# Patient Record
Sex: MALE | Race: WHITE | NOT HISPANIC OR LATINO | Employment: OTHER | URBAN - METROPOLITAN AREA
[De-identification: names, ages, dates, MRNs, and addresses within clinical notes are randomized per-mention and may not be internally consistent; named-entity substitution may affect disease eponyms.]

---

## 2023-09-25 DIAGNOSIS — Z95.2 AORTIC VALVE REPLACED: Primary | ICD-10-CM

## 2023-09-25 LAB
INR IN PPP BY COAGULATION ASSAY EXTERNAL: 2.4
PROTHROMBIN TIME (PT) IN PPP BY COAGULATION ASSAY EXTERNAL: NORMAL SECONDS

## 2023-10-09 ENCOUNTER — ANTICOAGULATION - WARFARIN VISIT (OUTPATIENT)
Dept: CARDIOLOGY | Facility: CLINIC | Age: 58
End: 2023-10-09
Payer: MEDICARE

## 2023-10-09 DIAGNOSIS — Z95.2 AORTIC VALVE REPLACED: Primary | ICD-10-CM

## 2023-10-09 NOTE — PROGRESS NOTES
Patient identification verified with 2 identifiers.    Location: [facility] Patient Self-Testing Program [phone]    Referring Physician: Dr Jang  Enrollment/ Re-enrollment date: 2024.   INR Goal: 2.0-3.0  INR monitoring is per [facility] protocol.  Anticoagulation Medication: warfarin  Indication:  Aortic valve replaced    Subjective  Received faxed INR self test result and phoned patient.  Patient verified by two identifiers.   Bleeding signs/symptoms: No    Bruising: No   Major bleeding event: No  Thrombosis signs/symptoms: No  Thromboembolic event: No  Missed doses: No  Extra doses: No  Medication changes: No  Dietary changes: No  Change in health: No  Change in activity: No  Alcohol: No  Other concerns: No    Upcoming Surgeries:  Does the Patient Have any upcoming surgeries that require interruption in anticoagulation therapy? no  Does the patient require bridging? no      Anticoagulation Summary  As of 10/9/2023      INR goal:  2.0-3.0   TTR:  100.0 % (4 d)   INR used for dosin.40 (10/9/2023)   Weekly warfarin total:  7.5 mg               Assessment/Plan   Therapeutic     1. New dose: no change  Reviewed with patient current warfarin dose and patient read back dosing correctly.   2. Next INR: 1 week      Education provided to patient during the visit:  Patient instructed to call in interim with questions, concerns and changes.   Patient educated on interactions between medications and warfarin.   Patient educated on dietary consistency in vitamin k consumption.   Patient educated on affects of alcohol consumption while taking warfarin.   Patient educated on signs of bleeding/clotting.   Patient educated on compliance with dosing, follow up appointments, and prescribed plan of care.

## 2023-10-16 ENCOUNTER — ANTICOAGULATION - WARFARIN VISIT (OUTPATIENT)
Dept: CARDIOLOGY | Facility: CLINIC | Age: 58
End: 2023-10-16
Payer: MEDICARE

## 2023-10-16 DIAGNOSIS — Z95.2 AORTIC VALVE REPLACED: Primary | ICD-10-CM

## 2023-10-16 NOTE — PROGRESS NOTES
Patient identification verified with 2 identifiers.    Location: [facility] Patient Self-Testing Program [phone]    Referring Physician: Dr Ronni Jang DO  Enrollment/ Re-enrollment date: 2024   INR Goal: 2.0-3.0  INR monitoring is per [facility] protocol.  Anticoagulation Medication: warfarin  Indication:  Aortic Value Replaced    Subjective   Bleeding signs/symptoms: No    Bruising: No   Major bleeding event: No  Thrombosis signs/symptoms: No  Thromboembolic event: No  Missed doses: No  Extra doses: No  Medication changes: No  Dietary changes: No  Change in health: No  Change in activity: No  Alcohol: No  Other concerns: No    Upcoming Surgeries:  Does the Patient Have any upcoming surgeries that require interruption in anticoagulation therapy? no  Does the patient require bridging? no      Anticoagulation Summary  As of 10/16/2023      INR goal:  2.0-3.0   TTR:  100.0 % (1.6 wk)   INR used for dosin.40 (10/16/2023)   Weekly warfarin total:  7.5 mg             Received faxed INR self-test results and called patient. Pt identification verified with 2 pt identifiers. Current dose schedule reviewed with patient, patient verbalized understanding. Pt instructed to call in interim with questions, concerns or changes.  SALVADOR Gonzales RN      Assessment/Plan   Therapeutic     1. New dose: no change    2. Next INR: 2 weeks      Education provided to patient during the visit:  Patient instructed to call in interim with questions, concerns and changes.

## 2023-10-30 ENCOUNTER — ANTICOAGULATION - WARFARIN VISIT (OUTPATIENT)
Dept: CARDIOLOGY | Facility: CLINIC | Age: 58
End: 2023-10-30
Payer: MEDICARE

## 2023-10-30 DIAGNOSIS — Z95.2 AORTIC VALVE REPLACED: Primary | ICD-10-CM

## 2023-10-30 LAB
INR IN PPP BY COAGULATION ASSAY EXTERNAL: 2.3
PROTHROMBIN TIME (PT) IN PPP BY COAGULATION ASSAY EXTERNAL: NORMAL SECONDS

## 2023-10-30 NOTE — PROGRESS NOTES
Patient identification verified with 2 identifiers.    Location: [facility] Patient Self-Testing Program [phone]    Referring Physician: Dr. Ronni Jang  Enrollment/ Re-enrollment date: 2024   INR Goal: 2.0-3.0  INR monitoring is per [facility] protocol.  Anticoagulation Medication: warfarin  Indication: Aortic Value Replaced    Subjective   Bleeding signs/symptoms: No  Bruising: No   Major bleeding event: No  Thrombosis signs/symptoms: No  Thromboembolic event: No  Missed doses: No  Extra doses: No  Medication changes: No  Dietary changes: No  Change in health: No  Change in activity: No  Alcohol: No  Other concerns: No    Upcoming Surgeries:  Does the Patient Have any upcoming surgeries that require interruption in anticoagulation therapy? no  Does the patient require bridging? no      Anticoagulation Summary  As of 10/30/2023      INR goal:  2.0-3.0   TTR:  100.0 % (3.6 wk)   INR used for dosin.30 (10/30/2023)   Weekly warfarin total:  7.5 mg               Assessment/Plan   Therapeutic     1. New dose: no change    2. Next INR: 2 weeks      Education provided to patient during the visit:  Patient instructed to call in interim with questions, concerns and changes.   Patient educated on interactions between medications and warfarin.   Patient educated on dietary consistency in vitamin k consumption.   Patient educated on affects of alcohol consumption while taking warfarin.   Patient educated on signs of bleeding/clotting.   Patient educated on compliance with dosing, follow up appointments, and prescribed plan of care.

## 2023-11-13 ENCOUNTER — ANTICOAGULATION - WARFARIN VISIT (OUTPATIENT)
Dept: CARDIOLOGY | Facility: CLINIC | Age: 58
End: 2023-11-13
Payer: MEDICARE

## 2023-11-13 DIAGNOSIS — Z95.2 AORTIC VALVE REPLACED: Primary | ICD-10-CM

## 2023-11-13 LAB
INR IN PPP BY COAGULATION ASSAY EXTERNAL: 2.6
PROTHROMBIN TIME (PT) IN PPP BY COAGULATION ASSAY EXTERNAL: NORMAL SECONDS

## 2023-11-13 NOTE — PROGRESS NOTES
Patient identification verified with 2 identifiers.    Location: [facility] Patient Self-Testing Program [phone]    Referring Physician: Dr. Ronni Jang  Enrollment/ Re-enrollment date: 2024   INR Goal: 2.0-3.0  [facility]  Anticoagulation Medication: warfarin  Indication: Aortic Value Replaced    Subjective   Bleeding signs/symptoms: No  Bruising: No   Major bleeding event: No  Thrombosis signs/symptoms: No  Thromboembolic event: No  Missed doses: No  Extra doses: No  Medication changes: No  Dietary changes: No  Change in health: No  Change in activity: No  Alcohol: No  Other concerns: No    Upcoming Surgeries:  Does the Patient Have any upcoming surgeries that require interruption in anticoagulation therapy? no  Does the patient require bridging? no      Anticoagulation Summary  As of 2023      INR goal:  2.0-3.0   TTR:  100.0 % (1.3 mo)   INR used for dosin.60 (2023)   Weekly warfarin total:  7.5 mg               Assessment/Plan   Therapeutic     1. New dose: no change    2. Next INR: 2 weeks      Education provided to patient during the visit:  Patient instructed to call in interim with questions, concerns and changes.   Patient educated on interactions between medications and warfarin.   Patient educated on dietary consistency in vitamin k consumption.   Patient educated on affects of alcohol consumption while taking warfarin.   Patient educated on signs of bleeding/clotting.   Patient educated on compliance with dosing, follow up appointments, and prescribed plan of care.

## 2023-11-27 ENCOUNTER — ANTICOAGULATION - WARFARIN VISIT (OUTPATIENT)
Dept: CARDIOLOGY | Facility: CLINIC | Age: 58
End: 2023-11-27
Payer: MEDICARE

## 2023-11-27 DIAGNOSIS — Z95.2 AORTIC VALVE REPLACED: Primary | ICD-10-CM

## 2023-11-27 LAB
INR IN PPP BY COAGULATION ASSAY EXTERNAL: 3.2
PROTHROMBIN TIME (PT) IN PPP BY COAGULATION ASSAY EXTERNAL: NORMAL SECONDS

## 2023-11-27 NOTE — PROGRESS NOTES
Patient identification verified with 2 identifiers.    Location: [facility] Patient Self-Testing Program [phone]    Referring Physician: Dr. Ronni Jang   Enrollment/ Re-enrollment date: 24   INR Goal: 2.0-3.0  INR monitoring is per [facility] protocol.  Anticoagulation Medication: warfarin  Indication: Aortic Valve Replacement    Subjective   Bleeding signs/symptoms: No    Bruising: No   Major bleeding event: No  Thrombosis signs/symptoms: No  Thromboembolic event: No  Missed doses: No  Extra doses: No  Medication changes: No  Dietary changes: Yes  pt's appetite has been poor lately b/c his dog  recently  Change in health: No  Change in activity: No  Alcohol: No  Other concerns: No    Upcoming Surgeries:  Does the Patient Have any upcoming surgeries that require interruption in anticoagulation therapy? no  Does the patient require bridging? no      Anticoagulation Summary  As of 2023      INR goal:  2.0-3.0   TTR:  91.2 % (1.8 mo)   INR used for dosing:  3.20 (2023)   Weekly warfarin total:  7 mg               Assessment/Plan   Supratherapeutic     1. New dose:  decrease weekly dose per protocol     2. Next INR: 1 week      Education provided to patient during the visit:  Patient instructed to call in interim with questions, concerns and changes.   Patient educated on interactions between medications and warfarin.   Patient educated on dietary consistency in vitamin k consumption.   Patient educated on compliance with dosing, follow up appointments, and prescribed plan of care.

## 2023-12-04 ENCOUNTER — ANTICOAGULATION - WARFARIN VISIT (OUTPATIENT)
Dept: CARDIOLOGY | Facility: CLINIC | Age: 58
End: 2023-12-04
Payer: MEDICARE

## 2023-12-04 DIAGNOSIS — Z95.2 AORTIC VALVE REPLACED: ICD-10-CM

## 2023-12-04 LAB
INR IN PPP BY COAGULATION ASSAY EXTERNAL: 3.9
PROTHROMBIN TIME (PT) IN PPP BY COAGULATION ASSAY EXTERNAL: NORMAL SECONDS

## 2023-12-04 NOTE — PROGRESS NOTES
Patient identification verified with 2 identifiers.    Location: [facility] Patient Self-Testing Program [phone]    Referring Physician: DR. RIVERA  Enrollment/ Re-enrollment date: 6/27/2024   INR Goal: 2.0-3.0  INR monitoring is per [facility] protocol.  Anticoagulation Medication: warfarin  Indication: Aortic Valve Replacement    Subjective   Bleeding signs/symptoms: No    Bruising: No   Major bleeding event: No  Thrombosis signs/symptoms: No  Thromboembolic event: No  Missed doses: No  Extra doses: No  Medication changes: No  Dietary changes: No  Change in health: Yes  APPETITE POOR  Change in activity: No  Alcohol: No  Other concerns: No    Upcoming Surgeries:  Does the Patient Have any upcoming surgeries that require interruption in anticoagulation therapy? no  Does the patient require bridging? no      Anticoagulation Summary  As of 12/4/2023      INR goal:  2.0-3.0   TTR:  80.6 % (2 mo)   INR used for dosing:  3.90 (12/4/2023)   Weekly warfarin total:  6.5 mg               Assessment/Plan   Supratherapeutic     1. New dose:  HOLD X 1 DAY AND DECREASED - pt was therapeutic for may weeks, then suffeny increased 11/27 and 12/4     2. Next INR: 1 week      Education provided to patient during the visit:  Patient instructed to call in interim with questions, concerns and changes.   Patient educated on interactions between medications and warfarin.   Patient educated on dietary consistency in vitamin k consumption.   Patient educated on affects of alcohol consumption while taking warfarin.   Patient educated on signs of bleeding/clotting.   Patient educated on compliance with dosing, follow up appointments, and prescribed plan of care.

## 2023-12-11 ENCOUNTER — ANTICOAGULATION - WARFARIN VISIT (OUTPATIENT)
Dept: CARDIOLOGY | Facility: CLINIC | Age: 58
End: 2023-12-11
Payer: MEDICARE

## 2023-12-11 DIAGNOSIS — Z95.2 AORTIC VALVE REPLACED: ICD-10-CM

## 2023-12-11 NOTE — PROGRESS NOTES
Patient identification verified with 2 identifiers.    Location: [facility] Patient Self-Testing Program [phone]    Referring Physician: DR. RIVERA  Enrollment/ Re-enrollment date: 2024   INR Goal: 2.0-3.0  INR monitoring is per [facility] protocol.  Anticoagulation Medication: warfarin  Indication: Aortic Valve Replacement    Subjective   Bleeding signs/symptoms: No    Bruising: No   Major bleeding event: No  Thrombosis signs/symptoms: No  Thromboembolic event: No  Missed doses: No  Extra doses: No  Medication changes: No  Dietary changes: No  Change in health: No  Change in activity: No  Alcohol: No  Other concerns: No    Upcoming Surgeries:  Does the Patient Have any upcoming surgeries that require interruption in anticoagulation therapy? no  Does the patient require bridging? no      Anticoagulation Summary  As of 2023      INR goal:  2.0-3.0   TTR:  76.7 % (2.2 mo)   INR used for dosin.30 (2023)   Weekly warfarin total:  6.5 mg               Assessment/Plan     Maintain TWD   2. Next INR: 1 week      Education provided to patient during the visit:  Patient instructed to call in interim with questions, concerns and changes.   Patient educated on interactions between medications and warfarin.   Patient educated on dietary consistency in vitamin k consumption.   Patient educated on affects of alcohol consumption while taking warfarin.   Patient educated on signs of bleeding/clotting.   Patient educated on compliance with dosing, follow up appointments, and prescribed plan of care.

## 2023-12-18 ENCOUNTER — ANTICOAGULATION - WARFARIN VISIT (OUTPATIENT)
Dept: CARDIOLOGY | Facility: CLINIC | Age: 58
End: 2023-12-18
Payer: MEDICARE

## 2023-12-18 DIAGNOSIS — Z95.2 AORTIC VALVE REPLACED: Primary | ICD-10-CM

## 2023-12-18 LAB
INR IN PPP BY COAGULATION ASSAY EXTERNAL: 2.1
PROTHROMBIN TIME (PT) IN PPP BY COAGULATION ASSAY EXTERNAL: NORMAL SECONDS

## 2023-12-18 NOTE — PROGRESS NOTES
Patient identification verified with 2 identifiers.    Location: [facility] Patient Self-Testing Program [phone]     Referring Physician: DR. RIVERA  Enrollment/ Re-enrollment date: 2024   INR Goal: 2.0-3.0  INR monitoring is per [facility] protocol.  Anticoagulation Medication: warfarin  Indication: Aortic Valve Replacement    Subjective   Bleeding signs/symptoms: No    Bruising: No   Major bleeding event: No  Thrombosis signs/symptoms: No  Thromboembolic event: No  Missed doses: No  Extra doses: No  Medication changes: No  Dietary changes: No  Change in health: No  Change in activity: No  Alcohol: No  Other concerns: No    Upcoming Surgeries:  Does the Patient Have any upcoming surgeries that require interruption in anticoagulation therapy? no  Does the patient require bridging? no      Anticoagulation Summary  As of 2023      INR goal:  2.0-3.0   TTR:  78.9 % (2.5 mo)   INR used for dosin.10 (2023)   Weekly warfarin total:  6.5 mg               Assessment/Plan   Therapeutic     1. New dose: no change    2. Next INR: 2 weeks      Education provided to patient during the visit:  Patient instructed to call in interim with questions, concerns and changes.   Patient educated on compliance with dosing, follow up appointments, and prescribed plan of care.

## 2024-01-02 ENCOUNTER — ANTICOAGULATION - WARFARIN VISIT (OUTPATIENT)
Dept: CARDIOLOGY | Facility: CLINIC | Age: 59
End: 2024-01-02
Payer: COMMERCIAL

## 2024-01-02 DIAGNOSIS — Z95.2 AORTIC VALVE REPLACED: ICD-10-CM

## 2024-01-02 LAB
INR IN PPP BY COAGULATION ASSAY EXTERNAL: 2.8
PROTHROMBIN TIME (PT) IN PPP BY COAGULATION ASSAY EXTERNAL: NORMAL SECONDS

## 2024-01-02 NOTE — PROGRESS NOTES
Patient identification verified with 2 identifiers.    Location: [facility] Patient Self-Testing Program [phone]     Referring Physician: DR. RIVERA  Enrollment/ Re-enrollment date: 2024   INR Goal: 2.0-3.0  INR monitoring is per [facility] protocol.  Anticoagulation Medication: warfarin  Indication: Aortic Valve Replacement    Subjective   Bleeding signs/symptoms: No    Bruising: No   Major bleeding event: No  Thrombosis signs/symptoms: No  Thromboembolic event: No  Missed doses: No  Extra doses: No  Medication changes: No  Dietary changes: No  Change in health: No  Change in activity: No  Alcohol: No  Other concerns: No    Upcoming Surgeries:  Does the Patient Have any upcoming surgeries that require interruption in anticoagulation therapy? no  Does the patient require bridging? no      Anticoagulation Summary  As of 2024      INR goal:  2.0-3.0   TTR:  82.5 % (3 mo)   INR used for dosin.80 (2024)   Weekly warfarin total:  6.5 mg               Assessment/Plan   Therapeutic     1. New dose: no change    2. Next INR: 2 weeks      Education provided to patient during the visit:  Patient instructed to call in interim with questions, concerns and changes.   Patient educated on compliance with dosing, follow up appointments, and prescribed plan of care.

## 2024-01-15 ENCOUNTER — ANTICOAGULATION - WARFARIN VISIT (OUTPATIENT)
Dept: CARDIOLOGY | Facility: CLINIC | Age: 59
End: 2024-01-15
Payer: COMMERCIAL

## 2024-01-15 DIAGNOSIS — Z95.2 AORTIC VALVE REPLACED: Primary | ICD-10-CM

## 2024-01-15 LAB
INR IN PPP BY COAGULATION ASSAY EXTERNAL: 1.8 (ref 2–3)
PROTHROMBIN TIME (PT) IN PPP BY COAGULATION ASSAY EXTERNAL: ABNORMAL SECONDS

## 2024-01-15 NOTE — PROGRESS NOTES
Patient identification verified with 2 identifiers.    Location: [facility] Patient Self-Testing Program [phone]     Referring Physician: DR. RIVERA  Enrollment/ Re-enrollment date: 2024   INR Goal: 2.0-3.0  INR monitoring is per [facility] protocol.  Anticoagulation Medication: warfarin  Indication: Aortic Valve Replacement    Subjective   Bleeding signs/symptoms: No    Bruising: No   Major bleeding event: No  Thrombosis signs/symptoms: No  Thromboembolic event: No  Missed doses: Yes  Extra doses: No  Medication changes: No  Dietary changes: No  Change in health: No  Change in activity: No  Alcohol: No  Other concerns: No    Upcoming Surgeries:  Does the Patient Have any upcoming surgeries that require interruption in anticoagulation therapy? no  Does the patient require bridging? no      Anticoagulation Summary  As of 1/15/2024      INR goal:  2.0-3.0   TTR:  82.2 % (3.4 mo)   INR used for dosin.80 (1/15/2024)   Weekly warfarin total:  6.5 mg               Assessment/Plan   Subtherapeutic     1. New dose:  Additional one time dose today d/t missed dose last Fri.  Otherwise unchanged.  Pt confirms dosing schedule.     2. Next INR: 1 week      Education provided to patient during the visit:  Patient instructed to call in interim with questions, concerns and changes.   Patient educated on compliance with dosing, follow up appointments, and prescribed plan of care.

## 2024-01-22 ENCOUNTER — ANTICOAGULATION - WARFARIN VISIT (OUTPATIENT)
Dept: CARDIOLOGY | Facility: CLINIC | Age: 59
End: 2024-01-22
Payer: COMMERCIAL

## 2024-01-22 DIAGNOSIS — Z95.2 AORTIC VALVE REPLACED: ICD-10-CM

## 2024-01-22 NOTE — PROGRESS NOTES
Patient identification verified with 2 identifiers.    Location: [facility] Patient Self-Testing Program [phone]    Referring Physician: DR. LACIE RIVERA  Enrollment/ Re-enrollment date: 2024   INR Goal: 2.0-3.0  INR monitoring is per [facility] protocol.  Anticoagulation Medication: warfarin  Indication: Aortic Valve Replacement    Subjective   Bleeding signs/symptoms: No    Bruising: No   Major bleeding event: No  Thrombosis signs/symptoms: No  Thromboembolic event: No  Missed doses: No  Extra doses: No  Medication changes: No  Dietary changes: No  Change in health: No  Change in activity: No  Alcohol: No  Other concerns: No    Upcoming Surgeries:  Does the Patient Have any upcoming surgeries that require interruption in anticoagulation therapy? no  Does the patient require bridging? no      Anticoagulation Summary  As of 2024      INR goal:  2.0-3.0   TTR:  80.7 % (3.6 mo)   INR used for dosin.30 (2024)   Weekly warfarin total:  6.5 mg               Assessment/Plan   Therapeutic     1. New dose: no change    2. Next INR: 1 week      Education provided to patient during the visit:  Patient instructed to call in interim with questions, concerns and changes.   Patient educated on interactions between medications and warfarin.   Patient educated on dietary consistency in vitamin k consumption.   Patient educated on affects of alcohol consumption while taking warfarin.   Patient educated on signs of bleeding/clotting.   Patient educated on compliance with dosing, follow up appointments, and prescribed plan of care.

## 2024-01-29 ENCOUNTER — ANTICOAGULATION - WARFARIN VISIT (OUTPATIENT)
Dept: CARDIOLOGY | Facility: CLINIC | Age: 59
End: 2024-01-29
Payer: COMMERCIAL

## 2024-01-29 DIAGNOSIS — Z95.2 AORTIC VALVE REPLACED: ICD-10-CM

## 2024-01-29 NOTE — PROGRESS NOTES
Patient identification verified with 2 identifiers.    Location: [facility] Patient Self-Testing Program [phone]    Referring Physician: DR. LACIE RIVERA  Enrollment/ Re-enrollment date: 2024   INR Goal: 2.0-3.0  INR monitoring is per [facility] protocol.  Anticoagulation Medication: warfarin  Indication: Aortic Valve Replacement    Subjective   Bleeding signs/symptoms: No    Bruising: No   Major bleeding event: No  Thrombosis signs/symptoms: No  Thromboembolic event: No  Missed doses: No  Extra doses: No  Medication changes: No  Dietary changes: No  Change in health: No  Change in activity: No  Alcohol: No  Other concerns: No    Upcoming Surgeries:  Does the Patient Have any upcoming surgeries that require interruption in anticoagulation therapy? no  Does the patient require bridging? no      Anticoagulation Summary  As of 2024      INR goal:  2.0-3.0   TTR:  81.9 % (3.9 mo)   INR used for dosin.10 (2024)   Weekly warfarin total:  6.5 mg               Assessment/Plan   Therapeutic     1. New dose: no change  CALLED AND SPOKE TO PT. CONFIRMED DOSING . PT VERBALIZED CORRECTLY  2. Next INR: 2 weeks      Education provided to patient during the visit:  Patient instructed to call in interim with questions, concerns and changes.   Patient educated on interactions between medications and warfarin.   Patient educated on dietary consistency in vitamin k consumption.   Patient educated on affects of alcohol consumption while taking warfarin.   Patient educated on signs of bleeding/clotting.   Patient educated on compliance with dosing, follow up appointments, and prescribed plan of care.

## 2024-02-06 ENCOUNTER — HOSPITAL ENCOUNTER (OUTPATIENT)
Dept: CARDIOLOGY | Facility: HOSPITAL | Age: 59
Discharge: HOME | End: 2024-02-06
Payer: MEDICARE

## 2024-02-06 DIAGNOSIS — Z95.2 PRESENCE OF PROSTHETIC HEART VALVE: ICD-10-CM

## 2024-02-06 PROCEDURE — 93306 TTE W/DOPPLER COMPLETE: CPT | Performed by: INTERNAL MEDICINE

## 2024-02-06 PROCEDURE — 93306 TTE W/DOPPLER COMPLETE: CPT

## 2024-02-07 LAB
AORTIC VALVE MEAN GRADIENT: 32 MMHG
AORTIC VALVE PEAK VELOCITY: 4.06 M/S
AV PEAK GRADIENT: 65.9 MMHG
AVA (PEAK VEL): 0.88 CM2
AVA (VTI): 1.02 CM2
EJECTION FRACTION APICAL 4 CHAMBER: 62.4
LEFT VENTRICLE INTERNAL DIMENSION DIASTOLE: 4.64 CM (ref 3.5–6)
LEFT VENTRICULAR OUTFLOW TRACT DIAMETER: 1.7 CM
MITRAL VALVE E/A RATIO: 1.1
MITRAL VALVE E/E' RATIO: 22.93
RIGHT VENTRICLE FREE WALL PEAK S': 10.3 CM/S
RIGHT VENTRICLE PEAK SYSTOLIC PRESSURE: 40 MMHG
TRICUSPID ANNULAR PLANE SYSTOLIC EXCURSION: 2.2 CM

## 2024-02-12 ENCOUNTER — ANTICOAGULATION - WARFARIN VISIT (OUTPATIENT)
Dept: CARDIOLOGY | Facility: CLINIC | Age: 59
End: 2024-02-12
Payer: COMMERCIAL

## 2024-02-12 DIAGNOSIS — Z95.2 AORTIC VALVE REPLACED: ICD-10-CM

## 2024-02-12 LAB
INR IN PPP BY COAGULATION ASSAY EXTERNAL: 1.5
PROTHROMBIN TIME (PT) IN PPP BY COAGULATION ASSAY EXTERNAL: NORMAL SECONDS

## 2024-02-12 NOTE — PROGRESS NOTES
Patient identification verified with 2 identifiers.    Location: [facility] Patient Self-Testing Program [phone]    Referring Physician: DR. LACIE RIVERA  Enrollment/ Re-enrollment date: 2024   INR Goal: 2.0-3.0  INR monitoring is per [facility] protocol.  Anticoagulation Medication: warfarin  Indication: Aortic Valve Replacement    Subjective   Bleeding signs/symptoms: No    Bruising: No   Major bleeding event: No  Thrombosis signs/symptoms: No  Thromboembolic event: No  Missed doses: Yes  MISSED 1MG DOSE 12/10/24  Extra doses: No  Medication changes: No  Dietary changes: No  Change in health: No  Change in activity: No  Alcohol: No  Other concerns: No    Upcoming Surgeries:  Does the Patient Have any upcoming surgeries that require interruption in anticoagulation therapy? no  Does the patient require bridging? no      Anticoagulation Summary  As of 2024      INR goal:  2.0-3.0   TTR:  74.9 % (4.3 mo)   INR used for dosin.50 (2024)   Weekly warfarin total:  6.5 mg               Assessment/Plan   SUB THERAPEUTIC IN SETTING OF ONE MISSED DOSE.  WILL GIVE ONE TIME DOSE INCREASE TODAY     1. New dose: no change  CALLED AND SPOKE TO PT. CONFIRMED DOSING  PT VERBALIZED CORRECTLY  2. Next INR:1 WEEK      Education provided to patient during the visit:  Patient instructed to call in interim with questions, concerns and changes.   Patient educated on interactions between medications and warfarin.   Patient educated on dietary consistency in vitamin k consumption.   Patient educated on affects of alcohol consumption while taking warfarin.   Patient educated on signs of bleeding/clotting.   Patient educated on compliance with dosing, follow up appointments, and prescribed plan of care.

## 2024-02-19 ENCOUNTER — ANTICOAGULATION - WARFARIN VISIT (OUTPATIENT)
Dept: CARDIOLOGY | Facility: CLINIC | Age: 59
End: 2024-02-19
Payer: COMMERCIAL

## 2024-02-19 DIAGNOSIS — Z95.2 AORTIC VALVE REPLACED: Primary | ICD-10-CM

## 2024-02-19 LAB
INR IN PPP BY COAGULATION ASSAY EXTERNAL: 2 (ref 2–3)
PROTHROMBIN TIME (PT) IN PPP BY COAGULATION ASSAY EXTERNAL: NORMAL SECONDS

## 2024-02-19 NOTE — PROGRESS NOTES
Patient identification verified with 2 identifiers.    Location: [facility] Patient Self-Testing Program [phone]    Referring Physician: DR. LACIE RIVERA  Enrollment/ Re-enrollment date: 2024   INR Goal: 2.0-3.0  INR monitoring is per [facility] protocol.  Anticoagulation Medication: warfarin  Indication: Aortic Valve Replacement    Subjective   Bleeding signs/symptoms: No    Bruising: No   Major bleeding event: No  Thrombosis signs/symptoms: No  Thromboembolic event: No  Missed doses: No  Extra doses: No  Medication changes: No  Dietary changes: No  Change in health: No  Change in activity: No  Alcohol: No  Other concerns: No    Upcoming Surgeries:  Does the Patient Have any upcoming surgeries that require interruption in anticoagulation therapy? no  Does the patient require bridging? no      Anticoagulation Summary  As of 2024      INR goal:  2.0-3.0   TTR:  71.0 % (4.6 mo)   INR used for dosin.00 (2024)   Weekly warfarin total:  6.5 mg               Assessment/Plan   Therapeutic     1. New dose: no change  CALLED AND SPOKE TO PT. CONFIRMED DOSING . PT VERBALIZED CORRECTLY  2. Next INR: 1 week      Education provided to patient during the visit:  Patient instructed to call in interim with questions, concerns and changes.   Patient educated on interactions between medications and warfarin.   Patient educated on dietary consistency in vitamin k consumption.   Patient educated on affects of alcohol consumption while taking warfarin.   Patient educated on signs of bleeding/clotting.   Patient educated on compliance with dosing, follow up appointments, and prescribed plan of care.

## 2024-02-26 ENCOUNTER — ANTICOAGULATION - WARFARIN VISIT (OUTPATIENT)
Dept: CARDIOLOGY | Facility: CLINIC | Age: 59
End: 2024-02-26
Payer: COMMERCIAL

## 2024-02-26 DIAGNOSIS — Z95.2 AORTIC VALVE REPLACED: Primary | ICD-10-CM

## 2024-02-26 LAB
INR IN PPP BY COAGULATION ASSAY EXTERNAL: 1.6 (ref 2–3)
PROTHROMBIN TIME (PT) IN PPP BY COAGULATION ASSAY EXTERNAL: ABNORMAL SECONDS

## 2024-02-26 NOTE — PROGRESS NOTES
Patient identification verified with 2 identifiers.    Location: [facility] Patient Self-Testing Program [phone]    Referring Physician: DR. LACIE RIVERA  Enrollment/ Re-enrollment date: 2024   INR Goal: 2.0-3.0  INR monitoring is per [facility] protocol.  Anticoagulation Medication: warfarin  Indication: Aortic Valve Replacement    Subjective   Bleeding signs/symptoms: No    Bruising: No   Major bleeding event: No  Thrombosis signs/symptoms: No  Thromboembolic event: No  Missed doses: No  Extra doses: No  Medication changes: No  Dietary changes: No  Change in health: Yes  Change in activity: No  Alcohol: No  Other concerns: No    Upcoming Surgeries:  Does the Patient Have any upcoming surgeries that require interruption in anticoagulation therapy? no  Does the patient require bridging? no      Anticoagulation Summary  As of 2024      INR goal:  2.0-3.0   TTR:  67.6 % (4.8 mo)   INR used for dosin.60 (2024)   Weekly warfarin total:  7 mg               Assessment/Plan   Subtherapeutic     1. New dose:  TWD increased approximately 10% per protocol.  Additional .5mg given today.  Dr. Rivera notified via secure chat.  Pt states understanding.     2. Next INR: 1 week      Education provided to patient during the visit:  Patient instructed to call in interim with questions, concerns and changes.   Patient educated on interactions between medications and warfarin.   Patient educated on dietary consistency in vitamin k consumption.   Patient educated on affects of alcohol consumption while taking warfarin.   Patient educated on signs of bleeding/clotting.   Patient educated on compliance with dosing, follow up appointments, and prescribed plan of care.

## 2024-03-04 ENCOUNTER — ANTICOAGULATION - WARFARIN VISIT (OUTPATIENT)
Dept: CARDIOLOGY | Facility: CLINIC | Age: 59
End: 2024-03-04
Payer: COMMERCIAL

## 2024-03-04 DIAGNOSIS — Z95.2 AORTIC VALVE REPLACED: Primary | ICD-10-CM

## 2024-03-04 LAB
INR IN PPP BY COAGULATION ASSAY EXTERNAL: 2.1 (ref 2–3)
PROTHROMBIN TIME (PT) IN PPP BY COAGULATION ASSAY EXTERNAL: NORMAL SECONDS

## 2024-03-04 NOTE — PROGRESS NOTES
Patient identification verified with 2 identifiers.    Location: [facility] Patient Self-Testing Program [phone]    Referring Physician: DR. LACIE RIVERA  Enrollment/ Re-enrollment date: 2024   INR Goal: 2.0-3.0  INR monitoring is per [facility] protocol.  Anticoagulation Medication: warfarin  Indication: Aortic Valve Replacement    Subjective   Bleeding signs/symptoms: No    Bruising: No   Major bleeding event: No  Thrombosis signs/symptoms: No  Thromboembolic event: No  Missed doses: No  Extra doses: No  Medication changes: No  Dietary changes: No  Change in health: No  Change in activity: No  Alcohol: No  Other concerns: No    Upcoming Surgeries:  Does the Patient Have any upcoming surgeries that require interruption in anticoagulation therapy? no  Does the patient require bridging? no      Anticoagulation Summary  As of 3/4/2024      INR goal:  2.0-3.0   TTR:  65.4 % (5 mo)   INR used for dosin.10 (3/4/2024)   Weekly warfarin total:  7 mg               Assessment/Plan   Therapeutic     1. New dose: no change    2. Next INR: 1 week      Education provided to patient during the visit:  Patient instructed to call in interim with questions, concerns and changes.   Patient educated on interactions between medications and warfarin.   Patient educated on dietary consistency in vitamin k consumption.   Patient educated on affects of alcohol consumption while taking warfarin.   Patient educated on signs of bleeding/clotting.   Patient educated on compliance with dosing, follow up appointments, and prescribed plan of care.

## 2024-03-06 ENCOUNTER — OFFICE VISIT (OUTPATIENT)
Dept: CARDIOLOGY | Facility: CLINIC | Age: 59
End: 2024-03-06
Payer: MEDICARE

## 2024-03-06 VITALS
BODY MASS INDEX: 19.39 KG/M2 | SYSTOLIC BLOOD PRESSURE: 114 MMHG | WEIGHT: 96 LBS | HEART RATE: 76 BPM | DIASTOLIC BLOOD PRESSURE: 70 MMHG

## 2024-03-06 DIAGNOSIS — Z95.2 AORTIC VALVE REPLACED: Primary | ICD-10-CM

## 2024-03-06 DIAGNOSIS — I05.9 MITRAL VALVE DISORDER: ICD-10-CM

## 2024-03-06 PROCEDURE — 99213 OFFICE O/P EST LOW 20 MIN: CPT | Performed by: INTERNAL MEDICINE

## 2024-03-06 PROCEDURE — 1036F TOBACCO NON-USER: CPT | Performed by: INTERNAL MEDICINE

## 2024-03-06 RX ORDER — METOPROLOL SUCCINATE 25 MG/1
25 TABLET, EXTENDED RELEASE ORAL EVERY 24 HOURS
Qty: 90 TABLET | Refills: 3 | Status: SHIPPED | OUTPATIENT
Start: 2024-03-06

## 2024-03-06 RX ORDER — METOPROLOL SUCCINATE 25 MG/1
25 TABLET, EXTENDED RELEASE ORAL EVERY 24 HOURS
COMMUNITY
End: 2024-03-06 | Stop reason: SDUPTHER

## 2024-03-06 RX ORDER — WARFARIN 1 MG/1
1 TABLET ORAL EVERY 24 HOURS
COMMUNITY
Start: 2022-05-04 | End: 2024-03-06 | Stop reason: SDUPTHER

## 2024-03-06 RX ORDER — WARFARIN 1 MG/1
1 TABLET ORAL SEE ADMIN INSTRUCTIONS
Qty: 120 TABLET | Refills: 3 | Status: SHIPPED | OUTPATIENT
Start: 2024-03-06

## 2024-03-06 ASSESSMENT — PATIENT HEALTH QUESTIONNAIRE - PHQ9
2. FEELING DOWN, DEPRESSED OR HOPELESS: NOT AT ALL
1. LITTLE INTEREST OR PLEASURE IN DOING THINGS: NOT AT ALL
SUM OF ALL RESPONSES TO PHQ9 QUESTIONS 1 & 2: 0

## 2024-03-06 ASSESSMENT — ENCOUNTER SYMPTOMS
DYSPNEA ON EXERTION: 0
OCCASIONAL FEELINGS OF UNSTEADINESS: 0
PALPITATIONS: 0
HEMATURIA: 0
COUGH: 0
NUMBNESS: 0
LOSS OF SENSATION IN FEET: 0
PARESTHESIAS: 0
ABDOMINAL PAIN: 0
BLURRED VISION: 0
DEPRESSION: 0
DYSURIA: 0
SHORTNESS OF BREATH: 0

## 2024-03-06 ASSESSMENT — PAIN SCALES - GENERAL: PAINLEVEL: 0-NO PAIN

## 2024-03-06 NOTE — PROGRESS NOTES
Subjective   Robert Luna is a 58 y.o. male.    Chief Complaint:  Follow-up (6 month follow up and echo results)    HPI    Review of Systems   Constitutional: Negative for malaise/fatigue.   HENT:  Negative for congestion.    Eyes:  Negative for blurred vision.   Cardiovascular:  Negative for chest pain, dyspnea on exertion and palpitations.   Respiratory:  Negative for cough and shortness of breath.    Musculoskeletal:  Negative for joint pain.   Gastrointestinal:  Negative for abdominal pain.   Genitourinary:  Negative for dysuria and hematuria.   Neurological:  Negative for numbness and paresthesias.       Objective   Constitutional:       Appearance: Not in distress.   Eyes:      Conjunctiva/sclera: Conjunctivae normal.   Neck:      Vascular: JVD normal.   Pulmonary:      Breath sounds: Normal breath sounds. No wheezing. No rhonchi. No rales.   Cardiovascular:      Normal rate. Regular rhythm.      Murmurs: There is a grade 3/6 mid frequency early systolic murmur.      No gallop.  No click. No rub.   Abdominal:      Palpations: Abdomen is soft.   Neurological:      General: No focal deficit present.      Mental Status: Alert.         Lab Review:       Assessment/Plan   The primary encounter diagnosis was Aortic valve replaced. A diagnosis of Mitral valve disorder was also pertinent to this visit.    Aortic valve replaced  Well compensated.  AVR with 32 mm Hg mean gradient.  Plan on rechecking echo approx. 11/24    Mitral valve disorder  Well compensated, will recheck echo approx 11/24

## 2024-03-11 ENCOUNTER — ANTICOAGULATION - WARFARIN VISIT (OUTPATIENT)
Dept: CARDIOLOGY | Facility: CLINIC | Age: 59
End: 2024-03-11
Payer: COMMERCIAL

## 2024-03-11 DIAGNOSIS — Z95.2 AORTIC VALVE REPLACED: Primary | ICD-10-CM

## 2024-03-11 NOTE — PROGRESS NOTES
Patient identification verified with 2 identifiers.    Location: [facility] Patient Self-Testing Program [phone]    Referring Physician: Ronni Jang DO   Enrollment/ Re-enrollment date: 2024   INR Goal: 2.0-3.0  INR monitoring is per [facility] protocol.  Anticoagulation Medication: warfarin  Indication: Aortic Valve Replacement    Subjective   Bleeding signs/symptoms: No    Bruising: No   Major bleeding event: No  Thrombosis signs/symptoms: No  Thromboembolic event: No  Missed doses: No  Extra doses: No  Medication changes: No  Dietary changes: No  Change in health: No  Change in activity: No  Alcohol: No  Other concerns: No    Upcoming Procedures:  Does the Patient Have any upcoming procedures that require interruption in anticoagulation therapy? no  Does the patient require bridging? no      Anticoagulation Summary  As of 3/11/2024      INR goal:  2.0-3.0   TTR:  66.9 % (5.3 mo)   INR used for dosin.30 (3/11/2024)   Weekly warfarin total:  7 mg               Assessment/Plan   Therapeutic     1. New dose: no change    2. Next INR: 1 week      Education provided to patient during the visit:  Patient instructed to call in interim with questions, concerns and changes.

## 2024-03-18 ENCOUNTER — ANTICOAGULATION - WARFARIN VISIT (OUTPATIENT)
Dept: CARDIOLOGY | Facility: CLINIC | Age: 59
End: 2024-03-18
Payer: COMMERCIAL

## 2024-03-18 DIAGNOSIS — Z95.2 AORTIC VALVE REPLACED: Primary | ICD-10-CM

## 2024-03-18 NOTE — PROGRESS NOTES
Patient identification verified with 2 identifiers.    Location: [facility] Patient Self-Testing Program [phone]    Referring Physician: DR. RIVERA  Enrollment/ Re-enrollment date: 24   INR Goal: 2.0-3.0  INR monitoring is per [facility] protocol.  Anticoagulation Medication: warfarin  Indication: Aortic Valve Replacement    Subjective   Bleeding signs/symptoms: No    Bruising: No   Major bleeding event: No  Thrombosis signs/symptoms: No  Thromboembolic event: No  Missed doses: No  Extra doses: No  Medication changes: No  Dietary changes: No  Change in health: No  Change in activity: No  Alcohol: No  Other concerns: No    Upcoming Procedures:  Does the Patient Have any upcoming procedures that require interruption in anticoagulation therapy? no  Does the patient require bridging? no      Anticoagulation Summary  As of 3/18/2024      INR goal:  2.0-3.0   TTR:  68.3 % (5.5 mo)   INR used for dosin.60 (3/18/2024)   Weekly warfarin total:  7 mg               Assessment/Plan   Therapeutic     1. New dose: no change    2. Next INR: 2 weeks  I SPOKE TO PT CONFIRMING CURRENT WARFARIN DOSING.   PT WILL MAINTAIN CURRENT  WEEKLY DOSE SCHEDULE.  PT VERBALIZED UNDERSTANDING OF THESE DOSING INSTRUCTIONS.        Education provided to patient during the visit:  Patient instructed to call in interim with questions, concerns and changes.   Patient educated on interactions between medications and warfarin.   Patient educated on dietary consistency in vitamin k consumption.   Patient educated on signs of bleeding/clotting.   Patient educated on compliance with dosing, follow up appointments, and prescribed plan of care.

## 2024-04-01 ENCOUNTER — ANTICOAGULATION - WARFARIN VISIT (OUTPATIENT)
Dept: CARDIOLOGY | Facility: CLINIC | Age: 59
End: 2024-04-01
Payer: COMMERCIAL

## 2024-04-01 DIAGNOSIS — Z95.2 AORTIC VALVE REPLACED: Primary | ICD-10-CM

## 2024-04-01 LAB
INR IN PPP BY COAGULATION ASSAY EXTERNAL: 3.8
PROTHROMBIN TIME (PT) IN PPP BY COAGULATION ASSAY EXTERNAL: NORMAL SECONDS

## 2024-04-01 NOTE — PROGRESS NOTES
Patient identification verified with 2 identifiers.    Location: [facility] Patient Self-Testing Program [phone]    Referring Physician: DR. RIVERA  Enrollment/ Re-enrollment date: 6/27/24   INR Goal: 2.0-3.0  INR monitoring is per [facility] protocol.  Anticoagulation Medication: warfarin  Indication: Aortic Valve Replacement    Subjective   Bleeding signs/symptoms: No    Bruising: No   Major bleeding event: No  Thrombosis signs/symptoms: No  Thromboembolic event: No  Missed doses: No  Extra doses: No  Medication changes: No  Dietary changes: No  Change in health: No  Change in activity: No  Alcohol: No  Other concerns: No    Upcoming Procedures:  Does the Patient Have any upcoming procedures that require interruption in anticoagulation therapy? no  Does the patient require bridging? no      Anticoagulation Summary  As of 4/1/2024      INR goal:  2.0-3.0   TTR:  65.6 % (6 mo)   INR used for dosing:  3.80 (4/1/2024)   Weekly warfarin total:  7 mg             Received faxed INR self-test results and called patient. Pt identification verified with 2 pt identifiers. Current dose schedule reviewed with patient, patient verbalized understanding. Pt instructed to call in interim with questions, concerns or changes.  SALVADOR Gonzales RN    Assessment/Plan   Supratherapeutic     1. New dose:  Will hold one day and maintain weekly dosing     2. Next INR: 1 week      Education provided to patient during the visit:  Patient instructed to call in interim with questions, concerns and changes.   Patient educated on dietary consistency in vitamin k consumption.

## 2024-04-08 ENCOUNTER — ANTICOAGULATION - WARFARIN VISIT (OUTPATIENT)
Dept: CARDIOLOGY | Facility: HOSPITAL | Age: 59
End: 2024-04-08
Payer: COMMERCIAL

## 2024-04-08 DIAGNOSIS — Z95.2 AORTIC VALVE REPLACED: Primary | ICD-10-CM

## 2024-04-08 NOTE — PROGRESS NOTES
Patient identification verified with 2 identifiers.    Location: [facility] Patient Self-Testing Program [phone]    Referring Physician: DR. RIVERA  Enrollment/ Re-enrollment date: 24   INR Goal: 2.0-3.0  INR monitoring is per [facility] protocol.  Anticoagulation Medication: warfarin  Indication: Aortic Valve Replacement    Subjective   Bleeding signs/symptoms: No    Bruising: No   Major bleeding event: No  Thrombosis signs/symptoms: No  Thromboembolic event: No  Missed doses: No  Extra doses: No  Medication changes: No  Dietary changes: No  Change in health: No  Change in activity: No  Alcohol: No  Other concerns: No    Upcoming Procedures:  Does the Patient Have any upcoming procedures that require interruption in anticoagulation therapy? no  Does the patient require bridging? no      Anticoagulation Summary  As of 2024      INR goal:  2.0-3.0   TTR:  63.9 % (6.2 mo)   INR used for dosin.80 (2024)   Weekly warfarin total:  7 mg             Received faxed INR self-test results and called patient. Pt identification verified with 2 pt identifiers. Current dose schedule reviewed with patient, patient verbalized understanding. Pt instructed to call in interim with questions, concerns or changes.  ADRIAN Lim RN    Assessment/Plan   Supratherapeutic     1. New dose:  no change      2. Next INR: 1 week      Education provided to patient during the visit:  Patient instructed to call in interim with questions, concerns and changes.   Patient educated on dietary consistency in vitamin k consumption.

## 2024-04-15 ENCOUNTER — ANTICOAGULATION - WARFARIN VISIT (OUTPATIENT)
Dept: CARDIOLOGY | Facility: CLINIC | Age: 59
End: 2024-04-15
Payer: COMMERCIAL

## 2024-04-15 DIAGNOSIS — Z95.2 AORTIC VALVE REPLACED: ICD-10-CM

## 2024-04-15 LAB
INR IN PPP BY COAGULATION ASSAY EXTERNAL: 3.3
PROTHROMBIN TIME (PT) IN PPP BY COAGULATION ASSAY EXTERNAL: NORMAL SECONDS

## 2024-04-15 NOTE — PROGRESS NOTES
Patient identification verified with 2 identifiers.    Location: [facility] Patient Self-Testing Program [phone]    Referring Physician: DR. LACIE RIVERA  Enrollment/ Re-enrollment date: 7/17/2024   INR Goal: 2.0-3.0  INR monitoring is per [facility] protocol.  Anticoagulation Medication: warfarin  Indication: Aortic Valve Replacement    Subjective   Bleeding signs/symptoms: No    Bruising: No   Major bleeding event: No  Thrombosis signs/symptoms: No  Thromboembolic event: No  Missed doses: No  Extra doses: No  Medication changes: No  Dietary changes: No  Change in health: Yes  PT HAD GI UPSET WITH DIARRHEA LAST WEEK.  Change in activity: No  Alcohol: No  Other concerns: No    Upcoming Procedures:  Does the Patient Have any upcoming procedures that require interruption in anticoagulation therapy? no  Does the patient require bridging? no      Anticoagulation Summary  As of 4/15/2024      INR goal:  2.0-3.0   TTR:  63.0% (6.4 mo)   INR used for dosing:  3.30 (4/15/2024)   Weekly warfarin total:  6.5 mg               Assessment/Plan   Supratherapeutic     1. New dose:  SPOKE TO PT. WILL DECREASE TWD. PT VERBALIZED NEW DOSING INSTRUCTIONS CORRECTLY.     2. Next INR: 1 week      Education provided to patient during the visit:  Patient instructed to call in interim with questions, concerns and changes.   Patient educated on compliance with dosing, follow up appointments, and prescribed plan of care.

## 2024-04-22 ENCOUNTER — ANTICOAGULATION - WARFARIN VISIT (OUTPATIENT)
Dept: CARDIOLOGY | Facility: CLINIC | Age: 59
End: 2024-04-22
Payer: COMMERCIAL

## 2024-04-22 DIAGNOSIS — Z95.2 AORTIC VALVE REPLACED: Primary | ICD-10-CM

## 2024-04-22 NOTE — PROGRESS NOTES
Patient identification verified with 2 identifiers.    Location: [facility] Patient Self-Testing Program [phone]    Referring Physician: DR. RIVERA  Enrollment/ Re-enrollment date: 24   INR Goal: 2.0-3.0  INR monitoring is per [facility] protocol.  Anticoagulation Medication: warfarin  Indication: Aortic Valve Replacement    Subjective   Bleeding signs/symptoms: No    Bruising: No   Major bleeding event: No  Thrombosis signs/symptoms: No  Thromboembolic event: No  Missed doses: No  Extra doses: No  Medication changes: No  Dietary changes: No  Change in health: No  Change in activity: No  Alcohol: No  Other concerns: No    Upcoming Procedures:  Does the Patient Have any upcoming procedures that require interruption in anticoagulation therapy? no  Does the patient require bridging? no      Anticoagulation Summary  As of 2024      INR goal:  2.0-3.0   TTR:  62.8% (6.7 mo)   INR used for dosin.60 (2024)   Weekly warfarin total:  6.5 mg               Assessment/Plan   Therapeutic     1. New dose: Spoke to patient with dosing instructions and next testing date.  Patient verbalized understanding.    2. Next INR: 1 week      Education provided to patient during the visit:  Patient instructed to call in interim with questions, concerns and changes.

## 2024-04-29 ENCOUNTER — ANTICOAGULATION - WARFARIN VISIT (OUTPATIENT)
Dept: CARDIOLOGY | Facility: CLINIC | Age: 59
End: 2024-04-29
Payer: COMMERCIAL

## 2024-04-29 DIAGNOSIS — Z95.2 AORTIC VALVE REPLACED: Primary | ICD-10-CM

## 2024-04-29 LAB
INR IN PPP BY COAGULATION ASSAY EXTERNAL: 2
PROTHROMBIN TIME (PT) IN PPP BY COAGULATION ASSAY EXTERNAL: NORMAL SECONDS

## 2024-04-29 NOTE — PROGRESS NOTES
Patient identification verified with 2 identifiers.    Location: [facility] Patient Self-Testing Program [phone]    Referring Physician: DR. RIVERA  Enrollment/ Re-enrollment date: 24   INR Goal: 2.0-3.0  INR monitoring is per [facility] protocol.  Anticoagulation Medication: warfarin  Indication: Aortic Valve Replacement    Subjective   Bleeding signs/symptoms: No    Bruising: No   Major bleeding event: No  Thrombosis signs/symptoms: No  Thromboembolic event: No  Missed doses: No  Extra doses: No  Medication changes: No  Dietary changes: No  Change in health: No  Change in activity: No  Alcohol: No  Other concerns: No    Upcoming Procedures:  Does the Patient Have any upcoming procedures that require interruption in anticoagulation therapy? no  Does the patient require bridging? no      Anticoagulation Summary  As of 2024      INR goal:  2.0-3.0   TTR:  64.1% (6.9 mo)   INR used for dosin.00 (2024)   Weekly warfarin total:  6.5 mg               Assessment/Plan   Therapeutic     1. New dose: Spoke to patient with dosing instructions and next testing date.  Patient verbalized understanding.    2. Next INR: 2 weeks      Education provided to patient during the visit:  Patient instructed to call in interim with questions, concerns and changes.   Patient educated on compliance with dosing, follow up appointments, and prescribed plan of care.

## 2024-05-13 ENCOUNTER — ANTICOAGULATION - WARFARIN VISIT (OUTPATIENT)
Dept: CARDIOLOGY | Facility: CLINIC | Age: 59
End: 2024-05-13
Payer: COMMERCIAL

## 2024-05-13 DIAGNOSIS — Z95.2 AORTIC VALVE REPLACED: Primary | ICD-10-CM

## 2024-05-13 NOTE — PROGRESS NOTES
Patient identification verified with 2 identifiers.    Location: [facility] Patient Self-Testing Program [phone]    Referring Physician: DR. RIVERA  Enrollment/ Re-enrollment date: 6/27/24   INR Goal: 2.0-3.0  INR monitoring is per [facility] protocol.  Anticoagulation Medication: warfarin  Indication: Aortic Valve Replacement    Subjective   Bleeding signs/symptoms: No    Bruising: No   Major bleeding event: No  Thrombosis signs/symptoms: No  Thromboembolic event: No  Missed doses: No  Extra doses: No  Medication changes: No  Dietary changes: Yes  Only change that pt can think of is that he had some cinnamon rolls this past week.  Unsure that this would have raised his INR.  Pt denies any changes in his vit k intake.  Change in health: No  Change in activity: No  Alcohol: No  Other concerns: No    Upcoming Procedures:  Does the Patient Have any upcoming procedures that require interruption in anticoagulation therapy? no  Does the patient require bridging? no      Anticoagulation Summary  As of 5/13/2024      INR goal:  2.0-3.0   TTR:  63.3% (7.4 mo)   INR used for dosing:  3.90 (5/13/2024)   Weekly warfarin total:  6.5 mg               Assessment/Plan   Supratherapeutic     1. New dose: No change but will have pt hold one dose of warfarin today only (5/13/24). Spoke to patient with dosing instructions and next testing date.  Patient verbalized understanding.    2. Next INR: 1 week      Education provided to patient during the visit:  Patient instructed to call in interim with questions, concerns and changes.   Patient educated on compliance with dosing, follow up appointments, and prescribed plan of care.

## 2024-05-20 ENCOUNTER — ANTICOAGULATION - WARFARIN VISIT (OUTPATIENT)
Dept: CARDIOLOGY | Facility: CLINIC | Age: 59
End: 2024-05-20
Payer: COMMERCIAL

## 2024-05-20 DIAGNOSIS — Z95.2 AORTIC VALVE REPLACED: Primary | ICD-10-CM

## 2024-05-20 NOTE — PROGRESS NOTES
Patient identification verified with 2 identifiers.    Location: [facility] Patient Self-Testing Program [phone]    Referring Physician: LACIE RIVERA  Enrollment/ Re-enrollment date: 2024   INR Goal: 2.0-3.0  INR monitoring is per [facility] protocol.  Anticoagulation Medication: warfarin  Indication: Aortic Valve Replacement    Subjective   Bleeding signs/symptoms: No    Bruising: No   Major bleeding event: No  Thrombosis signs/symptoms: No  Thromboembolic event: No  Missed doses: No  Extra doses: No  Medication changes: No  Dietary changes: No  Change in health: No  Change in activity: No  Alcohol: No  Other concerns: No    Upcoming Procedures:  Does the Patient Have any upcoming procedures that require interruption in anticoagulation therapy? no  Does the patient require bridging? no      Anticoagulation Summary  As of 2024      INR goal:  2.0-3.0   TTR:  62.7% (7.6 mo)   INR used for dosin.30 (2024)   Weekly warfarin total:  6.5 mg               Assessment/Plan   Therapeutic     1. New dose: no change    2. Next INR: 1 week      Education provided to patient during the visit:  Patient instructed to call in interim with questions, concerns and changes.   Patient educated on interactions between medications and warfarin.   Patient educated on dietary consistency in vitamin k consumption.   Patient educated on affects of alcohol consumption while taking warfarin.   Patient educated on signs of bleeding/clotting.   Patient educated on compliance with dosing, follow up appointments, and prescribed plan of care.

## 2024-05-28 ENCOUNTER — ANTICOAGULATION - WARFARIN VISIT (OUTPATIENT)
Dept: CARDIOLOGY | Facility: CLINIC | Age: 59
End: 2024-05-28
Payer: COMMERCIAL

## 2024-05-28 DIAGNOSIS — Z95.2 AORTIC VALVE REPLACED: Primary | ICD-10-CM

## 2024-05-28 LAB
INR IN PPP BY COAGULATION ASSAY EXTERNAL: 4.4
PROTHROMBIN TIME (PT) IN PPP BY COAGULATION ASSAY EXTERNAL: NORMAL SECONDS

## 2024-05-28 NOTE — PROGRESS NOTES
Patient identification verified with 2 identifiers.    Location: [facility] Patient Self-Testing Program [phone]    Referring Physician: Ronni Jang DO   Enrollment/ Re-enrollment date: 2024   INR Goal: 2.0-3.0  INR monitoring is per [facility] protocol.  Anticoagulation Medication: warfarin  Indication: Aortic Valve Replaced    Subjective   Bleeding signs/symptoms: No    Bruising: No   Major bleeding event: No  Thrombosis signs/symptoms: No  Thromboembolic event: No  Missed doses: No  Extra doses: No  Medication changes: No  Dietary changes: No  Change in health: No  Change in activity: No  Alcohol: No  Other concerns: No    Upcoming Procedures:  Does the Patient Have any upcoming procedures that require interruption in anticoagulation therapy? no  Does the patient require bridging? no      Anticoagulation Summary  As of 2024      INR goal:  2.0-3.0   TTR:  61.7% (7.9 mo)   INR used for dosin.40 (2024)   Weekly warfarin total:  5.5 mg               Assessment/Plan   Supratherapeutic     1. New dose:  hold two days, reduce dose as instructed      2. Next INR:        Education provided to patient during the visit:  Patient instructed to call in interim with questions, concerns and changes.

## 2024-06-05 ENCOUNTER — ANTICOAGULATION - WARFARIN VISIT (OUTPATIENT)
Dept: CARDIOLOGY | Facility: CLINIC | Age: 59
End: 2024-06-05
Payer: COMMERCIAL

## 2024-06-05 DIAGNOSIS — Z95.2 AORTIC VALVE REPLACED: Primary | ICD-10-CM

## 2024-06-05 LAB
INR IN PPP BY COAGULATION ASSAY EXTERNAL: 2.9
PROTHROMBIN TIME (PT) IN PPP BY COAGULATION ASSAY EXTERNAL: NORMAL SECONDS

## 2024-06-05 NOTE — PROGRESS NOTES
Patient identification verified with 2 identifiers.    Location: [facility] Patient Self-Testing Program [phone]    Referring Physician: DR.DANIEL RIVERA  Enrollment/ Re-enrollment date: 2024   INR Goal: 2.0-3.0  INR monitoring is per [facility] protocol.  Anticoagulation Medication: warfarin  Indication: Aortic Valve Replacement    Subjective   Bleeding signs/symptoms: No    Bruising: No   Major bleeding event: No  Thrombosis signs/symptoms: No  Thromboembolic event: No  Missed doses: No  Extra doses: No  Medication changes: No  Dietary changes: No  Change in health: No  Change in activity: No  Alcohol: No  Other concerns: No    Upcoming Procedures:  Does the Patient Have any upcoming procedures that require interruption in anticoagulation therapy? no  Does the patient require bridging? no      Anticoagulation Summary  As of 2024      INR goal:  2.0-3.0   TTR:  59.9% (8.1 mo)   INR used for dosin.90 (2024)   Weekly warfarin total:  5.5 mg               Assessment/Plan   Therapeutic     1. New dose: no change  SPOKE TO PT CONFIRMED CURRENT DOSING INSTRUCTIONS  2. Next INR: 1 week      Education provided to patient during the visit:  Patient instructed to call in interim with questions, concerns and changes.   Patient educated on compliance with dosing, follow up appointments, and prescribed plan of care.

## 2024-06-12 ENCOUNTER — ANTICOAGULATION - WARFARIN VISIT (OUTPATIENT)
Dept: CARDIOLOGY | Facility: CLINIC | Age: 59
End: 2024-06-12
Payer: COMMERCIAL

## 2024-06-12 DIAGNOSIS — Z95.2 AORTIC VALVE REPLACED: Primary | ICD-10-CM

## 2024-06-12 NOTE — PROGRESS NOTES
Patient identification verified with 2 identifiers.    Location: [facility] Patient Self-Testing Program [phone]    Referring Physician: DR. LACIE RIVERA  Enrollment/ Re-enrollment date: 6/5/2025   INR Goal: 2.0-3.0  INR monitoring is per [facility] protocol.  Anticoagulation Medication: warfarin  Indication: Aortic Valve Replacement    Subjective   Bleeding signs/symptoms: No    Bruising: No   Major bleeding event: No  Thrombosis signs/symptoms: No  Thromboembolic event: No  Missed doses: No  Extra doses: No  Medication changes: No  Dietary changes: Yes  PT STATES HE HAD SOME DIARRHEA  Change in health: No  Change in activity: No  Alcohol: No  Other concerns: No    Upcoming Procedures:  Does the Patient Have any upcoming procedures that require interruption in anticoagulation therapy? no  Does the patient require bridging? no      Anticoagulation Summary  As of 6/12/2024      INR goal:  2.0-3.0   TTR:  59.0% (8.4 mo)   INR used for dosing:  3.30 (6/12/2024)   Weekly warfarin total:  5.5 mg               Assessment/Plan   Supratherapeutic     1. New dose: no change  SPOKE TO PT, HE WAS HAVING GI ISSUES LAST WEEK. WILL MAINTAIN TWD. PT VERBALIZED INSTRUCTIONS CORRECTLY  2. Next INR: 1 week      Education provided to patient during the visit:  Patient instructed to call in interim with questions, concerns and changes.   Patient educated on dietary consistency in vitamin k consumption.   Patient educated on compliance with dosing, follow up appointments, and prescribed plan of care.

## 2024-06-19 ENCOUNTER — ANTICOAGULATION - WARFARIN VISIT (OUTPATIENT)
Dept: CARDIOLOGY | Facility: CLINIC | Age: 59
End: 2024-06-19
Payer: COMMERCIAL

## 2024-06-19 DIAGNOSIS — Z95.2 AORTIC VALVE REPLACED: Primary | ICD-10-CM

## 2024-06-19 LAB
INR IN PPP BY COAGULATION ASSAY EXTERNAL: 2.5 (ref 2–3)
PROTHROMBIN TIME (PT) IN PPP BY COAGULATION ASSAY EXTERNAL: NORMAL SECONDS

## 2024-06-19 NOTE — PROGRESS NOTES
Patient identification verified with 2 identifiers.    Location: [facility] Patient Self-Testing Program [phone]    Referring Physician: DR. LACIE RIVERA  Enrollment/ Re-enrollment date: 2025   INR Goal: 2.0-3.0  INR monitoring is per [facility] protocol.  Anticoagulation Medication: warfarin  Indication: Aortic Valve Replacement    Subjective   Bleeding signs/symptoms: No    Bruising: No   Major bleeding event: No  Thrombosis signs/symptoms: No  Thromboembolic event: No  Missed doses: No  Extra doses: No  Medication changes: No  Dietary changes: No  Change in health: No  Change in activity: No  Alcohol: No  Other concerns: No    Upcoming Procedures:  Does the Patient Have any upcoming procedures that require interruption in anticoagulation therapy? no  Does the patient require bridging? no      Anticoagulation Summary  As of 2024      INR goal:  2.0-3.0   TTR:  59.1% (8.6 mo)   INR used for dosin.50 (2024)   Weekly warfarin total:  5.5 mg               Assessment/Plan   Therapeutic     1. New dose: no change  Spoke with pt and confirmed dosing schedule.  Pt states understanding.  2. Next INR: 1 week      Education provided to patient during the visit:  Patient instructed to call in interim with questions, concerns and changes.   Patient educated on compliance with dosing, follow up appointments, and prescribed plan of care.

## 2024-06-26 ENCOUNTER — ANTICOAGULATION - WARFARIN VISIT (OUTPATIENT)
Dept: CARDIOLOGY | Facility: CLINIC | Age: 59
End: 2024-06-26
Payer: COMMERCIAL

## 2024-06-26 DIAGNOSIS — Z95.2 AORTIC VALVE REPLACED: Primary | ICD-10-CM

## 2024-06-26 NOTE — PROGRESS NOTES
Patient identification verified with 2 identifiers.    Location: [facility] Patient Self-Testing Program [phone]    Referring Physician: DR. LACIE RIVERA  Enrollment/ Re-enrollment date: 2025   INR Goal: 2.0-3.0  INR monitoring is per [facility] protocol.  Anticoagulation Medication: warfarin  Indication: Aortic Valve Replacement    Subjective   Bleeding signs/symptoms: No    Bruising: No   Major bleeding event: No  Thrombosis signs/symptoms: No  Thromboembolic event: No  Missed doses: No  Extra doses: No  Medication changes: No  Dietary changes: No  Change in health: No  Change in activity: No  Alcohol: No  Other concerns: No    Upcoming Procedures:  Does the Patient Have any upcoming procedures that require interruption in anticoagulation therapy? no  Does the patient require bridging? no      Anticoagulation Summary  As of 2024      INR goal:  2.0-3.0   TTR:  60.1% (8.8 mo)   INR used for dosin.60 (2024)   Weekly warfarin total:  5.5 mg               Assessment/Plan   Therapeutic     1. New dose: no change  SPOKE TO PT, CONFIRMED CURRENT DOSING INSTRUCTIONS AND PT REPEATED CORRECTLY  2. Next INR: 2 weeks      Education provided to patient during the visit:  Patient instructed to call in interim with questions, concerns and changes.   Patient educated on compliance with dosing, follow up appointments, and prescribed plan of care.

## 2024-07-10 ENCOUNTER — ANTICOAGULATION - WARFARIN VISIT (OUTPATIENT)
Dept: CARDIOLOGY | Facility: CLINIC | Age: 59
End: 2024-07-10
Payer: COMMERCIAL

## 2024-07-10 DIAGNOSIS — Z95.2 AORTIC VALVE REPLACED: Primary | ICD-10-CM

## 2024-07-10 LAB
INR IN PPP BY COAGULATION ASSAY EXTERNAL: 3.1
PROTHROMBIN TIME (PT) IN PPP BY COAGULATION ASSAY EXTERNAL: NORMAL

## 2024-07-10 NOTE — PROGRESS NOTES
Patient identification verified with 2 identifiers.    Location: [facility] Patient Self-Testing Program [phone]    Referring Physician: DR. LACIE RIVERA  Enrollment/ Re-enrollment date: 6/5/2025   INR Goal: 2.0-3.0  INR monitoring is per [facility] protocol.  Anticoagulation Medication: warfarin  Indication: Aortic Valve Replacement    Subjective   Bleeding signs/symptoms: No    Bruising: No   Major bleeding event: No  Thrombosis signs/symptoms: No  Thromboembolic event: No  Missed doses: No  Extra doses: No  Medication changes: No  Dietary changes: No  Change in health: No  Change in activity: No  Alcohol: No  Other concerns: No    Upcoming Procedures:  Does the Patient Have any upcoming procedures that require interruption in anticoagulation therapy? no  Does the patient require bridging? no      Anticoagulation Summary  As of 7/10/2024      INR goal:  2.0-3.0   TTR:  61.1% (9.3 mo)   INR used for dosing:  3.10 (7/10/2024)   Weekly warfarin total:  5.5 mg               Assessment/Plan   Supratherapeutic     1. New dose: no change  SPOKE TO PT. HE WILL EAT SOME VIT K TODAY  2. Next INR: 1 week      Education provided to patient during the visit:  Patient instructed to call in interim with questions, concerns and changes.   Patient educated on dietary consistency in vitamin k consumption.   Patient educated on compliance with dosing, follow up appointments, and prescribed plan of care.

## 2024-07-17 ENCOUNTER — ANTICOAGULATION - WARFARIN VISIT (OUTPATIENT)
Dept: CARDIOLOGY | Facility: CLINIC | Age: 59
End: 2024-07-17
Payer: COMMERCIAL

## 2024-07-17 DIAGNOSIS — Z95.2 AORTIC VALVE REPLACED: Primary | ICD-10-CM

## 2024-07-17 LAB
INR IN PPP BY COAGULATION ASSAY EXTERNAL: 1.3
PROTHROMBIN TIME (PT) IN PPP BY COAGULATION ASSAY EXTERNAL: NORMAL

## 2024-07-17 NOTE — PROGRESS NOTES
Patient identification verified with 2 identifiers.    Location: [facility] Patient Self-Testing Program [phone]    Referring Physician: DR. LACIE RIVERA  Enrollment/ Re-enrollment date: 2025   INR Goal: 2.0-3.0  INR monitoring is per [facility] protocol.  Anticoagulation Medication: warfarin  Indication: Aortic Valve Replacement    Subjective   Bleeding signs/symptoms: No    Bruising: No   Major bleeding event: No  Thrombosis signs/symptoms: No  Thromboembolic event: No  Missed doses: No  Extra doses: No  Medication changes: No  Dietary changes: No  Change in health: No  Change in activity: No  Alcohol: No  Other concerns: No    Upcoming Procedures:  Does the Patient Have any upcoming procedures that require interruption in anticoagulation therapy? no  Does the patient require bridging? no      Anticoagulation Summary  As of 2024      INR goal:  2.0-3.0   TTR:  61.0% (9.5 mo)   INR used for dosin.30 (2024)   Weekly warfarin total:  6.5 mg               Assessment/Plan   Subtherapeutic     1. New dose: SPOKE TO PT. NO KNOWN REASON FOR DEPRESSED INR. INCREASED TWD. PT VERBALIZED NEW INSTRUCTIONS CORRECTLY. APPT MADE AT [facility] FOR ANNUAL METER REVIEW. PT DOESN'T DRIVE AND THIS IS WHEN HE CAN GET A RIDE. PT INSTRUCTED TO BRING HIS MONITOR WITH HIM.   2. Next INR: 1 week      Education provided to patient during the visit:  Patient instructed to call in interim with questions, concerns and changes.   Patient educated on compliance with dosing, follow up appointments, and prescribed plan of care.

## 2024-07-24 ENCOUNTER — ANTICOAGULATION - WARFARIN VISIT (OUTPATIENT)
Dept: CARDIOLOGY | Facility: CLINIC | Age: 59
End: 2024-07-24
Payer: COMMERCIAL

## 2024-07-24 ENCOUNTER — APPOINTMENT (OUTPATIENT)
Dept: CARDIOLOGY | Facility: CLINIC | Age: 59
End: 2024-07-24
Payer: COMMERCIAL

## 2024-07-24 DIAGNOSIS — Z95.2 AORTIC VALVE REPLACED: Primary | ICD-10-CM

## 2024-07-24 LAB
INR IN PPP BY COAGULATION ASSAY EXTERNAL: 2.5
PROTHROMBIN TIME (PT) IN PPP BY COAGULATION ASSAY EXTERNAL: NORMAL

## 2024-07-24 NOTE — PROGRESS NOTES
Patient identification verified with 2 identifiers.    Location: [facility] Patient Self-Testing Program [phone]    Referring Physician: DR. RIVERA  Enrollment/ Re-enrollment date: 25   INR Goal: 2.0-3.0  INR monitoring is per [facility] protocol.  Anticoagulation Medication: warfarin  Indication: Aortic Valve Replacement    Subjective   Bleeding signs/symptoms: No    Bruising: No   Major bleeding event: No  Thrombosis signs/symptoms: No  Thromboembolic event: No  Missed doses: No  Extra doses: No  Medication changes: No  Dietary changes: No  Change in health: No  Change in activity: No  Alcohol: No  Other concerns: No    Upcoming Procedures:  Does the Patient Have any upcoming procedures that require interruption in anticoagulation therapy? no  Does the patient require bridging? no      Anticoagulation Summary  As of 2024      INR goal:  2.0-3.0   TTR:  60.5% (9.8 mo)   INR used for dosin.50 (2024)   Weekly warfarin total:  6 mg               Assessment/Plan   Therapeutic     1. New dose:  WILL DECREASE WEEKLY DOSE DUE TO INR JUMP FROM 1.3 TO 2.5 IN ONE WEEK AFTER DOSE INCREASE LAST WEEK.       2. Next INR: 1 week   I SPOKE TO PT CONFIRMING CURRENT WARFARIN DOSING.   PT WILL DECREASE  WEEKLY DOSE SCHEDULE.  PT VERBALIZED UNDERSTANDING OF THESE DOSING INSTRUCTIONS.        Education provided to patient during the visit:  Patient instructed to call in interim with questions, concerns and changes.

## 2024-07-31 ENCOUNTER — ANTICOAGULATION - WARFARIN VISIT (OUTPATIENT)
Dept: CARDIOLOGY | Facility: CLINIC | Age: 59
End: 2024-07-31
Payer: COMMERCIAL

## 2024-07-31 DIAGNOSIS — Z95.2 AORTIC VALVE REPLACED: Primary | ICD-10-CM

## 2024-07-31 LAB
INR IN PPP BY COAGULATION ASSAY EXTERNAL: 2.3
PROTHROMBIN TIME (PT) IN PPP BY COAGULATION ASSAY EXTERNAL: NORMAL

## 2024-07-31 NOTE — PROGRESS NOTES
Patient identification verified with 2 identifiers.    Location: [facility] Patient Self-Testing Program [phone]    Referring Physician: LACIE RIVERA  Enrollment/ Re-enrollment date: 2024 - METER REVIEW ON 24   INR Goal: 2.0-3.0  INR monitoring is per [facility] protocol.  Anticoagulation Medication: warfarin  Indication: Aortic Valve Replacement    Subjective   Bleeding signs/symptoms: No    Bruising: No   Major bleeding event: No  Thrombosis signs/symptoms: No  Thromboembolic event: No  Missed doses: No  Extra doses: No  Medication changes: No  Dietary changes: No  Change in health: No  Change in activity: No  Alcohol: No  Other concerns: No    Upcoming Procedures:  Does the Patient Have any upcoming procedures that require interruption in anticoagulation therapy? no  Does the patient require bridging? no      Anticoagulation Summary  As of 2024      INR goal:  2.0-3.0   TTR:  61.5% (10 mo)   INR used for dosin.30 (2024)   Weekly warfarin total:  6 mg               Assessment/Plan   Therapeutic     1. New dose: no change    2. Next INR: 1 week      Education provided to patient during the visit:  Patient instructed to call in interim with questions, concerns and changes.   Patient educated on interactions between medications and warfarin.   Patient educated on dietary consistency in vitamin k consumption.   Patient educated on affects of alcohol consumption while taking warfarin.   Patient educated on signs of bleeding/clotting.   Patient educated on compliance with dosing, follow up appointments, and prescribed plan of care.

## 2024-08-02 ENCOUNTER — ANTICOAGULATION - WARFARIN VISIT (OUTPATIENT)
Dept: CARDIOLOGY | Facility: CLINIC | Age: 59
End: 2024-08-02

## 2024-08-02 ENCOUNTER — ANTICOAGULATION - WARFARIN VISIT (OUTPATIENT)
Dept: CARDIOLOGY | Facility: CLINIC | Age: 59
End: 2024-08-02
Payer: MEDICARE

## 2024-08-02 DIAGNOSIS — Z95.2 AORTIC VALVE REPLACED: Primary | ICD-10-CM

## 2024-08-02 LAB
INR IN PPP BY COAGULATION ASSAY EXTERNAL: 2.3
POC INR: 2.3
POC PROTHROMBIN TIME: NORMAL
PROTHROMBIN TIME (PT) IN PPP BY COAGULATION ASSAY EXTERNAL: NORMAL

## 2024-08-02 PROCEDURE — 85610 PROTHROMBIN TIME: CPT | Mod: QW

## 2024-08-02 PROCEDURE — 99211 OFF/OP EST MAY X REQ PHY/QHP: CPT

## 2024-08-02 NOTE — PROGRESS NOTES
Patient identification verified with 2 identifiers.    Location: [facility] - [address] [place] [address] [phone]     Referring Physician: Dr. Ronni Jang  Enrollment/ Re-enrollment date: 6/5/2025   INR Goal: 2.0-3.0  INR monitoring is per [facility] protocol.  Anticoagulation Medication: warfarin  Indication: Aortic Valve Replacement    Subjective   Bleeding signs/symptoms: No    Bruising: No   Major bleeding event: No  Thrombosis signs/symptoms: No  Thromboembolic event: No  Missed doses: No  Extra doses: No  Medication changes: No  Dietary changes: No  Change in health: No  Change in activity: No  Alcohol: No  Other concerns: No    Upcoming Procedures:  Does the Patient Have any upcoming procedures that require interruption in anticoagulation therapy? no  Does the patient require bridging? no      Anticoagulation Summary  As of 8/2/2024      INR goal:  2.0-3.0   TTR:  61.7% (10.1 mo)   INR used for dosing:  --               Assessment/Plan   Therapeutic     1. New dose: Left VM with dosing instructions and next testing date.    2. Next INR: 1 week      Education provided to patient during the visit:  Patient instructed to call in interim with questions, concerns and changes.

## 2024-08-02 NOTE — PROGRESS NOTES
Patient identification verified with 2 identifiers.    Location: [facility] - [address] [place] [id number] [phone]     Referring Physician: Dr. Ronni Jang  Enrollment/ Re-enrollment date: 2025   INR Goal: 2.0-3.0  INR monitoring is per [facility] protocol.  Anticoagulation Medication: warfarin  Indication: Aortic Valve Replacement    Subjective   Bleeding signs/symptoms: No    Bruising: No   Major bleeding event: No  Thrombosis signs/symptoms: No  Thromboembolic event: No  Missed doses: No  Extra doses: No  Medication changes: No  Dietary changes: No  Change in health: No  Change in activity: No  Alcohol: No  Other concerns: No    Upcoming Procedures:  Does the Patient Have any upcoming procedures that require interruption in anticoagulation therapy? no  Does the patient require bridging? no      Anticoagulation Summary  As of 2024      INR goal:  2.0-3.0   TTR:  61.7% (10.1 mo)   INR used for dosin.30 (2024)   Weekly warfarin total:  6 mg               Assessment/Plan   Therapeutic     1. New dose: no change    2. Next INR: 1 week      Education provided to patient during the visit:  Patient instructed to call in interim with questions, concerns and changes.   Patient educated on interactions between medications and warfarin.   Patient educated on dietary consistency in vitamin k consumption.   Patient educated on affects of alcohol consumption while taking warfarin.   Patient educated on signs of bleeding/clotting.   Patient educated on compliance with dosing, follow up appointments, and prescribed plan of care.

## 2024-08-07 ENCOUNTER — ANTICOAGULATION - WARFARIN VISIT (OUTPATIENT)
Dept: CARDIOLOGY | Facility: CLINIC | Age: 59
End: 2024-08-07
Payer: COMMERCIAL

## 2024-08-07 DIAGNOSIS — Z95.2 AORTIC VALVE REPLACED: Primary | ICD-10-CM

## 2024-08-07 LAB
INR IN PPP BY COAGULATION ASSAY EXTERNAL: 3.4
PROTHROMBIN TIME (PT) IN PPP BY COAGULATION ASSAY EXTERNAL: NORMAL

## 2024-08-07 NOTE — PROGRESS NOTES
Patient identification verified with 2 identifiers.    Location: [facility] Patient Self-Testing Program [phone]    Referring Physician: DR. RIVERA  Enrollment/ Re-enrollment date: 6/5/25   INR Goal: 2.0-3.0  INR monitoring is per [facility] protocol.  Anticoagulation Medication: warfarin  Indication: Aortic Valve Replacement    Subjective   Bleeding signs/symptoms: No    Bruising: No   Major bleeding event: No  Thrombosis signs/symptoms: No  Thromboembolic event: No  Missed doses: No  Extra doses: No  Medication changes: No  TAKING SAME AMOUNT OF OTC PAIN MEDS.  Dietary changes: No  PT HAS BEEN CONSISTENT WITH HIS DIETARY INTAKE OF GREEN VEGETABLES.  Change in health: No  Change in activity: No  Alcohol: No  Other concerns: No    Upcoming Procedures:  Does the Patient Have any upcoming procedures that require interruption in anticoagulation therapy? no  Does the patient require bridging? no      Anticoagulation Summary  As of 8/7/2024      INR goal:  2.0-3.0   TTR:  61.7% (10.2 mo)   INR used for dosing:  3.40 (8/7/2024)   Weekly warfarin total:  5.5 mg               Assessment/Plan   Supratherapeutic     1. New dose:  WILL DECREASE WEEKLY DOSE      2. Next INR: 1 week    I SPOKE TO PT CONFIRMING CURRENT WARFARIN DOSING.   PT WILL DECREASE  WEEKLY DOSE SCHEDULE.  PT VERBALIZED UNDERSTANDING OF THESE DOSING INSTRUCTIONS.      Education provided to patient during the visit:  Patient instructed to call in interim with questions, concerns and changes.

## 2024-08-14 ENCOUNTER — ANTICOAGULATION - WARFARIN VISIT (OUTPATIENT)
Dept: CARDIOLOGY | Facility: CLINIC | Age: 59
End: 2024-08-14
Payer: COMMERCIAL

## 2024-08-14 DIAGNOSIS — Z95.2 AORTIC VALVE REPLACED: Primary | ICD-10-CM

## 2024-08-14 LAB
INR IN PPP BY COAGULATION ASSAY EXTERNAL: 2.5 (ref 2–3)
PROTHROMBIN TIME (PT) IN PPP BY COAGULATION ASSAY EXTERNAL: NORMAL

## 2024-08-14 NOTE — PROGRESS NOTES
"    Subjective   Bleeding signs/symptoms:    [facility]  Bruising:     Major bleeding event:    Thrombosis signs/symptoms:    Thromboembolic event:    Missed doses:    Extra doses:    Medication changes:    Dietary changes:    Change in health:    Change in activity:    Alcohol:    Other concerns:      Upcoming Procedures:  Does the Patient Have any upcoming procedures that require interruption in anticoagulation therapy? {yes/no:17588}  Does the patient require bridging? {yes/no:24129}      Anticoagulation Summary  As of 2024      INR goal:  2.0-3.0   TTR:  61.6% (10.5 mo)   INR used for dosing:  --               Assessment/Plan   {therapeutic/subtherapeutic/supratherapeutic:85658}     1. New dose: {no change:93147}    2. Next INR: {time frame/weeks 1-4:58137}      Education provided to patient during the visit:  {Anticoag Pt Ed. documentation:96658::\"Patient instructed to call in interim with questions, concerns and changes. \"}       "

## 2024-08-14 NOTE — PROGRESS NOTES
Patient identification verified with 2 identifiers.    Location: [facility] Patient Self-Testing Program [phone]    Referring Physician: DR. RIVERA  Enrollment/ Re-enrollment date: 25   INR Goal: 2.0-3.0  INR monitoring is per [facility] protocol.  Anticoagulation Medication: warfarin  Indication: Aortic Valve Replacement    Subjective   Bleeding signs/symptoms: No    Bruising: No   Major bleeding event: No  Thrombosis signs/symptoms: No  Thromboembolic event: No  Missed doses: No  Extra doses: No  Medication changes: No  Dietary changes: No  Change in health: No  Change in activity: No  Alcohol: No  Other concerns: No    Upcoming Procedures:  Does the Patient Have any upcoming procedures that require interruption in anticoagulation therapy? no  Does the patient require bridging? no      Anticoagulation Summary  As of 2024      INR goal:  2.0-3.0   TTR:  61.6% (10.5 mo)   INR used for dosin.50 (2024)   Weekly warfarin total:  5.5 mg               Assessment/Plan   Therapeutic and Supratherapeutic     1. New dose: no change  Spoke with pt and confirmed dosing schedule.  2. Next INR: 1 week    Education provided to patient during the visit:  Patient instructed to call in interim with questions, concerns and changes.   Patient educated on interactions between medications and warfarin.   Patient educated on dietary consistency in vitamin k consumption.

## 2024-08-21 ENCOUNTER — ANTICOAGULATION - WARFARIN VISIT (OUTPATIENT)
Dept: CARDIOLOGY | Facility: CLINIC | Age: 59
End: 2024-08-21
Payer: COMMERCIAL

## 2024-08-21 DIAGNOSIS — Z95.2 AORTIC VALVE REPLACED: Primary | ICD-10-CM

## 2024-08-21 LAB
POC INR: 1.6
POC PROTHROMBIN TIME: NORMAL

## 2024-08-21 NOTE — PROGRESS NOTES
Patient identification verified with 2 identifiers.    Location: [facility] Patient Self-Testing Program [phone]    Referring Physician: LACIE RIVERA  Enrollment/ Re-enrollment date: 2025   INR Goal: 2.0-3.0  INR monitoring is per [facility] protocol.  Anticoagulation Medication: warfarin  Indication: Aortic Valve Replacement    Subjective   Bleeding signs/symptoms: No    Bruising: No   Major bleeding event: No  Thrombosis signs/symptoms: No  Thromboembolic event: No  Missed doses: No  Extra doses: No  Medication changes: No  Dietary changes: No  Change in health: No  Change in activity: No  Alcohol: No  Other concerns: No    Upcoming Procedures:  Does the Patient Have any upcoming procedures that require interruption in anticoagulation therapy? no  Does the patient require bridging? no      Anticoagulation Summary  As of 2024      INR goal:  2.0-3.0   TTR:  61.5% (10.7 mo)   INR used for dosin.60 (2024)   Weekly warfarin total:  6 mg               Assessment/Plan   Subtherapeutic     1. New dose:  WILL INCREASE DOSE TODAY     2. Next INR: 1 week      Education provided to patient during the visit:  Patient instructed to call in interim with questions, concerns and changes.   Patient educated on interactions between medications and warfarin.   Patient educated on dietary consistency in vitamin k consumption.   Patient educated on affects of alcohol consumption while taking warfarin.   Patient educated on signs of bleeding/clotting.   Patient educated on compliance with dosing, follow up appointments, and prescribed plan of care.

## 2024-08-28 ENCOUNTER — ANTICOAGULATION - WARFARIN VISIT (OUTPATIENT)
Dept: CARDIOLOGY | Facility: CLINIC | Age: 59
End: 2024-08-28
Payer: COMMERCIAL

## 2024-08-28 DIAGNOSIS — Z95.2 AORTIC VALVE REPLACED: Primary | ICD-10-CM

## 2024-08-28 LAB
INR IN PPP BY COAGULATION ASSAY EXTERNAL: 2.7 (ref 2–3)
PROTHROMBIN TIME (PT) IN PPP BY COAGULATION ASSAY EXTERNAL: NORMAL

## 2024-08-28 NOTE — PROGRESS NOTES
Patient identification verified with 2 identifiers.    Location: [facility] Patient Self-Testing Program [phone]    Referring Physician: LACIE RIVERA  Enrollment/ Re-enrollment date: 2025   INR Goal: 2.0-3.0  INR monitoring is per [facility] protocol.  Anticoagulation Medication: warfarin  Indication: Aortic Valve Replacement    Subjective   Bleeding signs/symptoms: No    Bruising: No   Major bleeding event: No  Thrombosis signs/symptoms: No  Thromboembolic event: No  Missed doses: No  Extra doses: No  Medication changes: No  Dietary changes: No  Change in health: No  Change in activity: No  Alcohol: No  Other concerns: No    Upcoming Procedures:  Does the Patient Have any upcoming procedures that require interruption in anticoagulation therapy? no  Does the patient require bridging? no      Anticoagulation Summary  As of 2024      INR goal:  2.0-3.0   TTR:  61.5% (10.9 mo)   INR used for dosin.70 (2024)   Weekly warfarin total:  6 mg               Assessment/Plan   Therapeutic     1. New dose: no change    2. Next INR: 1 week      Education provided to patient during the visit:  Patient instructed to call in interim with questions, concerns and changes.   Patient educated on interactions between medications and warfarin.   Patient educated on dietary consistency in vitamin k consumption.   Patient educated on affects of alcohol consumption while taking warfarin.   Patient educated on signs of bleeding/clotting.   Patient educated on compliance with dosing, follow up appointments, and prescribed plan of care.

## 2024-09-04 ENCOUNTER — ANTICOAGULATION - WARFARIN VISIT (OUTPATIENT)
Dept: CARDIOLOGY | Facility: CLINIC | Age: 59
End: 2024-09-04
Payer: COMMERCIAL

## 2024-09-04 DIAGNOSIS — Z95.2 AORTIC VALVE REPLACED: Primary | ICD-10-CM

## 2024-09-04 LAB
INR IN PPP BY COAGULATION ASSAY EXTERNAL: 2.9
PROTHROMBIN TIME (PT) IN PPP BY COAGULATION ASSAY EXTERNAL: NORMAL

## 2024-09-04 NOTE — PROGRESS NOTES
Patient identification verified with 2 identifiers.    Location: [facility] Patient Self-Testing Program [phone]    Referring Physician: DR. RIVERA  Enrollment/ Re-enrollment date: 2025   INR Goal: 2.0-3.0  INR monitoring is per [facility] protocol.  Anticoagulation Medication: warfarin  Indication: Aortic Valve Replacement    Subjective   Bleeding signs/symptoms: No    Bruising: No   Major bleeding event: No  Thrombosis signs/symptoms: No  Thromboembolic event: No  Missed doses: No  Extra doses: No  Medication changes: No  Dietary changes: No  Change in health: No  Change in activity: No  Alcohol: No  Other concerns: No    Upcoming Procedures:  Does the Patient Have any upcoming procedures that require interruption in anticoagulation therapy? no  Does the patient require bridging? no      Anticoagulation Summary  As of 2024      INR goal:  2.0-3.0   TTR:  62.3% (11.2 mo)   INR used for dosin.90 (2024)   Weekly warfarin total:  6 mg               Assessment/Plan   Therapeutic     1. New dose: no change    2. Next INR: 1 week      Education provided to patient during the visit:  Patient instructed to call in interim with questions, concerns and changes.   Patient educated on interactions between medications and warfarin.   Patient educated on dietary consistency in vitamin k consumption.   Patient educated on affects of alcohol consumption while taking warfarin.   Patient educated on signs of bleeding/clotting.   Patient educated on compliance with dosing, follow up appointments, and prescribed plan of care.

## 2024-09-11 ENCOUNTER — ANTICOAGULATION - WARFARIN VISIT (OUTPATIENT)
Dept: CARDIOLOGY | Facility: CLINIC | Age: 59
End: 2024-09-11
Payer: COMMERCIAL

## 2024-09-11 DIAGNOSIS — Z95.2 AORTIC VALVE REPLACED: Primary | ICD-10-CM

## 2024-09-11 LAB
INR IN PPP BY COAGULATION ASSAY EXTERNAL: 3.2
PROTHROMBIN TIME (PT) IN PPP BY COAGULATION ASSAY EXTERNAL: NORMAL

## 2024-09-11 NOTE — PROGRESS NOTES
Patient identification verified with 2 identifiers.    Location: [facility] Patient Self-Testing Program [phone]    Referring Physician: DR. LACIE RIVERA  Enrollment/ Re-enrollment date: 6/5/2025   INR Goal: 2.0-3.0  INR monitoring is per [facility] protocol.  Anticoagulation Medication: warfarin  Indication: Aortic Valve Replacement    Subjective   Bleeding signs/symptoms: No    Bruising: No   Major bleeding event: No  Thrombosis signs/symptoms: No  Thromboembolic event: No  Missed doses: No  Extra doses: No  Medication changes: No  Dietary changes: No  Change in health: No  Change in activity: No  Alcohol: No  Other concerns: No    Upcoming Procedures:  Does the Patient Have any upcoming procedures that require interruption in anticoagulation therapy? no  Does the patient require bridging? no      Anticoagulation Summary  As of 9/11/2024      INR goal:  2.0-3.0   TTR:  61.7% (11.4 mo)   INR used for dosing:  3.20 (9/11/2024)   Weekly warfarin total:  5.5 mg               Assessment/Plan   SUPRA THERAPEUTIC     1. New dose:  SPOKE TO PT. WILL DECREASE TWD. PT VERBALIZED NEW DOSING INSTRUCTIONS CORRECTLY     2. Next INR: 1 week      Education provided to patient during the visit:  Patient instructed to call in interim with questions, concerns and changes.   Patient educated on compliance with dosing, follow up appointments, and prescribed plan of care.

## 2024-09-18 ENCOUNTER — APPOINTMENT (OUTPATIENT)
Dept: CARDIOLOGY | Facility: CLINIC | Age: 59
End: 2024-09-18
Payer: COMMERCIAL

## 2024-09-18 ENCOUNTER — ANTICOAGULATION - WARFARIN VISIT (OUTPATIENT)
Dept: CARDIOLOGY | Facility: CLINIC | Age: 59
End: 2024-09-18
Payer: COMMERCIAL

## 2024-09-18 DIAGNOSIS — Z95.2 AORTIC VALVE REPLACED: Primary | ICD-10-CM

## 2024-09-18 DIAGNOSIS — I05.9 MITRAL VALVE DISORDER: ICD-10-CM

## 2024-09-18 NOTE — PROGRESS NOTES
Patient identification verified with 2 identifiers.    Location: [facility] Patient Self-Testing Program [phone]    Referring Physician: DR. LACIE RIVERA  Enrollment/ Re-enrollment date: 2025   INR Goal: 2.0-3.0  INR monitoring is per [facility] protocol.  Anticoagulation Medication: warfarin  Indication: Aortic Valve Replacement    Subjective   Bleeding signs/symptoms: No    Bruising: No   Major bleeding event: No  Thrombosis signs/symptoms: No  Thromboembolic event: No  Missed doses: No  Extra doses: No  Medication changes: No  Dietary changes: No  Change in health: No  Change in activity: No  Alcohol: No  Other concerns: No    Upcoming Procedures:  Does the Patient Have any upcoming procedures that require interruption in anticoagulation therapy? no  Does the patient require bridging? no      Anticoagulation Summary  As of 2024      INR goal:  2.0-3.0   TTR:  61.9% (11.6 mo)   INR used for dosin.50 (2024)   Weekly warfarin total:  5.5 mg               Assessment/Plan    THERAPEUTIC   MAINTAIN TWD  REPEAT INR IN 1 WEEK    Education provided to patient during the visit:  Patient instructed to call in interim with questions, concerns and changes.   Patient educated on compliance with dosing, follow up appointments, and prescribed plan of care.

## 2024-09-25 ENCOUNTER — ANTICOAGULATION - WARFARIN VISIT (OUTPATIENT)
Dept: CARDIOLOGY | Facility: CLINIC | Age: 59
End: 2024-09-25
Payer: COMMERCIAL

## 2024-09-25 DIAGNOSIS — Z95.2 AORTIC VALVE REPLACED: Primary | ICD-10-CM

## 2024-09-25 LAB
INR IN PPP BY COAGULATION ASSAY EXTERNAL: 2.8
PROTHROMBIN TIME (PT) IN PPP BY COAGULATION ASSAY EXTERNAL: NORMAL

## 2024-09-25 NOTE — PROGRESS NOTES
Patient identification verified with 2 identifiers.    Location: [facility] Patient Self-Testing Program [phone]    Referring Physician: DR. LACIE RIVERA  Enrollment/ Re-enrollment date: 2025   INR Goal: 2.0-3.0  INR monitoring is per [facility] protocol.  Anticoagulation Medication: warfarin  Indication: Aortic Valve Replacement    Subjective   Bleeding signs/symptoms: No    Bruising: No   Major bleeding event: No  Thrombosis signs/symptoms: No  Thromboembolic event: No  Missed doses: No  Extra doses: No  Medication changes: No  Dietary changes: No  Change in health: No  Change in activity: No  Alcohol: No  Other concerns: No    Upcoming Procedures:  Does the Patient Have any upcoming procedures that require interruption in anticoagulation therapy? no  Does the patient require bridging? no      Anticoagulation Summary  As of 2024      INR goal:  2.0-3.0   TTR:  62.7% (11.9 mo)   INR used for dosin.80 (2024)   Weekly warfarin total:  5.5 mg               Assessment/Plan   Therapeutic     1. New dose: Spoke to patient with dosing instructions and next testing date.    2. Next INR: 2 weeks      Education provided to patient during the visit:  Patient instructed to call in interim with questions, concerns and changes.

## 2024-10-07 ENCOUNTER — OFFICE VISIT (OUTPATIENT)
Dept: CARDIOLOGY | Facility: CLINIC | Age: 59
End: 2024-10-07
Payer: MEDICARE

## 2024-10-07 VITALS
BODY MASS INDEX: 17.77 KG/M2 | WEIGHT: 88 LBS | DIASTOLIC BLOOD PRESSURE: 68 MMHG | HEART RATE: 70 BPM | SYSTOLIC BLOOD PRESSURE: 108 MMHG

## 2024-10-07 DIAGNOSIS — I05.9 MITRAL VALVE DISORDER: ICD-10-CM

## 2024-10-07 DIAGNOSIS — Z95.2 AORTIC VALVE REPLACED: Primary | ICD-10-CM

## 2024-10-07 PROCEDURE — 1036F TOBACCO NON-USER: CPT | Performed by: INTERNAL MEDICINE

## 2024-10-07 PROCEDURE — 99213 OFFICE O/P EST LOW 20 MIN: CPT | Performed by: INTERNAL MEDICINE

## 2024-10-07 ASSESSMENT — ENCOUNTER SYMPTOMS
LOSS OF SENSATION IN FEET: 0
HEMATURIA: 0
PALPITATIONS: 0
DEPRESSION: 0
NUMBNESS: 0
PARESTHESIAS: 0
OCCASIONAL FEELINGS OF UNSTEADINESS: 0
DYSPNEA ON EXERTION: 0
COUGH: 0
HEARTBURN: 1
DYSURIA: 0
BLURRED VISION: 0
SHORTNESS OF BREATH: 0
ABDOMINAL PAIN: 0

## 2024-10-07 ASSESSMENT — PAIN SCALES - GENERAL: PAINLEVEL: 0-NO PAIN

## 2024-10-07 ASSESSMENT — PATIENT HEALTH QUESTIONNAIRE - PHQ9
2. FEELING DOWN, DEPRESSED OR HOPELESS: NOT AT ALL
1. LITTLE INTEREST OR PLEASURE IN DOING THINGS: NOT AT ALL
SUM OF ALL RESPONSES TO PHQ9 QUESTIONS 1 AND 2: 0

## 2024-10-07 NOTE — ASSESSMENT & PLAN NOTE
Normal mechanical heart tones.  Will plan on repeating echocardiogram on return with measurements of the mean gradient across the aortic valve mechanical prosthesis.  INR's are checked with a home monitor and then sent to the monitoring home company.

## 2024-10-07 NOTE — PROGRESS NOTES
Subjective   Robert Luna is a 59 y.o. male.    Chief Complaint:  Follow-up    HPI  Overall patient physically feels well.  No chest pain or palpitations.  No unusual shortness of breath.  He does state he has lost about 20 pounds over the last month.  He has had some gastroesophageal reflux symptoms at times as well.    Review of Systems   Constitutional: Negative for malaise/fatigue.   HENT:  Negative for congestion.    Eyes:  Negative for blurred vision.   Cardiovascular:  Negative for chest pain, dyspnea on exertion and palpitations.   Respiratory:  Negative for cough and shortness of breath.    Musculoskeletal:  Negative for joint pain.   Gastrointestinal:  Positive for heartburn. Negative for abdominal pain.   Genitourinary:  Negative for dysuria and hematuria.   Neurological:  Negative for numbness and paresthesias.       Objective   Constitutional:       Appearance: Not in distress.   Eyes:      Conjunctiva/sclera: Conjunctivae normal.   Neck:      Vascular: JVD normal.   Pulmonary:      Breath sounds: Normal breath sounds. No wheezing. No rhonchi. No rales.   Cardiovascular:      Normal rate. Regular rhythm.      Murmurs: There is a grade 2/6 mid frequency holosystolic murmur.      No gallop.  No click. No rub.   Abdominal:      Palpations: Abdomen is soft.   Neurological:      General: No focal deficit present.      Mental Status: Alert.         Lab Review:       Assessment/Plan   The primary encounter diagnosis was Aortic valve replaced. A diagnosis of Mitral valve disorder was also pertinent to this visit.    Aortic valve replaced  Normal mechanical heart tones.  Will plan on repeating echocardiogram on return with measurements of the mean gradient across the aortic valve mechanical prosthesis.  INR's are checked with a home monitor and then sent to the monitoring home company.    Mitral valve disorder  Appears to be well compensated.  Will plan on repeating echocardiogram prior to follow-up visit.

## 2024-10-09 ENCOUNTER — ANTICOAGULATION - WARFARIN VISIT (OUTPATIENT)
Dept: CARDIOLOGY | Facility: CLINIC | Age: 59
End: 2024-10-09
Payer: COMMERCIAL

## 2024-10-09 DIAGNOSIS — Z95.2 AORTIC VALVE REPLACED: Primary | ICD-10-CM

## 2024-10-09 LAB
INR IN PPP BY COAGULATION ASSAY EXTERNAL: 1.8
PROTHROMBIN TIME (PT) IN PPP BY COAGULATION ASSAY EXTERNAL: NORMAL

## 2024-10-09 NOTE — PROGRESS NOTES
Patient identification verified with 2 identifiers.    Location: [facility] Patient Self-Testing Program [phone]    Referring Physician: DR. LACIE RIVERA  Enrollment/ Re-enrollment date: 2025   INR Goal: 2.0-3.0  INR monitoring is per [facility] protocol.  Anticoagulation Medication: warfarin  Indication: Aortic Valve Replacement    Subjective   Bleeding signs/symptoms: No    Bruising: No   Major bleeding event: No  Thrombosis signs/symptoms: No  Thromboembolic event: No  Missed doses: No  Extra doses: No  Medication changes: No  Dietary changes: No  Change in health: No  Change in activity: No  Alcohol: No  Other concerns: No    Upcoming Procedures:  Does the Patient Have any upcoming procedures that require interruption in anticoagulation therapy? no  Does the patient require bridging? no      Anticoagulation Summary  As of 10/9/2024      INR goal:  2.0-3.0   TTR:  63.3% (1 y)   INR used for dosin.80 (10/9/2024)   Weekly warfarin total:  6 mg               Assessment/Plan   Therapeutic     1. New dose:  TWD. SPOKE TO PT. CONFIRMED NEW DOSING INSTRUCTIONS. PT VERBALIZED CORRECTLY     2. Next INR: 1 week      Education provided to patient during the visit:  Patient instructed to call in interim with questions, concerns and changes.   Patient educated on compliance with dosing, follow up appointments, and prescribed plan of care.

## 2024-10-16 ENCOUNTER — ANTICOAGULATION - WARFARIN VISIT (OUTPATIENT)
Dept: CARDIOLOGY | Facility: CLINIC | Age: 59
End: 2024-10-16
Payer: COMMERCIAL

## 2024-10-16 DIAGNOSIS — Z95.2 AORTIC VALVE REPLACED: Primary | ICD-10-CM

## 2024-10-16 LAB
INR IN PPP BY COAGULATION ASSAY EXTERNAL: 1.9
PROTHROMBIN TIME (PT) IN PPP BY COAGULATION ASSAY EXTERNAL: NORMAL

## 2024-10-16 NOTE — PROGRESS NOTES
Patient identification verified with 2 identifiers.    Location: [facility] Patient Self-Testing Program [phone]    Referring Physician: DR. LACIE RIVERA  Enrollment/ Re-enrollment date: 2025   INR Goal: 2.0-3.0  INR monitoring is per [facility] protocol.  Anticoagulation Medication: warfarin  Indication: Aortic Valve Replacement    Subjective   Bleeding signs/symptoms: No    Bruising: No   Major bleeding event: No  Thrombosis signs/symptoms: No  Thromboembolic event: No  Missed doses: No  Extra doses: No  Medication changes: No  Dietary changes: No  Change in health: No  Change in activity: No  Alcohol: No  Other concerns: No    Upcoming Procedures:  Does the Patient Have any upcoming procedures that require interruption in anticoagulation therapy? no  Does the patient require bridging? no      Anticoagulation Summary  As of 10/16/2024      INR goal:  2.0-3.0   TTR:  62.1% (1 y)   INR used for dosin.80 (10/16/2024)   Weekly warfarin total:  6.5 mg               Assessment/Plan   Subtherapeutic    1. New dose: Will increase dose and patient will retest in 1 week.    2. Next INR: 1 week      Education provided to patient during the visit:  Patient instructed to call in interim with questions, concerns and changes.

## 2024-10-23 ENCOUNTER — ANTICOAGULATION - WARFARIN VISIT (OUTPATIENT)
Dept: CARDIOLOGY | Facility: CLINIC | Age: 59
End: 2024-10-23
Payer: COMMERCIAL

## 2024-10-23 DIAGNOSIS — Z95.2 AORTIC VALVE REPLACED: Primary | ICD-10-CM

## 2024-10-23 NOTE — PROGRESS NOTES
Patient identification verified with 2 identifiers.    Location: [facility] Patient Self-Testing Program [phone]    Referring Physician: DR. LACIE RIVERA  Enrollment/ Re-enrollment date: 2025   INR Goal: 2.0-3.0  INR monitoring is per [facility] protocol.  Anticoagulation Medication: warfarin  Indication: Aortic Valve Replacement    Subjective   Bleeding signs/symptoms: No    Bruising: No   Major bleeding event: No  Thrombosis signs/symptoms: No  Thromboembolic event: No  Missed doses: No  Extra doses: No  Medication changes: No  Dietary changes: No  Change in health: No  Change in activity: No  Alcohol: No  Other concerns: No    Upcoming Procedures:  Does the Patient Have any upcoming procedures that require interruption in anticoagulation therapy? no  Does the patient require bridging? no      Anticoagulation Summary  As of 10/23/2024      INR goal:  2.0-3.0   TTR:  61.0% (1.1 y)   INR used for dosin.90 (10/23/2024)   Weekly warfarin total:  7 mg               Assessment/Plan   Subtherapeutic    1. New dose: Will increase dose and patient will retest in 1 week.    2. Next INR: 1 week      Education provided to patient during the visit:  Patient instructed to call in interim with questions, concerns and changes.

## 2024-10-30 ENCOUNTER — ANTICOAGULATION - WARFARIN VISIT (OUTPATIENT)
Dept: CARDIOLOGY | Facility: CLINIC | Age: 59
End: 2024-10-30
Payer: COMMERCIAL

## 2024-10-30 DIAGNOSIS — Z95.2 AORTIC VALVE REPLACED: Primary | ICD-10-CM

## 2024-10-30 LAB
INR IN PPP BY COAGULATION ASSAY EXTERNAL: 2.1
PROTHROMBIN TIME (PT) IN PPP BY COAGULATION ASSAY EXTERNAL: NORMAL

## 2024-11-06 ENCOUNTER — ANTICOAGULATION - WARFARIN VISIT (OUTPATIENT)
Dept: CARDIOLOGY | Facility: CLINIC | Age: 59
End: 2024-11-06
Payer: COMMERCIAL

## 2024-11-06 DIAGNOSIS — Z95.2 AORTIC VALVE REPLACED: Primary | ICD-10-CM

## 2024-11-06 NOTE — PROGRESS NOTES
Patient identification verified with 2 identifiers.    Location: [facility] Patient Self-Testing Program [phone]    Referring Physician: DR. LACIE RIVERA  Enrollment/ Re-enrollment date: 2025   INR Goal: 2.0-3.0  INR monitoring is per [facility] protocol.  Anticoagulation Medication: warfarin  Indication: Aortic Valve Replacement    Subjective   Bleeding signs/symptoms: No    Bruising: No   Major bleeding event: No  Thrombosis signs/symptoms: No  Thromboembolic event: No  Missed doses: No  Extra doses: No  Medication changes: No  Dietary changes: No  Change in health: No  Change in activity: No  Alcohol: No  Other concerns: No    Upcoming Procedures:  Does the Patient Have any upcoming procedures that require interruption in anticoagulation therapy? no  Does the patient require bridging? no      Anticoagulation Summary  As of 2024      INR goal:  2.0-3.0   TTR:  60.3% (1.1 y)   INR used for dosin.80 (2024)   Weekly warfarin total:  7.5 mg               Assessment/Plan   Subtherapeutic     1. New dose: Will increase dose and patient will retest in 1 week.      2. Next INR: 1 week      Education provided to patient during the visit:  Patient instructed to call in interim with questions, concerns and changes.

## 2024-11-13 ENCOUNTER — ANTICOAGULATION - WARFARIN VISIT (OUTPATIENT)
Dept: CARDIOLOGY | Facility: CLINIC | Age: 59
End: 2024-11-13
Payer: COMMERCIAL

## 2024-11-13 DIAGNOSIS — Z95.2 AORTIC VALVE REPLACED: Primary | ICD-10-CM

## 2024-11-13 LAB
INR IN PPP BY COAGULATION ASSAY EXTERNAL: 2
PROTHROMBIN TIME (PT) IN PPP BY COAGULATION ASSAY EXTERNAL: NORMAL

## 2024-11-13 NOTE — PROGRESS NOTES
Patient identification verified with 2 identifiers.    Location: [facility] Patient Self-Testing Program [phone]    Referring Physician: LACIE RIVERA  Enrollment/ Re-enrollment date: 2025   INR Goal: 2.0-3.0  INR monitoring is per [facility] protocol.  Anticoagulation Medication: warfarin  Indication: Aortic Valve Replacement    Subjective   Bleeding signs/symptoms: No    Bruising: No   Major bleeding event: No  Thrombosis signs/symptoms: No  Thromboembolic event: No  Missed doses: No  Extra doses: No  Medication changes: No  Dietary changes: No  Change in health: No  Change in activity: No  Alcohol: No  Other concerns: No    Upcoming Procedures:  Does the Patient Have any upcoming procedures that require interruption in anticoagulation therapy? no  Does the patient require bridging? no      Anticoagulation Summary  As of 2024      INR goal:  2.0-3.0   TTR:  59.3% (1.1 y)   INR used for dosin.00 (2024)   Weekly warfarin total:  7.5 mg               Assessment/Plan   Therapeutic     1. New dose: no change -dose increased one week ago   2. Next INR: 1 week      Education provided to patient during the visit:  Patient instructed to call in interim with questions, concerns and changes.   Patient educated on interactions between medications and warfarin.   Patient educated on dietary consistency in vitamin k consumption.   Patient educated on affects of alcohol consumption while taking warfarin.   Patient educated on signs of bleeding/clotting.   Patient educated on compliance with dosing, follow up appointments, and prescribed plan of care.

## 2024-11-20 ENCOUNTER — ANTICOAGULATION - WARFARIN VISIT (OUTPATIENT)
Dept: CARDIOLOGY | Facility: CLINIC | Age: 59
End: 2024-11-20
Payer: COMMERCIAL

## 2024-11-20 DIAGNOSIS — Z95.2 AORTIC VALVE REPLACED: Primary | ICD-10-CM

## 2024-11-20 NOTE — PROGRESS NOTES
Patient identification verified with 2 identifiers.    Location: [facility] Patient Self-Testing Program [phone]    Referring Physician: DR. LACIE RIVERA  Enrollment/ Re-enrollment date: 2025   INR Goal: 2.0-3.0  INR monitoring is per [facility] protocol.  Anticoagulation Medication: warfarin  Indication: Aortic Valve Replacement    Subjective   Bleeding signs/symptoms: No    Bruising: No   Major bleeding event: No  Thrombosis signs/symptoms: No  Thromboembolic event: No  Missed doses: No  Extra doses: No  Medication changes: No  Dietary changes: No  Change in health: No  Change in activity: No  Alcohol: No  Other concerns: No    Upcoming Procedures:  Does the Patient Have any upcoming procedures that require interruption in anticoagulation therapy? no  Does the patient require bridging? no      Anticoagulation Summary  As of 2024      INR goal:  2.0-3.0   TTR:  60.0% (1.1 y)   INR used for dosin.30 (2024)   Weekly warfarin total:  7.5 mg               Assessment/Plan   Therapeutic     1. New dose: Will maintain dose and patient will retest in 1 week.     2. Next INR: 1 week      Education provided to patient during the visit:  Patient instructed to call in interim with questions, concerns and changes.

## 2024-11-27 ENCOUNTER — ANTICOAGULATION - WARFARIN VISIT (OUTPATIENT)
Dept: CARDIOLOGY | Facility: CLINIC | Age: 59
End: 2024-11-27
Payer: COMMERCIAL

## 2024-11-27 DIAGNOSIS — Z95.2 AORTIC VALVE REPLACED: Primary | ICD-10-CM

## 2024-11-27 LAB
INR IN PPP BY COAGULATION ASSAY EXTERNAL: 3
PROTHROMBIN TIME (PT) IN PPP BY COAGULATION ASSAY EXTERNAL: NORMAL

## 2024-11-27 NOTE — PROGRESS NOTES
Patient identification verified with 2 identifiers.    Location: [facility] Patient Self-Testing Program [phone]    Referring Physician: DR. LACIE RIVERA  Enrollment/ Re-enrollment date: 6/5/2025   INR Goal: 2.0-3.0  INR monitoring is per [facility] protocol.  Anticoagulation Medication: warfarin  Indication: Aortic Valve Replacement    Subjective   Bleeding signs/symptoms: No    Bruising: No   Major bleeding event: No  Thrombosis signs/symptoms: No  Thromboembolic event: No  Missed doses: No  Extra doses: No  Medication changes: No  Dietary changes: No  Change in health: No  Change in activity: No  Alcohol: No  Other concerns: No    Upcoming Procedures:  Does the Patient Have any upcoming procedures that require interruption in anticoagulation therapy? no  Does the patient require bridging? no      Anticoagulation Summary  As of 11/27/2024      INR goal:  2.0-3.0   TTR:  60.6% (1.1 y)   INR used for dosing:  3.00 (11/27/2024)   Weekly warfarin total:  7.5 mg               Assessment/Plan   Therapeutic     1. New dose: Will maintain dose and patient will retest in 1 week.    2. Next INR: 1 week      Education provided to patient during the visit:  Patient instructed to call in interim with questions, concerns and changes.        
with patient

## 2024-12-04 ENCOUNTER — ANTICOAGULATION - WARFARIN VISIT (OUTPATIENT)
Dept: CARDIOLOGY | Facility: CLINIC | Age: 59
End: 2024-12-04
Payer: COMMERCIAL

## 2024-12-04 DIAGNOSIS — Z95.2 AORTIC VALVE REPLACED: Primary | ICD-10-CM

## 2024-12-04 LAB
INR IN PPP BY COAGULATION ASSAY EXTERNAL: 3.5
PROTHROMBIN TIME (PT) IN PPP BY COAGULATION ASSAY EXTERNAL: NORMAL

## 2024-12-04 NOTE — PROGRESS NOTES
Patient identification verified with 2 identifiers.    Location: [facility] Patient Self-Testing Program [phone]    Referring Physician: DR. LACIE RIVERA  Enrollment/ Re-enrollment date: 6/5/2025   INR Goal: 2.0-3.0  INR monitoring is per [facility] protocol.  Anticoagulation Medication: warfarin  Indication: Aortic Valve Replacement    Subjective   Bleeding signs/symptoms: No    Bruising: No   Major bleeding event: No  Thrombosis signs/symptoms: No  Thromboembolic event: No  Missed doses: No  Extra doses: No  Medication changes: No  Dietary changes: No  Change in health: No  Patient has been sick the past few days and has not been maintaining his regular diet.  Change in activity: No  Alcohol: No  Other concerns: No    Upcoming Procedures:  Does the Patient Have any upcoming procedures that require interruption in anticoagulation therapy? no  Does the patient require bridging? no      Anticoagulation Summary  As of 12/4/2024      INR goal:  2.0-3.0   TTR:  59.6% (1.2 y)   INR used for dosing:  3.50 (12/4/2024)   Weekly warfarin total:  7.5 mg               Assessment/Plan   Supratherapeutic     1. New dose: Will hold today's dose, maintain weekly dose and patient will retest in 1 week    2. Next INR: 1 week      Education provided to patient during the visit:  Patient instructed to call in interim with questions, concerns and changes.

## 2024-12-11 ENCOUNTER — ANTICOAGULATION - WARFARIN VISIT (OUTPATIENT)
Dept: CARDIOLOGY | Facility: CLINIC | Age: 59
End: 2024-12-11
Payer: COMMERCIAL

## 2024-12-11 DIAGNOSIS — Z95.2 AORTIC VALVE REPLACED: Primary | ICD-10-CM

## 2024-12-11 NOTE — PROGRESS NOTES
Patient identification verified with 2 identifiers.    Location: [facility] Patient Self-Testing Program [phone]    Referring Physician: RADHA Edward   Enrollment/ Re-enrollment date: 6/5/2025   INR Goal: 2.0-3.0  INR monitoring is per [facility] protocol.  Anticoagulation Medication: warfarin  Indication: Aortic Valve Replacement    Subjective   Bleeding signs/symptoms: No    Bruising: No   Major bleeding event: No  Thrombosis signs/symptoms: No  Thromboembolic event: No  Missed doses: No  Extra doses: No  Medication changes: No  Dietary changes: No  Change in health: No  Change in activity: No  Alcohol: No  Other concerns: No    Upcoming Procedures:  Does the Patient Have any upcoming procedures that require interruption in anticoagulation therapy? no  Does the patient require bridging? no      Anticoagulation Summary  As of 12/11/2024      INR goal:  2.0-3.0   TTR:  58.7% (1.2 y)   INR used for dosing:  3.00 (12/11/2024)   Weekly warfarin total:  7 mg               Assessment/Plan   Therapeutic     1. New dose:  dose reduced      2. Next INR: 1 week      Education provided to patient during the visit:  Patient instructed to call in interim with questions, concerns and changes.

## 2024-12-18 ENCOUNTER — ANTICOAGULATION - WARFARIN VISIT (OUTPATIENT)
Dept: CARDIOLOGY | Facility: CLINIC | Age: 59
End: 2024-12-18
Payer: MEDICARE

## 2024-12-18 DIAGNOSIS — Z95.2 AORTIC VALVE REPLACED: Primary | ICD-10-CM

## 2024-12-18 NOTE — PROGRESS NOTES
Patient identification verified with 2 identifiers.    Location: [facility] Patient Self-Testing Program [phone]    Referring Physician: RADHA Edward   Enrollment/ Re-enrollment date: 2025   INR Goal: 2.0-3.0  INR monitoring is per [facility] protocol.  Anticoagulation Medication: warfarin  Indication: Aortic Valve Replacement    Subjective   Bleeding signs/symptoms: No    Bruising: No   Major bleeding event: No  Thrombosis signs/symptoms: No  Thromboembolic event: No  Missed doses: No  Extra doses: No  Medication changes: No  Dietary changes: No  Change in health: No  Change in activity: No  Alcohol: No  Other concerns: No    Upcoming Procedures:  Does the Patient Have any upcoming procedures that require interruption in anticoagulation therapy? no  Does the patient require bridging? no      Anticoagulation Summary  As of 2024      INR goal:  2.0-3.0   TTR:  59.3% (1.2 y)   INR used for dosin.50 (2024)   Weekly warfarin total:  7 mg               Assessment/Plan   Therapeutic     1. New dose: Will maintain dose and patient will retest in 1 week.    2. Next INR: 1 week      Education provided to patient during the visit:  Patient instructed to call in interim with questions, concerns and changes.

## 2024-12-19 ENCOUNTER — OFFICE VISIT (OUTPATIENT)
Dept: PRIMARY CARE | Facility: CLINIC | Age: 59
End: 2024-12-19
Payer: MEDICARE

## 2024-12-19 VITALS
RESPIRATION RATE: 16 BRPM | BODY MASS INDEX: 17.47 KG/M2 | HEART RATE: 94 BPM | WEIGHT: 89 LBS | SYSTOLIC BLOOD PRESSURE: 100 MMHG | TEMPERATURE: 99.5 F | OXYGEN SATURATION: 97 % | DIASTOLIC BLOOD PRESSURE: 56 MMHG | HEIGHT: 60 IN

## 2024-12-19 DIAGNOSIS — Z95.2 AORTIC VALVE REPLACED: ICD-10-CM

## 2024-12-19 DIAGNOSIS — E46 PROTEIN-CALORIE MALNUTRITION, UNSPECIFIED SEVERITY (MULTI): ICD-10-CM

## 2024-12-19 DIAGNOSIS — R13.10 DYSPHAGIA, UNSPECIFIED TYPE: Primary | ICD-10-CM

## 2024-12-19 DIAGNOSIS — Z12.11 SCREENING FOR COLON CANCER: ICD-10-CM

## 2024-12-19 PROCEDURE — 3008F BODY MASS INDEX DOCD: CPT | Performed by: FAMILY MEDICINE

## 2024-12-19 PROCEDURE — G2211 COMPLEX E/M VISIT ADD ON: HCPCS | Performed by: FAMILY MEDICINE

## 2024-12-19 PROCEDURE — 99213 OFFICE O/P EST LOW 20 MIN: CPT | Performed by: FAMILY MEDICINE

## 2024-12-19 ASSESSMENT — ENCOUNTER SYMPTOMS
DEPRESSION: 0
OCCASIONAL FEELINGS OF UNSTEADINESS: 0
LOSS OF SENSATION IN FEET: 0

## 2024-12-19 ASSESSMENT — PATIENT HEALTH QUESTIONNAIRE - PHQ9
SUM OF ALL RESPONSES TO PHQ9 QUESTIONS 1 AND 2: 0
1. LITTLE INTEREST OR PLEASURE IN DOING THINGS: NOT AT ALL
2. FEELING DOWN, DEPRESSED OR HOPELESS: NOT AT ALL

## 2024-12-19 ASSESSMENT — COLUMBIA-SUICIDE SEVERITY RATING SCALE - C-SSRS
6. HAVE YOU EVER DONE ANYTHING, STARTED TO DO ANYTHING, OR PREPARED TO DO ANYTHING TO END YOUR LIFE?: NO
1. IN THE PAST MONTH, HAVE YOU WISHED YOU WERE DEAD OR WISHED YOU COULD GO TO SLEEP AND NOT WAKE UP?: NO
2. HAVE YOU ACTUALLY HAD ANY THOUGHTS OF KILLING YOURSELF?: NO

## 2024-12-19 ASSESSMENT — PAIN SCALES - GENERAL: PAINLEVEL_OUTOF10: 0-NO PAIN

## 2024-12-19 NOTE — PROGRESS NOTES
"Subjective   Patient ID: Robert Luna is a 59 y.o. male who presents for Choking (Pt is here with complaints of difficulty swallowing certain foods. He feels that he may be allergic to something in the food. Pt also complains of sinus issues.).    HPI   For about 6 months he has been having some GI issues.  Occasional vomiting and diarrhea.    Also he feels like his throat swells and he has some intermittent dysphagia.  Once seemed to be related to tomato sauce .  Food does get stuck occasionally and water will not get it to pass an occasionally aspirates.  Breads seem to be worse. He did have a remote egd 10 years ago.    Review of Systems see HPI    Objective   /56 (BP Location: Left arm, Patient Position: Sitting, BP Cuff Size: Adult)   Pulse 94   Temp 37.5 °C (99.5 °F) (Temporal)   Resp 16   Ht 1.499 m (4' 11\")   Wt (!) 40.4 kg (89 lb)   SpO2 97%   BMI 17.98 kg/m²     Physical Exam  Constitutional:       General: He is not in acute distress.     Appearance: Normal appearance.   Cardiovascular:      Rate and Rhythm: Normal rate and regular rhythm.      Heart sounds: No murmur heard.     Comments: Positive valvular click  Pulmonary:      Breath sounds: Normal breath sounds. No wheezing.   Neurological:      Mental Status: He is alert.         Assessment/Plan   Problem List Items Addressed This Visit             ICD-10-CM    Aortic valve replaced Z95.2    Protein-calorie malnutrition, unspecified severity (Multi) E46     Other Visit Diagnoses         Codes    Dysphagia, unspecified type    -  Primary R13.10    Relevant Orders    Referral to Gastroenterology    Screening for colon cancer     Z12.11    Relevant Orders    Referral to Gastroenterology               "

## 2024-12-26 ENCOUNTER — ANTICOAGULATION - WARFARIN VISIT (OUTPATIENT)
Dept: CARDIOLOGY | Facility: CLINIC | Age: 59
End: 2024-12-26
Payer: MEDICARE

## 2024-12-26 DIAGNOSIS — Z95.2 AORTIC VALVE REPLACED: Primary | ICD-10-CM

## 2024-12-26 LAB
INR IN PPP BY COAGULATION ASSAY EXTERNAL: 2 (ref 2–3)
PROTHROMBIN TIME (PT) IN PPP BY COAGULATION ASSAY EXTERNAL: NORMAL

## 2024-12-26 NOTE — PROGRESS NOTES
Patient identification verified with 2 identifiers.    Location: [facility] Patient Self-Testing Program [phone]    Referring Physician: RADHA Edward   Enrollment/ Re-enrollment date: 2025   INR Goal: 2.0-3.0  INR monitoring is per [facility] protocol.  Anticoagulation Medication: warfarin  Indication: Aortic Valve Replacement    Subjective   Bleeding signs/symptoms: No.  Pt denies.    Bruising: No.  Pt denies.  Major bleeding event: No  Thrombosis signs/symptoms: No  Thromboembolic event: No  Missed doses: No.  Pt denies.  Extra doses: No  Medication changes: No.  Pt denies.  Dietary changes: No.  Pt denies.  Change in health: No.  Pt denies.  Change in activity: No  Alcohol: No  Other concerns: No    Upcoming Procedures:  Does the Patient Have any upcoming procedures that require interruption in anticoagulation therapy? no  Does the patient require bridging? no    Dose maintained after last self test INR on 24.  INR was 2.5.  Pt is taking 7mg of warfarin weekly.  Anticoagulation Summary  As of 2024      INR goal:  2.0-3.0   TTR:  60.1% (1.2 y)   INR used for dosin.00 (2024)   Weekly warfarin total:  7 mg           - Received faxed INR self test results from [facility] and called patient.  I spoke to Pt via telephone call.   Pt identification verified with 2 Pt identifiers.   Pt denies any complications related to ACT therapy.  Current dose schedule reviewed and verified with Pt.  Pt read back current dosing schedule correctly.   Will maintain current weekly dose and have Pt retest in 1 week(s).  Pt instructed to call in interim with questions, concerns or changes.      Assessment/Plan   Therapeutic     1. New dose: no change.  Maintain dose.  7mg weekly.  Pt will self test in 1 week on 25.  2. Next INR: 1 week.  Can r/s in 1 week if therapeutic next week.      Education provided to patient during the visit:  Patient instructed to call in interim with questions, concerns and  changes.   Patient educated on interactions between medications and warfarin.   Patient educated on dietary consistency in vitamin k consumption.   Patient educated on affects of alcohol consumption while taking warfarin.   Patient educated on signs of bleeding/clotting.   Patient educated on compliance with dosing, follow up appointments, and prescribed plan of care.

## 2025-01-02 ENCOUNTER — ANTICOAGULATION - WARFARIN VISIT (OUTPATIENT)
Dept: CARDIOLOGY | Facility: CLINIC | Age: 60
End: 2025-01-02
Payer: MEDICARE

## 2025-01-02 DIAGNOSIS — Z95.2 AORTIC VALVE REPLACED: Primary | ICD-10-CM

## 2025-01-02 LAB
INR IN PPP BY COAGULATION ASSAY EXTERNAL: 2
INR IN PPP BY COAGULATION ASSAY EXTERNAL: 2
PROTHROMBIN TIME (PT) IN PPP BY COAGULATION ASSAY EXTERNAL: NORMAL
PROTHROMBIN TIME (PT) IN PPP BY COAGULATION ASSAY EXTERNAL: NORMAL

## 2025-01-02 NOTE — PROGRESS NOTES
Patient identification verified with 2 identifiers.    Location: [facility] Patient Self-Testing Program [phone]    Referring Physician: RADHA Edward   Enrollment/ Re-enrollment date: 2025   INR Goal: 2.0-3.0  INR monitoring is per [facility] protocol.  Anticoagulation Medication: warfarin  Indication: Aortic Valve Replacement    Subjective   Bleeding signs/symptoms: No    Bruising: No   Major bleeding event: No  Thrombosis signs/symptoms: No  Thromboembolic event: No  Missed doses: No  Extra doses: No  Medication changes: No  Dietary changes: No  Change in health: No  Change in activity: No  Alcohol: No  Other concerns: No    Upcoming Procedures:  Does the Patient Have any upcoming procedures that require interruption in anticoagulation therapy? no  Does the patient require bridging? no      Anticoagulation Summary  As of 2025      INR goal:  2.0-3.0   TTR:  60.7% (1.2 y)   INR used for dosin.00 (2025)   Weekly warfarin total:  7 mg               Assessment/Plan   Therapeutic     1. New dose: Will maintain dose and patient will retest in 1 week.    2. Next INR: 1 week      Education provided to patient during the visit:  Patient instructed to call in interim with questions, concerns and changes.

## 2025-01-09 ENCOUNTER — ANTICOAGULATION - WARFARIN VISIT (OUTPATIENT)
Dept: CARDIOLOGY | Facility: CLINIC | Age: 60
End: 2025-01-09
Payer: MEDICARE

## 2025-01-09 DIAGNOSIS — Z95.2 AORTIC VALVE REPLACED: Primary | ICD-10-CM

## 2025-01-09 NOTE — PROGRESS NOTES
Patient identification verified with 2 identifiers.    Location: [facility] Patient Self-Testing Program [phone]    Referring Physician: RADHA Edward   Enrollment/ Re-enrollment date: 2025   INR Goal: 2.0-3.0  INR monitoring is per [facility] protocol.  Anticoagulation Medication: warfarin  Indication: Aortic Valve Replacement    Subjective   Bleeding signs/symptoms: No    Bruising: No   Major bleeding event: No  Thrombosis signs/symptoms: No  Thromboembolic event: No  Missed doses: No  Denies  Extra doses: No  Medication changes: No  Denies  Dietary changes: No  Denies  Change in health: No  Change in activity: No  Alcohol: No  Other concerns: No    Upcoming Procedures:  Does the Patient Have any upcoming procedures that require interruption in anticoagulation therapy? no  Does the patient require bridging? no      Anticoagulation Summary  As of 2025      INR goal:  2.0-3.0   TTR:  59.8% (1.3 y)   INR used for dosin.80 (2025)   Weekly warfarin total:  7.5 mg               Assessment/Plan   Subtherapeutic     1. New dose: Will increase dose and patient will retest in 1 week.    2. Next INR: 1 week      Education provided to patient during the visit:  Patient instructed to call in interim with questions, concerns and changes.

## 2025-01-16 ENCOUNTER — ANTICOAGULATION - WARFARIN VISIT (OUTPATIENT)
Dept: CARDIOLOGY | Facility: CLINIC | Age: 60
End: 2025-01-16
Payer: MEDICARE

## 2025-01-16 DIAGNOSIS — Z95.2 AORTIC VALVE REPLACED: Primary | ICD-10-CM

## 2025-01-16 LAB
INR IN PPP BY COAGULATION ASSAY EXTERNAL: 2.4
PROTHROMBIN TIME (PT) IN PPP BY COAGULATION ASSAY EXTERNAL: NORMAL

## 2025-01-16 NOTE — PROGRESS NOTES
Patient identification verified with 2 identifiers.    Location: [facility] Patient Self-Testing Program [phone]    Referring Physician: RADHA Edward   Enrollment/ Re-enrollment date: 2025   INR Goal: 2.0-3.0  INR monitoring is per [facility] protocol.  Anticoagulation Medication: warfarin  Indication: Aortic Valve Replacement    Subjective   Bleeding signs/symptoms: No    Bruising: No   Major bleeding event: No  Thrombosis signs/symptoms: No  Thromboembolic event: No  Missed doses: No  Extra doses: No  Medication changes: No  Dietary changes: No  Change in health: No  Change in activity: No  Alcohol: No  Other concerns: No    Upcoming Procedures:  Does the Patient Have any upcoming procedures that require interruption in anticoagulation therapy? no  Does the patient require bridging? no      Anticoagulation Summary  As of 2025      INR goal:  2.0-3.0   TTR:  59.9% (1.3 y)   INR used for dosin.40 (2025)   Weekly warfarin total:  7.5 mg               Assessment/Plan   Therapeutic     1. New dose: Spoke to patient with dosing instructions and next testing date.    2. Next INR: 1 week      Education provided to patient during the visit:  Patient instructed to call in interim with questions, concerns and changes.

## 2025-01-23 ENCOUNTER — TELEPHONE (OUTPATIENT)
Dept: PRIMARY CARE | Facility: CLINIC | Age: 60
End: 2025-01-23
Payer: MEDICARE

## 2025-01-23 ENCOUNTER — ANTICOAGULATION - WARFARIN VISIT (OUTPATIENT)
Dept: CARDIOLOGY | Facility: CLINIC | Age: 60
End: 2025-01-23
Payer: MEDICARE

## 2025-01-23 DIAGNOSIS — Z95.2 AORTIC VALVE REPLACED: Primary | ICD-10-CM

## 2025-01-23 NOTE — PROGRESS NOTES
Patient identification verified with 2 identifiers.    Location: [facility] Patient Self-Testing Program [phone]    Referring Physician: RADHA Edward   Enrollment/ Re-enrollment date: 2025   INR Goal: 2.0-3.0  INR monitoring is per [facility] protocol.  Anticoagulation Medication: warfarin  Indication: Aortic Valve Replacement    Subjective   Bleeding signs/symptoms: No    Bruising: No   Major bleeding event: No  Thrombosis signs/symptoms: No  Thromboembolic event: No  Missed doses: Yes  Denies  Extra doses: No  Medication changes: Yes  Denies  Dietary changes: No  Change in health: No  Change in activity: No  Alcohol: No  Other concerns: No    Upcoming Procedures:  Does the Patient Have any upcoming procedures that require interruption in anticoagulation therapy? no  Does the patient require bridging? no      Anticoagulation Summary  As of 2025      INR goal:  2.0-3.0   TTR:  60.2% (1.3 y)   INR used for dosin.90 (2025)   Weekly warfarin total:  8 mg               Assessment/Plan   Subtherapeutic     1. New dose: Will increase weekly dose and patient will retest in 1 week.    2. Next INR: 1 week      Education provided to patient during the visit:  Patient instructed to call in interim with questions, concerns and changes.

## 2025-01-30 ENCOUNTER — ANTICOAGULATION - WARFARIN VISIT (OUTPATIENT)
Dept: CARDIOLOGY | Facility: CLINIC | Age: 60
End: 2025-01-30
Payer: MEDICARE

## 2025-01-30 DIAGNOSIS — Z95.2 AORTIC VALVE REPLACED: Primary | ICD-10-CM

## 2025-01-30 LAB
INR IN PPP BY COAGULATION ASSAY EXTERNAL: 2.7
PROTHROMBIN TIME (PT) IN PPP BY COAGULATION ASSAY EXTERNAL: NORMAL

## 2025-01-30 NOTE — PROGRESS NOTES
Patient identification verified with 2 identifiers.    Location: [facility] Patient Self-Testing Program [phone]    Referring Physician: RADHA Edward   Enrollment/ Re-enrollment date: 2025   INR Goal: 2.0-3.0  INR monitoring is per [facility] protocol.  Anticoagulation Medication: warfarin  Indication: Aortic Valve Replacement    Subjective   Bleeding signs/symptoms: No    Bruising: No   Major bleeding event: No  Thrombosis signs/symptoms: No  Thromboembolic event: No  Missed doses: No  Extra doses: No  Medication changes: No  Dietary changes: No  Change in health: No  Change in activity: No  Alcohol: No  Other concerns: No    Upcoming Procedures:  Does the Patient Have any upcoming procedures that require interruption in anticoagulation therapy? no  Does the patient require bridging? no      Anticoagulation Summary  As of 2025      INR goal:  2.0-3.0   TTR:  60.6% (1.3 y)   INR used for dosin.70 (2025)   Weekly warfarin total:  8 mg               Assessment/Plan   Therapeutic     1. New dose: Spoke to patient with dosing instructions and next testing date.     2. Next INR: 1 week      Education provided to patient during the visit:  Patient instructed to call in interim with questions, concerns and changes.

## 2025-02-05 DIAGNOSIS — I05.9 MITRAL VALVE DISORDER: ICD-10-CM

## 2025-02-05 RX ORDER — METOPROLOL SUCCINATE 25 MG/1
TABLET, EXTENDED RELEASE ORAL
Qty: 90 TABLET | Refills: 3 | Status: SHIPPED | OUTPATIENT
Start: 2025-02-05

## 2025-02-06 ENCOUNTER — ANTICOAGULATION - WARFARIN VISIT (OUTPATIENT)
Dept: CARDIOLOGY | Facility: CLINIC | Age: 60
End: 2025-02-06
Payer: MEDICARE

## 2025-02-06 DIAGNOSIS — Z95.2 AORTIC VALVE REPLACED: Primary | ICD-10-CM

## 2025-02-06 NOTE — PROGRESS NOTES
Patient identification verified with 2 identifiers.    Location: [facility] Patient Self-Testing Program [phone]    Referring Physician: RADHA Edward   Enrollment/ Re-enrollment date: 2025   INR Goal: 2.0-3.0  INR monitoring is per [facility] protocol.  Anticoagulation Medication: warfarin  Indication: Aortic Valve Replacement    Subjective   Bleeding signs/symptoms: No    Bruising: No   Major bleeding event: No  Thrombosis signs/symptoms: No  Thromboembolic event: No  Missed doses: No  Extra doses: No  Medication changes: No  Dietary changes: No  Change in health: No  Change in activity: No  Alcohol: No  Other concerns: No    Upcoming Procedures:  Does the Patient Have any upcoming procedures that require interruption in anticoagulation therapy? no  Does the patient require bridging? no      Anticoagulation Summary  As of 2025      INR goal:  2.0-3.0   TTR:  61.1% (1.3 y)   INR used for dosin.30 (2025)   Weekly warfarin total:  8 mg               Assessment/Plan   Therapeutic     1. New dose: Spoke to patient with dosing instructions and next testing date.      2. Next INR: 1 week      Education provided to patient during the visit:  Patient instructed to call in interim with questions, concerns and changes.

## 2025-02-13 ENCOUNTER — ANTICOAGULATION - WARFARIN VISIT (OUTPATIENT)
Dept: CARDIOLOGY | Facility: CLINIC | Age: 60
End: 2025-02-13
Payer: MEDICARE

## 2025-02-13 DIAGNOSIS — Z95.2 AORTIC VALVE REPLACED: Primary | ICD-10-CM

## 2025-02-13 NOTE — PROGRESS NOTES
Patient identification verified with 2 identifiers.    Location: [facility] Patient Self-Testing Program [phone]    Referring Physician: RADHA Edward   Enrollment/ Re-enrollment date: 2025   INR Goal: 2.0-3.0  INR monitoring is per [facility] protocol.  Anticoagulation Medication: warfarin  Indication: Aortic Valve Replacement    Subjective   Bleeding signs/symptoms: No    Bruising: No   Major bleeding event: No  Thrombosis signs/symptoms: No  Thromboembolic event: No  Missed doses: No  Extra doses: No  Medication changes: No  Dietary changes: No  Change in health: No  Change in activity: No  Alcohol: No  Other concerns: No    Upcoming Procedures:  Does the Patient Have any upcoming procedures that require interruption in anticoagulation therapy? no  Does the patient require bridging? no      Anticoagulation Summary  As of 2025      INR goal:  2.0-3.0   TTR:  61.7% (1.4 y)   INR used for dosin.00 (2025)   Weekly warfarin total:  8 mg               Assessment/Plan   Therapeutic     1. New dose: Spoke to patient with dosing instructions and next testing date.      2. Next INR: 1 week      Education provided to patient during the visit:  Patient instructed to call in interim with questions, concerns and changes.

## 2025-02-20 ENCOUNTER — ANTICOAGULATION - WARFARIN VISIT (OUTPATIENT)
Dept: CARDIOLOGY | Facility: CLINIC | Age: 60
End: 2025-02-20
Payer: MEDICARE

## 2025-02-20 DIAGNOSIS — Z95.2 AORTIC VALVE REPLACED: Primary | ICD-10-CM

## 2025-02-20 NOTE — PROGRESS NOTES
Patient identification verified with 2 identifiers.    Location: [facility] Patient Self-Testing Program [phone]    Referring Physician: RADHA Edward   Enrollment/ Re-enrollment date: 2025   INR Goal: 2.0-3.0  INR monitoring is per [facility] protocol.  Anticoagulation Medication: warfarin  Indication: Aortic Valve Replacement    Subjective   Bleeding signs/symptoms: No    Bruising: No   Major bleeding event: No  Thrombosis signs/symptoms: No  Thromboembolic event: No  Missed doses: No  Extra doses: No  Medication changes: No  Dietary changes: No  Change in health: No  Change in activity: No  Alcohol: No  Other concerns: No    Upcoming Procedures:  Does the Patient Have any upcoming procedures that require interruption in anticoagulation therapy? no  Does the patient require bridging? no      Anticoagulation Summary  As of 2025      INR goal:  2.0-3.0   TTR:  62.2% (1.4 y)   INR used for dosin.50 (2025)   Weekly warfarin total:  8 mg               Assessment/Plan   Therapeutic     1. New dose: no change    2. Next INR: 1 week      Education provided to patient during the visit:  Patient instructed to call in interim with questions, concerns and changes.

## 2025-02-27 ENCOUNTER — ANTICOAGULATION - WARFARIN VISIT (OUTPATIENT)
Dept: CARDIOLOGY | Facility: CLINIC | Age: 60
End: 2025-02-27
Payer: MEDICARE

## 2025-02-27 DIAGNOSIS — Z95.2 AORTIC VALVE REPLACED: Primary | ICD-10-CM

## 2025-02-27 LAB
POC INR: 3.3
POC PROTHROMBIN TIME: NORMAL

## 2025-02-27 NOTE — PROGRESS NOTES
Patient identification verified with 2 identifiers.    Location: [facility] Patient Self-Testing Program [phone]    Referring Physician: RADHA Edward   Enrollment/ Re-enrollment date: 6/5/2025   INR Goal: 2.0-3.0  INR monitoring is per [facility] protocol.  Anticoagulation Medication: warfarin  Indication: Aortic Valve Replacement    Subjective   Bleeding signs/symptoms: No    Bruising: No   Major bleeding event: No  Thrombosis signs/symptoms: No  Thromboembolic event: No  Missed doses: No  Extra doses: Yes  Denies  Medication changes: Yes  Denies  Dietary changes: Yes  Denies  Change in health: No  Change in activity: No  Alcohol: No  Other concerns: No    Upcoming Procedures:  Does the Patient Have any upcoming procedures that require interruption in anticoagulation therapy? no  Does the patient require bridging? no      Anticoagulation Summary  As of 2/27/2025      INR goal:  2.0-3.0   TTR:  62.2% (1.4 y)   INR used for dosing:  3.30 (2/27/2025)   Weekly warfarin total:  7.5 mg               Assessment/Plan   Supratherapeutic     1. New dose: Spoke to patient with dosing instructions and next testing date.  Weekly dose will be decreased.    2. Next INR: 1 week      Education provided to patient during the visit:  Patient instructed to call in interim with questions, concerns and changes.

## 2025-03-06 ENCOUNTER — ANTICOAGULATION - WARFARIN VISIT (OUTPATIENT)
Dept: CARDIOLOGY | Facility: CLINIC | Age: 60
End: 2025-03-06
Payer: MEDICARE

## 2025-03-06 DIAGNOSIS — Z95.2 AORTIC VALVE REPLACED: Primary | ICD-10-CM

## 2025-03-06 NOTE — PROGRESS NOTES
Patient identification verified with 2 identifiers.    Location: [facility] Patient Self-Testing Program [phone]    Referring Physician: RADHA Edward   Enrollment/ Re-enrollment date: 2025   INR Goal: 2.0-3.0  INR monitoring is per [facility] protocol.  Anticoagulation Medication: warfarin  Indication: Aortic Valve Replacement    Subjective   Bleeding signs/symptoms: No    Bruising: No   Major bleeding event: No  Thrombosis signs/symptoms: No  Thromboembolic event: No  Missed doses: No  Extra doses: No  Medication changes: No  Dietary changes: No  Change in health: No  Change in activity: No  Alcohol: No  Other concerns: No    Upcoming Procedures:  Does the Patient Have any upcoming procedures that require interruption in anticoagulation therapy? no  Does the patient require bridging? no      Anticoagulation Summary  As of 3/6/2025      INR goal:  2.0-3.0   TTR:  61.7% (1.4 y)   INR used for dosin.90 (3/6/2025)   Weekly warfarin total:  7.5 mg               Assessment/Plan   Therapeutic     1. New dose: Spoke to patient with dosing instructions and next testing date.      2. Next INR: 1 week      Education provided to patient during the visit:  Patient instructed to call in interim with questions, concerns and changes.

## 2025-03-13 ENCOUNTER — ANTICOAGULATION - WARFARIN VISIT (OUTPATIENT)
Dept: CARDIOLOGY | Facility: CLINIC | Age: 60
End: 2025-03-13
Payer: MEDICARE

## 2025-03-13 DIAGNOSIS — Z95.2 AORTIC VALVE REPLACED: Primary | ICD-10-CM

## 2025-03-13 NOTE — PROGRESS NOTES
Patient identification verified with 2 identifiers.    Location: [facility] Patient Self-Testing Program [phone]    Referring Physician: RADHA Edward   Enrollment/ Re-enrollment date: 2025   INR Goal: 2.0-3.0  INR monitoring is per [facility] protocol.  Anticoagulation Medication: warfarin  Indication: Aortic Valve Replacement    Subjective   Bleeding signs/symptoms: No    Bruising: No   Major bleeding event: No  Thrombosis signs/symptoms: No  Thromboembolic event: No  Missed doses: No  Extra doses: No  Medication changes: No  Dietary changes: No  Change in health: No  Change in activity: No  Alcohol: No  Other concerns: No    Upcoming Procedures:  Does the Patient Have any upcoming procedures that require interruption in anticoagulation therapy? no  Does the patient require bridging? no      Anticoagulation Summary  As of 3/13/2025      INR goal:  2.0-3.0   TTR:  62.2% (1.4 y)   INR used for dosin.50 (3/13/2025)   Weekly warfarin total:  7.5 mg               Assessment/Plan   Therapeutic     1. New dose: Spoke to patient with dosing instructions and next testing date.    2. Next INR: 1 week      Education provided to patient during the visit:  Patient instructed to call in interim with questions, concerns and changes.

## 2025-03-18 DIAGNOSIS — I05.9 MITRAL VALVE DISORDER: ICD-10-CM

## 2025-03-18 RX ORDER — WARFARIN 1 MG/1
1 TABLET ORAL SEE ADMIN INSTRUCTIONS
Qty: 120 TABLET | Refills: 3 | Status: SHIPPED | OUTPATIENT
Start: 2025-03-18

## 2025-03-20 ENCOUNTER — ANTICOAGULATION - WARFARIN VISIT (OUTPATIENT)
Dept: CARDIOLOGY | Facility: CLINIC | Age: 60
End: 2025-03-20
Payer: MEDICARE

## 2025-03-20 DIAGNOSIS — Z79.01 LONG TERM (CURRENT) USE OF ANTICOAGULANTS: ICD-10-CM

## 2025-03-20 DIAGNOSIS — Z95.2 AORTIC VALVE REPLACED: Primary | ICD-10-CM

## 2025-03-20 NOTE — PROGRESS NOTES
Patient identification verified with 2 identifiers.    Location: [facility] Patient Self-Testing Program [phone]    Referring Physician: Ronni Jang DO  Enrollment/ Re-enrollment date: June 5, 2025   INR Goal: 2.0-3.0  INR monitoring is per [facility] protocol.  Anticoagulation Medication: warfarin  Indication: Aortic Valve Replacement    Received faxed INR self-test results and called patient.?     Subjective   Bleeding signs/symptoms: No    Bruising: No   Major bleeding event: No  Thrombosis signs/symptoms: No  Thromboembolic event: No  Missed doses: No  Extra doses: No  Medication changes: Yes  Patient reports taking BENADRYL/PRN.  Dietary changes: No  Change in health: No  Change in activity: No  Alcohol: No  Other concerns: No    Upcoming Procedures:  Does the Patient Have any upcoming procedures that require interruption in anticoagulation therapy? no  Does the patient require bridging? no      Anticoagulation Summary  As of 3/20/2025      INR goal:  2.0-3.0   TTR:  62.7% (1.5 y)   INR used for dosing:  3.00 (3/20/2025)   Weekly warfarin total:  7.5 mg               Assessment/Plan   Therapeutic     1. New dose: no change    2. Next INR: 1 week  Current dose schedule reviewed with?patient and read back correctly to me.?    Education provided to patient during the visit:  Patient instructed to call in interim with questions, concerns and changes.   Patient educated on interactions between medications and warfarin.   Patient educated on dietary consistency in vitamin k consumption.   Patient educated on affects of alcohol consumption while taking warfarin.   Patient educated on signs of bleeding/clotting.   Patient educated on compliance with dosing, follow up appointments, and prescribed plan of care.

## 2025-03-27 ENCOUNTER — ANTICOAGULATION - WARFARIN VISIT (OUTPATIENT)
Dept: CARDIOLOGY | Facility: CLINIC | Age: 60
End: 2025-03-27
Payer: MEDICARE

## 2025-03-27 DIAGNOSIS — Z95.2 AORTIC VALVE REPLACED: Primary | ICD-10-CM

## 2025-03-27 DIAGNOSIS — Z79.01 LONG TERM (CURRENT) USE OF ANTICOAGULANTS: ICD-10-CM

## 2025-03-27 LAB
POC INR: 3.2
POC PROTHROMBIN TIME: NORMAL

## 2025-03-27 NOTE — PROGRESS NOTES
Patient identification verified with 2 identifiers.    Location: [facility] Patient Self-Testing Program [phone]    Referring Physician: Ronni Jang DO   Enrollment/ Re-enrollment date: 6/5/2025   INR Goal: 2.0-3.0  INR monitoring is per [facility] protocol.  Anticoagulation Medication: warfarin  Indication: Aortic Valve Replacement    Subjective   Bleeding signs/symptoms: No    Bruising: No   Major bleeding event: No  Thrombosis signs/symptoms: No  Thromboembolic event: No  Missed doses: No  Extra doses: No  Medication changes: No  Dietary changes: No  Change in health: No  Change in activity: No  Alcohol: No  Other concerns: No    Upcoming Procedures:  Does the Patient Have any upcoming procedures that require interruption in anticoagulation therapy? no  Does the patient require bridging? no      Anticoagulation Summary  As of 3/27/2025      INR goal:  2.0-3.0   TTR:  61.9% (1.5 y)   INR used for dosing:  3.20 (3/27/2025)   Weekly warfarin total:  7.5 mg               Assessment/Plan   Supratherapeutic     1. New dose: no change    2. Next INR: 1 week      Education provided to patient during the visit:  Patient instructed to call in interim with questions, concerns and changes.   Patient educated on dietary consistency in vitamin k consumption.

## 2025-04-03 ENCOUNTER — ANTICOAGULATION - WARFARIN VISIT (OUTPATIENT)
Dept: CARDIOLOGY | Facility: CLINIC | Age: 60
End: 2025-04-03
Payer: MEDICARE

## 2025-04-03 DIAGNOSIS — Z79.01 LONG TERM (CURRENT) USE OF ANTICOAGULANTS: ICD-10-CM

## 2025-04-03 DIAGNOSIS — Z95.2 AORTIC VALVE REPLACED: Primary | ICD-10-CM

## 2025-04-03 NOTE — PROGRESS NOTES
Patient identification verified with 2 identifiers.    Location: [facility] Patient Self-Testing Program [phone]    Referring Physician: Ronni Jang DO   Enrollment/ Re-enrollment date: 6/5/2025   INR Goal: 2.0-3.0  INR monitoring is per [facility] protocol.  Anticoagulation Medication: warfarin  Indication: Aortic Valve Replaceme    Subjective   Bleeding signs/symptoms: No    Bruising: No   Major bleeding event: No  Thrombosis signs/symptoms: No  Thromboembolic event: No  Missed doses: No  Extra doses: No  Medication changes: No  Dietary changes: No  Change in health: No  Change in activity: No  Alcohol: No  Other concerns: No    Upcoming Procedures:  Does the Patient Have any upcoming procedures that require interruption in anticoagulation therapy? no  Does the patient require bridging? no      Anticoagulation Summary  As of 4/3/2025      INR goal:  2.0-3.0   TTR:  61.1% (1.5 y)   INR used for dosing:  3.00 (4/3/2025)   Weekly warfarin total:  7.5 mg               Assessment/Plan   Therapeutic     1. New dose: Spoke to patient with dosing instructions and next testing date.    2. Next INR: 1 week      Education provided to patient during the visit:  Patient instructed to call in interim with questions, concerns and changes.

## 2025-04-07 ENCOUNTER — APPOINTMENT (OUTPATIENT)
Facility: CLINIC | Age: 60
End: 2025-04-07
Payer: MEDICARE

## 2025-04-07 DIAGNOSIS — I05.9 MITRAL VALVE DISORDER: ICD-10-CM

## 2025-04-07 DIAGNOSIS — Z95.2 AORTIC VALVE REPLACED: Primary | ICD-10-CM

## 2025-04-10 ENCOUNTER — ANTICOAGULATION - WARFARIN VISIT (OUTPATIENT)
Dept: CARDIOLOGY | Facility: CLINIC | Age: 60
End: 2025-04-10
Payer: MEDICARE

## 2025-04-10 DIAGNOSIS — Z79.01 LONG TERM (CURRENT) USE OF ANTICOAGULANTS: ICD-10-CM

## 2025-04-10 DIAGNOSIS — Z95.2 AORTIC VALVE REPLACED: Primary | ICD-10-CM

## 2025-04-10 LAB
INR IN PPP BY COAGULATION ASSAY EXTERNAL: 1.6
PROTHROMBIN TIME (PT) IN PPP BY COAGULATION ASSAY EXTERNAL: NORMAL

## 2025-04-10 NOTE — PROGRESS NOTES
Patient identification verified with 2 identifiers.    Location: [facility] Patient Self-Testing Program [phone]    Referring Physician: Ronni Jang DO  Enrollment/ Re-enrollment date: 2025   INR Goal: 2.0-3.0  INR monitoring is per [facility] protocol.  Anticoagulation Medication: warfarin  Indication: Aortic valve replaced [Z95.2]   Long term (current) use of anticoagulants [Z79.01]     Received faxed INR self-test results and called patient.?     Subjective   Bleeding signs/symptoms: No    Bruising: No   Major bleeding event: No  Thrombosis signs/symptoms: No  Thromboembolic event: No  Missed doses: Patient DENIES missed doses of warfarin.  Extra doses: No  Medication changes: No  Dietary changes: No  Change in health: Patient has been ill during the past week with GI distress.   Change in activity: No  Alcohol: No  Other concerns: No    Upcoming Procedures:  Does the Patient Have any upcoming procedures that require interruption in anticoagulation therapy? no  Does the patient require bridging? no      Anticoagulation Summary  As of 4/10/2025      INR goal:  2.0-3.0   TTR:  61.2% (1.5 y)   INR used for dosin.60 (4/10/2025)   Weekly warfarin total:  8 mg               Assessment/Plan   Subtherapeutic     1. New dose:  will INCREASE TWD of warfarin by 0.5mg (approx. 5-10%).     2. Next INR:  3-5 days  Patient to perform PST on Monday, 2025.  Current dose schedule reviewed with?patient and read back correctly to me.?    Education provided to patient during the visit:  Patient instructed to call in interim with questions, concerns and changes.   Patient educated on interactions between medications and warfarin.   Patient educated on dietary consistency in vitamin k consumption.   Patient educated on affects of alcohol consumption while taking warfarin.   Patient educated on signs of bleeding/clotting.   Patient educated on compliance with dosing, follow up appointments, and prescribed plan of  care.

## 2025-04-14 ENCOUNTER — ANTICOAGULATION - WARFARIN VISIT (OUTPATIENT)
Dept: CARDIOLOGY | Facility: CLINIC | Age: 60
End: 2025-04-14
Payer: MEDICARE

## 2025-04-14 DIAGNOSIS — Z95.2 AORTIC VALVE REPLACED: Primary | ICD-10-CM

## 2025-04-14 DIAGNOSIS — Z79.01 LONG TERM (CURRENT) USE OF ANTICOAGULANTS: ICD-10-CM

## 2025-04-14 LAB
INR IN PPP BY COAGULATION ASSAY EXTERNAL: 1.4
PROTHROMBIN TIME (PT) IN PPP BY COAGULATION ASSAY EXTERNAL: NORMAL

## 2025-04-14 NOTE — PROGRESS NOTES
Patient identification verified with 2 identifiers.    Location: [facility] Patient Self-Testing Program [phone]    Referring Physician: DR. LACIE RIVERA  Enrollment/ Re-enrollment date: 2025   INR Goal: 2.0-3.0  INR monitoring is per [facility] protocol.  Anticoagulation Medication: warfarin  Indication: Aortic Valve Replacement    Subjective   Bleeding signs/symptoms: No    Bruising: No   Major bleeding event: No  Thrombosis signs/symptoms: No  Thromboembolic event: No  Missed doses: No  Extra doses: No  Medication changes: No  Dietary changes: No  Change in health: No  PT C/O STILL HAVING DIARHHEA. ADVISED PT TO CONTACT HIS PCP IF IT CONTINUES  Change in activity: No  Alcohol: No  Other concerns: No    Upcoming Procedures:  Does the Patient Have any upcoming procedures that require interruption in anticoagulation therapy? no  Does the patient require bridging? no      Anticoagulation Summary  As of 2025      INR goal:  2.0-3.0   TTR:  60.8% (1.5 y)   INR used for dosin.40 (2025)   Weekly warfarin total:  8.5 mg               Assessment/Plan   Subtherapeutic     1. New dose:  . WILL INCREASE TWD . PT VERBALIZED NEW INSTRUCTIONS CORRECTLY     2. Next INR:  2025      Education provided to patient during the visit:  Patient instructed to call in interim with questions, concerns and changes.   Patient educated on compliance with dosing, follow up appointments, and prescribed plan of care.

## 2025-04-17 ENCOUNTER — ANTICOAGULATION - WARFARIN VISIT (OUTPATIENT)
Dept: CARDIOLOGY | Facility: CLINIC | Age: 60
End: 2025-04-17
Payer: MEDICARE

## 2025-04-17 DIAGNOSIS — Z95.2 AORTIC VALVE REPLACED: Primary | ICD-10-CM

## 2025-04-17 DIAGNOSIS — Z79.01 LONG TERM (CURRENT) USE OF ANTICOAGULANTS: ICD-10-CM

## 2025-04-17 NOTE — PROGRESS NOTES
Patient identification verified with 2 identifiers.    Location: [facility] Patient Self-Testing Program [phone]    Referring Physician: DR. LACIE RIVERA  Enrollment/ Re-enrollment date: 2025   INR Goal: 2.0-3.0  INR monitoring is per [facility] protocol.  Anticoagulation Medication: warfarin  Indication: Aortic Valve Replacement    Subjective   Bleeding signs/symptoms: No    Bruising: No   Major bleeding event: No  Thrombosis signs/symptoms: No  Thromboembolic event: No  Missed doses: No  Extra doses: No  Medication changes: No  Dietary changes: No  Change in health: No  Change in activity: No  Alcohol: No  Other concerns: No    Upcoming Procedures:  Does the Patient Have any upcoming procedures that require interruption in anticoagulation therapy? no  Does the patient require bridging? no      Anticoagulation Summary  As of 2025      INR goal:  2.0-3.0   TTR:  60.5% (1.5 y)   INR used for dosin.90 (2025)   Weekly warfarin total:  9 mg               Assessment/Plan   Subtherapeutic     1. New dose: Will increase dose and patient will retest in 1 week.  Patient verbalized understanding    2. Next INR: 1 week      Education provided to patient during the visit:  Patient instructed to call in interim with questions, concerns and changes.

## 2025-04-24 ENCOUNTER — ANTICOAGULATION - WARFARIN VISIT (OUTPATIENT)
Dept: CARDIOLOGY | Facility: CLINIC | Age: 60
End: 2025-04-24
Payer: MEDICARE

## 2025-04-24 DIAGNOSIS — Z95.2 AORTIC VALVE REPLACED: Primary | ICD-10-CM

## 2025-04-24 DIAGNOSIS — Z79.01 LONG TERM (CURRENT) USE OF ANTICOAGULANTS: ICD-10-CM

## 2025-04-24 LAB
INR IN PPP BY COAGULATION ASSAY EXTERNAL: 3.3
PROTHROMBIN TIME (PT) IN PPP BY COAGULATION ASSAY EXTERNAL: NORMAL

## 2025-04-24 NOTE — PROGRESS NOTES
Patient identification verified with 2 identifiers.    Location: [facility] Patient Self-Testing Program [phone]    Referring Physician: DR. LACIE RIVERA  Enrollment/ Re-enrollment date: 6/5/2025   INR Goal: 2.0-3.0  INR monitoring is per [facility] protocol.  Anticoagulation Medication: warfarin  Indication: Aortic Valve Replacement    Subjective   Bleeding signs/symptoms: No    Bruising: No   Major bleeding event: No  Thrombosis signs/symptoms: No  Thromboembolic event: No  Missed doses: No  Extra doses: No  Denies  Medication changes: No  Denies  Dietary changes: No  Denies  Change in health: No  Change in activity: No  Alcohol: No  Other concerns: No    Upcoming Procedures:  Does the Patient Have any upcoming procedures that require interruption in anticoagulation therapy? no  Does the patient require bridging? no      Anticoagulation Summary  As of 4/24/2025      INR goal:  2.0-3.0   TTR:  60.6% (1.6 y)   INR used for dosing:  3.30 (4/24/2025)   Weekly warfarin total:  8.5 mg               Assessment/Plan   Therapeutic     1. New dose: Will decrease dose and patient will retest in 1 week.    2. Next INR: 1 week      Education provided to patient during the visit:  Patient instructed to call in interim with questions, concerns and changes.

## 2025-05-01 ENCOUNTER — ANTICOAGULATION - WARFARIN VISIT (OUTPATIENT)
Dept: CARDIOLOGY | Facility: CLINIC | Age: 60
End: 2025-05-01
Payer: MEDICARE

## 2025-05-01 DIAGNOSIS — Z95.2 AORTIC VALVE REPLACED: Primary | ICD-10-CM

## 2025-05-01 DIAGNOSIS — Z79.01 LONG TERM (CURRENT) USE OF ANTICOAGULANTS: ICD-10-CM

## 2025-05-01 NOTE — PROGRESS NOTES
Patient identification verified with 2 identifiers.    Location: [facility] Patient Self-Testing Program [phone]    Referring Physician: DR. LACIE RIVERA  Enrollment/ Re-enrollment date: 6/5/2025   INR Goal: 2.0-3.0  INR monitoring is per [facility] protocol.  Anticoagulation Medication: warfarin  Indication: Aortic Valve Replacement    Subjective   Bleeding signs/symptoms: No    Bruising: No   Major bleeding event: No  Thrombosis signs/symptoms: No  Thromboembolic event: No  Missed doses: No  Extra doses: No  Medication changes: No  Dietary changes: No  Change in health: No  Change in activity: No  Alcohol: No  Other concerns: No    Upcoming Procedures:  Does the Patient Have any upcoming procedures that require interruption in anticoagulation therapy? no  Does the patient require bridging? no      Anticoagulation Summary  As of 5/1/2025      INR goal:  2.0-3.0   TTR:  59.9% (1.6 y)   INR used for dosing:  3.00 (5/1/2025)   Weekly warfarin total:  8.5 mg               Assessment/Plan   Therapeutic     1. New dose: Spoke to patient with dosing instructions and next testing date.    2. Next INR: 1 week      Education provided to patient during the visit:  Patient instructed to call in interim with questions, concerns and changes.

## 2025-05-08 ENCOUNTER — ANTICOAGULATION - WARFARIN VISIT (OUTPATIENT)
Dept: CARDIOLOGY | Facility: CLINIC | Age: 60
End: 2025-05-08
Payer: MEDICARE

## 2025-05-08 DIAGNOSIS — Z79.01 LONG TERM (CURRENT) USE OF ANTICOAGULANTS: ICD-10-CM

## 2025-05-08 DIAGNOSIS — Z95.2 AORTIC VALVE REPLACED: Primary | ICD-10-CM

## 2025-05-08 NOTE — PROGRESS NOTES
Patient identification verified with 2 identifiers.    Location: [facility] Patient Self-Testing Program [phone]    Referring Physician: DR. LACIE RIVERA  Enrollment/ Re-enrollment date: 2025   INR Goal: 2.0-3.0  INR monitoring is per [facility] protocol.  Anticoagulation Medication: warfarin  Indication: Aortic Valve Replacement    Subjective   Bleeding signs/symptoms: No    Bruising: No   Major bleeding event: No  Thrombosis signs/symptoms: No  Thromboembolic event: No  Missed doses: No  Extra doses: No  Medication changes: No  Dietary changes: No  Change in health: No  Change in activity: No  Alcohol: No  Other concerns: No    Upcoming Procedures:  Does the Patient Have any upcoming procedures that require interruption in anticoagulation therapy? no  Does the patient require bridging? no      Anticoagulation Summary  As of 2025      INR goal:  2.0-3.0   TTR:  60.3% (1.6 y)   INR used for dosin.10 (2025)   Weekly warfarin total:  8.5 mg               Assessment/Plan   Therapeutic     1. New dose: Spoke to patient with dosing instructions and next testing date    2. Next INR: 1 week      Education provided to patient during the visit:  Patient instructed to call in interim with questions, concerns and changes.

## 2025-05-15 ENCOUNTER — ANTICOAGULATION - WARFARIN VISIT (OUTPATIENT)
Dept: CARDIOLOGY | Facility: CLINIC | Age: 60
End: 2025-05-15
Payer: MEDICARE

## 2025-05-15 DIAGNOSIS — Z79.01 LONG TERM (CURRENT) USE OF ANTICOAGULANTS: ICD-10-CM

## 2025-05-15 DIAGNOSIS — Z95.2 AORTIC VALVE REPLACED: Primary | ICD-10-CM

## 2025-05-15 LAB
POC INR: 2 (ref 0.9–1.1)
POC PROTHROMBIN TIME: ABNORMAL (ref 9.3–12.5)

## 2025-05-15 NOTE — PROGRESS NOTES
Patient identification verified with 2 identifiers.    Location: [facility] Patient Self-Testing Program [phone]    Referring Physician: DR. LACIE RIVERA  Enrollment/ Re-enrollment date: 2025   INR Goal: 2.0-3.0  INR monitoring is per [facility] protocol.  Anticoagulation Medication: warfarin  Indication: Aortic Valve Replacement    Subjective   Bleeding signs/symptoms: No    Bruising: No   Major bleeding event: No  Thrombosis signs/symptoms: No  Thromboembolic event: No  Missed doses: No  Extra doses: No  Medication changes: No  Dietary changes: No  Change in health: No  Change in activity: No  Alcohol: No  Other concerns: Yes  DIARRHEA FROM STOMACH BUG    Upcoming Procedures:  Does the Patient Have any upcoming procedures that require interruption in anticoagulation therapy? no  Does the patient require bridging? no      Anticoagulation Summary  As of 5/15/2025      INR goal:  2.0-3.0   TTR:  60.8% (1.6 y)   INR used for dosin.00 (5/15/2025)   Weekly warfarin total:  8.5 mg               Assessment/Plan   Therapeutic     1. New dose: no change    2. Next INR: 1 week      Education provided to patient during the visit:  Patient instructed to call in interim with questions, concerns and changes.   Patient educated on interactions between medications and warfarin.   Patient educated on compliance with dosing, follow up appointments, and prescribed plan of care.

## 2025-05-22 ENCOUNTER — ANTICOAGULATION - WARFARIN VISIT (OUTPATIENT)
Dept: CARDIOLOGY | Facility: CLINIC | Age: 60
End: 2025-05-22
Payer: MEDICARE

## 2025-05-22 DIAGNOSIS — Z79.01 LONG TERM (CURRENT) USE OF ANTICOAGULANTS: ICD-10-CM

## 2025-05-22 DIAGNOSIS — Z95.2 AORTIC VALVE REPLACED: Primary | ICD-10-CM

## 2025-05-22 NOTE — PROGRESS NOTES
Patient identification verified with 2 identifiers.    Location: [facility] Patient Self-Testing Program [phone]    Referring Physician: DR. LACIE RIVERA  Enrollment/ Re-enrollment date: 2025   INR Goal: 2.0-3.0  INR monitoring is per [facility] protocol.  Anticoagulation Medication: warfarin  Indication: Aortic Valve Replacement    Subjective   Bleeding signs/symptoms: No    Bruising: No   Major bleeding event: No  Thrombosis signs/symptoms: No  Thromboembolic event: No  Missed doses: No  Extra doses: No  Medication changes: No  Dietary changes: No  Change in health: No  Change in activity: No  Alcohol: No  Other concerns: No    Upcoming Procedures:  Does the Patient Have any upcoming procedures that require interruption in anticoagulation therapy? no  Does the patient require bridging? no      Anticoagulation Summary  As of 2025      INR goal:  2.0-3.0   TTR:  60.2% (1.6 y)   INR used for dosin.90 (2025)   Weekly warfarin total:  9 mg             Received faxed INR self-test results and called patient. Pt identification verified with 2 pt identifiers. Current dose schedule reviewed with patient, patient verbalized understanding. Pt instructed to call in interim with questions, concerns or changes.  SALVADOR Gonzales RN    Assessment/Plan   Subtherapeutic     1. New dose: Will increase dose per protocol    2. Next INR: 1 week      Education provided to patient during the visit:  Patient instructed to call in interim with questions, concerns and changes.   Patient educated on affects of alcohol consumption while taking warfarin.   Patient educated on compliance with dosing, follow up appointments, and prescribed plan of care.

## 2025-05-27 ENCOUNTER — HOSPITAL ENCOUNTER (INPATIENT)
Facility: HOSPITAL | Age: 60
End: 2025-05-27
Attending: INTERNAL MEDICINE | Admitting: INTERNAL MEDICINE
Payer: MEDICARE

## 2025-05-27 ENCOUNTER — APPOINTMENT (OUTPATIENT)
Dept: CARDIOLOGY | Facility: HOSPITAL | Age: 60
End: 2025-05-27
Payer: MEDICARE

## 2025-05-27 ENCOUNTER — APPOINTMENT (OUTPATIENT)
Dept: RADIOLOGY | Facility: HOSPITAL | Age: 60
End: 2025-05-27
Payer: MEDICARE

## 2025-05-27 ENCOUNTER — HOSPITAL ENCOUNTER (EMERGENCY)
Facility: HOSPITAL | Age: 60
Discharge: ANOTHER HOSPITAL | End: 2025-05-27
Attending: STUDENT IN AN ORGANIZED HEALTH CARE EDUCATION/TRAINING PROGRAM
Payer: MEDICARE

## 2025-05-27 VITALS
HEART RATE: 81 BPM | WEIGHT: 89.51 LBS | TEMPERATURE: 98.4 F | OXYGEN SATURATION: 98 % | HEIGHT: 60 IN | SYSTOLIC BLOOD PRESSURE: 111 MMHG | DIASTOLIC BLOOD PRESSURE: 65 MMHG | BODY MASS INDEX: 17.57 KG/M2 | RESPIRATION RATE: 18 BRPM

## 2025-05-27 DIAGNOSIS — D64.9 ANEMIA, UNSPECIFIED TYPE: Primary | ICD-10-CM

## 2025-05-27 DIAGNOSIS — Z95.3 PRESENCE OF XENOGENIC HEART VALVE: ICD-10-CM

## 2025-05-27 DIAGNOSIS — D64.9 SYMPTOMATIC ANEMIA: Primary | ICD-10-CM

## 2025-05-27 DIAGNOSIS — I50.33 ACUTE ON CHRONIC HEART FAILURE WITH PRESERVED EJECTION FRACTION: ICD-10-CM

## 2025-05-27 DIAGNOSIS — Z95.2 AORTIC VALVE REPLACED: ICD-10-CM

## 2025-05-27 DIAGNOSIS — Z98.890 OTHER SPECIFIED POSTPROCEDURAL STATES: ICD-10-CM

## 2025-05-27 DIAGNOSIS — Z79.01 ANTICOAGULATED: ICD-10-CM

## 2025-05-27 DIAGNOSIS — R79.9 ELEVATED BUN: ICD-10-CM

## 2025-05-27 DIAGNOSIS — D50.0 IRON DEFICIENCY ANEMIA DUE TO CHRONIC BLOOD LOSS: ICD-10-CM

## 2025-05-27 DIAGNOSIS — I06.8 OTHER RHEUMATIC AORTIC VALVE DISEASES: ICD-10-CM

## 2025-05-27 DIAGNOSIS — Z79.01 LONG TERM (CURRENT) USE OF ANTICOAGULANTS: ICD-10-CM

## 2025-05-27 LAB
ALBUMIN SERPL BCP-MCNC: 4.1 G/DL (ref 3.4–5)
ALP SERPL-CCNC: 52 U/L (ref 33–136)
ALT SERPL W P-5'-P-CCNC: 16 U/L (ref 10–52)
ANION GAP SERPL CALCULATED.3IONS-SCNC: 11 MMOL/L (ref 10–20)
AST SERPL W P-5'-P-CCNC: 23 U/L (ref 9–39)
BASOPHILS # BLD AUTO: 0.04 X10*3/UL (ref 0–0.1)
BASOPHILS NFR BLD AUTO: 0.7 %
BILIRUB SERPL-MCNC: 0.7 MG/DL (ref 0–1.2)
BNP SERPL-MCNC: 867 PG/ML (ref 0–99)
BUN SERPL-MCNC: 27 MG/DL (ref 6–23)
CALCIUM SERPL-MCNC: 8.7 MG/DL (ref 8.6–10.3)
CARDIAC TROPONIN I PNL SERPL HS: 5 NG/L (ref 0–20)
CARDIAC TROPONIN I PNL SERPL HS: 6 NG/L (ref 0–20)
CHLORIDE SERPL-SCNC: 107 MMOL/L (ref 98–107)
CO2 SERPL-SCNC: 22 MMOL/L (ref 21–32)
CREAT SERPL-MCNC: 0.75 MG/DL (ref 0.5–1.3)
EGFRCR SERPLBLD CKD-EPI 2021: >90 ML/MIN/1.73M*2
EOSINOPHIL # BLD AUTO: 0.07 X10*3/UL (ref 0–0.7)
EOSINOPHIL NFR BLD AUTO: 1.2 %
ERYTHROCYTE [DISTWIDTH] IN BLOOD BY AUTOMATED COUNT: 15.2 % (ref 11.5–14.5)
GLUCOSE SERPL-MCNC: 89 MG/DL (ref 74–99)
HCT VFR BLD AUTO: 27.8 % (ref 41–52)
HGB BLD-MCNC: 8.3 G/DL (ref 13.5–17.5)
IMM GRANULOCYTES # BLD AUTO: 0.03 X10*3/UL (ref 0–0.7)
IMM GRANULOCYTES NFR BLD AUTO: 0.5 % (ref 0–0.9)
INR PPP: 1.9 (ref 0.9–1.2)
LYMPHOCYTES # BLD AUTO: 0.61 X10*3/UL (ref 1.2–4.8)
LYMPHOCYTES NFR BLD AUTO: 10.4 %
MCH RBC QN AUTO: 23.8 PG (ref 26–34)
MCHC RBC AUTO-ENTMCNC: 29.9 G/DL (ref 32–36)
MCV RBC AUTO: 80 FL (ref 80–100)
MONOCYTES # BLD AUTO: 0.42 X10*3/UL (ref 0.1–1)
MONOCYTES NFR BLD AUTO: 7.2 %
NEUTROPHILS # BLD AUTO: 4.69 X10*3/UL (ref 1.2–7.7)
NEUTROPHILS NFR BLD AUTO: 80 %
NRBC BLD-RTO: 0 /100 WBCS (ref 0–0)
PLATELET # BLD AUTO: 298 X10*3/UL (ref 150–450)
POTASSIUM SERPL-SCNC: 4.4 MMOL/L (ref 3.5–5.3)
PROT SERPL-MCNC: 6.9 G/DL (ref 6.4–8.2)
PROTHROMBIN TIME: 19.2 SECONDS (ref 9.3–12.7)
RBC # BLD AUTO: 3.49 X10*6/UL (ref 4.5–5.9)
SODIUM SERPL-SCNC: 136 MMOL/L (ref 136–145)
WBC # BLD AUTO: 5.9 X10*3/UL (ref 4.4–11.3)

## 2025-05-27 PROCEDURE — 2500000001 HC RX 250 WO HCPCS SELF ADMINISTERED DRUGS (ALT 637 FOR MEDICARE OP): Performed by: INTERNAL MEDICINE

## 2025-05-27 PROCEDURE — 73030 X-RAY EXAM OF SHOULDER: CPT | Mod: RIGHT SIDE | Performed by: RADIOLOGY

## 2025-05-27 PROCEDURE — 99291 CRITICAL CARE FIRST HOUR: CPT | Mod: 25 | Performed by: NURSE PRACTITIONER

## 2025-05-27 PROCEDURE — 85610 PROTHROMBIN TIME: CPT | Performed by: NURSE PRACTITIONER

## 2025-05-27 PROCEDURE — 71046 X-RAY EXAM CHEST 2 VIEWS: CPT | Mod: FOREIGN READ | Performed by: RADIOLOGY

## 2025-05-27 PROCEDURE — 84075 ASSAY ALKALINE PHOSPHATASE: CPT | Performed by: NURSE PRACTITIONER

## 2025-05-27 PROCEDURE — 1200000002 HC GENERAL ROOM WITH TELEMETRY DAILY

## 2025-05-27 PROCEDURE — 36415 COLL VENOUS BLD VENIPUNCTURE: CPT | Performed by: NURSE PRACTITIONER

## 2025-05-27 PROCEDURE — 93005 ELECTROCARDIOGRAM TRACING: CPT

## 2025-05-27 PROCEDURE — 84484 ASSAY OF TROPONIN QUANT: CPT | Performed by: NURSE PRACTITIONER

## 2025-05-27 PROCEDURE — 83880 ASSAY OF NATRIURETIC PEPTIDE: CPT | Performed by: NURSE PRACTITIONER

## 2025-05-27 PROCEDURE — 73030 X-RAY EXAM OF SHOULDER: CPT | Mod: RT

## 2025-05-27 PROCEDURE — 71046 X-RAY EXAM CHEST 2 VIEWS: CPT

## 2025-05-27 PROCEDURE — 2500000004 HC RX 250 GENERAL PHARMACY W/ HCPCS (ALT 636 FOR OP/ED): Mod: JZ | Performed by: INTERNAL MEDICINE

## 2025-05-27 PROCEDURE — 85025 COMPLETE CBC W/AUTO DIFF WBC: CPT | Performed by: NURSE PRACTITIONER

## 2025-05-27 RX ORDER — ACETAMINOPHEN 650 MG/1
650 SUPPOSITORY RECTAL EVERY 4 HOURS PRN
Status: DISCONTINUED | OUTPATIENT
Start: 2025-05-27 | End: 2025-06-12 | Stop reason: HOSPADM

## 2025-05-27 RX ORDER — WARFARIN 2 MG/1
1 TABLET ORAL DAILY
Status: DISCONTINUED | OUTPATIENT
Start: 2025-05-28 | End: 2025-05-28

## 2025-05-27 RX ORDER — ACETAMINOPHEN 325 MG/1
650 TABLET ORAL EVERY 4 HOURS PRN
Status: DISCONTINUED | OUTPATIENT
Start: 2025-05-27 | End: 2025-06-12 | Stop reason: HOSPADM

## 2025-05-27 RX ORDER — ONDANSETRON HYDROCHLORIDE 2 MG/ML
4 INJECTION, SOLUTION INTRAVENOUS EVERY 8 HOURS PRN
Status: DISCONTINUED | OUTPATIENT
Start: 2025-05-27 | End: 2025-06-12 | Stop reason: HOSPADM

## 2025-05-27 RX ORDER — ACETAMINOPHEN 160 MG/5ML
650 SOLUTION ORAL EVERY 4 HOURS PRN
Status: DISCONTINUED | OUTPATIENT
Start: 2025-05-27 | End: 2025-06-12 | Stop reason: HOSPADM

## 2025-05-27 RX ORDER — PANTOPRAZOLE SODIUM 40 MG/10ML
40 INJECTION, POWDER, LYOPHILIZED, FOR SOLUTION INTRAVENOUS 2 TIMES DAILY
Status: DISCONTINUED | OUTPATIENT
Start: 2025-05-28 | End: 2025-05-29

## 2025-05-27 RX ORDER — SODIUM CHLORIDE 9 MG/ML
100 INJECTION, SOLUTION INTRAVENOUS CONTINUOUS
Status: DISCONTINUED | OUTPATIENT
Start: 2025-05-27 | End: 2025-05-27

## 2025-05-27 RX ORDER — ONDANSETRON 4 MG/1
4 TABLET, FILM COATED ORAL EVERY 8 HOURS PRN
Status: DISCONTINUED | OUTPATIENT
Start: 2025-05-27 | End: 2025-06-12 | Stop reason: HOSPADM

## 2025-05-27 RX ORDER — METOPROLOL SUCCINATE 25 MG/1
25 TABLET, EXTENDED RELEASE ORAL DAILY
Status: DISCONTINUED | OUTPATIENT
Start: 2025-05-28 | End: 2025-06-12 | Stop reason: HOSPADM

## 2025-05-27 RX ADMIN — SODIUM CHLORIDE 100 ML/HR: 0.9 INJECTION, SOLUTION INTRAVENOUS at 23:27

## 2025-05-27 RX ADMIN — ACETAMINOPHEN 650 MG: 325 TABLET ORAL at 23:36

## 2025-05-27 SDOH — SOCIAL STABILITY: SOCIAL INSECURITY: HAVE YOU HAD ANY THOUGHTS OF HARMING ANYONE ELSE?: NO

## 2025-05-27 SDOH — SOCIAL STABILITY: SOCIAL INSECURITY
WITHIN THE LAST YEAR, HAVE YOU BEEN KICKED, HIT, SLAPPED, OR OTHERWISE PHYSICALLY HURT BY YOUR PARTNER OR EX-PARTNER?: NO

## 2025-05-27 SDOH — SOCIAL STABILITY: SOCIAL INSECURITY: DO YOU FEEL UNSAFE GOING BACK TO THE PLACE WHERE YOU ARE LIVING?: NO

## 2025-05-27 SDOH — ECONOMIC STABILITY: FOOD INSECURITY: WITHIN THE PAST 12 MONTHS, THE FOOD YOU BOUGHT JUST DIDN'T LAST AND YOU DIDN'T HAVE MONEY TO GET MORE.: NEVER TRUE

## 2025-05-27 SDOH — SOCIAL STABILITY: SOCIAL INSECURITY: ARE YOU OR HAVE YOU BEEN THREATENED OR ABUSED PHYSICALLY, EMOTIONALLY, OR SEXUALLY BY ANYONE?: NO

## 2025-05-27 SDOH — SOCIAL STABILITY: SOCIAL INSECURITY: WITHIN THE LAST YEAR, HAVE YOU BEEN AFRAID OF YOUR PARTNER OR EX-PARTNER?: NO

## 2025-05-27 SDOH — SOCIAL STABILITY: SOCIAL INSECURITY: WITHIN THE LAST YEAR, HAVE YOU BEEN HUMILIATED OR EMOTIONALLY ABUSED IN OTHER WAYS BY YOUR PARTNER OR EX-PARTNER?: NO

## 2025-05-27 SDOH — SOCIAL STABILITY: SOCIAL INSECURITY
WITHIN THE LAST YEAR, HAVE YOU BEEN RAPED OR FORCED TO HAVE ANY KIND OF SEXUAL ACTIVITY BY YOUR PARTNER OR EX-PARTNER?: NO

## 2025-05-27 SDOH — ECONOMIC STABILITY: INCOME INSECURITY: IN THE PAST 12 MONTHS HAS THE ELECTRIC, GAS, OIL, OR WATER COMPANY THREATENED TO SHUT OFF SERVICES IN YOUR HOME?: NO

## 2025-05-27 SDOH — SOCIAL STABILITY: SOCIAL INSECURITY: DO YOU FEEL ANYONE HAS EXPLOITED OR TAKEN ADVANTAGE OF YOU FINANCIALLY OR OF YOUR PERSONAL PROPERTY?: NO

## 2025-05-27 SDOH — SOCIAL STABILITY: SOCIAL INSECURITY: ABUSE: ADULT

## 2025-05-27 SDOH — SOCIAL STABILITY: SOCIAL INSECURITY: ARE THERE ANY APPARENT SIGNS OF INJURIES/BEHAVIORS THAT COULD BE RELATED TO ABUSE/NEGLECT?: NO

## 2025-05-27 SDOH — SOCIAL STABILITY: SOCIAL INSECURITY: HAVE YOU HAD THOUGHTS OF HARMING ANYONE ELSE?: NO

## 2025-05-27 SDOH — ECONOMIC STABILITY: FOOD INSECURITY: WITHIN THE PAST 12 MONTHS, YOU WORRIED THAT YOUR FOOD WOULD RUN OUT BEFORE YOU GOT THE MONEY TO BUY MORE.: NEVER TRUE

## 2025-05-27 SDOH — SOCIAL STABILITY: SOCIAL INSECURITY: DOES ANYONE TRY TO KEEP YOU FROM HAVING/CONTACTING OTHER FRIENDS OR DOING THINGS OUTSIDE YOUR HOME?: NO

## 2025-05-27 SDOH — SOCIAL STABILITY: SOCIAL INSECURITY: WERE YOU ABLE TO COMPLETE ALL THE BEHAVIORAL HEALTH SCREENINGS?: YES

## 2025-05-27 SDOH — SOCIAL STABILITY: SOCIAL INSECURITY: HAS ANYONE EVER THREATENED TO HURT YOUR FAMILY OR YOUR PETS?: NO

## 2025-05-27 ASSESSMENT — COGNITIVE AND FUNCTIONAL STATUS - GENERAL
MOBILITY SCORE: 24
PATIENT BASELINE BEDBOUND: NO
DAILY ACTIVITIY SCORE: 24

## 2025-05-27 ASSESSMENT — PATIENT HEALTH QUESTIONNAIRE - PHQ9
2. FEELING DOWN, DEPRESSED OR HOPELESS: NOT AT ALL
SUM OF ALL RESPONSES TO PHQ9 QUESTIONS 1 & 2: 0
1. LITTLE INTEREST OR PLEASURE IN DOING THINGS: NOT AT ALL

## 2025-05-27 ASSESSMENT — ACTIVITIES OF DAILY LIVING (ADL)
HEARING - LEFT EAR: FUNCTIONAL
FEEDING YOURSELF: INDEPENDENT
PATIENT'S MEMORY ADEQUATE TO SAFELY COMPLETE DAILY ACTIVITIES?: YES
TOILETING: INDEPENDENT
GROOMING: INDEPENDENT
DRESSING YOURSELF: INDEPENDENT
HEARING - RIGHT EAR: FUNCTIONAL
LACK_OF_TRANSPORTATION: NO
ADEQUATE_TO_COMPLETE_ADL: YES
BATHING: INDEPENDENT
WALKS IN HOME: INDEPENDENT
JUDGMENT_ADEQUATE_SAFELY_COMPLETE_DAILY_ACTIVITIES: YES

## 2025-05-27 ASSESSMENT — LIFESTYLE VARIABLES
AUDIT-C TOTAL SCORE: 0
AUDIT-C TOTAL SCORE: 0
HOW OFTEN DO YOU HAVE 6 OR MORE DRINKS ON ONE OCCASION: NEVER
SKIP TO QUESTIONS 9-10: 1
HOW MANY STANDARD DRINKS CONTAINING ALCOHOL DO YOU HAVE ON A TYPICAL DAY: PATIENT DOES NOT DRINK
HOW OFTEN DO YOU HAVE A DRINK CONTAINING ALCOHOL: NEVER

## 2025-05-27 ASSESSMENT — PAIN - FUNCTIONAL ASSESSMENT
PAIN_FUNCTIONAL_ASSESSMENT: 0-10
PAIN_FUNCTIONAL_ASSESSMENT: 0-10

## 2025-05-27 ASSESSMENT — PAIN SCALES - GENERAL
PAINLEVEL_OUTOF10: 0 - NO PAIN
PAINLEVEL_OUTOF10: 0 - NO PAIN
PAINLEVEL_OUTOF10: 3

## 2025-05-27 ASSESSMENT — ENCOUNTER SYMPTOMS
MUSCULOSKELETAL NEGATIVE: 1
CARDIOVASCULAR NEGATIVE: 1
ENDOCRINE NEGATIVE: 1
EYES NEGATIVE: 1
GASTROINTESTINAL NEGATIVE: 1
ALLERGIC/IMMUNOLOGIC NEGATIVE: 1
FATIGUE: 1
PSYCHIATRIC NEGATIVE: 1
SHORTNESS OF BREATH: 1
ACTIVITY CHANGE: 1
NEUROLOGICAL NEGATIVE: 1
HEMATOLOGIC/LYMPHATIC NEGATIVE: 1

## 2025-05-27 ASSESSMENT — PAIN DESCRIPTION - LOCATION: LOCATION: NECK

## 2025-05-27 ASSESSMENT — COLUMBIA-SUICIDE SEVERITY RATING SCALE - C-SSRS
2. HAVE YOU ACTUALLY HAD ANY THOUGHTS OF KILLING YOURSELF?: NO
1. IN THE PAST MONTH, HAVE YOU WISHED YOU WERE DEAD OR WISHED YOU COULD GO TO SLEEP AND NOT WAKE UP?: NO
6. HAVE YOU EVER DONE ANYTHING, STARTED TO DO ANYTHING, OR PREPARED TO DO ANYTHING TO END YOUR LIFE?: NO
2. HAVE YOU ACTUALLY HAD ANY THOUGHTS OF KILLING YOURSELF?: NO
1. IN THE PAST MONTH, HAVE YOU WISHED YOU WERE DEAD OR WISHED YOU COULD GO TO SLEEP AND NOT WAKE UP?: NO
6. HAVE YOU EVER DONE ANYTHING, STARTED TO DO ANYTHING, OR PREPARED TO DO ANYTHING TO END YOUR LIFE?: NO

## 2025-05-27 ASSESSMENT — PAIN DESCRIPTION - DESCRIPTORS: DESCRIPTORS: DISCOMFORT

## 2025-05-27 NOTE — ED PROVIDER NOTES
Supervisory note:  Patient seen in conjunction with Nabil Brewer NP.  Patient presents with shortness of breath.  While he has been having shortness of breath over the last month, it became worse today.  Normally it only happens when he is up and walking around but today it persisted despite resting.  He denies shortness of breath when laying flat.  He does take Coumadin for mechanical heart valve and has been on Coumadin for approximately 25 years.  He does sometimes notice black or tarry appearing stools.  On examination, there is no pretibial edema.  Cardiac and respiratory rates are unremarkable.    Laboratory studies reveal hemoglobin of 8.3.  This compares with previous measurement in 2022 which reveals normal hemoglobin at that time.  BUN is also significantly elevated out of proportion to creatinine, which could also be indicative of GI bleeding.  With suspicion for GI bleeding, subacute, and no GI coverage available at [facility] at this time, patient accepted to [facility] for further management.    I personally saw the patient and made/approved the management plan and take responsiblity for the patient management.  Parts of this chart were completed with dictation software, please excuse any errors in transcription.     Wayne Dixon MD  05/27/25 5278

## 2025-05-27 NOTE — ED PROVIDER NOTES
HPI   Chief Complaint   Patient presents with    Shortness of Breath     Has been Intermittent for months, had started again yesterday but has not subsided like usual. Denies any chest pain at this time but does report intermittent chest pain.  Does have significant cardiac hx        HPI  See my MDM      Patient History   Medical History[1]  Surgical History[2]  Family History[3]  Social History[4]    Physical Exam   ED Triage Vitals [05/27/25 1551]   Temperature Heart Rate Respirations BP   36.9 °C (98.4 °F) 67 17 113/55      Pulse Ox Temp Source Heart Rate Source Patient Position   100 % Tympanic Monitor Sitting      BP Location FiO2 (%)     Right arm --       Physical Exam    CONSTITUTIONAL: Vital signs reviewed as charted, well-developed and in no distress  Eyes: Extraocular muscles are intact. Pupils equal round and reactive to light. Conjunctiva are pink.    ENT: Mucous membranes are moist. Tongue in the midline. Pharynx was without erythema or exudates, uvula midline  LUNGS: Breath sounds equal and clear to auscultation. Good air exchange, no wheezes rales or retractions, pulse oximetry is charted.  HEART: Regular rate and rhythm without murmur thrill or rub, strong tones, auscultation is normal.  ABDOMEN: Soft and nontender without guarding rebound rigidity or mass. Bowel sounds are present and normal in all quadrants. There is no palpable masses or aneurysms identified. No hepatosplenomegaly, normal abdominal exam.  Neuro: The patient is awake, alert and oriented ×3. Moving all 4 extremities and answering questions appropriately.   MUSCULOSKELETAL: The calves are nontender to palpation. Full gross active range of motion.   PSYCH: Awake alert oriented, normal mood and affect.  Skin:  Dry, normal color, warm to the touch, no rash present.      ED Course & MDM   ED Course as of 05/27/25 2336   Tue May 27, 2025   1612 EKG, interpretation shows normal sinus rhythm, rate of 69 bpm.  Normal axis.  QTc 411 ms, ND  interval 136.  No ST elevation or depression, no acute ischemia.  No STEMI.  EKG. [NT]      ED Course User Index  [NT] Josesito Garcia DO         Diagnoses as of 05/27/25 2336   Anemia, unspecified type   Anticoagulated   Elevated BUN                 No data recorded     Aki Coma Scale Score: 15 (05/27/25 1553 : Kam Alexandra, RYDER)                           Medical Decision Making  History obtained from: patient    Vital signs, nursing notes, current medications, past medical history, Surgical history, allergies, social history, family History were reviewed.         HPI:  Patient is a 59-year-old gentleman history of aortic valve replacement presenting ED today complaining of dyspnea.  States worse with exertion.  States ongoing for quite some time but worsened over the last 24 hours.  States even walking short distances cause him to become short of breath.  He is fine at rest.  States he was coughing a lot yesterday.  Denies fever chills or night sweats.  Denies nausea vomiting diarrhea.  He is nontoxic and well-appearing no increased work of breathing.      10 point ROS was reviewed and negative except Noted above in HPI.  DDX: as listed above          MDM Summary/considerations:  Labs Reviewed   COMPREHENSIVE METABOLIC PANEL - Abnormal       Result Value    Glucose 89      Sodium 136      Potassium 4.4      Chloride 107      Bicarbonate 22      Anion Gap 11      Urea Nitrogen 27 (*)     Creatinine 0.75      eGFR >90      Calcium 8.7      Albumin 4.1      Alkaline Phosphatase 52      Total Protein 6.9      AST 23      Bilirubin, Total 0.7      ALT 16     CBC WITH AUTO DIFFERENTIAL - Abnormal    WBC 5.9      nRBC 0.0      RBC 3.49 (*)     Hemoglobin 8.3 (*)     Hematocrit 27.8 (*)     MCV 80      MCH 23.8 (*)     MCHC 29.9 (*)     RDW 15.2 (*)     Platelets 298      Neutrophils % 80.0      Immature Granulocytes %, Automated 0.5      Lymphocytes % 10.4      Monocytes % 7.2      Eosinophils % 1.2       Basophils % 0.7      Neutrophils Absolute 4.69      Immature Granulocytes Absolute, Automated 0.03      Lymphocytes Absolute 0.61 (*)     Monocytes Absolute 0.42      Eosinophils Absolute 0.07      Basophils Absolute 0.04     PROTIME-INR - Abnormal    Protime 19.2 (*)     INR 1.9 (*)     Narrative:     INR Therapeutic Range: 2.0-3.5   B-TYPE NATRIURETIC PEPTIDE - Abnormal     (*)     Narrative:        <100 pg/mL - Heart failure unlikely  100-299 pg/mL - Intermediate probability of acute heart                  failure exacerbation. Correlate with clinical                  context and patient history.    >=300 pg/mL - Heart Failure likely. Correlate with clinical                  context and patient history.    BNP testing is performed using different testing methodology at [facility] than at other [facility]. Direct result comparisons should only be made within the same method.      SERIAL TROPONIN-INITIAL - Normal    Troponin I, High Sensitivity 6      Narrative:     Less than 99th percentile of normal range cutoff-  Female and children under 18 years old <14 ng/L; Male <21 ng/L: Negative  Repeat testing should be performed if clinically indicated.     Female and children under 18 years old 14-50 ng/L; Male 21-50 ng/L:  Consistent with possible cardiac damage and possible increased clinical   risk. Serial measurements may help to assess extent of myocardial damage.     >50 ng/L: Consistent with cardiac damage, increased clinical risk and  myocardial infarction. Serial measurements may help assess extent of   myocardial damage.      NOTE: Children less than 1 year old may have higher baseline troponin   levels and results should be interpreted in conjunction with the overall   clinical context.     NOTE: Troponin I testing is performed using a different   testing methodology at [facility] than at other   [facility]. Direct result comparisons should only   be made within  the same method.   SERIAL TROPONIN, 1 HOUR - Normal    Troponin I, High Sensitivity 5      Narrative:     Less than 99th percentile of normal range cutoff-  Female and children under 18 years old <14 ng/L; Male <21 ng/L: Negative  Repeat testing should be performed if clinically indicated.     Female and children under 18 years old 14-50 ng/L; Male 21-50 ng/L:  Consistent with possible cardiac damage and possible increased clinical   risk. Serial measurements may help to assess extent of myocardial damage.     >50 ng/L: Consistent with cardiac damage, increased clinical risk and  myocardial infarction. Serial measurements may help assess extent of   myocardial damage.      NOTE: Children less than 1 year old may have higher baseline troponin   levels and results should be interpreted in conjunction with the overall   clinical context.     NOTE: Troponin I testing is performed using a different   testing methodology at [facility] than at other   [facility]. Direct result comparisons should only   be made within the same method.   TROPONIN SERIES- (INITIAL, 1 HR)    Narrative:     The following orders were created for panel order Troponin I Series, High Sensitivity (0, 1 HR).  Procedure                               Abnormality         Status                     ---------                               -----------         ------                     Troponin I, High Sensiti...[[id number]]  Normal              Final result               Troponin, High Sensitivi...[[id number]]  Normal              Final result                 Please view results for these tests on the individual orders.     XR chest 2 views   Final Result   No acute cardiopulmonary process.   Signed by Silvio Wall MD      XR shoulder right 2+ views   Final Result   No evidence for acute fracture or dislocation.   Signed by Silvio Wall MD        Medications - No data to display  Discharge Medication List as of 5/27/2025 10:18  PM        Patient noted to have symptomatic anemia, hemoglobin today 8.3 hematocrit 27.8.  Last results to compare to over 2 and half years ago where he had hemoglobin 12-1/2.  He is on warfarin INR is 1.9 today.  Otherwise his workup is pretty grossly unremarkable chest x-ray shows no acute pathology.  Does have mild elevation in his proBNP normal troponins.  Nonischemic EKG.  Patient will be transferred to [facility] due to no GI coverage here at our facility.    After reviewing patient's comorbidities, severity of history of presenting illness, labs and imaging if obtained in conjunction with physical exam and course in emergency department, deemed to have potential for deterioration/progression of symptoms that could lead to multiple morbidities or mortality, decision made that patient requires further observation/evaluation/treatment and patient admitted to appropriate service, patient/family understand and agree with plan.      I saw this patient in conjunction with Dr. Dixon, please see his supervision note.      Critical Care: Please see my procedure note        This chart was completed using voice recognition transcription software. Please excuse any errors of transcription including grammatical, punctuation, syntax and spelling errors.  Please contact me with any questions regarding this chart.    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

## 2025-05-27 NOTE — ED PROCEDURE NOTE
Procedure  Critical Care    Performed by: RADHA Marquez  Authorized by: Wayne Dixon MD    Critical care provider statement:     Critical care time (minutes):  32    Critical care time was exclusive of:  Separately billable procedures and treating other patients    Critical care was necessary to treat or prevent imminent or life-threatening deterioration of the following conditions:  Circulatory failure    Critical care was time spent personally by me on the following activities:  Blood draw for specimens, development of treatment plan with patient or surrogate, discussions with consultants, evaluation of patient's response to treatment, examination of patient, obtaining history from patient or surrogate, ordering and performing treatments and interventions, ordering and review of laboratory studies, ordering and review of radiographic studies, pulse oximetry, re-evaluation of patient's condition and review of old charts    Care discussed with: admitting provider                 RADHA Marquez  05/27/25 1958

## 2025-05-28 LAB
ANION GAP SERPL CALC-SCNC: 15 MMOL/L (ref 10–20)
BUN SERPL-MCNC: 26 MG/DL (ref 6–23)
CALCIUM SERPL-MCNC: 8.6 MG/DL (ref 8.6–10.3)
CHLORIDE SERPL-SCNC: 106 MMOL/L (ref 98–107)
CO2 SERPL-SCNC: 20 MMOL/L (ref 21–32)
CREAT SERPL-MCNC: 0.75 MG/DL (ref 0.5–1.3)
EGFRCR SERPLBLD CKD-EPI 2021: >90 ML/MIN/1.73M*2
ERYTHROCYTE [DISTWIDTH] IN BLOOD BY AUTOMATED COUNT: 15.2 % (ref 11.5–14.5)
FERRITIN SERPL-MCNC: 13 NG/ML (ref 20–300)
GLUCOSE SERPL-MCNC: 76 MG/DL (ref 74–99)
HAPTOGLOB SERPL NEPH-MCNC: <30 MG/DL (ref 30–200)
HCT VFR BLD AUTO: 25.9 % (ref 41–52)
HEMOCCULT SP1 STL QL: POSITIVE
HGB BLD-MCNC: 8.3 G/DL (ref 13.5–17.5)
INR PPP: 1.9 (ref 0.9–1.1)
IRON SATN MFR SERPL: 4 % (ref 25–45)
IRON SERPL-MCNC: 17 UG/DL (ref 35–150)
LDH SERPL L TO P-CCNC: 181 U/L (ref 84–246)
MCH RBC QN AUTO: 24.5 PG (ref 26–34)
MCHC RBC AUTO-ENTMCNC: 32 G/DL (ref 32–36)
MCV RBC AUTO: 76 FL (ref 80–100)
NRBC BLD-RTO: 0 /100 WBCS (ref 0–0)
PLATELET # BLD AUTO: 284 X10*3/UL (ref 150–450)
POTASSIUM SERPL-SCNC: 4.7 MMOL/L (ref 3.5–5.3)
PROTHROMBIN TIME: 21.5 SECONDS (ref 9.8–12.4)
RBC # BLD AUTO: 3.39 X10*6/UL (ref 4.5–5.9)
SODIUM SERPL-SCNC: 136 MMOL/L (ref 136–145)
TIBC SERPL-MCNC: 390 UG/DL (ref 240–445)
UFH PPP CHRO-ACNC: 0.1 IU/ML (ref ?–1.1)
UFH PPP CHRO-ACNC: <0.1 IU/ML (ref ?–1.1)
UIBC SERPL-MCNC: 373 UG/DL (ref 110–370)
WBC # BLD AUTO: 6.6 X10*3/UL (ref 4.4–11.3)

## 2025-05-28 PROCEDURE — 80048 BASIC METABOLIC PNL TOTAL CA: CPT | Performed by: INTERNAL MEDICINE

## 2025-05-28 PROCEDURE — 99232 SBSQ HOSP IP/OBS MODERATE 35: CPT | Performed by: HOSPITALIST

## 2025-05-28 PROCEDURE — 36415 COLL VENOUS BLD VENIPUNCTURE: CPT | Performed by: HOSPITALIST

## 2025-05-28 PROCEDURE — 85610 PROTHROMBIN TIME: CPT | Performed by: INTERNAL MEDICINE

## 2025-05-28 PROCEDURE — 83010 ASSAY OF HAPTOGLOBIN QUANT: CPT | Mod: AHULAB | Performed by: HOSPITALIST

## 2025-05-28 PROCEDURE — 2500000001 HC RX 250 WO HCPCS SELF ADMINISTERED DRUGS (ALT 637 FOR MEDICARE OP): Performed by: STUDENT IN AN ORGANIZED HEALTH CARE EDUCATION/TRAINING PROGRAM

## 2025-05-28 PROCEDURE — 99221 1ST HOSP IP/OBS SF/LOW 40: CPT

## 2025-05-28 PROCEDURE — 85520 HEPARIN ASSAY: CPT | Performed by: HOSPITALIST

## 2025-05-28 PROCEDURE — 82728 ASSAY OF FERRITIN: CPT | Performed by: HOSPITALIST

## 2025-05-28 PROCEDURE — 2500000004 HC RX 250 GENERAL PHARMACY W/ HCPCS (ALT 636 FOR OP/ED): Mod: JZ | Performed by: HOSPITALIST

## 2025-05-28 PROCEDURE — 83540 ASSAY OF IRON: CPT | Performed by: HOSPITALIST

## 2025-05-28 PROCEDURE — 83615 LACTATE (LD) (LDH) ENZYME: CPT | Performed by: HOSPITALIST

## 2025-05-28 PROCEDURE — 36415 COLL VENOUS BLD VENIPUNCTURE: CPT | Performed by: INTERNAL MEDICINE

## 2025-05-28 PROCEDURE — 1200000002 HC GENERAL ROOM WITH TELEMETRY DAILY

## 2025-05-28 PROCEDURE — 2500000004 HC RX 250 GENERAL PHARMACY W/ HCPCS (ALT 636 FOR OP/ED): Performed by: INTERNAL MEDICINE

## 2025-05-28 PROCEDURE — 82270 OCCULT BLOOD FECES: CPT | Performed by: INTERNAL MEDICINE

## 2025-05-28 PROCEDURE — 85027 COMPLETE CBC AUTOMATED: CPT | Performed by: INTERNAL MEDICINE

## 2025-05-28 PROCEDURE — 2500000001 HC RX 250 WO HCPCS SELF ADMINISTERED DRUGS (ALT 637 FOR MEDICARE OP): Performed by: INTERNAL MEDICINE

## 2025-05-28 RX ORDER — DOCUSATE SODIUM 100 MG/1
100 CAPSULE, LIQUID FILLED ORAL 2 TIMES DAILY
Status: DISCONTINUED | OUTPATIENT
Start: 2025-05-28 | End: 2025-06-12 | Stop reason: HOSPADM

## 2025-05-28 RX ORDER — SENNOSIDES 8.6 MG/1
1 TABLET ORAL NIGHTLY
Status: DISCONTINUED | OUTPATIENT
Start: 2025-05-28 | End: 2025-06-12 | Stop reason: HOSPADM

## 2025-05-28 RX ORDER — WARFARIN 3 MG/1
1.5 TABLET ORAL
Status: DISCONTINUED | OUTPATIENT
Start: 2025-05-29 | End: 2025-06-03

## 2025-05-28 RX ORDER — HEPARIN SODIUM 10000 [USP'U]/100ML
0-4500 INJECTION, SOLUTION INTRAVENOUS CONTINUOUS
Status: DISCONTINUED | OUTPATIENT
Start: 2025-05-28 | End: 2025-05-30

## 2025-05-28 RX ORDER — POLYETHYLENE GLYCOL 3350, SODIUM CHLORIDE, SODIUM BICARBONATE, POTASSIUM CHLORIDE 420; 11.2; 5.72; 1.48 G/4L; G/4L; G/4L; G/4L
4000 POWDER, FOR SOLUTION ORAL ONCE
Status: COMPLETED | OUTPATIENT
Start: 2025-05-28 | End: 2025-05-28

## 2025-05-28 RX ORDER — WARFARIN 2 MG/1
1 TABLET ORAL
Status: DISCONTINUED | OUTPATIENT
Start: 2025-05-28 | End: 2025-06-03

## 2025-05-28 RX ORDER — POLYETHYLENE GLYCOL 3350 17 G/17G
17 POWDER, FOR SOLUTION ORAL NIGHTLY
Status: DISCONTINUED | OUTPATIENT
Start: 2025-05-28 | End: 2025-06-12 | Stop reason: HOSPADM

## 2025-05-28 RX ADMIN — HEPARIN SODIUM 800 UNITS/HR: 10000 INJECTION, SOLUTION INTRAVENOUS at 23:41

## 2025-05-28 RX ADMIN — IRON SUCROSE 300 MG: 20 INJECTION, SOLUTION INTRAVENOUS at 22:56

## 2025-05-28 RX ADMIN — POLYETHYLENE GLYCOL 3350, SODIUM SULFATE ANHYDROUS, SODIUM BICARBONATE, SODIUM CHLORIDE, POTASSIUM CHLORIDE 4000 ML: 236; 22.74; 6.74; 5.86; 2.97 POWDER, FOR SOLUTION ORAL at 17:15

## 2025-05-28 RX ADMIN — PANTOPRAZOLE SODIUM 40 MG: 40 INJECTION, POWDER, FOR SOLUTION INTRAVENOUS at 00:37

## 2025-05-28 RX ADMIN — SENNOSIDES 8.6 MG: 8.6 TABLET, FILM COATED ORAL at 21:01

## 2025-05-28 RX ADMIN — PANTOPRAZOLE SODIUM 40 MG: 40 INJECTION, POWDER, FOR SOLUTION INTRAVENOUS at 08:45

## 2025-05-28 RX ADMIN — ACETAMINOPHEN 650 MG: 325 TABLET ORAL at 23:13

## 2025-05-28 RX ADMIN — POLYETHYLENE GLYCOL 3350 17 G: 17 POWDER, FOR SOLUTION ORAL at 21:01

## 2025-05-28 RX ADMIN — PANTOPRAZOLE SODIUM 40 MG: 40 INJECTION, POWDER, FOR SOLUTION INTRAVENOUS at 21:01

## 2025-05-28 RX ADMIN — HEPARIN SODIUM 700 UNITS/HR: 10000 INJECTION, SOLUTION INTRAVENOUS at 11:57

## 2025-05-28 RX ADMIN — DOCUSATE SODIUM 100 MG: 100 CAPSULE, LIQUID FILLED ORAL at 08:44

## 2025-05-28 RX ADMIN — DOCUSATE SODIUM 100 MG: 100 CAPSULE, LIQUID FILLED ORAL at 21:01

## 2025-05-28 RX ADMIN — DOCUSATE SODIUM 100 MG: 100 CAPSULE, LIQUID FILLED ORAL at 03:07

## 2025-05-28 ASSESSMENT — COGNITIVE AND FUNCTIONAL STATUS - GENERAL
DAILY ACTIVITIY SCORE: 24
DAILY ACTIVITIY SCORE: 24
MOBILITY SCORE: 24
MOBILITY SCORE: 24

## 2025-05-28 ASSESSMENT — ACTIVITIES OF DAILY LIVING (ADL): LACK_OF_TRANSPORTATION: NO

## 2025-05-28 ASSESSMENT — PAIN SCALES - GENERAL
PAINLEVEL_OUTOF10: 0 - NO PAIN
PAINLEVEL_OUTOF10: 3
PAINLEVEL_OUTOF10: 0 - NO PAIN

## 2025-05-28 ASSESSMENT — PAIN - FUNCTIONAL ASSESSMENT
PAIN_FUNCTIONAL_ASSESSMENT: 0-10

## 2025-05-28 ASSESSMENT — PAIN DESCRIPTION - LOCATION: LOCATION: BACK

## 2025-05-28 NOTE — H&P
History Of Present Illness  Robert Luna is a 59 y.o. male with a history of aortic valve replacement on warfarin who presented to [facility] complaining of dyspnea on exertion that has become progressively worse over the past 24 hours.  He is not point where even walking short distances or performing activities of daily living make him short of breath.  He denies any fever chills, chest pain, abdominal pain nausea vomiting or diarrhea.  He denies any melena.  Laboratory evaluation showed glucose 89 sodium 136 potassium 4.4 chloride 107 bicarbonate 22 anion gap 11 BUN 27 creatinine 0.75 AST 23 ALT 16 total bilirubin 0.7  troponin 6 repeat 5 glucose 89 INR is 1.9.  White blood cell count 5.9 hemoglobin 8.3 hematocrit 27.8 platelets 298.  Chest x-ray showed no acute cardiopulmonary process.  He was transferred from [facility] to [facility] for suspected occult GI blood loss with symptomatic anemia     Past Medical History  Aortic valve replacement on warfarin    Surgical History  Aortic valve replacement     Social History  He reports that he has never smoked. He has never used smokeless tobacco. He reports that he does not currently use alcohol. He reports that he does not use drugs.    Family History  Family History[1]     Allergies  Patient has no known allergies.    Review of Systems   Constitutional:  Positive for activity change and fatigue.   HENT: Negative.     Eyes: Negative.    Respiratory:  Positive for shortness of breath.    Cardiovascular: Negative.    Gastrointestinal: Negative.    Endocrine: Negative.    Genitourinary: Negative.    Musculoskeletal: Negative.    Skin: Negative.    Allergic/Immunologic: Negative.    Neurological: Negative.    Hematological: Negative.    Psychiatric/Behavioral: Negative.     All other systems reviewed and are negative.       Physical Exam  Vitals and nursing note reviewed.   Constitutional:       Appearance: Normal appearance.   HENT:      Head:  Normocephalic.      Right Ear: External ear normal.      Left Ear: External ear normal.      Nose: Nose normal.      Mouth/Throat:      Mouth: Mucous membranes are dry.      Pharynx: Oropharynx is clear.   Eyes:      Extraocular Movements: Extraocular movements intact.      Conjunctiva/sclera: Conjunctivae normal.      Pupils: Pupils are equal, round, and reactive to light.   Cardiovascular:      Rate and Rhythm: Normal rate and regular rhythm.      Heart sounds: Murmur heard.   Pulmonary:      Effort: Pulmonary effort is normal.      Breath sounds: Normal breath sounds.   Abdominal:      General: Abdomen is flat. Bowel sounds are normal.      Palpations: Abdomen is soft.   Musculoskeletal:         General: Normal range of motion.   Skin:     General: Skin is warm and dry.   Neurological:      General: No focal deficit present.      Mental Status: He is alert. Mental status is at baseline.   Psychiatric:         Mood and Affect: Mood normal.         Behavior: Behavior normal.          Last Recorded Vitals  Blood pressure 109/59, pulse 85, temperature 36.8 °C (98.3 °F), resp. rate 18, SpO2 97%.    Relevant Results  Meds:  Scheduled medications  Scheduled Medications[2]  Continuous medications  Continuous Medications[3]  PRN medications  PRN Medications[4]   Current Outpatient Medications   Medication Instructions    metoprolol succinate XL (Toprol-XL) 25 mg 24 hr tablet TAKE 1 TABLET(25 MG) BY MOUTH EVERY 24 HOURS    warfarin (COUMADIN) 1 mg, oral, See admin instructions        Labs:  Results for orders placed or performed during the hospital encounter of 05/27/25 (from the past 24 hours)   Comprehensive Metabolic Panel   Result Value Ref Range    Glucose 89 74 - 99 mg/dL    Sodium 136 136 - 145 mmol/L    Potassium 4.4 3.5 - 5.3 mmol/L    Chloride 107 98 - 107 mmol/L    Bicarbonate 22 21 - 32 mmol/L    Anion Gap 11 10 - 20 mmol/L    Urea Nitrogen 27 (H) 6 - 23 mg/dL    Creatinine 0.75 0.50 - 1.30 mg/dL    eGFR >90  >60 mL/min/1.73m*2    Calcium 8.7 8.6 - 10.3 mg/dL    Albumin 4.1 3.4 - 5.0 g/dL    Alkaline Phosphatase 52 33 - 136 U/L    Total Protein 6.9 6.4 - 8.2 g/dL    AST 23 9 - 39 U/L    Bilirubin, Total 0.7 0.0 - 1.2 mg/dL    ALT 16 10 - 52 U/L   CBC and Auto Differential   Result Value Ref Range    WBC 5.9 4.4 - 11.3 x10*3/uL    nRBC 0.0 0.0 - 0.0 /100 WBCs    RBC 3.49 (L) 4.50 - 5.90 x10*6/uL    Hemoglobin 8.3 (L) 13.5 - 17.5 g/dL    Hematocrit 27.8 (L) 41.0 - 52.0 %    MCV 80 80 - 100 fL    MCH 23.8 (L) 26.0 - 34.0 pg    MCHC 29.9 (L) 32.0 - 36.0 g/dL    RDW 15.2 (H) 11.5 - 14.5 %    Platelets 298 150 - 450 x10*3/uL    Neutrophils % 80.0 40.0 - 80.0 %    Immature Granulocytes %, Automated 0.5 0.0 - 0.9 %    Lymphocytes % 10.4 13.0 - 44.0 %    Monocytes % 7.2 2.0 - 10.0 %    Eosinophils % 1.2 0.0 - 6.0 %    Basophils % 0.7 0.0 - 2.0 %    Neutrophils Absolute 4.69 1.20 - 7.70 x10*3/uL    Immature Granulocytes Absolute, Automated 0.03 0.00 - 0.70 x10*3/uL    Lymphocytes Absolute 0.61 (L) 1.20 - 4.80 x10*3/uL    Monocytes Absolute 0.42 0.10 - 1.00 x10*3/uL    Eosinophils Absolute 0.07 0.00 - 0.70 x10*3/uL    Basophils Absolute 0.04 0.00 - 0.10 x10*3/uL   Troponin I, High Sensitivity, Initial   Result Value Ref Range    Troponin I, High Sensitivity 6 0 - 20 ng/L   Troponin, High Sensitivity, 1 Hour   Result Value Ref Range    Troponin I, High Sensitivity 5 0 - 20 ng/L   Protime-INR   Result Value Ref Range    Protime 19.2 (H) 9.3 - 12.7 seconds    INR 1.9 (H) 0.9 - 1.2   B-Type Natriuretic Peptide   Result Value Ref Range     (H) 0 - 99 pg/mL      Imaging:  Imaging  XR chest 2 views  Result Date: 5/27/2025  No acute cardiopulmonary process. Signed by Silvio Wall MD    XR shoulder right 2+ views  Result Date: 5/27/2025  No evidence for acute fracture or dislocation. Signed by Silvio Wall MD      Cardiology, Vascular, and Other Imaging  No other imaging results found for the past 2 days        Assessment/Plan   Symptomatic anemia  Plan:  Stool for occult blood  Serial hemoglobin hematocrit.  Transfuse to keep hemoglobin greater than 7.0  Hold warfarin for now    Suspected occult GI blood loss  Plan:  Stool for occult blood  Serial hemoglobin hematocrit  Protonix 40 mg IV every 12 hours  Hold warfarin for now    Hypertension  Plan:  Metoprolol XL 25 mg orally daily    Aortic valve replacement  Plan:  On warfarin resume warfarin when okay with GI  Goal INR 2.5-3.5    DVT prophylaxis  Covered with warfarin  SCDs    I spent 60 minutes in the professional and overall care of this patient.      Wayne Dumas DO                       [1]   Family History  Problem Relation Name Age of Onset    Cancer Mother Alena Luna     Cancer Father Guy Luna    [2] [START ON 5/28/2025] metoprolol succinate XL, 25 mg, oral, Daily  [START ON 5/28/2025] warfarin, 1 mg, oral, Daily    [3]    [4]

## 2025-05-28 NOTE — CONSULTS
Reason For Consult  Symptomatic anemia, suspect alcoholic GI blood loss    History Of Present Illness  Robert Luna is a 59 y.o. male presenting with history of aortic valve replacement on warfarin who presented to [facility] ED complaining of shortness of breath on exertion with minimal activity which got worse over the past few days.     He reports having weight loss 20 pounds in the last month.  Patient also has dysphagia in the upper part of the esophagus and regurgitate, chest discomfort.   Patient is not taking any PPI.   Patient denied nausea, vomiting, diarrhea, abdominal pain, constipation, lower leg swelling, cough, blood in the stool.   Patient denied using NSAID.  He does not Smoke or drink alcohol.     Patient usually has type 4 BM at  [employer] stool chart.     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     He has dad had pharyngeal cancer     Past Medical History  He has a past medical history of Heart valve disease.    Surgical History  He has a past surgical history that includes Aortic valve replacement and Cardiac valve replacement (02/1997).     Social History  He reports that he has never smoked. He has never used smokeless tobacco. He reports that he does not currently use alcohol. He reports that he does not use drugs.    Family History  Family History[1]     Allergies  Patient has no known allergies.    Review of Systems  All pertinent positive symptoms are included in the history of present illness.    All other systems have been reviewed and are negative and noncontributory to this patient's current ailments.     Physical Exam  General: Alert and oriented. Appears well-nourished and in no acute distress.  Head/neck: Normocephalic. Supple.  Respiratory/Thorax: Clear to auscultation bilaterally. No wheezing.   Cardiovascular: Regular rate and rhythm.  Positive for heart murmur 3 out of 6    Gastrointestinal: Soft, nontender, nondistended. +BS   Musculoskeletal: ROM intact. No joint swelling. Normal strength   Extremities: Warm and well perfused. No peripheral edema.  Psychological: Appropriate mood and affect.   Skin: No visible rashes or lesions.       Last Recorded Vitals  Blood pressure 106/66, pulse 74, temperature 36.6 °C (97.9 °F), temperature source Temporal, resp. rate 18, SpO2 100%.    Relevant Results  Results for orders placed or performed during the hospital encounter of 05/27/25 (from the past 24 hours)   CBC   Result Value Ref Range    WBC 6.6 4.4 - 11.3 x10*3/uL    nRBC 0.0 0.0 - 0.0 /100 WBCs    RBC 3.39 (L) 4.50 - 5.90 x10*6/uL    Hemoglobin 8.3 (L) 13.5 - 17.5 g/dL    Hematocrit 25.9 (L) 41.0 - 52.0 %    MCV 76 (L) 80 - 100 fL    MCH 24.5 (L) 26.0 - 34.0 pg    MCHC 32.0 32.0 - 36.0 g/dL    RDW 15.2 (H) 11.5 - 14.5 %    Platelets 284 150 - 450 x10*3/uL   Basic metabolic panel   Result Value Ref Range    Glucose 76 74 - 99 mg/dL    Sodium 136 136 - 145 mmol/L    Potassium 4.7 3.5 - 5.3 mmol/L    Chloride 106 98 - 107 mmol/L    Bicarbonate 20 (L) 21 - 32 mmol/L    Anion Gap 15 10 - 20 mmol/L    Urea Nitrogen 26 (H) 6 - 23 mg/dL    Creatinine 0.75 0.50 - 1.30 mg/dL    eGFR >90 >60 mL/min/1.73m*2    Calcium 8.6 8.6 - 10.3 mg/dL   Protime-INR   Result Value Ref Range    Protime 21.5 (H) 9.8 - 12.4 seconds    INR 1.9 (H) 0.9 - 1.1   Iron and TIBC   Result Value Ref Range    Iron 17 (L) 35 - 150 ug/dL    UIBC 373 (H) 110 - 370 ug/dL    TIBC 390 240 - 445 ug/dL    % Saturation 4 (L) 25 - 45 %   Lactate dehydrogenase   Result Value Ref Range     84 - 246 U/L   Ferritin   Result Value Ref Range    Ferritin 13 (L) 20 - 300 ng/mL         Assessment/Plan   Robert Luna is a 59 y.o. male presenting with history of aortic valve replacement on warfarin who was admitted for worsening of shortness of breath on exertion with minimal activity, fatigue which got worse over the past few days.      Microcytic anemia  - Patient had red flag, including dysphagia, weight loss,  and reflux and chest discomfort  -Iron study were positive for iron deficiency  -Pending occult blood test  - Due to having aortic valve replacement and patient was on warfarin, would be reasonable to do EGD and colonoscopy at the same time in the hospital because he needs bridging with heparin.  Will start heparin today.  Heparin need to discontinue 2 hours prior of procedure.  Plan is to do colonoscopy and EGD tomorrow.   - Clear liquid diet  - N.p.o. after midnight      Laura Arthur MD         [1]   Family History  Problem Relation Name Age of Onset    Cancer Mother Alena Luna     Cancer Father Guy Luna

## 2025-05-28 NOTE — PROGRESS NOTES
Robert Luna is a 59 y.o. male admitted for symptomatic anemia. Pharmacy has been consulted for warfarin dosing and monitoring for Aortic Valve Replacement with goal INR of 2.0-3.0.     Home regimen   MON TUE WED THR FRI SAT SUN   Dose 1  mg 1.5  mg 1  mg 1.5  mg 1.5  mg 1  mg 1.5  mg   Total weekly dose: 9 mg  Source: [facility] from 5/22/2025    Labs  INR: 1.9  Hgb/Hct/Plt  Lab Results   Component Value Date    HGB 8.3 (L) 05/28/2025    HGB 8.3 (L) 05/27/2025    HGB 12.4 (L) 09/02/2022        Lab Results   Component Value Date    HCT 25.9 (L) 05/28/2025    HCT 27.8 (L) 05/27/2025    HCT 37.8 (L) 09/02/2022        Platelets   Date Value Ref Range Status   05/28/2025 284 150 - 450 x10*3/uL Final   05/27/2025 298 150 - 450 x10*3/uL Final   09/02/2022 241 150 - 450 K/UL Final      Warfarin Therapy   Current regimen: resuming home reigmen  Bridging: Heparin  Interacting medications: no interacting medications    Dosing History During Current Admission  Date 5/28     INR 1.9     Dose  (mg) See plan       Assessment and Plan  Patient's INR of 1.9 today is Subtherapeutic. Hemoglobin/hematocrit/platelet decreasing  Warfarin inpatient plan: Hold Warfarin today, Heparin drip initiated. Warfarin currently on hold until clearance from GI and admitting team to restart. Pharmacy will continue to check in on patient daily to see when Warfarin will resume, but will not get daily INRs until then.  Monitor s/sx of bleeding including epistaxis, hematuria, unusual bruising, hemoptysis, hematochezia as well as s/sx of stroke including impaired speech, unilateral paralysis, blurry vision.  Pharmacy will continue to monitor the patient and adjust therapy as needed.      Thank you for the consult. Please do not hesitate to contact a pharmacist with any questions.    Natividad Rosas, LigiaD

## 2025-05-28 NOTE — CARE PLAN
The patient's goals for the shift include      The clinical goals for the shift include Remain safe, HDS, free of pain, and maintain skin integrity throughout shift.      Problem: Pain - Adult  Goal: Verbalizes/displays adequate comfort level or baseline comfort level  Outcome: Progressing     Problem: Safety - Adult  Goal: Free from fall injury  Outcome: Progressing     Problem: Discharge Planning  Goal: Discharge to home or other facility with appropriate resources  Outcome: Progressing     Problem: Chronic Conditions and Co-morbidities  Goal: Patient's chronic conditions and co-morbidity symptoms are monitored and maintained or improved  Outcome: Progressing     Problem: Nutrition  Goal: Nutrient intake appropriate for maintaining nutritional needs  Outcome: Progressing

## 2025-05-28 NOTE — PROGRESS NOTES
"   05/28/25 0815   Discharge Planning   Living Arrangements Alone   Support Systems Family members   Assistance Needed Independent prior to admission   Type of Residence Private residence   Number of Stairs to Enter Residence 0   Number of Stairs Within Residence 0   Do you have animals or pets at home? Yes   Type of Animals or Pets dog \"Bear\"   Home or Post Acute Services None   Expected Discharge Disposition Home   Does the patient need discharge transport arranged? No  (Sister to transport home)   RoundTrip coordination needed? No   Financial Resource Strain   How hard is it for you to pay for the very basics like food, housing, medical care, and heating? Not hard   Housing Stability   In the last 12 months, was there a time when you were not able to pay the mortgage or rent on time? N   In the past 12 months, how many times have you moved where you were living? 0   At any time in the past 12 months, were you homeless or living in a shelter (including now)? N   Transportation Needs   In the past 12 months, has lack of transportation kept you from medical appointments or from getting medications? no   In the past 12 months, has lack of transportation kept you from meetings, work, or from getting things needed for daily living? No   Stroke Family Assessment   Stroke Family Assessment Needed No   Intensity of Service   Intensity of Service 0-30 min     Met with patient at the bedside to discuss discharge plan.  PLAN/BARRIER: Gi consult  DISP: home  DME: none  O2: none  WOUNDS: none  ADOD: 1-2 days  Patient denies any discharge needs.  Caryn Brooks RN     "

## 2025-05-28 NOTE — CONSULTS
"Nutrition Assessment Note  Nutrition Assessment      Reason for Assessment: Admission nursing screening  Admitted for anemia.  MST score of 2 for weight loss.  He reports a 20# weight loss in unknown time frame, but also states his UBW id #.  His current weight is in the low end of his UBW range and no significant loss is identified.  Currently restricted to CLD, unable to assess intake, but appears inadequate PTA.    History:  Energy Intake: Poor < 50 %  Food and Nutrient History: Describes dysphagia as episodes he has had for years, isn't always an issue and finds relief with use of allergy medication. Decreased intake for 2-3 months of GI issues. Tries to eat 3 meals daily, uses frozen meals.     Problem List[1]     Anthropometrics:  Height: 149.9 cm (4' 11.02\")  Weight: (!) 40.6 kg (89 lb 8.1 oz)  BMI (Calculated): 18.07    Weight History / % Weight Change: 12/19/24: 40.4kg, 10/17/24: 39.9kg, 3/6/24: 43.5kg.  Reports UBW of #.  Significant Weight Loss: No    IBW/kg (Dietitian Calculated): 45.5 kg     Amputation Calculations:  BMI Amputation Adjustment: No    Energy Needs:  Method for Estimating Needs: 1400- 1460 @ 34-36 kcal/kg    Method for Estimating 24 Hour Protein Needs: 54-63 @ 1.2-1.4 gr/kg IBW    Total Fluid Estimated Needs in 24 Hours (mL): 1350 mL  Total Fluid Estimated Needs in 24 hours (mL/kg): 30 mL/kg     Dietary Orders  Adult diet Clear Liquid      Nutrition Focused Physical Findings:  Orbital Fat Pads: Severe (dark circles, hollowing and loose skin)  Buccal Fat Pads: Severe (hollow, sunken and narrow face)    Temporalis: Mild-Moderate (slight depression)  Pectoralis (Clavicular Region): Severe (protruding prominent clavicle)  Deltoid/Trapezius: Severe (squared shoulders, acromion process prominent)  Interosseous: Severe (depressed area between thumb and forefinger)    Mouth Findings: Dysphagia       Nutrition Diagnosis   Malnutrition Diagnosis  Patient has Malnutrition Diagnosis: " Yes  Diagnosis Status: New  Malnutrition Diagnosis: Severe malnutrition related to chronic disease or condition  Related to: dysphagia  As Evidenced by: reported intake <75% estimated needs for > 2 months, moderate-severe muslce & subcutaneous fat depletion, low BMI.    Patient has Nutrition Diagnosis: Yes  Nutrition Diagnosis 1: Inadequate protein energy intake  Diagnosis Status (1): New  Related to (1): dysphagia  As Evidenced by (1): inability to gain weight, low BMI     Nutrition Interventions/Recommendations   Nutrition Prescription: Nutrition prescription for oral nutrition  Individualized Nutrition Prescription Provided for : MD to advance diet s/p procedure, may benefit from soft diet to help with c/o of swallow discomfort.   Order Ensure Plus BID.  Daily MVI.  MD to manage IVF as indicated.    Food and/or Nutrient Delivery Interventions  Meals and Snacks: General healthful diet  Goal: intake meets >75% estimated nutrient needs.     Medical Food Supplement: Commercial beverage medical food supplement therapy  Goal: Ensure Plus provides: 350 kcal, 20 gr PRO/carton    Nutrition Monitoring and Evaluation   Food and Nutrient Related History   Intake / Amount of food: Consumes at least 75% or more of meals/snacks/supplements    Anthropometrics: Body Composition/Growth/Weight History  Body Weight: Body weight - Promote weight restoration    Biochemical Data, Medical Tests and Procedures  Electrolyte and Renal Panel: BUN, Calcium, serum, Chloride, Creatinine, Magnesium, Phosphorus, Potassium, Sodium  Criteria: as indicated    Gastrointestinal Profile: Gastroesophageal reflux monitoring  Criteria: as indicated    Glucose/Endocrine Profile: Glucose within normal limits ( mg/dL)  Criteria: as indicated    Nutritional Anemia Profile: Folate, serum, Hematocrit, Hemoglobin, B12  Criteria: as indicated    Vitamin Profile: Vitamin D, 25 hydroxy  Criteria: as indicated    Nutrition Focused Physical Findings    Digestive System Finding: Anorexia, Constipation, Diarrhea, Early satiety, Nausea, Vomiting  Criteria: daily    Mouth Finding: Dysphagia  Criteria: daily    Last Date of Nutrition Visit: 05/28/25  Nutrition Follow-Up Needed?: Dietitian to reassess per policy  Follow up Comment: svr PCMN/poor po-TR            [1]   Patient Active Problem List  Diagnosis    Aortic valve replaced    Mitral valve disorder    Protein-calorie malnutrition, unspecified severity (Multi)    Long term (current) use of anticoagulants    Symptomatic anemia

## 2025-05-29 ENCOUNTER — ANTICOAGULATION - WARFARIN VISIT (OUTPATIENT)
Dept: CARDIOLOGY | Facility: CLINIC | Age: 60
End: 2025-05-29

## 2025-05-29 ENCOUNTER — ANESTHESIA (OUTPATIENT)
Dept: GASTROENTEROLOGY | Facility: HOSPITAL | Age: 60
End: 2025-05-29
Payer: MEDICARE

## 2025-05-29 ENCOUNTER — APPOINTMENT (OUTPATIENT)
Dept: GASTROENTEROLOGY | Facility: HOSPITAL | Age: 60
End: 2025-05-29
Payer: MEDICARE

## 2025-05-29 ENCOUNTER — ANESTHESIA EVENT (OUTPATIENT)
Dept: GASTROENTEROLOGY | Facility: HOSPITAL | Age: 60
End: 2025-05-29
Payer: MEDICARE

## 2025-05-29 DIAGNOSIS — Z79.01 LONG TERM (CURRENT) USE OF ANTICOAGULANTS: ICD-10-CM

## 2025-05-29 DIAGNOSIS — Z95.2 AORTIC VALVE REPLACED: Primary | ICD-10-CM

## 2025-05-29 LAB
ABO GROUP (TYPE) IN BLOOD: NORMAL
ABO GROUP (TYPE) IN BLOOD: NORMAL
ANION GAP SERPL CALC-SCNC: 14 MMOL/L (ref 10–20)
ANTIBODY SCREEN: NORMAL
BASOPHILS # BLD AUTO: 0.02 X10*3/UL (ref 0–0.1)
BASOPHILS NFR BLD AUTO: 0.4 %
BLOOD EXPIRATION DATE: NORMAL
BLOOD EXPIRATION DATE: NORMAL
BUN SERPL-MCNC: 10 MG/DL (ref 6–23)
CALCIUM SERPL-MCNC: 8.2 MG/DL (ref 8.6–10.3)
CHLORIDE SERPL-SCNC: 105 MMOL/L (ref 98–107)
CO2 SERPL-SCNC: 20 MMOL/L (ref 21–32)
CREAT SERPL-MCNC: 0.56 MG/DL (ref 0.5–1.3)
DISPENSE STATUS: NORMAL
DISPENSE STATUS: NORMAL
EGFRCR SERPLBLD CKD-EPI 2021: >90 ML/MIN/1.73M*2
EOSINOPHIL # BLD AUTO: 0.17 X10*3/UL (ref 0–0.7)
EOSINOPHIL NFR BLD AUTO: 3.5 %
ERYTHROCYTE [DISTWIDTH] IN BLOOD BY AUTOMATED COUNT: 15 % (ref 11.5–14.5)
ERYTHROCYTE [DISTWIDTH] IN BLOOD BY AUTOMATED COUNT: 15.2 % (ref 11.5–14.5)
GLUCOSE SERPL-MCNC: 90 MG/DL (ref 74–99)
HCT VFR BLD AUTO: 25.6 % (ref 41–52)
HCT VFR BLD AUTO: 28 % (ref 41–52)
HGB BLD-MCNC: 7.7 G/DL (ref 13.5–17.5)
HGB BLD-MCNC: 8.7 G/DL (ref 13.5–17.5)
IMM GRANULOCYTES # BLD AUTO: 0.01 X10*3/UL (ref 0–0.7)
IMM GRANULOCYTES NFR BLD AUTO: 0.2 % (ref 0–0.9)
INR PPP: 2.2 (ref 0.9–1.1)
LYMPHOCYTES # BLD AUTO: 0.74 X10*3/UL (ref 1.2–4.8)
LYMPHOCYTES NFR BLD AUTO: 15.2 %
MCH RBC QN AUTO: 23.1 PG (ref 26–34)
MCH RBC QN AUTO: 24.4 PG (ref 26–34)
MCHC RBC AUTO-ENTMCNC: 30.1 G/DL (ref 32–36)
MCHC RBC AUTO-ENTMCNC: 31.1 G/DL (ref 32–36)
MCV RBC AUTO: 77 FL (ref 80–100)
MCV RBC AUTO: 78 FL (ref 80–100)
MONOCYTES # BLD AUTO: 0.77 X10*3/UL (ref 0.1–1)
MONOCYTES NFR BLD AUTO: 15.8 %
NEUTROPHILS # BLD AUTO: 3.15 X10*3/UL (ref 1.2–7.7)
NEUTROPHILS NFR BLD AUTO: 64.9 %
NRBC BLD-RTO: 0 /100 WBCS (ref 0–0)
NRBC BLD-RTO: 0 /100 WBCS (ref 0–0)
PLATELET # BLD AUTO: 215 X10*3/UL (ref 150–450)
PLATELET # BLD AUTO: 216 X10*3/UL (ref 150–450)
POTASSIUM SERPL-SCNC: 3.7 MMOL/L (ref 3.5–5.3)
PRODUCT BLOOD TYPE: 6200
PRODUCT BLOOD TYPE: 6200
PRODUCT CODE: NORMAL
PRODUCT CODE: NORMAL
PROTHROMBIN TIME: 24.7 SECONDS (ref 9.8–12.4)
RBC # BLD AUTO: 3.33 X10*6/UL (ref 4.5–5.9)
RBC # BLD AUTO: 3.57 X10*6/UL (ref 4.5–5.9)
RH FACTOR (ANTIGEN D): NORMAL
RH FACTOR (ANTIGEN D): NORMAL
SODIUM SERPL-SCNC: 135 MMOL/L (ref 136–145)
UFH PPP CHRO-ACNC: 0.1 IU/ML (ref ?–1.1)
UFH PPP CHRO-ACNC: 0.6 IU/ML (ref ?–1.1)
UFH PPP CHRO-ACNC: 0.7 IU/ML (ref ?–1.1)
UNIT ABO: NORMAL
UNIT ABO: NORMAL
UNIT NUMBER: NORMAL
UNIT NUMBER: NORMAL
UNIT RH: NORMAL
UNIT RH: NORMAL
UNIT VOLUME: 286
UNIT VOLUME: 286
WBC # BLD AUTO: 12 X10*3/UL (ref 4.4–11.3)
WBC # BLD AUTO: 4.9 X10*3/UL (ref 4.4–11.3)
XM INTEP: NORMAL
XM INTEP: NORMAL

## 2025-05-29 PROCEDURE — 85025 COMPLETE CBC W/AUTO DIFF WBC: CPT | Performed by: HOSPITALIST

## 2025-05-29 PROCEDURE — 0DB98ZX EXCISION OF DUODENUM, VIA NATURAL OR ARTIFICIAL OPENING ENDOSCOPIC, DIAGNOSTIC: ICD-10-PCS | Performed by: STUDENT IN AN ORGANIZED HEALTH CARE EDUCATION/TRAINING PROGRAM

## 2025-05-29 PROCEDURE — 99232 SBSQ HOSP IP/OBS MODERATE 35: CPT | Performed by: HOSPITALIST

## 2025-05-29 PROCEDURE — A45378 PR COLONOSCOPY,DIAGNOSTIC: Performed by: NURSE ANESTHETIST, CERTIFIED REGISTERED

## 2025-05-29 PROCEDURE — 80048 BASIC METABOLIC PNL TOTAL CA: CPT | Performed by: HOSPITALIST

## 2025-05-29 PROCEDURE — 3700000002 HC GENERAL ANESTHESIA TIME - EACH INCREMENTAL 1 MINUTE

## 2025-05-29 PROCEDURE — 85610 PROTHROMBIN TIME: CPT

## 2025-05-29 PROCEDURE — 36415 COLL VENOUS BLD VENIPUNCTURE: CPT | Performed by: HOSPITALIST

## 2025-05-29 PROCEDURE — 0DB78ZX EXCISION OF STOMACH, PYLORUS, VIA NATURAL OR ARTIFICIAL OPENING ENDOSCOPIC, DIAGNOSTIC: ICD-10-PCS | Performed by: STUDENT IN AN ORGANIZED HEALTH CARE EDUCATION/TRAINING PROGRAM

## 2025-05-29 PROCEDURE — 1200000002 HC GENERAL ROOM WITH TELEMETRY DAILY

## 2025-05-29 PROCEDURE — A45378 PR COLONOSCOPY,DIAGNOSTIC: Performed by: STUDENT IN AN ORGANIZED HEALTH CARE EDUCATION/TRAINING PROGRAM

## 2025-05-29 PROCEDURE — 88112 CYTOPATH CELL ENHANCE TECH: CPT | Mod: TC | Performed by: STUDENT IN AN ORGANIZED HEALTH CARE EDUCATION/TRAINING PROGRAM

## 2025-05-29 PROCEDURE — 86923 COMPATIBILITY TEST ELECTRIC: CPT

## 2025-05-29 PROCEDURE — 2500000001 HC RX 250 WO HCPCS SELF ADMINISTERED DRUGS (ALT 637 FOR MEDICARE OP): Performed by: INTERNAL MEDICINE

## 2025-05-29 PROCEDURE — P9016 RBC LEUKOCYTES REDUCED: HCPCS

## 2025-05-29 PROCEDURE — 0DJD8ZZ INSPECTION OF LOWER INTESTINAL TRACT, VIA NATURAL OR ARTIFICIAL OPENING ENDOSCOPIC: ICD-10-PCS | Performed by: STUDENT IN AN ORGANIZED HEALTH CARE EDUCATION/TRAINING PROGRAM

## 2025-05-29 PROCEDURE — 88305 TISSUE EXAM BY PATHOLOGIST: CPT | Mod: TC,AHULAB | Performed by: STUDENT IN AN ORGANIZED HEALTH CARE EDUCATION/TRAINING PROGRAM

## 2025-05-29 PROCEDURE — 3700000001 HC GENERAL ANESTHESIA TIME - INITIAL BASE CHARGE

## 2025-05-29 PROCEDURE — 36430 TRANSFUSION BLD/BLD COMPNT: CPT

## 2025-05-29 PROCEDURE — 2500000002 HC RX 250 W HCPCS SELF ADMINISTERED DRUGS (ALT 637 FOR MEDICARE OP, ALT 636 FOR OP/ED)

## 2025-05-29 PROCEDURE — 2500000004 HC RX 250 GENERAL PHARMACY W/ HCPCS (ALT 636 FOR OP/ED): Mod: JZ | Performed by: HOSPITALIST

## 2025-05-29 PROCEDURE — 0DB68ZX EXCISION OF STOMACH, VIA NATURAL OR ARTIFICIAL OPENING ENDOSCOPIC, DIAGNOSTIC: ICD-10-PCS | Performed by: STUDENT IN AN ORGANIZED HEALTH CARE EDUCATION/TRAINING PROGRAM

## 2025-05-29 PROCEDURE — 2500000004 HC RX 250 GENERAL PHARMACY W/ HCPCS (ALT 636 FOR OP/ED): Performed by: INTERNAL MEDICINE

## 2025-05-29 PROCEDURE — 7100000009 HC PHASE TWO TIME - INITIAL BASE CHARGE

## 2025-05-29 PROCEDURE — 43239 EGD BIOPSY SINGLE/MULTIPLE: CPT | Performed by: STUDENT IN AN ORGANIZED HEALTH CARE EDUCATION/TRAINING PROGRAM

## 2025-05-29 PROCEDURE — 88305 TISSUE EXAM BY PATHOLOGIST: CPT | Performed by: PATHOLOGY

## 2025-05-29 PROCEDURE — 45378 DIAGNOSTIC COLONOSCOPY: CPT | Performed by: STUDENT IN AN ORGANIZED HEALTH CARE EDUCATION/TRAINING PROGRAM

## 2025-05-29 PROCEDURE — 85520 HEPARIN ASSAY: CPT | Performed by: HOSPITALIST

## 2025-05-29 PROCEDURE — 7100000010 HC PHASE TWO TIME - EACH INCREMENTAL 1 MINUTE

## 2025-05-29 PROCEDURE — 85027 COMPLETE CBC AUTOMATED: CPT | Performed by: HOSPITALIST

## 2025-05-29 PROCEDURE — 86900 BLOOD TYPING SEROLOGIC ABO: CPT | Performed by: HOSPITALIST

## 2025-05-29 PROCEDURE — 88112 CYTOPATH CELL ENHANCE TECH: CPT | Performed by: PATHOLOGY

## 2025-05-29 PROCEDURE — 2500000004 HC RX 250 GENERAL PHARMACY W/ HCPCS (ALT 636 FOR OP/ED): Performed by: NURSE ANESTHETIST, CERTIFIED REGISTERED

## 2025-05-29 RX ORDER — FENTANYL CITRATE 50 UG/ML
INJECTION, SOLUTION INTRAMUSCULAR; INTRAVENOUS AS NEEDED
Status: DISCONTINUED | OUTPATIENT
Start: 2025-05-29 | End: 2025-05-29

## 2025-05-29 RX ORDER — PROPOFOL 10 MG/ML
INJECTION, EMULSION INTRAVENOUS CONTINUOUS PRN
Status: DISCONTINUED | OUTPATIENT
Start: 2025-05-29 | End: 2025-05-29

## 2025-05-29 RX ADMIN — ONDANSETRON 4 MG: 2 INJECTION, SOLUTION INTRAMUSCULAR; INTRAVENOUS at 20:29

## 2025-05-29 RX ADMIN — PROPOFOL 400 MCG/KG/MIN: 10 INJECTION, EMULSION INTRAVENOUS at 15:40

## 2025-05-29 RX ADMIN — PROPOFOL 100 MCG/KG/MIN: 10 INJECTION, EMULSION INTRAVENOUS at 15:42

## 2025-05-29 RX ADMIN — FENTANYL CITRATE 25 MCG: 50 INJECTION, SOLUTION INTRAMUSCULAR; INTRAVENOUS at 16:00

## 2025-05-29 RX ADMIN — HEPARIN SODIUM 1100 UNITS/HR: 10000 INJECTION, SOLUTION INTRAVENOUS at 17:33

## 2025-05-29 RX ADMIN — METOPROLOL SUCCINATE 25 MG: 25 TABLET, EXTENDED RELEASE ORAL at 08:38

## 2025-05-29 RX ADMIN — PANTOPRAZOLE SODIUM 40 MG: 40 INJECTION, POWDER, FOR SOLUTION INTRAVENOUS at 08:38

## 2025-05-29 RX ADMIN — FENTANYL CITRATE 25 MCG: 50 INJECTION, SOLUTION INTRAMUSCULAR; INTRAVENOUS at 15:44

## 2025-05-29 RX ADMIN — WARFARIN SODIUM 1.5 MG: 3 TABLET ORAL at 17:33

## 2025-05-29 SDOH — HEALTH STABILITY: MENTAL HEALTH: CURRENT SMOKER: 0

## 2025-05-29 ASSESSMENT — COGNITIVE AND FUNCTIONAL STATUS - GENERAL
MOBILITY SCORE: 24
DAILY ACTIVITIY SCORE: 24
DAILY ACTIVITIY SCORE: 24

## 2025-05-29 ASSESSMENT — PAIN - FUNCTIONAL ASSESSMENT
PAIN_FUNCTIONAL_ASSESSMENT: 0-10

## 2025-05-29 ASSESSMENT — COLUMBIA-SUICIDE SEVERITY RATING SCALE - C-SSRS
2. HAVE YOU ACTUALLY HAD ANY THOUGHTS OF KILLING YOURSELF?: NO
6. HAVE YOU EVER DONE ANYTHING, STARTED TO DO ANYTHING, OR PREPARED TO DO ANYTHING TO END YOUR LIFE?: NO
1. IN THE PAST MONTH, HAVE YOU WISHED YOU WERE DEAD OR WISHED YOU COULD GO TO SLEEP AND NOT WAKE UP?: NO

## 2025-05-29 ASSESSMENT — PAIN SCALES - GENERAL
PAINLEVEL_OUTOF10: 0 - NO PAIN

## 2025-05-29 NOTE — PROGRESS NOTES
Pharmacy Medication History     Source of Information: PATIENT    Additional concerns with the patient's PTA list.     Notified Provider via [employer] : No    The following updates were made to the Prior to Admission medication list:     Medications ADDED:   N/A  Medications CHANGED:  N/A  Medications REMOVED:   N/A  Medications NOT TAKING:   N/A    Allergy reviewed : Yes    Meds 2 Beds : Yes    Outpatient pharmacy confirmed and updated in chart : Yes    [facility]: [facility]    The list below reflectives the updated PTA list. Please review each medication in order reconciliation for additional clarification and justification.    Prior to Admission Medications   Prescriptions Last Dose      metoprolol succinate XL (Toprol-XL) 25 mg 24 hr tablet 5/27/2025 Morning      Sig: TAKE 1 TABLET(25 MG) BY MOUTH EVERY 24 HOURS   warfarin (Coumadin) 1 mg tablet 5/27/2025 Bedtime      Sig: Take 1 tablet (1 mg) by mouth see administration instructions.   Patient taking differently: Take 1 tablet (1 mg) by mouth see administration instructions. TAKE 1.5 TABLETS ON DAILY SUN AND TAKE 1 TABLET BY MOUTH DAILY MONDAY-SATURDAY      Facility-Administered Medications: None       The list below reflectives the updated allergy list. Please review each documented allergy for additional clarification and justification.    No Known Allergies       05/29/25 at 12:57 PM - Sharee Leon

## 2025-05-29 NOTE — PROGRESS NOTES
Robert Luna is a 59 y.o. male admitted for symptomatic anemia. Pharmacy has been consulted for warfarin dosing and monitoring for Aortic Valve Replacement with goal INR of 2.0-3.0.     Home regimen   MON TUE WED THR FRI SAT SUN   Dose 1  mg 1.5  mg 1  mg 1.5  mg 1.5  mg 1  mg 1.5  mg   Total weekly dose: 9 mg  Source: [facility] Note from 5/22/2025    Labs  INR: pending  Hgb/Hct/Plt  Lab Results   Component Value Date    HGB 7.7 (L) 05/29/2025    HGB 8.3 (L) 05/28/2025    HGB 8.3 (L) 05/27/2025    HGB 12.4 (L) 09/02/2022        Lab Results   Component Value Date    HCT 25.6 (L) 05/29/2025    HCT 25.9 (L) 05/28/2025    HCT 27.8 (L) 05/27/2025    HCT 37.8 (L) 09/02/2022        Platelets   Date Value Ref Range Status   05/29/2025 216 150 - 450 x10*3/uL Final   05/28/2025 284 150 - 450 x10*3/uL Final   05/27/2025 298 150 - 450 x10*3/uL Final   09/02/2022 241 150 - 450 K/UL Final      Warfarin Therapy   Current regimen: resuming home reigmen  Bridging: Heparin  Interacting medications: no interacting medications    Dosing History During Current Admission  Date 5/28 5/29    INR 1.9 N/A    Dose  (mg) See plan 1.5 mg      Assessment and Plan  Patient's INR is currently pending. H+H stable.   Warfarin inpatient plan: Per elizabeth Ray to resume Warfarin today. Will give home regimen of 1.5mg and resume Heparin drip as bridge.   Monitor s/sx of bleeding including epistaxis, hematuria, unusual bruising, hemoptysis, hematochezia as well as s/sx of stroke including impaired speech, unilateral paralysis, blurry vision.  Pharmacy will continue to monitor the patient and adjust therapy as needed.      Thank you for the consult. Please do not hesitate to contact a pharmacist with any questions.    Natividad Rosas, LigiaD

## 2025-05-29 NOTE — SIGNIFICANT EVENT
Patient is s/p EGD/Colonoscopy for eval of symptomatic anemia    See procedure tab for full report     EGD/Colon with no old or active bleeding. No abnormality to explain anemia.     Recommendations   - resume coumadin  - resume diet  - outpatient VCE for complete evaluation of MAHESH   - GI to stop actively following  - please re-consult as indicated       Discussed with patient and care team

## 2025-05-29 NOTE — CARE PLAN
The patient's goals for the shift include      The clinical goals for the shift include remain hds, no s/x of bleeding tolerate bowel prep

## 2025-05-29 NOTE — NURSING NOTE
No accurate heparin assays drawn today due to hep gtt constantly being cut on/off per endo pushing back patient's EGD & colonoscopy and hep gtt needing to be off 2hrs prior. When patient is back from procedure, will resume hep gtt at 11cc/hr and have heparin assay drawn 4 hours after restart time.

## 2025-05-29 NOTE — PROGRESS NOTES
"Between 7AM-7PM please message me via Epic Secure Chat.  After 7PM please page Nocturnist on call.    [facility] Hospitalist Progress Note      Robert Luna    :  1965(59 y.o.)    MRN:  [id number]  Date: 25     Assessment and Plan:     Dyspnea on exertion due to anemia  Iron Deficiency Anemia  Suspected occult GI blood loss  HTN- on toprol xl  Aortic valve replacement- on coumadin    Plan:  - GI consulted, plan for EGD/Colonoscopy today  - Hold coumadin, bridging with heparin gtt. Will need to hold heparin gtt 2 hours before endoscopy  - IV Venofer 300 mg x3 doses ordered  - Hgb 7.7 -->Anesthesia would like 1 unit pRBC given Hgb <8 with hx of AVR      Malnutrition Diagnosis Status: New  Malnutrition Diagnosis: Severe malnutrition related to chronic disease or condition  Related to: dysphagia  As Evidenced by: reported intake <75% estimated needs for > 2 months, moderate-severe muslce & subcutaneous fat depletion, low BMI.  I agree with the dietitian's malnutrition diagnosis.        DVT Prophylaxis: full anticoagulation with heparin gtt    Disposition: continue to monitor inpatient, await consultant recommendations, await test results, and await clinical improvement    Subjective:      Interval History:   Vitals and chart notes from overnight reviewed.   No acute issues overnight.   Patient seen and evaluated at bedside.   No chest pain or shortness of breath. Tolerated bowel prep. Reports stools yellow/clear.    Review of Systems:   Other than patient's chronic conditions and those complaints in the history above, the rest of the 10 systems review were done and were negative.     Current medications:  Scheduled Meds:Scheduled Medications[1]  Continuous Infusions:Continuous Medications[2]  PRN Meds:PRN Medications[3]      Objective:     Heart Rate:  [71-86]   Temp:  [36.5 °C (97.7 °F)-37.2 °C (99 °F)]   Resp:  [16-18]   BP: ()/(48-62)   Height:  [149.9 cm (4' 11.02\")]   Weight:  [40.6 " kg (89 lb 8.1 oz)]   SpO2:  [94 %-100 %]          Physical Exam  Vitals and nursing note reviewed.   HENT:      Mouth/Throat:      Mouth: Mucous membranes are moist.      Pharynx: Oropharynx is clear.   Cardiovascular:      Rate and Rhythm: Normal rate and regular rhythm.      Comments: +Click  Pulmonary:      Effort: Pulmonary effort is normal.   Abdominal:      General: There is no distension.      Palpations: Abdomen is soft.      Tenderness: There is no abdominal tenderness.   Neurological:      Mental Status: He is alert and oriented to person, place, and time.         Labs:   Lab Results   Component Value Date     (L) 05/29/2025    K 3.7 05/29/2025     05/29/2025    CO2 20 (L) 05/29/2025    BUN 10 05/29/2025    CREATININE 0.56 05/29/2025    GLUCOSE 90 05/29/2025    CALCIUM 8.2 (L) 05/29/2025    PROT 6.9 05/27/2025    BILITOT 0.7 05/27/2025    ALKPHOS 52 05/27/2025    AST 23 05/27/2025    ALT 16 05/27/2025    GLOB 3.1 09/02/2022       Lab Results   Component Value Date    WBC 4.9 05/29/2025    HGB 7.7 (L) 05/29/2025    HCT 25.6 (L) 05/29/2025    MCV 77 (L) 05/29/2025     05/29/2025          [1] docusate sodium, 100 mg, oral, BID  iron sucrose, 300 mg, intravenous, Daily  metoprolol succinate XL, 25 mg, oral, Daily  pantoprazole, 40 mg, intravenous, BID  polyethylene glycol, 17 g, oral, Nightly  sennosides, 1 tablet, oral, Nightly  [Held by provider] warfarin, 1 mg, oral, Once per day on Monday Wednesday Saturday  [Held by provider] warfarin, 1.5 mg, oral, Once per day on Sunday Tuesday Thursday Friday     [2] heparin, 0-4,500 Units/hr, Last Rate: Stopped (05/29/25 0835)     [3] PRN medications: acetaminophen **OR** acetaminophen **OR** acetaminophen, heparin, ondansetron **OR** ondansetron

## 2025-05-29 NOTE — ANESTHESIA POSTPROCEDURE EVALUATION
Patient: Robert Luna    Procedure Summary       Date: 05/29/25 Room / Location: [facility]    Anesthesia Start: 1537 Anesthesia Stop: 1630    Procedures:       COLONOSCOPY      EGD Diagnosis:       Symptomatic anemia      Iron deficiency anemia due to chronic blood loss    Scheduled Providers: Jessica Amador MD; Alex Can MD; Patricia Nino RN Responsible Provider: Alberto Griffin MD    Anesthesia Type: MAC ASA Status: 4            Anesthesia Type: MAC    Vitals Value Taken Time   /57 05/29/25 16:37   Temp  05/29/25 16:37   Pulse 73 05/29/25 16:37   Resp 16 05/29/25 16:35   SpO2 99 % 05/29/25 16:37   Vitals shown include unfiled device data.    Anesthesia Post Evaluation    Patient location during evaluation: bedside  Patient participation: complete - patient participated  Level of consciousness: awake  Pain management: adequate  Multimodal analgesia pain management approach  Airway patency: patent  Cardiovascular status: stable  Respiratory status: spontaneous ventilation and unassisted  Hydration status: acceptable  Postoperative Nausea and Vomiting: none  Comments: No significant PONV.      No notable events documented.

## 2025-05-29 NOTE — CARE PLAN
The patient's goals for the shift include      The clinical goals for the shift include Remain safe, HDS, pain free, and maintain skin integrity.      Problem: Pain - Adult  Goal: Verbalizes/displays adequate comfort level or baseline comfort level  Outcome: Progressing     Problem: Safety - Adult  Goal: Free from fall injury  Outcome: Progressing     Problem: Discharge Planning  Goal: Discharge to home or other facility with appropriate resources  Outcome: Progressing     Problem: Chronic Conditions and Co-morbidities  Goal: Patient's chronic conditions and co-morbidity symptoms are monitored and maintained or improved  Outcome: Progressing     Problem: Nutrition  Goal: Nutrient intake appropriate for maintaining nutritional needs  Outcome: Progressing

## 2025-05-29 NOTE — ANESTHESIA PREPROCEDURE EVALUATION
Patient: Robert Luna    Procedure Information       Date/Time: 05/29/25 1400    Scheduled providers: Jessica Amador MD; Alex Can MD; Patricia Nino RN    Procedures:       COLONOSCOPY      EGD    Location: [facility]            Relevant Problems   Cardiac   (+) Mitral valve disorder      Hematology   (+) Long term (current) use of anticoagulants   (+) Symptomatic anemia       Clinical information reviewed:   Tobacco  Allergies  Meds  Problems  Med Hx  Surg Hx   Fam Hx  Soc   Hx        NPO Detail:  NPO/Void Status  Carbohydrate Drink Given Prior to Surgery? : N  Date of Last Liquid: 05/29/25  Time of Last Liquid: 0900  Date of Last Solid: 05/28/25  Last Intake Type: Clear fluids         Physical Exam    Airway  Mallampati: I  TM distance: >3 FB  Neck ROM: full  Mouth opening: 3 or more finger widths     Cardiovascular - normal exam   Dental     (+) upper dentures, lower dentures     Pulmonary - normal exam   Abdominal - normal exam           Anesthesia Plan    History of general anesthesia?: yes  History of complications of general anesthesia?: no    ASA 4     MAC     The patient is not a current smoker.    intravenous induction   Anesthetic plan and risks discussed with patient.  Use of blood products discussed with patient who consented to blood products.

## 2025-05-29 NOTE — PROGRESS NOTES
"Between 7AM-7PM please message me via Epic Secure Chat.  After 7PM please page Nocturnist on call.    [facility] Hospitalist Progress Note      Robert Luna    :  1965(59 y.o.)    MRN:  [id number]  Date: 25     Assessment and Plan:     Dyspnea on exertion due to anemia  Iron Deficiency Anemia  Suspected occult GI blood loss  HTN- on toprol xl  Aortic valve replacement- on coumadin    Plan:  - GI consulted, plan for EGD/Colonoscopy tomorrow  - Hold coumadin, bridging with heparin gtt. Will need to hold heparin gtt 2 hours before endoscopy  - IV Venofer 300 mg x3 doses ordered      Malnutrition Diagnosis Status: New  Malnutrition Diagnosis: Severe malnutrition related to chronic disease or condition  Related to: dysphagia  As Evidenced by: reported intake <75% estimated needs for > 2 months, moderate-severe muslce & subcutaneous fat depletion, low BMI.  I agree with the dietitian's malnutrition diagnosis.        DVT Prophylaxis: full anticoagulation with heparin gtt    Disposition: continue to monitor inpatient, await consultant recommendations, await test results, and await clinical improvement    Subjective:      Interval History:   Vitals and chart notes from overnight reviewed.   No acute issues overnight.   Patient seen and evaluated at bedside.   No chest pain or shortness of breath.     Review of Systems:   Other than patient's chronic conditions and those complaints in the history above, the rest of the 10 systems review were done and were negative.     Current medications:  Scheduled Meds:Scheduled Medications[1]  Continuous Infusions:Continuous Medications[2]  PRN Meds:PRN Medications[3]      Objective:     Heart Rate:  [71-85]   Temp:  [36.5 °C (97.7 °F)-36.8 °C (98.3 °F)]   Resp:  [17-18]   BP: ()/(48-66)   Height:  [149.9 cm (4' 11.02\")]   Weight:  [40.6 kg (89 lb 8.1 oz)]   SpO2:  [97 %-100 %]          Physical Exam  Vitals and nursing note reviewed.   HENT:      " Mouth/Throat:      Mouth: Mucous membranes are moist.      Pharynx: Oropharynx is clear.   Cardiovascular:      Rate and Rhythm: Normal rate and regular rhythm.   Pulmonary:      Effort: Pulmonary effort is normal.   Abdominal:      General: There is no distension.      Palpations: Abdomen is soft.      Tenderness: There is no abdominal tenderness.   Neurological:      Mental Status: He is alert and oriented to person, place, and time.         Labs:   Lab Results   Component Value Date     05/28/2025    K 4.7 05/28/2025     05/28/2025    CO2 20 (L) 05/28/2025    BUN 26 (H) 05/28/2025    CREATININE 0.75 05/28/2025    GLUCOSE 76 05/28/2025    CALCIUM 8.6 05/28/2025    PROT 6.9 05/27/2025    BILITOT 0.7 05/27/2025    ALKPHOS 52 05/27/2025    AST 23 05/27/2025    ALT 16 05/27/2025    GLOB 3.1 09/02/2022       Lab Results   Component Value Date    WBC 6.6 05/28/2025    HGB 8.3 (L) 05/28/2025    HCT 25.9 (L) 05/28/2025    MCV 76 (L) 05/28/2025     05/28/2025          [1] docusate sodium, 100 mg, oral, BID  metoprolol succinate XL, 25 mg, oral, Daily  pantoprazole, 40 mg, intravenous, BID  polyethylene glycol, 17 g, oral, Nightly  sennosides, 1 tablet, oral, Nightly  [Held by provider] warfarin, 1 mg, oral, Once per day on Monday Wednesday Saturday  [Held by provider] warfarin, 1.5 mg, oral, Once per day on Sunday Tuesday Thursday Friday    [2] heparin, 0-4,500 Units/hr, Last Rate: 800 Units/hr (05/28/25 2058)    [3] PRN medications: acetaminophen **OR** acetaminophen **OR** acetaminophen, heparin, ondansetron **OR** ondansetron

## 2025-05-30 ENCOUNTER — APPOINTMENT (OUTPATIENT)
Dept: RADIOLOGY | Facility: HOSPITAL | Age: 60
End: 2025-05-30
Payer: MEDICARE

## 2025-05-30 LAB
ANION GAP SERPL CALC-SCNC: 14 MMOL/L (ref 10–20)
BASOPHILS # BLD AUTO: 0.03 X10*3/UL (ref 0–0.1)
BASOPHILS NFR BLD AUTO: 0.2 %
BUN SERPL-MCNC: 10 MG/DL (ref 6–23)
CALCIUM SERPL-MCNC: 8.4 MG/DL (ref 8.6–10.3)
CHLORIDE SERPL-SCNC: 105 MMOL/L (ref 98–107)
CO2 SERPL-SCNC: 20 MMOL/L (ref 21–32)
CREAT SERPL-MCNC: 0.59 MG/DL (ref 0.5–1.3)
EGFRCR SERPLBLD CKD-EPI 2021: >90 ML/MIN/1.73M*2
EOSINOPHIL # BLD AUTO: 0 X10*3/UL (ref 0–0.7)
EOSINOPHIL NFR BLD AUTO: 0 %
ERYTHROCYTE [DISTWIDTH] IN BLOOD BY AUTOMATED COUNT: 15.2 % (ref 11.5–14.5)
GLUCOSE SERPL-MCNC: 93 MG/DL (ref 74–99)
HCT VFR BLD AUTO: 27.3 % (ref 41–52)
HGB BLD-MCNC: 8.6 G/DL (ref 13.5–17.5)
HOLD SPECIMEN: NORMAL
IMM GRANULOCYTES # BLD AUTO: 0.14 X10*3/UL (ref 0–0.7)
IMM GRANULOCYTES NFR BLD AUTO: 0.8 % (ref 0–0.9)
INR PPP: 2.9 (ref 0.9–1.1)
LYMPHOCYTES # BLD AUTO: 0.49 X10*3/UL (ref 1.2–4.8)
LYMPHOCYTES NFR BLD AUTO: 2.7 %
MCH RBC QN AUTO: 25.1 PG (ref 26–34)
MCHC RBC AUTO-ENTMCNC: 31.5 G/DL (ref 32–36)
MCV RBC AUTO: 80 FL (ref 80–100)
MONOCYTES # BLD AUTO: 0.87 X10*3/UL (ref 0.1–1)
MONOCYTES NFR BLD AUTO: 4.9 %
NEUTROPHILS # BLD AUTO: 16.39 X10*3/UL (ref 1.2–7.7)
NEUTROPHILS NFR BLD AUTO: 91.4 %
NRBC BLD-RTO: 0 /100 WBCS (ref 0–0)
PLATELET # BLD AUTO: 207 X10*3/UL (ref 150–450)
POTASSIUM SERPL-SCNC: 4.2 MMOL/L (ref 3.5–5.3)
PROTHROMBIN TIME: 32.3 SECONDS (ref 9.8–12.4)
RBC # BLD AUTO: 3.43 X10*6/UL (ref 4.5–5.9)
SODIUM SERPL-SCNC: 135 MMOL/L (ref 136–145)
UFH PPP CHRO-ACNC: 0.9 IU/ML (ref ?–1.1)
UFH PPP CHRO-ACNC: 1.2 IU/ML (ref ?–1.1)
WBC # BLD AUTO: 17.9 X10*3/UL (ref 4.4–11.3)

## 2025-05-30 PROCEDURE — 85520 HEPARIN ASSAY: CPT | Performed by: HOSPITALIST

## 2025-05-30 PROCEDURE — 2500000004 HC RX 250 GENERAL PHARMACY W/ HCPCS (ALT 636 FOR OP/ED): Performed by: INTERNAL MEDICINE

## 2025-05-30 PROCEDURE — 2500000002 HC RX 250 W HCPCS SELF ADMINISTERED DRUGS (ALT 637 FOR MEDICARE OP, ALT 636 FOR OP/ED): Performed by: HOSPITALIST

## 2025-05-30 PROCEDURE — 84132 ASSAY OF SERUM POTASSIUM: CPT | Performed by: HOSPITALIST

## 2025-05-30 PROCEDURE — 36415 COLL VENOUS BLD VENIPUNCTURE: CPT | Performed by: HOSPITALIST

## 2025-05-30 PROCEDURE — 2500000002 HC RX 250 W HCPCS SELF ADMINISTERED DRUGS (ALT 637 FOR MEDICARE OP, ALT 636 FOR OP/ED)

## 2025-05-30 PROCEDURE — 99233 SBSQ HOSP IP/OBS HIGH 50: CPT | Performed by: HOSPITALIST

## 2025-05-30 PROCEDURE — 71045 X-RAY EXAM CHEST 1 VIEW: CPT | Performed by: STUDENT IN AN ORGANIZED HEALTH CARE EDUCATION/TRAINING PROGRAM

## 2025-05-30 PROCEDURE — 9420000001 HC RT PATIENT EDUCATION 5 MIN

## 2025-05-30 PROCEDURE — 71045 X-RAY EXAM CHEST 1 VIEW: CPT

## 2025-05-30 PROCEDURE — 2500000005 HC RX 250 GENERAL PHARMACY W/O HCPCS: Performed by: HOSPITALIST

## 2025-05-30 PROCEDURE — 94640 AIRWAY INHALATION TREATMENT: CPT

## 2025-05-30 PROCEDURE — 2500000004 HC RX 250 GENERAL PHARMACY W/ HCPCS (ALT 636 FOR OP/ED): Mod: JZ | Performed by: HOSPITALIST

## 2025-05-30 PROCEDURE — 85610 PROTHROMBIN TIME: CPT

## 2025-05-30 PROCEDURE — 85025 COMPLETE CBC W/AUTO DIFF WBC: CPT | Performed by: HOSPITALIST

## 2025-05-30 PROCEDURE — 94668 MNPJ CHEST WALL SBSQ: CPT

## 2025-05-30 PROCEDURE — 85520 HEPARIN ASSAY: CPT | Performed by: INTERNAL MEDICINE

## 2025-05-30 PROCEDURE — 94667 MNPJ CHEST WALL 1ST: CPT

## 2025-05-30 PROCEDURE — 2500000001 HC RX 250 WO HCPCS SELF ADMINISTERED DRUGS (ALT 637 FOR MEDICARE OP): Performed by: INTERNAL MEDICINE

## 2025-05-30 PROCEDURE — 1200000002 HC GENERAL ROOM WITH TELEMETRY DAILY

## 2025-05-30 RX ORDER — IPRATROPIUM BROMIDE AND ALBUTEROL SULFATE 2.5; .5 MG/3ML; MG/3ML
SOLUTION RESPIRATORY (INHALATION)
Status: COMPLETED
Start: 2025-05-30 | End: 2025-05-30

## 2025-05-30 RX ORDER — IPRATROPIUM BROMIDE AND ALBUTEROL SULFATE 2.5; .5 MG/3ML; MG/3ML
3 SOLUTION RESPIRATORY (INHALATION)
Status: DISCONTINUED | OUTPATIENT
Start: 2025-05-30 | End: 2025-06-02

## 2025-05-30 RX ORDER — IPRATROPIUM BROMIDE AND ALBUTEROL SULFATE 2.5; .5 MG/3ML; MG/3ML
3 SOLUTION RESPIRATORY (INHALATION) EVERY 2 HOUR PRN
Status: DISCONTINUED | OUTPATIENT
Start: 2025-05-30 | End: 2025-06-12 | Stop reason: HOSPADM

## 2025-05-30 RX ORDER — IPRATROPIUM BROMIDE AND ALBUTEROL SULFATE 2.5; .5 MG/3ML; MG/3ML
3 SOLUTION RESPIRATORY (INHALATION)
Status: DISCONTINUED | OUTPATIENT
Start: 2025-05-30 | End: 2025-05-30

## 2025-05-30 RX ORDER — IPRATROPIUM BROMIDE AND ALBUTEROL SULFATE 2.5; .5 MG/3ML; MG/3ML
3 SOLUTION RESPIRATORY (INHALATION) 3 TIMES DAILY
Status: DISCONTINUED | OUTPATIENT
Start: 2025-05-30 | End: 2025-05-30

## 2025-05-30 RX ADMIN — WARFARIN SODIUM 1.5 MG: 3 TABLET ORAL at 17:20

## 2025-05-30 RX ADMIN — DOCUSATE SODIUM 100 MG: 100 CAPSULE, LIQUID FILLED ORAL at 09:03

## 2025-05-30 RX ADMIN — DOCUSATE SODIUM 100 MG: 100 CAPSULE, LIQUID FILLED ORAL at 21:18

## 2025-05-30 RX ADMIN — HEPARIN SODIUM 800 UNITS/HR: 10000 INJECTION, SOLUTION INTRAVENOUS at 10:12

## 2025-05-30 RX ADMIN — Medication 4 L/MIN: at 20:02

## 2025-05-30 RX ADMIN — IRON SUCROSE 300.01 MG: 20 INJECTION, SOLUTION INTRAVENOUS at 05:38

## 2025-05-30 RX ADMIN — IPRATROPIUM BROMIDE AND ALBUTEROL SULFATE 3 ML: 2.5; .5 SOLUTION RESPIRATORY (INHALATION) at 12:12

## 2025-05-30 RX ADMIN — POLYETHYLENE GLYCOL 3350 17 G: 17 POWDER, FOR SOLUTION ORAL at 21:18

## 2025-05-30 RX ADMIN — ACETAMINOPHEN 650 MG: 325 TABLET ORAL at 15:43

## 2025-05-30 RX ADMIN — AMPICILLIN SODIUM AND SULBACTAM SODIUM 3 G: 2; 1 INJECTION, POWDER, FOR SOLUTION INTRAMUSCULAR; INTRAVENOUS at 12:27

## 2025-05-30 RX ADMIN — IPRATROPIUM BROMIDE AND ALBUTEROL SULFATE 3 ML: 2.5; .5 SOLUTION RESPIRATORY (INHALATION) at 20:02

## 2025-05-30 RX ADMIN — IPRATROPIUM BROMIDE AND ALBUTEROL SULFATE 3 ML: 2.5; .5 SOLUTION RESPIRATORY (INHALATION) at 15:05

## 2025-05-30 RX ADMIN — SENNOSIDES 8.6 MG: 8.6 TABLET, FILM COATED ORAL at 21:18

## 2025-05-30 RX ADMIN — AMPICILLIN SODIUM AND SULBACTAM SODIUM 3 G: 2; 1 INJECTION, POWDER, FOR SOLUTION INTRAMUSCULAR; INTRAVENOUS at 17:23

## 2025-05-30 ASSESSMENT — COGNITIVE AND FUNCTIONAL STATUS - GENERAL
TOILETING: A LITTLE
DAILY ACTIVITIY SCORE: 23
WALKING IN HOSPITAL ROOM: A LITTLE
DAILY ACTIVITIY SCORE: 24
MOBILITY SCORE: 24
MOBILITY SCORE: 23

## 2025-05-30 ASSESSMENT — PAIN SCALES - GENERAL: PAINLEVEL_OUTOF10: 0 - NO PAIN

## 2025-05-30 ASSESSMENT — PAIN - FUNCTIONAL ASSESSMENT: PAIN_FUNCTIONAL_ASSESSMENT: 0-10

## 2025-05-30 NOTE — CARE PLAN
The patient's goals for the shift include      The clinical goals for the shift include Remain safe, HDS, pain free, and maintain skin integrity.

## 2025-05-30 NOTE — NURSING NOTE
Maria Teresa RT to come see patient. When vitals were taken patient was c/o mild SOB and O2 on 2L was 88%.    @1200  RT came to see patient and asked if Dr. Monk could come see patient. Lungs do not sound good and RT had to up patient to 4L O2 via NC.

## 2025-05-30 NOTE — PROGRESS NOTES
Robert Luna is a 59 y.o. male admitted for symptomatic anemia. Pharmacy has been consulted for warfarin dosing and monitoring for Aortic Valve Replacement with goal INR of 2.0-3.0.     Home regimen   MON TUE WED THR FRI SAT SUN   Dose 1  mg 1.5  mg 1  mg 1.5  mg 1.5  mg 1  mg 1.5  mg   Total weekly dose: 9 mg  Source: [facility] from 5/22/2025    Labs  INR: 2.9  Hgb/Hct/Plt  Lab Results   Component Value Date    HGB 8.6 (L) 05/30/2025    HGB 8.7 (L) 05/29/2025    HGB 7.7 (L) 05/29/2025    HGB 8.3 (L) 05/28/2025    HGB 8.3 (L) 05/27/2025        Lab Results   Component Value Date    HCT 27.3 (L) 05/30/2025    HCT 28.0 (L) 05/29/2025    HCT 25.6 (L) 05/29/2025    HCT 25.9 (L) 05/28/2025    HCT 27.8 (L) 05/27/2025        Platelets   Date Value Ref Range Status   05/30/2025 207 150 - 450 x10*3/uL Final   05/29/2025 215 150 - 450 x10*3/uL Final   05/29/2025 216 150 - 450 x10*3/uL Final   05/28/2025 284 150 - 450 x10*3/uL Final   05/27/2025 298 150 - 450 x10*3/uL Final      Warfarin Therapy   Current regimen: resuming home reigmen  Bridging: Heparin - patient's INR has been therapeutic for 2 days, will recommend discontinuation  Interacting medications: no interacting medications    Dosing History During Current Admission  Date 5/28 5/29 5/30   INR 1.9 2.2 2.9   Dose  (mg) See plan 1.5 mg 1.5 mg     Assessment and Plan  Patient's INR of 2.9 today is therapeutic. Hemaglobin/hematocrit/platelets are stable.  Warfarin inpatient plan: Give 1.5mg today per home regimen.   Monitor s/sx of bleeding including epistaxis, hematuria, unusual bruising, hemoptysis, hematochezia as well as s/sx of stroke including impaired speech, unilateral paralysis, blurry vision.  Pharmacy will continue to monitor the patient and adjust therapy as needed.      Thank you for the consult. Please do not hesitate to contact a pharmacist with any questions.    Natividad Rosas, PharmD

## 2025-05-30 NOTE — PROGRESS NOTES
Between 7AM-7PM please message me via Epic Secure Chat.  After 7PM please page Nocturnist on call.    [facility] Hospitalist Progress Note      Robert Luna    :  1965(59 y.o.)    MRN:  [id number]  Date: 25     Assessment and Plan:     Dyspnea on exertion due to anemia  Iron Deficiency Anemia  Suspected occult GI blood loss  HTN- on toprol xl  Aortic valve replacement- on coumadin  Acute respiratory failure with hypoxia  Aspiration pneumonia    Plan:  - GI consulted, EGD/Colon with no old or active bleeding. No abnormality to explain anemia. Outpatient VCE for complete evaluation of MAHESH   - Coumadin restarted; INR 2.9, will stop heparin bridge  - IV Venofer 300 mg x3 doses ordered; 1 unit pRBC this admit. Hgb 8.6 today  - New hypoxia today; needing 4L NC. Reports what sounds like aspiration even after endoscopy yesterday. CXR with new bilateral, left greater than right, airspace opacities. WBC 17. Start IV unasyn. Wean NC o2 as tolerated. Check sputum culture if able.      Malnutrition Diagnosis Status: New  Malnutrition Diagnosis: Severe malnutrition related to chronic disease or condition  Related to: dysphagia  As Evidenced by: reported intake <75% estimated needs for > 2 months, moderate-severe muslce & subcutaneous fat depletion, low BMI.  I agree with the dietitian's malnutrition diagnosis.      DVT Prophylaxis: full anticoagulation with heparin gtt    Disposition: continue to monitor inpatient, await consultant recommendations, await test results, and await clinical improvement    Subjective:      Interval History:   Vitals and chart notes from overnight reviewed.   No acute issues overnight.   Patient seen and evaluated at bedside.   Hypoxic this AM. RT had to increase to 4L NC.   Coughing. Febrile this afternoon.    Review of Systems:   Other than patient's chronic conditions and those complaints in the history above, the rest of the 10 systems review were done and were negative.      Current medications:  Scheduled Meds:Scheduled Medications[1]  Continuous Infusions:Continuous Medications[2]  PRN Meds:PRN Medications[3]      Objective:     Heart Rate:  []   Temp:  [36.2 °C (97.2 °F)-37.9 °C (100.3 °F)]   Resp:  [15-22]   BP: ()/(46-68)   SpO2:  [86 %-97 %]          Physical Exam  Vitals and nursing note reviewed.   HENT:      Mouth/Throat:      Mouth: Mucous membranes are moist.      Pharynx: Oropharynx is clear.   Cardiovascular:      Rate and Rhythm: Normal rate and regular rhythm.      Comments: +Click  Pulmonary:      Effort: Pulmonary effort is normal.      Comments: Coarse breath sounds bilaterally  Abdominal:      General: There is no distension.      Palpations: Abdomen is soft.      Tenderness: There is no abdominal tenderness.   Musculoskeletal:      Right lower leg: No edema.      Left lower leg: No edema.   Neurological:      Mental Status: He is alert and oriented to person, place, and time.         Labs:   Lab Results   Component Value Date     (L) 05/30/2025    K 4.2 05/30/2025     05/30/2025    CO2 20 (L) 05/30/2025    BUN 10 05/30/2025    CREATININE 0.59 05/30/2025    GLUCOSE 93 05/30/2025    CALCIUM 8.4 (L) 05/30/2025    PROT 6.9 05/27/2025    BILITOT 0.7 05/27/2025    ALKPHOS 52 05/27/2025    AST 23 05/27/2025    ALT 16 05/27/2025    GLOB 3.1 09/02/2022       Lab Results   Component Value Date    WBC 17.9 (H) 05/30/2025    HGB 8.6 (L) 05/30/2025    HCT 27.3 (L) 05/30/2025    MCV 80 05/30/2025     05/30/2025            [1] ampicillin-sulbactam, 3 g, intravenous, q6h MARTELL  docusate sodium, 100 mg, oral, BID  ipratropium-albuteroL, 3 mL, nebulization, 4x daily  iron sucrose, 300 mg, intravenous, Daily  metoprolol succinate XL, 25 mg, oral, Daily  polyethylene glycol, 17 g, oral, Nightly  sennosides, 1 tablet, oral, Nightly  warfarin, 1 mg, oral, Once per day on Monday Wednesday Saturday  warfarin, 1.5 mg, oral, Once per day on Sunday Tuesday  Thursday Friday     [2]    [3] PRN medications: acetaminophen **OR** acetaminophen **OR** acetaminophen, ipratropium-albuteroL, ondansetron **OR** ondansetron

## 2025-05-30 NOTE — PROGRESS NOTES
05/30/25 1435   Discharge Planning   Home or Post Acute Services None   Expected Discharge Disposition Home   Does the patient need discharge transport arranged? No   Stroke Family Assessment   Stroke Family Assessment Needed No   Intensity of Service   Intensity of Service 0-30 min     PLAN/BARRIER: Bridging from Heparin to Warfarin, wean O2  DISP: home  HHC: none  DME: none  O2: 2L, baseline is room air  ADOD: 1-2 days  Patient does home monitoring of his PT/INR.  Caryn Brooks RN

## 2025-05-31 LAB
ANION GAP SERPL CALC-SCNC: 14 MMOL/L (ref 10–20)
BASOPHILS # BLD AUTO: 0.01 X10*3/UL (ref 0–0.1)
BASOPHILS NFR BLD AUTO: 0.1 %
BUN SERPL-MCNC: 16 MG/DL (ref 6–23)
CALCIUM SERPL-MCNC: 8.4 MG/DL (ref 8.6–10.3)
CHLORIDE SERPL-SCNC: 108 MMOL/L (ref 98–107)
CO2 SERPL-SCNC: 20 MMOL/L (ref 21–32)
CREAT SERPL-MCNC: 0.63 MG/DL (ref 0.5–1.3)
EGFRCR SERPLBLD CKD-EPI 2021: >90 ML/MIN/1.73M*2
EOSINOPHIL # BLD AUTO: 0.02 X10*3/UL (ref 0–0.7)
EOSINOPHIL NFR BLD AUTO: 0.2 %
ERYTHROCYTE [DISTWIDTH] IN BLOOD BY AUTOMATED COUNT: 15.6 % (ref 11.5–14.5)
GLUCOSE SERPL-MCNC: 130 MG/DL (ref 74–99)
HCT VFR BLD AUTO: 22.8 % (ref 41–52)
HGB BLD-MCNC: 7.1 G/DL (ref 13.5–17.5)
IMM GRANULOCYTES # BLD AUTO: 0.07 X10*3/UL (ref 0–0.7)
IMM GRANULOCYTES NFR BLD AUTO: 0.7 % (ref 0–0.9)
INR PPP: 2.1 (ref 0.9–1.1)
LYMPHOCYTES # BLD AUTO: 0.35 X10*3/UL (ref 1.2–4.8)
LYMPHOCYTES NFR BLD AUTO: 3.6 %
MCH RBC QN AUTO: 24.5 PG (ref 26–34)
MCHC RBC AUTO-ENTMCNC: 31.1 G/DL (ref 32–36)
MCV RBC AUTO: 79 FL (ref 80–100)
MONOCYTES # BLD AUTO: 0.8 X10*3/UL (ref 0.1–1)
MONOCYTES NFR BLD AUTO: 8.3 %
NEUTROPHILS # BLD AUTO: 8.42 X10*3/UL (ref 1.2–7.7)
NEUTROPHILS NFR BLD AUTO: 87.1 %
NRBC BLD-RTO: 0.4 /100 WBCS (ref 0–0)
PLATELET # BLD AUTO: 182 X10*3/UL (ref 150–450)
POTASSIUM SERPL-SCNC: 4.2 MMOL/L (ref 3.5–5.3)
PROTHROMBIN TIME: 23.7 SECONDS (ref 9.8–12.4)
RBC # BLD AUTO: 2.9 X10*6/UL (ref 4.5–5.9)
SODIUM SERPL-SCNC: 138 MMOL/L (ref 136–145)
WBC # BLD AUTO: 9.7 X10*3/UL (ref 4.4–11.3)

## 2025-05-31 PROCEDURE — 94640 AIRWAY INHALATION TREATMENT: CPT

## 2025-05-31 PROCEDURE — 2500000001 HC RX 250 WO HCPCS SELF ADMINISTERED DRUGS (ALT 637 FOR MEDICARE OP): Performed by: INTERNAL MEDICINE

## 2025-05-31 PROCEDURE — 2500000004 HC RX 250 GENERAL PHARMACY W/ HCPCS (ALT 636 FOR OP/ED): Mod: JZ | Performed by: HOSPITALIST

## 2025-05-31 PROCEDURE — 36415 COLL VENOUS BLD VENIPUNCTURE: CPT

## 2025-05-31 PROCEDURE — 2500000005 HC RX 250 GENERAL PHARMACY W/O HCPCS: Performed by: HOSPITALIST

## 2025-05-31 PROCEDURE — 85025 COMPLETE CBC W/AUTO DIFF WBC: CPT | Performed by: HOSPITALIST

## 2025-05-31 PROCEDURE — 99233 SBSQ HOSP IP/OBS HIGH 50: CPT | Performed by: HOSPITALIST

## 2025-05-31 PROCEDURE — 1200000002 HC GENERAL ROOM WITH TELEMETRY DAILY

## 2025-05-31 PROCEDURE — 94668 MNPJ CHEST WALL SBSQ: CPT

## 2025-05-31 PROCEDURE — 2500000004 HC RX 250 GENERAL PHARMACY W/ HCPCS (ALT 636 FOR OP/ED): Performed by: INTERNAL MEDICINE

## 2025-05-31 PROCEDURE — 2500000002 HC RX 250 W HCPCS SELF ADMINISTERED DRUGS (ALT 637 FOR MEDICARE OP, ALT 636 FOR OP/ED): Performed by: HOSPITALIST

## 2025-05-31 PROCEDURE — 2500000001 HC RX 250 WO HCPCS SELF ADMINISTERED DRUGS (ALT 637 FOR MEDICARE OP)

## 2025-05-31 PROCEDURE — 36415 COLL VENOUS BLD VENIPUNCTURE: CPT | Performed by: HOSPITALIST

## 2025-05-31 PROCEDURE — 80048 BASIC METABOLIC PNL TOTAL CA: CPT | Performed by: HOSPITALIST

## 2025-05-31 PROCEDURE — 85610 PROTHROMBIN TIME: CPT

## 2025-05-31 RX ADMIN — ACETAMINOPHEN 650 MG: 325 TABLET ORAL at 20:51

## 2025-05-31 RX ADMIN — AMPICILLIN SODIUM AND SULBACTAM SODIUM 3 G: 2; 1 INJECTION, POWDER, FOR SOLUTION INTRAMUSCULAR; INTRAVENOUS at 00:55

## 2025-05-31 RX ADMIN — AMPICILLIN SODIUM AND SULBACTAM SODIUM 3 G: 2; 1 INJECTION, POWDER, FOR SOLUTION INTRAMUSCULAR; INTRAVENOUS at 12:19

## 2025-05-31 RX ADMIN — IPRATROPIUM BROMIDE AND ALBUTEROL SULFATE 3 ML: 2.5; .5 SOLUTION RESPIRATORY (INHALATION) at 07:23

## 2025-05-31 RX ADMIN — METOPROLOL SUCCINATE 25 MG: 25 TABLET, EXTENDED RELEASE ORAL at 09:54

## 2025-05-31 RX ADMIN — POLYETHYLENE GLYCOL 3350 17 G: 17 POWDER, FOR SOLUTION ORAL at 20:51

## 2025-05-31 RX ADMIN — AMPICILLIN SODIUM AND SULBACTAM SODIUM 3 G: 2; 1 INJECTION, POWDER, FOR SOLUTION INTRAMUSCULAR; INTRAVENOUS at 17:06

## 2025-05-31 RX ADMIN — IPRATROPIUM BROMIDE AND ALBUTEROL SULFATE 3 ML: 2.5; .5 SOLUTION RESPIRATORY (INHALATION) at 10:53

## 2025-05-31 RX ADMIN — IPRATROPIUM BROMIDE AND ALBUTEROL SULFATE 3 ML: 2.5; .5 SOLUTION RESPIRATORY (INHALATION) at 15:35

## 2025-05-31 RX ADMIN — AMPICILLIN SODIUM AND SULBACTAM SODIUM 3 G: 2; 1 INJECTION, POWDER, FOR SOLUTION INTRAMUSCULAR; INTRAVENOUS at 05:05

## 2025-05-31 RX ADMIN — WARFARIN SODIUM 1 MG: 2 TABLET ORAL at 17:07

## 2025-05-31 RX ADMIN — DOCUSATE SODIUM 100 MG: 100 CAPSULE, LIQUID FILLED ORAL at 09:54

## 2025-05-31 RX ADMIN — DOCUSATE SODIUM 100 MG: 100 CAPSULE, LIQUID FILLED ORAL at 20:51

## 2025-05-31 RX ADMIN — IPRATROPIUM BROMIDE AND ALBUTEROL SULFATE 3 ML: 2.5; .5 SOLUTION RESPIRATORY (INHALATION) at 19:55

## 2025-05-31 RX ADMIN — Medication 2 L/MIN: at 10:53

## 2025-05-31 RX ADMIN — IRON SUCROSE 300 MG: 20 INJECTION, SOLUTION INTRAVENOUS at 05:05

## 2025-05-31 RX ADMIN — Medication 4 L/MIN: at 07:23

## 2025-05-31 RX ADMIN — SENNOSIDES 8.6 MG: 8.6 TABLET, FILM COATED ORAL at 20:51

## 2025-05-31 ASSESSMENT — COGNITIVE AND FUNCTIONAL STATUS - GENERAL
TOILETING: A LITTLE
MOBILITY SCORE: 23
DAILY ACTIVITIY SCORE: 23
WALKING IN HOSPITAL ROOM: A LITTLE
DAILY ACTIVITIY SCORE: 24
WALKING IN HOSPITAL ROOM: A LITTLE
MOBILITY SCORE: 23

## 2025-05-31 ASSESSMENT — PAIN SCALES - GENERAL
PAINLEVEL_OUTOF10: 0 - NO PAIN
PAINLEVEL_OUTOF10: 3
PAINLEVEL_OUTOF10: 1

## 2025-05-31 ASSESSMENT — PAIN - FUNCTIONAL ASSESSMENT
PAIN_FUNCTIONAL_ASSESSMENT: 0-10

## 2025-05-31 ASSESSMENT — PAIN DESCRIPTION - DESCRIPTORS: DESCRIPTORS: ACHING

## 2025-05-31 ASSESSMENT — PAIN DESCRIPTION - LOCATION: LOCATION: GENERALIZED

## 2025-05-31 NOTE — CARE PLAN
The patient's goals for the shift include      The clinical goals for the shift include remain hds, safe and cough/deep breathe to decrease oxygen demands      Problem: Pain - Adult  Goal: Verbalizes/displays adequate comfort level or baseline comfort level  Outcome: Progressing     Problem: Safety - Adult  Goal: Free from fall injury  Outcome: Progressing     Problem: Fall/Injury  Goal: Not fall by end of shift  Outcome: Progressing  Goal: Be free from injury by end of the shift  Outcome: Progressing  Goal: Verbalize understanding of personal risk factors for fall in the hospital  Outcome: Progressing  Goal: Verbalize understanding of risk factor reduction measures to prevent injury from fall in the home  Outcome: Progressing  Goal: Use assistive devices by end of the shift  Outcome: Progressing  Goal: Pace activities to prevent fatigue by end of the shift  Outcome: Progressing

## 2025-05-31 NOTE — PROGRESS NOTES
Robert Luna is a 59 y.o. male admitted for symptomatic anemia. Pharmacy has been consulted for warfarin dosing and monitoring for Aortic Valve Replacement with goal INR of 2.0-3.0.     Home regimen   MON TUE WED THR FRI SAT SUN   Dose 1  mg 1.5  mg 1  mg 1.5  mg 1.5  mg 1  mg 1.5  mg   Total weekly dose: 9 mg  Source: [facility] Note from 5/22/2025    Labs  INR: 2.1  Hgb/Hct/Plt  Lab Results   Component Value Date    HGB 7.1 (L) 05/31/2025    HGB 8.6 (L) 05/30/2025    HGB 8.7 (L) 05/29/2025    HGB 7.7 (L) 05/29/2025    HGB 8.3 (L) 05/28/2025        Lab Results   Component Value Date    HCT 22.8 (L) 05/31/2025    HCT 27.3 (L) 05/30/2025    HCT 28.0 (L) 05/29/2025    HCT 25.6 (L) 05/29/2025    HCT 25.9 (L) 05/28/2025        Platelets   Date Value Ref Range Status   05/31/2025 182 150 - 450 x10*3/uL Final   05/30/2025 207 150 - 450 x10*3/uL Final   05/29/2025 215 150 - 450 x10*3/uL Final   05/29/2025 216 150 - 450 x10*3/uL Final   05/28/2025 284 150 - 450 x10*3/uL Final      Warfarin Therapy   Current regimen: resuming home reigmen  Bridging: None needed   Interacting medications: no interacting medications    Dosing History During Current Admission  Date 5/28 5/29 5/30 5/31   INR 1.9 2.2 2.9 2.1   Dose  (mg) See plan 1.5 mg 1.5 mg 1 mg     Assessment and Plan  Patient's INR of 2.1 today is therapeutic. Hemaglobin/hematocrit/platelets are stable.  Warfarin inpatient plan: Give 1 mg today per home regimen.   Monitor s/sx of bleeding including epistaxis, hematuria, unusual bruising, hemoptysis, hematochezia as well as s/sx of stroke including impaired speech, unilateral paralysis, blurry vision.  Pharmacy will continue to monitor the patient and adjust therapy as needed.      Thank you for the consult. Please do not hesitate to contact a pharmacist with any questions.    Maria Christine, PharmD

## 2025-05-31 NOTE — CARE PLAN
The patient's goals for the shift include      The clinical goals for the shift include remain hds, safe and cough/deep breathe to decrease oxygen demands

## 2025-05-31 NOTE — PROGRESS NOTES
Between 7AM-7PM please message me via Epic Secure Chat.  After 7PM please page Nocturnist on call.    [facility] Hospitalist Progress Note      Robert Luna    :  1965(59 y.o.)    MRN:  [id number]  Date: 25     Assessment and Plan:     Dyspnea on exertion due to anemia  Iron Deficiency Anemia  Suspected occult GI blood loss  HTN- on toprol xl  Aortic valve replacement- on coumadin  Acute respiratory failure with hypoxia  Aspiration pneumonia    Plan:  - GI consulted, EGD/Colon with no old or active bleeding. No abnormality to explain anemia. Outpatient VCE for complete evaluation of MAHESH   - Coumadin as dosed by pharmacy for hx of AVR  - IV Venofer 300 mg x3 doses ordered; 1 unit pRBC this admit. Hgb labile, 7.1 today, no bleeding noted, no BM since endoscopy. Continue to trend. Transfusion goals: hgb>7, Plt>10 (if afebrile), >20 (if febrile), >50 (if active bleeding).   - New hypoxia today; needing 4L NC. Reports what sounds like aspiration even after endoscopy yesterday. CXR with new bilateral, left greater than right, airspace opacities. WBC 17. Continue IV unasyn. Weaned to 2L NC, continue to wean for goal o2 sat >90%. Transition to PO augmentin in coming days. Will need home O2 eval prior to dc      Malnutrition Diagnosis Status: New  Malnutrition Diagnosis: Severe malnutrition related to chronic disease or condition  Related to: dysphagia  As Evidenced by: reported intake <75% estimated needs for > 2 months, moderate-severe muslce & subcutaneous fat depletion, low BMI.  I agree with the dietitian's malnutrition diagnosis.      DVT Prophylaxis: full anticoagulation with heparin gtt    Disposition: continue to monitor inpatient, await consultant recommendations, await test results, and await clinical improvement    Subjective:      Interval History:   Vitals and chart notes from overnight reviewed.   No acute issues overnight.   Patient seen and evaluated at bedside.   Shortness of breath  improved but not back to baseline. Cough improving. No further fevers.     Review of Systems:   Other than patient's chronic conditions and those complaints in the history above, the rest of the 10 systems review were done and were negative.     Current medications:  Scheduled Meds:Scheduled Medications[1]  Continuous Infusions:Continuous Medications[2]  PRN Meds:PRN Medications[3]      Objective:     Heart Rate:  []   Temp:  [36.2 °C (97.2 °F)-37.9 °C (100.3 °F)]   Resp:  [16-22]   BP: (121-148)/(60-69)   SpO2:  [93 %-99 %]     Oxygen Dose: *4 L/min    Physical Exam  Vitals and nursing note reviewed.   HENT:      Mouth/Throat:      Mouth: Mucous membranes are moist.      Pharynx: Oropharynx is clear.   Cardiovascular:      Rate and Rhythm: Normal rate and regular rhythm.      Comments: +Click  Pulmonary:      Effort: Pulmonary effort is normal.      Comments: Coarse breath sounds bilaterally  Abdominal:      General: There is no distension.      Palpations: Abdomen is soft.      Tenderness: There is no abdominal tenderness.   Musculoskeletal:      Right lower leg: No edema.      Left lower leg: No edema.   Neurological:      Mental Status: He is alert and oriented to person, place, and time.         Labs:   Lab Results   Component Value Date     05/31/2025    K 4.2 05/31/2025     (H) 05/31/2025    CO2 20 (L) 05/31/2025    BUN 16 05/31/2025    CREATININE 0.63 05/31/2025    GLUCOSE 130 (H) 05/31/2025    CALCIUM 8.4 (L) 05/31/2025    PROT 6.9 05/27/2025    BILITOT 0.7 05/27/2025    ALKPHOS 52 05/27/2025    AST 23 05/27/2025    ALT 16 05/27/2025    GLOB 3.1 09/02/2022       Lab Results   Component Value Date    WBC 9.7 05/31/2025    HGB 7.1 (L) 05/31/2025    HCT 22.8 (L) 05/31/2025    MCV 79 (L) 05/31/2025     05/31/2025            [1] ampicillin-sulbactam, 3 g, intravenous, q6h MARTELL  docusate sodium, 100 mg, oral, BID  ipratropium-albuteroL, 3 mL, nebulization, 4x daily  metoprolol succinate  XL, 25 mg, oral, Daily  polyethylene glycol, 17 g, oral, Nightly  sennosides, 1 tablet, oral, Nightly  warfarin, 1 mg, oral, Once per day on Monday Wednesday Saturday  warfarin, 1.5 mg, oral, Once per day on Sunday Tuesday Thursday Friday     [2]    [3] PRN medications: acetaminophen **OR** acetaminophen **OR** acetaminophen, ipratropium-albuteroL, ondansetron **OR** ondansetron, oxygen

## 2025-06-01 ENCOUNTER — APPOINTMENT (OUTPATIENT)
Dept: RADIOLOGY | Facility: HOSPITAL | Age: 60
End: 2025-06-01
Payer: MEDICARE

## 2025-06-01 VITALS
OXYGEN SATURATION: 94 % | TEMPERATURE: 98.6 F | SYSTOLIC BLOOD PRESSURE: 143 MMHG | DIASTOLIC BLOOD PRESSURE: 83 MMHG | BODY MASS INDEX: 17.57 KG/M2 | RESPIRATION RATE: 18 BRPM | WEIGHT: 89.51 LBS | HEART RATE: 97 BPM | HEIGHT: 60 IN

## 2025-06-01 LAB
ANION GAP SERPL CALC-SCNC: 11 MMOL/L (ref 10–20)
ATRIAL RATE: 69 BPM
BASOPHILS # BLD AUTO: 0.03 X10*3/UL (ref 0–0.1)
BASOPHILS NFR BLD AUTO: 0.3 %
BNP SERPL-MCNC: 1832 PG/ML (ref 0–99)
BUN SERPL-MCNC: 16 MG/DL (ref 6–23)
CALCIUM SERPL-MCNC: 8.5 MG/DL (ref 8.6–10.3)
CHLORIDE SERPL-SCNC: 106 MMOL/L (ref 98–107)
CO2 SERPL-SCNC: 27 MMOL/L (ref 21–32)
CREAT SERPL-MCNC: 0.59 MG/DL (ref 0.5–1.3)
EGFRCR SERPLBLD CKD-EPI 2021: >90 ML/MIN/1.73M*2
EOSINOPHIL # BLD AUTO: 0.15 X10*3/UL (ref 0–0.7)
EOSINOPHIL NFR BLD AUTO: 1.4 %
ERYTHROCYTE [DISTWIDTH] IN BLOOD BY AUTOMATED COUNT: 16.4 % (ref 11.5–14.5)
GLUCOSE SERPL-MCNC: 119 MG/DL (ref 74–99)
HCT VFR BLD AUTO: 27.5 % (ref 41–52)
HGB BLD-MCNC: 8.2 G/DL (ref 13.5–17.5)
IMM GRANULOCYTES # BLD AUTO: 0.13 X10*3/UL (ref 0–0.7)
IMM GRANULOCYTES NFR BLD AUTO: 1.2 % (ref 0–0.9)
INR PPP: 1.9 (ref 0.9–1.1)
LYMPHOCYTES # BLD AUTO: 0.38 X10*3/UL (ref 1.2–4.8)
LYMPHOCYTES NFR BLD AUTO: 3.5 %
MCH RBC QN AUTO: 24.6 PG (ref 26–34)
MCHC RBC AUTO-ENTMCNC: 29.8 G/DL (ref 32–36)
MCV RBC AUTO: 82 FL (ref 80–100)
MONOCYTES # BLD AUTO: 0.87 X10*3/UL (ref 0.1–1)
MONOCYTES NFR BLD AUTO: 8 %
NEUTROPHILS # BLD AUTO: 9.27 X10*3/UL (ref 1.2–7.7)
NEUTROPHILS NFR BLD AUTO: 85.6 %
NRBC BLD-RTO: 0.5 /100 WBCS (ref 0–0)
P AXIS: 49 DEGREES
P OFFSET: 215 MS
P ONSET: 152 MS
PLATELET # BLD AUTO: 199 X10*3/UL (ref 150–450)
POTASSIUM SERPL-SCNC: 4.5 MMOL/L (ref 3.5–5.3)
PR INTERVAL: 136 MS
PROTHROMBIN TIME: 20.7 SECONDS (ref 9.8–12.4)
Q ONSET: 220 MS
QRS COUNT: 11 BEATS
QRS DURATION: 86 MS
QT INTERVAL: 384 MS
QTC CALCULATION(BAZETT): 411 MS
QTC FREDERICIA: 402 MS
R AXIS: 79 DEGREES
RBC # BLD AUTO: 3.34 X10*6/UL (ref 4.5–5.9)
SODIUM SERPL-SCNC: 139 MMOL/L (ref 136–145)
T AXIS: 73 DEGREES
T OFFSET: 412 MS
VENTRICULAR RATE: 69 BPM
WBC # BLD AUTO: 10.8 X10*3/UL (ref 4.4–11.3)

## 2025-06-01 PROCEDURE — 99233 SBSQ HOSP IP/OBS HIGH 50: CPT | Performed by: STUDENT IN AN ORGANIZED HEALTH CARE EDUCATION/TRAINING PROGRAM

## 2025-06-01 PROCEDURE — 71250 CT THORAX DX C-: CPT

## 2025-06-01 PROCEDURE — 94668 MNPJ CHEST WALL SBSQ: CPT

## 2025-06-01 PROCEDURE — 1200000002 HC GENERAL ROOM WITH TELEMETRY DAILY

## 2025-06-01 PROCEDURE — 2500000004 HC RX 250 GENERAL PHARMACY W/ HCPCS (ALT 636 FOR OP/ED): Performed by: HOSPITALIST

## 2025-06-01 PROCEDURE — 83880 ASSAY OF NATRIURETIC PEPTIDE: CPT | Performed by: NURSE PRACTITIONER

## 2025-06-01 PROCEDURE — 71250 CT THORAX DX C-: CPT | Performed by: RADIOLOGY

## 2025-06-01 PROCEDURE — 2500000004 HC RX 250 GENERAL PHARMACY W/ HCPCS (ALT 636 FOR OP/ED): Mod: JZ | Performed by: STUDENT IN AN ORGANIZED HEALTH CARE EDUCATION/TRAINING PROGRAM

## 2025-06-01 PROCEDURE — 94669 MECHANICAL CHEST WALL OSCILL: CPT

## 2025-06-01 PROCEDURE — 36415 COLL VENOUS BLD VENIPUNCTURE: CPT

## 2025-06-01 PROCEDURE — 2500000002 HC RX 250 W HCPCS SELF ADMINISTERED DRUGS (ALT 637 FOR MEDICARE OP, ALT 636 FOR OP/ED)

## 2025-06-01 PROCEDURE — 94640 AIRWAY INHALATION TREATMENT: CPT

## 2025-06-01 PROCEDURE — 2500000001 HC RX 250 WO HCPCS SELF ADMINISTERED DRUGS (ALT 637 FOR MEDICARE OP): Performed by: INTERNAL MEDICINE

## 2025-06-01 PROCEDURE — 85610 PROTHROMBIN TIME: CPT

## 2025-06-01 PROCEDURE — 2500000005 HC RX 250 GENERAL PHARMACY W/O HCPCS: Performed by: HOSPITALIST

## 2025-06-01 PROCEDURE — 85025 COMPLETE CBC W/AUTO DIFF WBC: CPT | Performed by: HOSPITALIST

## 2025-06-01 PROCEDURE — 80048 BASIC METABOLIC PNL TOTAL CA: CPT | Performed by: HOSPITALIST

## 2025-06-01 PROCEDURE — 2500000002 HC RX 250 W HCPCS SELF ADMINISTERED DRUGS (ALT 637 FOR MEDICARE OP, ALT 636 FOR OP/ED): Performed by: HOSPITALIST

## 2025-06-01 RX ORDER — FUROSEMIDE 10 MG/ML
40 INJECTION INTRAMUSCULAR; INTRAVENOUS
Status: DISCONTINUED | OUTPATIENT
Start: 2025-06-01 | End: 2025-06-06

## 2025-06-01 RX ADMIN — ACETAMINOPHEN 650 MG: 325 TABLET ORAL at 08:28

## 2025-06-01 RX ADMIN — WARFARIN SODIUM 1.5 MG: 3 TABLET ORAL at 17:34

## 2025-06-01 RX ADMIN — FUROSEMIDE 40 MG: 10 INJECTION, SOLUTION INTRAMUSCULAR; INTRAVENOUS at 15:21

## 2025-06-01 RX ADMIN — AMPICILLIN SODIUM AND SULBACTAM SODIUM 3 G: 2; 1 INJECTION, POWDER, FOR SOLUTION INTRAMUSCULAR; INTRAVENOUS at 00:40

## 2025-06-01 RX ADMIN — IPRATROPIUM BROMIDE AND ALBUTEROL SULFATE 3 ML: 2.5; .5 SOLUTION RESPIRATORY (INHALATION) at 21:01

## 2025-06-01 RX ADMIN — DOCUSATE SODIUM 100 MG: 100 CAPSULE, LIQUID FILLED ORAL at 08:27

## 2025-06-01 RX ADMIN — Medication 3 L/MIN: at 08:36

## 2025-06-01 RX ADMIN — AMPICILLIN SODIUM AND SULBACTAM SODIUM 3 G: 2; 1 INJECTION, POWDER, FOR SOLUTION INTRAMUSCULAR; INTRAVENOUS at 17:35

## 2025-06-01 RX ADMIN — METOPROLOL SUCCINATE 25 MG: 25 TABLET, EXTENDED RELEASE ORAL at 08:27

## 2025-06-01 RX ADMIN — Medication 3 L/MIN: at 21:01

## 2025-06-01 RX ADMIN — IPRATROPIUM BROMIDE AND ALBUTEROL SULFATE 3 ML: 2.5; .5 SOLUTION RESPIRATORY (INHALATION) at 11:42

## 2025-06-01 RX ADMIN — AMPICILLIN SODIUM AND SULBACTAM SODIUM 3 G: 2; 1 INJECTION, POWDER, FOR SOLUTION INTRAMUSCULAR; INTRAVENOUS at 06:28

## 2025-06-01 RX ADMIN — IPRATROPIUM BROMIDE AND ALBUTEROL SULFATE 3 ML: 2.5; .5 SOLUTION RESPIRATORY (INHALATION) at 08:31

## 2025-06-01 RX ADMIN — AMPICILLIN SODIUM AND SULBACTAM SODIUM 3 G: 2; 1 INJECTION, POWDER, FOR SOLUTION INTRAMUSCULAR; INTRAVENOUS at 11:29

## 2025-06-01 ASSESSMENT — COGNITIVE AND FUNCTIONAL STATUS - GENERAL
MOBILITY SCORE: 24
DAILY ACTIVITIY SCORE: 24
MOBILITY SCORE: 24
DAILY ACTIVITIY SCORE: 24

## 2025-06-01 ASSESSMENT — PAIN - FUNCTIONAL ASSESSMENT
PAIN_FUNCTIONAL_ASSESSMENT: 0-10
PAIN_FUNCTIONAL_ASSESSMENT: 0-10
PAIN_FUNCTIONAL_ASSESSMENT: UNABLE TO SELF-REPORT

## 2025-06-01 ASSESSMENT — PAIN SCALES - GENERAL
PAINLEVEL_OUTOF10: 0 - NO PAIN
PAINLEVEL_OUTOF10: 3

## 2025-06-01 ASSESSMENT — ENCOUNTER SYMPTOMS
ABDOMINAL PAIN: 0
FEVER: 0
ABDOMINAL DISTENTION: 0
SHORTNESS OF BREATH: 1
VOMITING: 0

## 2025-06-01 NOTE — CARE PLAN
The patient's goals for the shift include      The clinical goals for the shift include Maintain safety and manage pain throughout the shift    Over the shift, the patient did make progress toward the following goals.

## 2025-06-01 NOTE — PROGRESS NOTES
[facility] Hospitalist Progress Note      Between 7AM-7PM please message me via Epic Secure Chat.  After 7PM please page Nocturnist on call.        Assessment/Plan     Acute Problems    Dyspnea on exertion/acute hypoxic respiratory failure  Iron def anemia  Possible occult GI blood loss    Chronic Problems    Mechanical aortic valve replacement - INR goal 2-3  HTN    Plan    - GI consulted, EGD/Colon with no old or active bleeding. No abnormality to explain anemia. Outpatient VCE for complete evaluation of MAHESH   - Coumadin as dosed by pharmacy for hx of AVR  - IV Venofer 300 mg x3 doses ordered; 1 unit pRBC this admit. Trend hgb. Likely would benefit from PO iron supplement at DC  - continue IV unasyn for possible aspiration PNA/pneumonitis  - will ask cardiology input possible HF exacerbation/assess fx of aortic valve. Will check limited ECHO. IV diuresis. Wondering if anemia related at all to valve.    Fluids: None  Electrolytes: Replete as needed  Nutrition: Regular  Ortiz: None  Invasive lines: None  Drains: None  O2: NC    DVT Prophylaxis:  Coumadin    Discharge Planning: home once med ready    Plan of care was discussed with patient    Total time spent: At least 38 minutes, providing counseling or in coordination of care. Total time on this day of visit includes record and documentation review before and after visit including documentation and time not explicitly included on EMR time stamp.      Subjective     Robert Luna is a 59 y.o. male on day 5 of admission presenting with Symptomatic anemia.    NAEON. Overall stable, remains short of breath, on NC working on weaning.    Review of Systems   Constitutional:  Negative for fever.   Respiratory:  Positive for shortness of breath.    Cardiovascular:  Negative for chest pain.   Gastrointestinal:  Negative for abdominal distention, abdominal pain and vomiting.       Objective     Physical Exam  Vitals reviewed.   Constitutional:       General: He is not in acute  "distress.  Cardiovascular:      Rate and Rhythm: Normal rate and regular rhythm.   Pulmonary:      Effort: Pulmonary effort is normal.      Breath sounds: Rales present.   Abdominal:      General: There is no distension.      Palpations: Abdomen is soft.   Neurological:      Mental Status: He is alert. Mental status is at baseline.         Last Recorded Vitals  Blood pressure 143/74, pulse 88, temperature 36.8 °C (98.3 °F), temperature source Oral, resp. rate 18, height 1.499 m (4' 11\"), weight (!) 40.6 kg (89 lb 8.1 oz), SpO2 92%.    Medications  Scheduled Medications[1]   PRN Medications[2]                Miguel Walker MD  [facility]         [1] ampicillin-sulbactam, 3 g, intravenous, q6h MARTELL  docusate sodium, 100 mg, oral, BID  furosemide, 40 mg, intravenous, BID  ipratropium-albuteroL, 3 mL, nebulization, 4x daily  metoprolol succinate XL, 25 mg, oral, Daily  polyethylene glycol, 17 g, oral, Nightly  sennosides, 1 tablet, oral, Nightly  warfarin, 1 mg, oral, Once per day on Monday Wednesday Saturday  warfarin, 1.5 mg, oral, Once per day on Sunday Tuesday Thursday Friday     [2] PRN medications: acetaminophen **OR** acetaminophen **OR** acetaminophen, ipratropium-albuteroL, ondansetron **OR** ondansetron, oxygen    "

## 2025-06-01 NOTE — CONSULTS
"Inpatient consult to Cardiology  Consult performed by: Rebecca Neely, APRN-CNP  Consult ordered by: Miguel Walker MD  Reason for consult: CHF        Cards: [facility] 10/2024  History Of Present Illness:    Robert Luna is a 59 y.o. male with a history of aortic valve replacement on Coumadin, hypertension, iron deficiency anemia who presented to [facility] on 5/27/2005 with complaints of dyspnea on exertion.  While here at [facility] GI was consulted EGD/colonoscopy showed no new active bleeding.  He received IV Venofer 300 mg x 3 and 1 unit of packed red blood cells.  He is also receiving antibiotics for possible aspiration pneumonia/pneumonitis.  Cardiology is now asked to see him for \"Presented with worsening dyspnea on exertion. Suspect HF exacerbation, BNP is up. Has mechanical aortic valve last ECHO I can see from 1 year ago mean gradient 32 mmHg \"      Afebrile, heart rate 88, blood pressure 143/74, 3 L nasal cannula 95%.  Notable labs BUN/CR 16/0.59, potassium 4.5, H&H eighteen 8.2/27.5, INR 1.9,  from 5/27, WBC 10.8 down from 17.9 with a left shift.  Chest x-ray from the 5/30 showed new bilateral, left greater than right, airspace opacities compatible with pneumonia, although pulmonary edema may appear similarly.  Patient was started on Lasix 40 mg IV twice daily on 6/1/25.     Home cardiology meds include metoprolol succinate 25 mg daily and Coumadin.    Past Cardiology Tests (Last 3 Years):  TTE 2/2024  CONCLUSIONS:   1. Left ventricular systolic function is normal with a 60-65% estimated ejection fraction.   2. Spectral Doppler shows a pseudonormal pattern of left ventricular diastolic filling.   3. The left atrium is moderately dilated.   4. The mitral valve is moderately thickened.   5. Moderate mitral valve regurgitation.   6. There is moderate mitral valve prolapse.   7. Moderate aortic valve stenosis.   8. There is a mechanical aortic valve prosthesis present.       Past Medical History:  He has a past " medical history of Heart valve disease.    Past Surgical History:  He has a past surgical history that includes Aortic valve replacement and Cardiac valve replacement (1997).      Social History:  He reports that he has never smoked. He has never used smokeless tobacco. He reports that he does not currently use alcohol. He reports that he does not use drugs.    Family History:  Family History[1]     Allergies:  Patient has no known allergies.    ROS:  10 point review of systems including (Constitutional, Eyes, ENMT, Respiratory, Cardiac, Gastrointestinal, Neurological, Psychiatric, and Hematologic) was performed and is otherwise negative.    Objective Data:  Last Recorded Vitals:  Vitals:    25 0831 25 0836 25 1135 25 1142   BP:   143/74    BP Location:   Right leg    Patient Position:   Lying    Pulse:       Resp:   18    Temp:   36.8 °C (98.3 °F)    TempSrc:   Oral    SpO2: 93% 93% 94% 92%   Weight:       Height:         Medical Gas Therapy: Supplemental oxygen  Medical Gas Delivery Method: Nasal cannula  Weight  Av.6 kg (89 lb 8.1 oz)  Min: 40.6 kg (89 lb 8.1 oz)  Max: 40.6 kg (89 lb 8.1 oz)  ***    LABS:  CMP:  Results from last 7 days   Lab Units 25  0700 25  0744 25  0702 25  0635 25  0621 25  1605   SODIUM mmol/L 139 138 135* 135* 136 136   POTASSIUM mmol/L 4.5 4.2 4.2 3.7 4.7 4.4   CHLORIDE mmol/L 106 108* 105 105 106 107   CO2 mmol/L 27 20* 20* 20* 20* 22   ANION GAP mmol/L 11 14 14 14 15 11   BUN mg/dL 16 16 10 10 26* 27*   CREATININE mg/dL 0.59 0.63 0.59 0.56 0.75 0.75   EGFR mL/min/1.73m*2 >90 >90 >90 >90 >90 >90   ALBUMIN g/dL  --   --   --   --   --  4.1   ALT U/L  --   --   --   --   --  16   AST U/L  --   --   --   --   --  23   BILIRUBIN TOTAL mg/dL  --   --   --   --   --  0.7     CBC:  Results from last 7 days   Lab Units 25  0700 25  0744 25  0702 25  2211 25  0635 25  0621 25  1605   WBC  "AUTO x10*3/uL 10.8 9.7 17.9* 12.0* 4.9 6.6 5.9   HEMOGLOBIN g/dL 8.2* 7.1* 8.6* 8.7* 7.7* 8.3* 8.3*   HEMATOCRIT % 27.5* 22.8* 27.3* 28.0* 25.6* 25.9* 27.8*   PLATELETS AUTO x10*3/uL 199 182 207 215 216 284 298   MCV fL 82 79* 80 78* 77* 76* 80     COAG:   Results from last 7 days   Lab Units 06/01/25  0659 05/31/25  0525 05/30/25  0231 05/29/25  0637 05/28/25  0621 05/27/25  1701   INR  1.9* 2.1* 2.9* 2.2* 1.9* 1.9*     ABO: No results found for: \"ABO\"  HEME/ENDO:  Results from last 7 days   Lab Units 05/28/25  0621   FERRITIN ng/mL 13*   IRON SATURATION % 4*      CARDIAC:   Results from last 7 days   Lab Units 05/28/25  0621 05/27/25  1701 05/27/25  1605   LD U/L 181  --   --    TROPHS ng/L  --  5 6   BNP pg/mL  --  867*  --              Last I/O:    Intake/Output Summary (Last 24 hours) at 6/1/2025 1454  Last data filed at 6/1/2025 1200  Gross per 24 hour   Intake 1060 ml   Output --   Net 1060 ml     Net IO Since Admission: 2,736.83 mL [06/01/25 1454]      Imaging Results:  ECG 12 Lead  Result Date: 5/28/2025  Normal sinus rhythm Normal ECG No previous ECGs available    XR chest 2 views  Result Date: 5/27/2025  STUDY: Chest Radiographs;  5/27/2025 4:34PM INDICATION: Dyspnea. COMPARISON: None Available. ACCESSION NUMBER(S): [id number] ORDERING CLINICIAN: DRE CARCAMO TECHNIQUE:  Frontal and lateral chest. FINDINGS: CARDIOMEDIASTINAL SILHOUETTE: Cardiomediastinal silhouette is normal in size and configuration. Multiple median sternotomy wires are consistent with status post cardiothoracic surgery.  LUNGS: Lungs are clear.  ABDOMEN: No remarkable upper abdominal findings.  BONES: No acute osseous changes.    No acute cardiopulmonary process. Signed by Silvio Wall MD    XR shoulder right 2+ views  Result Date: 5/27/2025  STUDY: Shoulder Radiographs; 05/27/2025 04:34 PM INDICATION: Pain. COMPARISON: None available. ACCESSION NUMBER(S): [id number] ORDERING CLINICIAN: DRE CARCAMO TECHNIQUE:  Three " "view(s) of the right shoulder. FINDINGS:  The osseous structures are intact with no evidence for acute fracture or focal destruction.  The alignment is anatomic.  No soft tissue abnormality is seen.    No evidence for acute fracture or dislocation. Signed by Silvio Wall MD      Inpatient Medications:  Scheduled Medications[2]  PRN Medications[3]  Continuous Medications[4]    Outpatient Medications:  Current Outpatient Medications   Medication Instructions    metoprolol succinate XL (Toprol-XL) 25 mg 24 hr tablet TAKE 1 TABLET(25 MG) BY MOUTH EVERY 24 HOURS    warfarin (COUMADIN) 1 mg, oral, See admin instructions       Physical Exam:  General:  Patient is awake, alert, and oriented.  Patient is in no acute distress.  HEENT:  Pupils equal and reactive.  Normocephalic.  Moist mucosa.    Neck:  No thyromegaly.  Normal Jugular Venous Pressure.  Cardiovascular:  Regular rate and rhythm.  Normal S1 and S2.  Pulmonary:  Clear to auscultation bilaterally.  Abdomen:  Soft. Non-tender.   Non-distended.  Positive bowel sounds.  Lower Extremities:  2+ pedal pulses. No LE edema.  Neurologic:  Cranial nerves intact.  No focal deficit.   Skin: Skin warm and dry, normal skin turgor.   Psychiatric: Normal affect.     Assessment/Plan   Cards: [facility] 10/2024    Robert Luna is a 59 y.o. male with a history of aortic valve replacement on Coumadin, hypertension, iron deficiency anemia who presented to [facility] on 5/27/2005 with complaints of dyspnea on exertion.  While here at [facility] GI was consulted EGD/colonoscopy showed no new active bleeding.  He received IV Venofer 300 mg x 3 and 1 unit of packed red blood cells.  He is also receiving antibiotics for possible aspiration pneumonia/pneumonitis.  Cardiology is now asked to see him for \"CHF\"    #Rule out acute diastolic heart failure-BNP elevated   #Iron deficiency anemia  #Mechanical aortic valve INR goal   #HTN -suboptimal   #Aspiration PN -chest xray compatible with PN  vs edema, WBC " with left shift      RECS:   -Chest CT non con     Code Status:  Full Code    I spent *** minutes in the professional and overall care of this patient.        Rebecca Neely, DAVID-TRESA          [1]   Family History  Problem Relation Name Age of Onset    Cancer Mother Alena Luna     Cancer Father Guy Luna    [2]   Scheduled medications   Medication Dose Route Frequency    ampicillin-sulbactam  3 g intravenous q6h MARTELL    docusate sodium  100 mg oral BID    furosemide  40 mg intravenous BID    ipratropium-albuteroL  3 mL nebulization 4x daily    metoprolol succinate XL  25 mg oral Daily    polyethylene glycol  17 g oral Nightly    sennosides  1 tablet oral Nightly    warfarin  1 mg oral Once per day on Monday Wednesday Saturday    warfarin  1.5 mg oral Once per day on Sunday Tuesday Thursday Friday   [3]   PRN medications   Medication    acetaminophen    Or    acetaminophen    Or    acetaminophen    ipratropium-albuteroL    ondansetron    Or    ondansetron    oxygen   [4]   Continuous Medications   Medication Dose Last Rate      compatible with PN vs edema, WBC with left shift    Home cardiology meds include metoprolol succinate 25 mg daily and Coumadin.    RECS:   -We will obtain a transthoracic echocardiogram for structural evaluation including ejection fraction, assessment of regional wall motion abnormalities or valvular disease, and further evaluation of hemodynamics.    - Dyspnea on exertion prior to hospital stay 2/2 AVR stenosis with combination of being iron deficiency anemia-> aortic valve stenosis can be further worked up as an outpatient.  - Continue with IV antibiotics for aspiration pneumonia-> possible steroids in addition?   - Consider possible Pulmonary consult   - Will continue IV Lasix 40 mg twice daily for now  - Maintain INR 2-3-> currently on Coumadin   - Will need follow up with his cardiologist after discharge       Code Status:  Full Code    I spent 40 minutes in the professional and overall care of this patient.        DAVID Bundy-CNP     STAFF ADDENDUM:    Both the BATOOL and I have had a face to face encounter with the patient today. I have examined the patient and edited the documented physical examination as necessary.  I personally reviewed the patient's vital signs, telemetry, recent labs, medications, orders, EKGs, and pertinent cardiac imaging/ echocardiography.  I have reviewed the BATOOL's encounter note, approve the BATOOL's documentation and have edited the note to reflect my diagnostic and therapeutic plan.      Seems to be a couple different dyspnea episodes.  Exertional dyspnea on arrival which was initially thought may be secondary to anemia with hemoglobin down to about 8.3.  He was given a unit of blood and sent for EGD.  His dyspnea has worsened since endoscopy and and concern for significant aspiration.  CT chest with bilateral effusions and a lot of mucus.  His chest x-ray was clear on arrival also supporting aspiration worsen postprocedure.  There was some question regarding bioprosthetic  aortic stenosis as he had elevated gradients on echocardiogram in 2024 or approximately 60/32 mm respectively.  I do find records of an echocardiogram from 2007 in which he had gradients of 55/25 mmHg.  His haptoglobin and LDH were normal on arrival which goes against lysis causing his anemia.    Okay for IV diuresis  ?need for pneumonitis treatment  Swallow study  Echocardiogram pending  Inr goal 2-3 for mechanical aortic valve    Rob Berger DO           [1]   Family History  Problem Relation Name Age of Onset    Cancer Mother Alena Luna     Cancer Father Guy Luna    [2]   Scheduled medications   Medication Dose Route Frequency    ampicillin-sulbactam  3 g intravenous q6h MARTELL    docusate sodium  100 mg oral BID    furosemide  40 mg intravenous BID    ipratropium-albuteroL  3 mL nebulization 4x daily    metoprolol succinate XL  25 mg oral Daily    polyethylene glycol  17 g oral Nightly    sennosides  1 tablet oral Nightly    warfarin  1 mg oral Once per day on Monday Wednesday Saturday    warfarin  1.5 mg oral Once per day on Sunday Tuesday Thursday Friday   [3]   PRN medications   Medication    acetaminophen    Or    acetaminophen    Or    acetaminophen    ipratropium-albuteroL    ondansetron    Or    ondansetron    oxygen   [4]   Continuous Medications   Medication Dose Last Rate

## 2025-06-01 NOTE — PROGRESS NOTES
Robert Luna is a 59 y.o. male admitted for symptomatic anemia. Pharmacy has been consulted for warfarin dosing and monitoring for Aortic Valve Replacement with goal INR of 2.0-3.0.     Home regimen   MON TUE WED THR FRI SAT SUN   Dose 1  mg 1.5  mg 1  mg 1.5  mg 1.5  mg 1  mg 1.5  mg   Total weekly dose: 9 mg  Source: [facility] Note from 5/22/2025    Labs  INR: 1.9  Hgb/Hct/Plt  Lab Results   Component Value Date    HGB 8.2 (L) 06/01/2025    HGB 7.1 (L) 05/31/2025    HGB 8.6 (L) 05/30/2025    HGB 8.7 (L) 05/29/2025    HGB 7.7 (L) 05/29/2025        Lab Results   Component Value Date    HCT 27.5 (L) 06/01/2025    HCT 22.8 (L) 05/31/2025    HCT 27.3 (L) 05/30/2025    HCT 28.0 (L) 05/29/2025    HCT 25.6 (L) 05/29/2025        Platelets   Date Value Ref Range Status   06/01/2025 199 150 - 450 x10*3/uL Final   05/31/2025 182 150 - 450 x10*3/uL Final   05/30/2025 207 150 - 450 x10*3/uL Final   05/29/2025 215 150 - 450 x10*3/uL Final   05/29/2025 216 150 - 450 x10*3/uL Final      Warfarin Therapy   Current regimen: resuming home reigmen  Bridging: None needed   Interacting medications: no interacting medications    Dosing History During Current Admission  Date 5/28 5/29 5/30 5/31 6/1   INR 1.9 2.2 2.9 2.1 1.9   Dose  (mg) See plan 1.5 mg 1.5 mg 1 mg 1.5 mg     Assessment and Plan  Patient's INR of 1.9 today is subtherapeutic. Hemaglobin/hematocrit/platelets are stable.  Warfarin inpatient plan: Give 1.5 mg today per home regimen.   Monitor s/sx of bleeding including epistaxis, hematuria, unusual bruising, hemoptysis, hematochezia as well as s/sx of stroke including impaired speech, unilateral paralysis, blurry vision.  Pharmacy will continue to monitor the patient and adjust therapy as needed.      Thank you for the consult. Please do not hesitate to contact a pharmacist with any questions.    Maria Christine, PharmD

## 2025-06-01 NOTE — CARE PLAN
The patient's goals for the shift include      The clinical goals for the shift include Remain safe, HDS, decrease oxygen demand, manage pain, and maintain skin integrity throughout shift.      Problem: Pain - Adult  Goal: Verbalizes/displays adequate comfort level or baseline comfort level  Outcome: Progressing     Problem: Safety - Adult  Goal: Free from fall injury  Outcome: Progressing     Problem: Discharge Planning  Goal: Discharge to home or other facility with appropriate resources  Outcome: Progressing     Problem: Chronic Conditions and Co-morbidities  Goal: Patient's chronic conditions and co-morbidity symptoms are monitored and maintained or improved  Outcome: Progressing     Problem: Nutrition  Goal: Nutrient intake appropriate for maintaining nutritional needs  Outcome: Progressing     Problem: Fall/Injury  Goal: Not fall by end of shift  Outcome: Progressing  Goal: Be free from injury by end of the shift  Outcome: Progressing  Goal: Verbalize understanding of personal risk factors for fall in the hospital  Outcome: Progressing  Goal: Verbalize understanding of risk factor reduction measures to prevent injury from fall in the home  Outcome: Progressing  Goal: Use assistive devices by end of the shift  Outcome: Progressing  Goal: Pace activities to prevent fatigue by end of the shift  Outcome: Progressing

## 2025-06-02 LAB
ANION GAP SERPL CALC-SCNC: 15 MMOL/L (ref 10–20)
BUN SERPL-MCNC: 20 MG/DL (ref 6–23)
CALCIUM SERPL-MCNC: 9 MG/DL (ref 8.6–10.3)
CHLORIDE SERPL-SCNC: 102 MMOL/L (ref 98–107)
CO2 SERPL-SCNC: 26 MMOL/L (ref 21–32)
CREAT SERPL-MCNC: 0.56 MG/DL (ref 0.5–1.3)
EGFRCR SERPLBLD CKD-EPI 2021: >90 ML/MIN/1.73M*2
ERYTHROCYTE [DISTWIDTH] IN BLOOD BY AUTOMATED COUNT: 17.5 % (ref 11.5–14.5)
GLUCOSE SERPL-MCNC: 133 MG/DL (ref 74–99)
HCT VFR BLD AUTO: 26.9 % (ref 41–52)
HGB BLD-MCNC: 8.2 G/DL (ref 13.5–17.5)
HOLD SPECIMEN: NORMAL
INR PPP: 2.1 (ref 0.9–1.1)
LABORATORY COMMENT REPORT: NORMAL
LABORATORY COMMENT REPORT: NORMAL
MAGNESIUM SERPL-MCNC: 1.8 MG/DL (ref 1.6–2.4)
MCH RBC QN AUTO: 25.2 PG (ref 26–34)
MCHC RBC AUTO-ENTMCNC: 30.5 G/DL (ref 32–36)
MCV RBC AUTO: 83 FL (ref 80–100)
NRBC BLD-RTO: 0 /100 WBCS (ref 0–0)
PATH REPORT.FINAL DX SPEC: NORMAL
PATH REPORT.GROSS SPEC: NORMAL
PATH REPORT.TOTAL CANCER: NORMAL
PLATELET # BLD AUTO: 191 X10*3/UL (ref 150–450)
POTASSIUM SERPL-SCNC: 3.6 MMOL/L (ref 3.5–5.3)
PROTHROMBIN TIME: 22.8 SECONDS (ref 9.8–12.4)
RBC # BLD AUTO: 3.25 X10*6/UL (ref 4.5–5.9)
SODIUM SERPL-SCNC: 139 MMOL/L (ref 136–145)
WBC # BLD AUTO: 12.1 X10*3/UL (ref 4.4–11.3)

## 2025-06-02 PROCEDURE — 2500000005 HC RX 250 GENERAL PHARMACY W/O HCPCS: Performed by: HOSPITALIST

## 2025-06-02 PROCEDURE — 1200000002 HC GENERAL ROOM WITH TELEMETRY DAILY

## 2025-06-02 PROCEDURE — 2500000001 HC RX 250 WO HCPCS SELF ADMINISTERED DRUGS (ALT 637 FOR MEDICARE OP): Performed by: INTERNAL MEDICINE

## 2025-06-02 PROCEDURE — 94640 AIRWAY INHALATION TREATMENT: CPT

## 2025-06-02 PROCEDURE — 83735 ASSAY OF MAGNESIUM: CPT | Performed by: STUDENT IN AN ORGANIZED HEALTH CARE EDUCATION/TRAINING PROGRAM

## 2025-06-02 PROCEDURE — 84132 ASSAY OF SERUM POTASSIUM: CPT | Performed by: STUDENT IN AN ORGANIZED HEALTH CARE EDUCATION/TRAINING PROGRAM

## 2025-06-02 PROCEDURE — 9420000001 HC RT PATIENT EDUCATION 5 MIN

## 2025-06-02 PROCEDURE — 2500000004 HC RX 250 GENERAL PHARMACY W/ HCPCS (ALT 636 FOR OP/ED): Performed by: STUDENT IN AN ORGANIZED HEALTH CARE EDUCATION/TRAINING PROGRAM

## 2025-06-02 PROCEDURE — 85610 PROTHROMBIN TIME: CPT | Performed by: STUDENT IN AN ORGANIZED HEALTH CARE EDUCATION/TRAINING PROGRAM

## 2025-06-02 PROCEDURE — 87205 SMEAR GRAM STAIN: CPT | Mod: AHULAB | Performed by: HOSPITALIST

## 2025-06-02 PROCEDURE — 36415 COLL VENOUS BLD VENIPUNCTURE: CPT | Performed by: STUDENT IN AN ORGANIZED HEALTH CARE EDUCATION/TRAINING PROGRAM

## 2025-06-02 PROCEDURE — 2500000002 HC RX 250 W HCPCS SELF ADMINISTERED DRUGS (ALT 637 FOR MEDICARE OP, ALT 636 FOR OP/ED): Performed by: STUDENT IN AN ORGANIZED HEALTH CARE EDUCATION/TRAINING PROGRAM

## 2025-06-02 PROCEDURE — 2500000002 HC RX 250 W HCPCS SELF ADMINISTERED DRUGS (ALT 637 FOR MEDICARE OP, ALT 636 FOR OP/ED): Performed by: HOSPITALIST

## 2025-06-02 PROCEDURE — 94668 MNPJ CHEST WALL SBSQ: CPT

## 2025-06-02 PROCEDURE — 2500000001 HC RX 250 WO HCPCS SELF ADMINISTERED DRUGS (ALT 637 FOR MEDICARE OP)

## 2025-06-02 PROCEDURE — 99233 SBSQ HOSP IP/OBS HIGH 50: CPT | Performed by: STUDENT IN AN ORGANIZED HEALTH CARE EDUCATION/TRAINING PROGRAM

## 2025-06-02 PROCEDURE — 2500000004 HC RX 250 GENERAL PHARMACY W/ HCPCS (ALT 636 FOR OP/ED): Performed by: HOSPITALIST

## 2025-06-02 PROCEDURE — 92610 EVALUATE SWALLOWING FUNCTION: CPT | Mod: GN

## 2025-06-02 PROCEDURE — 85027 COMPLETE CBC AUTOMATED: CPT | Performed by: STUDENT IN AN ORGANIZED HEALTH CARE EDUCATION/TRAINING PROGRAM

## 2025-06-02 PROCEDURE — 99223 1ST HOSP IP/OBS HIGH 75: CPT | Performed by: INTERNAL MEDICINE

## 2025-06-02 RX ORDER — IPRATROPIUM BROMIDE AND ALBUTEROL SULFATE 2.5; .5 MG/3ML; MG/3ML
3 SOLUTION RESPIRATORY (INHALATION)
Status: DISCONTINUED | OUTPATIENT
Start: 2025-06-02 | End: 2025-06-12 | Stop reason: HOSPADM

## 2025-06-02 RX ORDER — FERROUS SULFATE 325(65) MG
65 TABLET ORAL DAILY
Status: DISCONTINUED | OUTPATIENT
Start: 2025-06-03 | End: 2025-06-12 | Stop reason: HOSPADM

## 2025-06-02 RX ADMIN — AMPICILLIN SODIUM AND SULBACTAM SODIUM 3 G: 2; 1 INJECTION, POWDER, FOR SOLUTION INTRAMUSCULAR; INTRAVENOUS at 11:51

## 2025-06-02 RX ADMIN — AMPICILLIN SODIUM AND SULBACTAM SODIUM 3 G: 2; 1 INJECTION, POWDER, FOR SOLUTION INTRAMUSCULAR; INTRAVENOUS at 17:41

## 2025-06-02 RX ADMIN — Medication 3 L/MIN: at 04:39

## 2025-06-02 RX ADMIN — AMPICILLIN SODIUM AND SULBACTAM SODIUM 3 G: 2; 1 INJECTION, POWDER, FOR SOLUTION INTRAMUSCULAR; INTRAVENOUS at 05:00

## 2025-06-02 RX ADMIN — DOCUSATE SODIUM 100 MG: 100 CAPSULE, LIQUID FILLED ORAL at 08:21

## 2025-06-02 RX ADMIN — WARFARIN SODIUM 1 MG: 2 TABLET ORAL at 17:39

## 2025-06-02 RX ADMIN — METOPROLOL SUCCINATE 25 MG: 25 TABLET, EXTENDED RELEASE ORAL at 08:21

## 2025-06-02 RX ADMIN — Medication 3 L/MIN: at 07:07

## 2025-06-02 RX ADMIN — IPRATROPIUM BROMIDE AND ALBUTEROL SULFATE 3 ML: 2.5; .5 SOLUTION RESPIRATORY (INHALATION) at 04:39

## 2025-06-02 RX ADMIN — IPRATROPIUM BROMIDE AND ALBUTEROL SULFATE 3 ML: 2.5; .5 SOLUTION RESPIRATORY (INHALATION) at 12:00

## 2025-06-02 RX ADMIN — FUROSEMIDE 40 MG: 10 INJECTION, SOLUTION INTRAMUSCULAR; INTRAVENOUS at 08:21

## 2025-06-02 RX ADMIN — Medication 2 L/MIN: at 20:06

## 2025-06-02 RX ADMIN — AMPICILLIN SODIUM AND SULBACTAM SODIUM 3 G: 2; 1 INJECTION, POWDER, FOR SOLUTION INTRAMUSCULAR; INTRAVENOUS at 00:03

## 2025-06-02 RX ADMIN — IPRATROPIUM BROMIDE AND ALBUTEROL SULFATE 3 ML: 2.5; .5 SOLUTION RESPIRATORY (INHALATION) at 07:07

## 2025-06-02 RX ADMIN — FUROSEMIDE 40 MG: 10 INJECTION, SOLUTION INTRAMUSCULAR; INTRAVENOUS at 15:59

## 2025-06-02 RX ADMIN — IPRATROPIUM BROMIDE AND ALBUTEROL SULFATE 3 ML: 2.5; .5 SOLUTION RESPIRATORY (INHALATION) at 20:06

## 2025-06-02 ASSESSMENT — COGNITIVE AND FUNCTIONAL STATUS - GENERAL
MOBILITY SCORE: 24
DAILY ACTIVITIY SCORE: 24
MOBILITY SCORE: 24
DAILY ACTIVITIY SCORE: 24

## 2025-06-02 ASSESSMENT — ENCOUNTER SYMPTOMS
VOMITING: 0
SHORTNESS OF BREATH: 1
ABDOMINAL PAIN: 0
ABDOMINAL DISTENTION: 0
FEVER: 0

## 2025-06-02 ASSESSMENT — PAIN SCALES - GENERAL
PAINLEVEL_OUTOF10: 0 - NO PAIN
PAINLEVEL_OUTOF10: 0 - NO PAIN

## 2025-06-02 ASSESSMENT — PAIN - FUNCTIONAL ASSESSMENT: PAIN_FUNCTIONAL_ASSESSMENT: 0-10

## 2025-06-02 NOTE — NURSING NOTE
Patient stated it's ok to give information to Luis Baez, and Yulissa if they were to call the unit.

## 2025-06-02 NOTE — CARE PLAN
The patient's goals for the shift include  saray rest    The clinical goals for the shift include safety      Problem: Pain - Adult  Goal: Verbalizes/displays adequate comfort level or baseline comfort level  Outcome: Progressing     Problem: Safety - Adult  Goal: Free from fall injury  Outcome: Progressing     Problem: Discharge Planning  Goal: Discharge to home or other facility with appropriate resources  Outcome: Progressing     Problem: Chronic Conditions and Co-morbidities  Goal: Patient's chronic conditions and co-morbidity symptoms are monitored and maintained or improved  Outcome: Progressing     Problem: Nutrition  Goal: Nutrient intake appropriate for maintaining nutritional needs  Outcome: Progressing

## 2025-06-02 NOTE — CARE PLAN
The patient's goals for the shift include      The clinical goals for the shift include Remain safe, HDS, free of pain, decrease oxygen demand, and maintain skin integrity throughout shift.      Problem: Pain - Adult  Goal: Verbalizes/displays adequate comfort level or baseline comfort level  Outcome: Progressing     Problem: Safety - Adult  Goal: Free from fall injury  Outcome: Progressing     Problem: Discharge Planning  Goal: Discharge to home or other facility with appropriate resources  Outcome: Progressing     Problem: Chronic Conditions and Co-morbidities  Goal: Patient's chronic conditions and co-morbidity symptoms are monitored and maintained or improved  Outcome: Progressing     Problem: Nutrition  Goal: Nutrient intake appropriate for maintaining nutritional needs  Outcome: Progressing     Problem: Fall/Injury  Goal: Not fall by end of shift  Outcome: Progressing  Goal: Be free from injury by end of the shift  Outcome: Progressing  Goal: Verbalize understanding of personal risk factors for fall in the hospital  Outcome: Progressing  Goal: Verbalize understanding of risk factor reduction measures to prevent injury from fall in the home  Outcome: Progressing  Goal: Use assistive devices by end of the shift  Outcome: Progressing  Goal: Pace activities to prevent fatigue by end of the shift  Outcome: Progressing

## 2025-06-02 NOTE — PROGRESS NOTES
Robert Luna is a 59 y.o. male admitted for symptomatic anemia. Pharmacy has been consulted for warfarin dosing and monitoring for Aortic Valve Replacement with goal INR of 2.0-3.0.     Home regimen   MON TUE WED THR FRI SAT SUN   Dose 1  mg 1.5  mg 1  mg 1.5  mg 1.5  mg 1  mg 1.5  mg   Total weekly dose: 9 mg  Source: [facility] Note from 5/22/2025    Labs  INR: 2.1  Hgb/Hct/Plt  Lab Results   Component Value Date    HGB 8.2 (L) 06/02/2025    HGB 8.2 (L) 06/01/2025    HGB 7.1 (L) 05/31/2025    HGB 8.6 (L) 05/30/2025    HGB 8.7 (L) 05/29/2025        Lab Results   Component Value Date    HCT 26.9 (L) 06/02/2025    HCT 27.5 (L) 06/01/2025    HCT 22.8 (L) 05/31/2025    HCT 27.3 (L) 05/30/2025    HCT 28.0 (L) 05/29/2025        Platelets   Date Value Ref Range Status   06/02/2025 191 150 - 450 x10*3/uL Final   06/01/2025 199 150 - 450 x10*3/uL Final   05/31/2025 182 150 - 450 x10*3/uL Final   05/30/2025 207 150 - 450 x10*3/uL Final   05/29/2025 215 150 - 450 x10*3/uL Final      Warfarin Therapy   Current regimen: resuming home reigmen  Bridging: None needed   Interacting medications: no interacting medications    Dosing History During Current Admission  Date 5/28 5/29 5/30 5/31 6/1 6/2   INR 1.9 2.2 2.9 2.1 1.9 2.1   Dose  (mg) See plan 1.5 mg 1.5 mg 1 mg 1.5 mg 1 mg     Assessment and Plan  Patient's INR of 2.1 today is therapeutic. Hemaglobin/hematocrit/platelets are stable.  Warfarin inpatient plan: Give 1 mg today per home regimen.   Monitor s/sx of bleeding including epistaxis, hematuria, unusual bruising, hemoptysis, hematochezia as well as s/sx of stroke including impaired speech, unilateral paralysis, blurry vision.  Pharmacy will continue to monitor the patient and adjust therapy as needed.      Thank you for the consult. Please do not hesitate to contact a pharmacist with any questions.    Natividad Rosas, LigiaD

## 2025-06-02 NOTE — PROGRESS NOTES
06/02/25 0753   Discharge Planning   Expected Discharge Disposition Home   Does the patient need discharge transport arranged? No   RoundTrip coordination needed? No   Stroke Family Assessment   Stroke Family Assessment Needed No   Intensity of Service   Intensity of Service 0-30 min     PLAN/BARRIER: cardiology consult, IV diuresis, TTE, wean O2  DISP: home  HHC: none  O2: 3L, baseline is room air  WOUNDS: none  DME: none  ADOD: 2-4 days  Caryn Brooks RN

## 2025-06-02 NOTE — PROGRESS NOTES
"Speech-Language Pathology    Speech-Language Pathology  Adult Inpatient Clinical Bedside Swallow Evaluation    Patient Name: Robert Luna  MRN: [id number]  Today's Date: 06/02/25   Time Calculation  Start Time: 1459  Stop Time: 1517  Time Calculation (min): 18 min        History of Present Illness: \"Robert Luna is a 59 y.o. male with a history of aortic valve replacement on warfarin who presented to [facility] complaining of dyspnea on exertion that has become progressively worse over the past 24 hours. He was transferred from [facility] to [facility] for suspected occult GI blood loss with symptomatic anemia \"    Assessment:   Clinical bedside swallow evaluation completed. Patient fully alert and verbal. Patient oriented to all spheres and able to follow commands. Nutritional status poor with  emaciated state at time of assessment. Patient seated upright in bed prior to presentation of po trials to assess swallowing tolerance. Respiratory status tenuous with increased WOB  with need for supplemental O2 support via NC. Vocal quality hypophonic with no dysarthria or OM weakness identified. Velar movement appeared  reduced during phonation. Dental status not adequate with limited necrotic dentition and nearly edentulous. Volitional cough response present and judged to be weak in intensity. Ice chips presented initially via tsp. Progressed to tsp boluses water, then straw sips. Bolus formation slowed with spontaneous reswallows completed. Patient exhibited prolonged coughing episode following multiple ice chip and water boluses suspicious for aspiration. Patient not safe for po diet at this time. Will need to complete instrumental swallowing assessment to objectively assess pharyngeal function.      Recommendations:  -NPO WITH MODIFIED BARIUM SWALLOW STUDY  -Single ice chips permitted (3-5 per hour) as tolerated    Short Term Goal:   Patient will tolerate current diet without overt s/s aspiration on 100% of " therapeutic trials.    Long Term Goal:  Patient will tolerate the least restrictive diet without overt s/s aspiration or further pulmonary compromise by time of discharge.    Start Date: 6/2/25  End Date: 7/2/25  Status: Goal Initiated this date       Plan:  SLP Services Indicated: Yes  Frequency: 3x week  Discussed POC with patient and siblings  SLP - OK to Discharge? : No    Pain:   Patient exhibits no s/s of pain or discomfort during session     Inpatient Education:  Extensive education provided to patient and siblings regarding current swallow function, recommendations/results, and of testing performed this date with POC.      Consultations/Referrals/Coordination of Services:   N/A

## 2025-06-02 NOTE — PROGRESS NOTES
[facility] Hospitalist Progress Note      Between 7AM-7PM please message me via Epic Secure Chat.  After 7PM please page Nocturnist on call.        Assessment/Plan     Acute Problems    Acute hypoxic respiratory failure - had a suspected aspiration event following EGD/colonoscopy  Component of HF exacerbation/PH  Bilateral pleural effusions  Iron def anemia  Possible occult GI blood loss    Chronic Problems    Mechanical aortic valve replacement - INR goal 2-3  Ascending aorta repair  HTN    Plan    - cardiology consulted -> ECHO to evaluate aortic valve. For now can continue IV lasix  - GI consulted, EGD/Colon with no old or active bleeding. No abnormality to explain anemia. Outpatient VCE for complete evaluation of MAHESH   - Coumadin as dosed by pharmacy for hx of AVR  - IV Venofer 300 mg x3 doses ordered; 1 unit pRBC this admit. Trend hgb. Likely would benefit from PO iron supplement at DC. Started here.  - continue IV unasyn for possible aspiration PNA/pneumonitis  - wean O2 as able  - SLP eval rec NPO and MBSS    Fluids: None  Electrolytes: Replete as needed  Nutrition: NPO  Ortiz: None  Invasive lines: None  Drains: None  O2: NC    DVT Prophylaxis:  Coumadin    Discharge Planning: home once med ready, needing ECHO, IV abx and diuresis and oxygen weaning    Plan of care was discussed with patient    Total time spent: At least 38 minutes, providing counseling or in coordination of care. Total time on this day of visit includes record and documentation review before and after visit including documentation and time not explicitly included on EMR time stamp.      Subjective     Robert Luna is a 59 y.o. male on day 6 of admission presenting with Symptomatic anemia.    NAEON. Overall stable, working on weaning O2. He is up and ambulating to restroom.    Review of Systems   Constitutional:  Negative for fever.   Respiratory:  Positive for shortness of breath.    Cardiovascular:  Negative for chest pain.   Gastrointestinal:   "Negative for abdominal distention, abdominal pain and vomiting.       Objective     Physical Exam  Vitals reviewed.   Constitutional:       General: He is not in acute distress.  Cardiovascular:      Rate and Rhythm: Normal rate and regular rhythm.   Pulmonary:      Effort: Pulmonary effort is normal.      Breath sounds: Rales present.   Abdominal:      General: There is no distension.      Palpations: Abdomen is soft.   Neurological:      Mental Status: He is alert. Mental status is at baseline.         Last Recorded Vitals  Blood pressure 138/79, pulse 98, temperature 36.7 °C (98.1 °F), temperature source Temporal, resp. rate 18, height 1.499 m (4' 11\"), weight (!) 40.6 kg (89 lb 8.1 oz), SpO2 94%.    Medications  Scheduled Medications[1]   PRN Medications[2]                Miguel Walker MD  [facility]         [1] ampicillin-sulbactam, 3 g, intravenous, q6h MARTELL  docusate sodium, 100 mg, oral, BID  [START ON 6/3/2025] ferrous sulfate, 65 mg of elemental iron, oral, Daily  furosemide, 40 mg, intravenous, BID  ipratropium-albuteroL, 3 mL, nebulization, TID  metoprolol succinate XL, 25 mg, oral, Daily  polyethylene glycol, 17 g, oral, Nightly  sennosides, 1 tablet, oral, Nightly  warfarin, 1 mg, oral, Once per day on Monday Wednesday Saturday  warfarin, 1.5 mg, oral, Once per day on Sunday Tuesday Thursday Friday     [2] PRN medications: acetaminophen **OR** acetaminophen **OR** acetaminophen, ipratropium-albuteroL, ondansetron **OR** ondansetron, oxygen    "

## 2025-06-03 ENCOUNTER — APPOINTMENT (OUTPATIENT)
Dept: CARDIOLOGY | Facility: HOSPITAL | Age: 60
End: 2025-06-03
Payer: MEDICARE

## 2025-06-03 ENCOUNTER — APPOINTMENT (OUTPATIENT)
Dept: RADIOLOGY | Facility: HOSPITAL | Age: 60
End: 2025-06-03
Payer: MEDICARE

## 2025-06-03 LAB
ANION GAP SERPL CALC-SCNC: 19 MMOL/L (ref 10–20)
AORTIC VALVE MEAN GRADIENT: 34 MMHG
AORTIC VALVE PEAK VELOCITY: 4.29 M/S
AV PEAK GRADIENT: 73 MMHG
AVA (PEAK VEL): 0.77 CM2
AVA (VTI): 0.5 CM2
BLOOD EXPIRATION DATE: NORMAL
BUN SERPL-MCNC: 25 MG/DL (ref 6–23)
CALCIUM SERPL-MCNC: 9 MG/DL (ref 8.6–10.3)
CHLORIDE SERPL-SCNC: 100 MMOL/L (ref 98–107)
CO2 SERPL-SCNC: 25 MMOL/L (ref 21–32)
CREAT SERPL-MCNC: 0.63 MG/DL (ref 0.5–1.3)
DISPENSE STATUS: NORMAL
EGFRCR SERPLBLD CKD-EPI 2021: >90 ML/MIN/1.73M*2
EJECTION FRACTION APICAL 4 CHAMBER: 70.6
EJECTION FRACTION: 71 %
ERYTHROCYTE [DISTWIDTH] IN BLOOD BY AUTOMATED COUNT: 19.1 % (ref 11.5–14.5)
FLUAV RNA RESP QL NAA+PROBE: NOT DETECTED
FLUBV RNA RESP QL NAA+PROBE: NOT DETECTED
GLUCOSE SERPL-MCNC: 91 MG/DL (ref 74–99)
HCT VFR BLD AUTO: 30.7 % (ref 41–52)
HGB BLD-MCNC: 8.6 G/DL (ref 13.5–17.5)
INR PPP: 2.2 (ref 0.9–1.1)
LEFT ATRIUM VOLUME AREA LENGTH INDEX BSA: 78.5 ML/M2
LEFT VENTRICLE INTERNAL DIMENSION DIASTOLE: 2.96 CM (ref 3.5–6)
LEFT VENTRICULAR OUTFLOW TRACT DIAMETER: 1.8 CM
MAGNESIUM SERPL-MCNC: 1.87 MG/DL (ref 1.6–2.4)
MCH RBC QN AUTO: 24.6 PG (ref 26–34)
MCHC RBC AUTO-ENTMCNC: 28 G/DL (ref 32–36)
MCV RBC AUTO: 88 FL (ref 80–100)
MITRAL VALVE E/A RATIO: 1.66
MRSA DNA SPEC QL NAA+PROBE: NOT DETECTED
NRBC BLD-RTO: 0.3 /100 WBCS (ref 0–0)
PLATELET # BLD AUTO: 242 X10*3/UL (ref 150–450)
POTASSIUM SERPL-SCNC: 4.3 MMOL/L (ref 3.5–5.3)
PRODUCT BLOOD TYPE: 6200
PRODUCT CODE: NORMAL
PROTHROMBIN TIME: 24.4 SECONDS (ref 9.8–12.4)
RBC # BLD AUTO: 3.49 X10*6/UL (ref 4.5–5.9)
RIGHT VENTRICLE FREE WALL PEAK S': 10 CM/S
RIGHT VENTRICLE PEAK SYSTOLIC PRESSURE: 60 MMHG
RSV RNA RESP QL NAA+PROBE: NOT DETECTED
SARS-COV-2 RNA RESP QL NAA+PROBE: NOT DETECTED
SODIUM SERPL-SCNC: 140 MMOL/L (ref 136–145)
TRICUSPID ANNULAR PLANE SYSTOLIC EXCURSION: 1.7 CM
UNIT ABO: NORMAL
UNIT NUMBER: NORMAL
UNIT RH: NORMAL
UNIT VOLUME: 286
WBC # BLD AUTO: 10 X10*3/UL (ref 4.4–11.3)
XM INTEP: NORMAL

## 2025-06-03 PROCEDURE — 83735 ASSAY OF MAGNESIUM: CPT | Performed by: STUDENT IN AN ORGANIZED HEALTH CARE EDUCATION/TRAINING PROGRAM

## 2025-06-03 PROCEDURE — 99232 SBSQ HOSP IP/OBS MODERATE 35: CPT | Performed by: NURSE PRACTITIONER

## 2025-06-03 PROCEDURE — 2500000001 HC RX 250 WO HCPCS SELF ADMINISTERED DRUGS (ALT 637 FOR MEDICARE OP): Performed by: STUDENT IN AN ORGANIZED HEALTH CARE EDUCATION/TRAINING PROGRAM

## 2025-06-03 PROCEDURE — 2500000004 HC RX 250 GENERAL PHARMACY W/ HCPCS (ALT 636 FOR OP/ED): Performed by: INTERNAL MEDICINE

## 2025-06-03 PROCEDURE — 85027 COMPLETE CBC AUTOMATED: CPT | Performed by: STUDENT IN AN ORGANIZED HEALTH CARE EDUCATION/TRAINING PROGRAM

## 2025-06-03 PROCEDURE — 80048 BASIC METABOLIC PNL TOTAL CA: CPT | Performed by: STUDENT IN AN ORGANIZED HEALTH CARE EDUCATION/TRAINING PROGRAM

## 2025-06-03 PROCEDURE — 99223 1ST HOSP IP/OBS HIGH 75: CPT | Performed by: CLINICAL NURSE SPECIALIST

## 2025-06-03 PROCEDURE — 71045 X-RAY EXAM CHEST 1 VIEW: CPT | Performed by: RADIOLOGY

## 2025-06-03 PROCEDURE — 93306 TTE W/DOPPLER COMPLETE: CPT

## 2025-06-03 PROCEDURE — 36415 COLL VENOUS BLD VENIPUNCTURE: CPT | Performed by: STUDENT IN AN ORGANIZED HEALTH CARE EDUCATION/TRAINING PROGRAM

## 2025-06-03 PROCEDURE — 2500000001 HC RX 250 WO HCPCS SELF ADMINISTERED DRUGS (ALT 637 FOR MEDICARE OP): Performed by: INTERNAL MEDICINE

## 2025-06-03 PROCEDURE — 84145 PROCALCITONIN (PCT): CPT | Mod: AHULAB | Performed by: STUDENT IN AN ORGANIZED HEALTH CARE EDUCATION/TRAINING PROGRAM

## 2025-06-03 PROCEDURE — 2500000002 HC RX 250 W HCPCS SELF ADMINISTERED DRUGS (ALT 637 FOR MEDICARE OP, ALT 636 FOR OP/ED): Performed by: STUDENT IN AN ORGANIZED HEALTH CARE EDUCATION/TRAINING PROGRAM

## 2025-06-03 PROCEDURE — 2500000004 HC RX 250 GENERAL PHARMACY W/ HCPCS (ALT 636 FOR OP/ED): Performed by: STUDENT IN AN ORGANIZED HEALTH CARE EDUCATION/TRAINING PROGRAM

## 2025-06-03 PROCEDURE — 87899 AGENT NOS ASSAY W/OPTIC: CPT | Mod: AHULAB | Performed by: STUDENT IN AN ORGANIZED HEALTH CARE EDUCATION/TRAINING PROGRAM

## 2025-06-03 PROCEDURE — 85610 PROTHROMBIN TIME: CPT | Performed by: STUDENT IN AN ORGANIZED HEALTH CARE EDUCATION/TRAINING PROGRAM

## 2025-06-03 PROCEDURE — 93306 TTE W/DOPPLER COMPLETE: CPT | Performed by: STUDENT IN AN ORGANIZED HEALTH CARE EDUCATION/TRAINING PROGRAM

## 2025-06-03 PROCEDURE — 2060000001 HC INTERMEDIATE ICU ROOM DAILY

## 2025-06-03 PROCEDURE — 87637 SARSCOV2&INF A&B&RSV AMP PRB: CPT | Performed by: STUDENT IN AN ORGANIZED HEALTH CARE EDUCATION/TRAINING PROGRAM

## 2025-06-03 PROCEDURE — 2500000005 HC RX 250 GENERAL PHARMACY W/O HCPCS: Performed by: STUDENT IN AN ORGANIZED HEALTH CARE EDUCATION/TRAINING PROGRAM

## 2025-06-03 PROCEDURE — 9420000001 HC RT PATIENT EDUCATION 5 MIN

## 2025-06-03 PROCEDURE — 71045 X-RAY EXAM CHEST 1 VIEW: CPT

## 2025-06-03 PROCEDURE — 99233 SBSQ HOSP IP/OBS HIGH 50: CPT | Performed by: STUDENT IN AN ORGANIZED HEALTH CARE EDUCATION/TRAINING PROGRAM

## 2025-06-03 PROCEDURE — 94668 MNPJ CHEST WALL SBSQ: CPT

## 2025-06-03 PROCEDURE — 87640 STAPH A DNA AMP PROBE: CPT | Performed by: STUDENT IN AN ORGANIZED HEALTH CARE EDUCATION/TRAINING PROGRAM

## 2025-06-03 PROCEDURE — 87449 NOS EACH ORGANISM AG IA: CPT | Mod: AHULAB | Performed by: STUDENT IN AN ORGANIZED HEALTH CARE EDUCATION/TRAINING PROGRAM

## 2025-06-03 PROCEDURE — 94640 AIRWAY INHALATION TREATMENT: CPT

## 2025-06-03 RX ORDER — FLUCONAZOLE 100 MG/1
400 TABLET ORAL ONCE
Status: COMPLETED | OUTPATIENT
Start: 2025-06-03 | End: 2025-06-03

## 2025-06-03 RX ORDER — FLUCONAZOLE 200 MG/1
TABLET ORAL
Qty: 14 TABLET | Refills: 0 | Status: CANCELLED | OUTPATIENT
Start: 2025-06-03

## 2025-06-03 RX ORDER — VANCOMYCIN HYDROCHLORIDE 1 G/20ML
INJECTION, POWDER, LYOPHILIZED, FOR SOLUTION INTRAVENOUS DAILY PRN
Status: DISCONTINUED | OUTPATIENT
Start: 2025-06-03 | End: 2025-06-03

## 2025-06-03 RX ORDER — FLUCONAZOLE 100 MG/1
200 TABLET ORAL DAILY
Status: DISCONTINUED | OUTPATIENT
Start: 2025-06-04 | End: 2025-06-12 | Stop reason: HOSPADM

## 2025-06-03 RX ORDER — FLUTICASONE PROPIONATE 50 MCG
2 SPRAY, SUSPENSION (ML) NASAL DAILY
Status: DISCONTINUED | OUTPATIENT
Start: 2025-06-03 | End: 2025-06-12 | Stop reason: HOSPADM

## 2025-06-03 RX ORDER — VANCOMYCIN HYDROCHLORIDE 750 MG/150ML
750 INJECTION, SOLUTION INTRAVENOUS EVERY 12 HOURS
Status: DISCONTINUED | OUTPATIENT
Start: 2025-06-03 | End: 2025-06-03

## 2025-06-03 RX ORDER — WARFARIN 3 MG/1
1.5 TABLET ORAL
Status: DISCONTINUED | OUTPATIENT
Start: 2025-06-05 | End: 2025-06-10

## 2025-06-03 RX ORDER — GUAIFENESIN 600 MG/1
600 TABLET, EXTENDED RELEASE ORAL 2 TIMES DAILY PRN
Status: DISCONTINUED | OUTPATIENT
Start: 2025-06-03 | End: 2025-06-12 | Stop reason: HOSPADM

## 2025-06-03 RX ORDER — WARFARIN 1 MG/1
1 TABLET ORAL
Status: DISCONTINUED | OUTPATIENT
Start: 2025-06-04 | End: 2025-06-10

## 2025-06-03 RX ADMIN — FUROSEMIDE 40 MG: 10 INJECTION, SOLUTION INTRAMUSCULAR; INTRAVENOUS at 09:02

## 2025-06-03 RX ADMIN — IPRATROPIUM BROMIDE AND ALBUTEROL SULFATE 3 ML: 2.5; .5 SOLUTION RESPIRATORY (INHALATION) at 10:51

## 2025-06-03 RX ADMIN — Medication 50 L/MIN: at 12:00

## 2025-06-03 RX ADMIN — POLYETHYLENE GLYCOL 3350 17 G: 17 POWDER, FOR SOLUTION ORAL at 20:43

## 2025-06-03 RX ADMIN — Medication 50 L/MIN: at 13:15

## 2025-06-03 RX ADMIN — IPRATROPIUM BROMIDE AND ALBUTEROL SULFATE 3 ML: 2.5; .5 SOLUTION RESPIRATORY (INHALATION) at 07:27

## 2025-06-03 RX ADMIN — PIPERACILLIN SODIUM AND TAZOBACTAM SODIUM 3.38 G: 3; .375 INJECTION, SOLUTION INTRAVENOUS at 20:43

## 2025-06-03 RX ADMIN — FERROUS SULFATE TAB 325 MG (65 MG ELEMENTAL FE) 1 TABLET: 325 (65 FE) TAB at 09:02

## 2025-06-03 RX ADMIN — DOCUSATE SODIUM 100 MG: 100 CAPSULE, LIQUID FILLED ORAL at 09:02

## 2025-06-03 RX ADMIN — PIPERACILLIN SODIUM AND TAZOBACTAM SODIUM 3.38 G: 3; .375 INJECTION, SOLUTION INTRAVENOUS at 12:52

## 2025-06-03 RX ADMIN — AMPICILLIN SODIUM AND SULBACTAM SODIUM 3 G: 2; 1 INJECTION, POWDER, FOR SOLUTION INTRAMUSCULAR; INTRAVENOUS at 02:08

## 2025-06-03 RX ADMIN — IPRATROPIUM BROMIDE AND ALBUTEROL SULFATE 3 ML: 2.5; .5 SOLUTION RESPIRATORY (INHALATION) at 19:45

## 2025-06-03 RX ADMIN — METOPROLOL SUCCINATE 25 MG: 25 TABLET, EXTENDED RELEASE ORAL at 09:02

## 2025-06-03 RX ADMIN — DOCUSATE SODIUM 100 MG: 100 CAPSULE, LIQUID FILLED ORAL at 20:43

## 2025-06-03 RX ADMIN — SENNOSIDES 8.6 MG: 8.6 TABLET, FILM COATED ORAL at 20:43

## 2025-06-03 RX ADMIN — FLUCONAZOLE 400 MG: 100 TABLET ORAL at 09:06

## 2025-06-03 RX ADMIN — AMPICILLIN SODIUM AND SULBACTAM SODIUM 3 G: 2; 1 INJECTION, POWDER, FOR SOLUTION INTRAMUSCULAR; INTRAVENOUS at 06:11

## 2025-06-03 RX ADMIN — Medication 50 L/MIN: at 23:27

## 2025-06-03 RX ADMIN — IPRATROPIUM BROMIDE AND ALBUTEROL SULFATE 3 ML: 2.5; .5 SOLUTION RESPIRATORY (INHALATION) at 14:38

## 2025-06-03 RX ADMIN — Medication 50 L/MIN: at 19:45

## 2025-06-03 RX ADMIN — FUROSEMIDE 40 MG: 10 INJECTION, SOLUTION INTRAMUSCULAR; INTRAVENOUS at 15:55

## 2025-06-03 ASSESSMENT — COGNITIVE AND FUNCTIONAL STATUS - GENERAL
DAILY ACTIVITIY SCORE: 24
MOBILITY SCORE: 24

## 2025-06-03 ASSESSMENT — ENCOUNTER SYMPTOMS
NAUSEA: 0
DIARRHEA: 0
VOMITING: 0
FEVER: 0
SINUS PAIN: 1
CONSTIPATION: 0
ABDOMINAL DISTENTION: 0
FATIGUE: 1
SINUS PRESSURE: 1
TROUBLE SWALLOWING: 1
RHINORRHEA: 1
CHILLS: 0
SHORTNESS OF BREATH: 1
ABDOMINAL PAIN: 0

## 2025-06-03 ASSESSMENT — PAIN - FUNCTIONAL ASSESSMENT
PAIN_FUNCTIONAL_ASSESSMENT: 0-10

## 2025-06-03 ASSESSMENT — PAIN SCALES - GENERAL
PAINLEVEL_OUTOF10: 0 - NO PAIN

## 2025-06-03 NOTE — PROGRESS NOTES
"Nutrition Follow-up Note  Nutrition Assessment       Respiratory distress requiring Airvo support and transfer to step down. Currently NPO, awaiting MBS which may now be delayed with compromised respiratory status.  Rule out Covid -19.  Remains at high nutrition risk for additional malnutrition.    History:  Food and Nutrient History: 6/2 made NPO by SLP until MBSS can be completed. 5/27,28 NPO/CLD, advanced to  regular diet from 5/29 dinner-6/2 lunch, no intake recorded to review. Dislikes Ensure Plus, stating it caused abdominal discomfort & gas.  Food Intolerance: Milk/lactose    Anthropometrics:  Height: 149.9 cm (4' 11\")  Weight: (!) 40.6 kg (89 lb 8.1 oz)  BMI (Calculated): 18.07    Weight Change: 0    Weight History / % Weight Change: 12/19/24: 40.4kg, 10/17/24: 39.9kg, 3/6/24: 43.5kg.  Reports UBW of #.  Significant Weight Loss: No    IBW/kg (Dietitian Calculated): 45.5 kg     Energy Needs:  Method for Estimating Needs: 1400- 1460 @ 34-36 kcal/kg    Method for Estimating 24 Hour Protein Needs: 54-63 @ 1.2-1.4 gr/kg IBW    Total Fluid Estimated Needs in 24 Hours (mL): 1350 mL  Total Fluid Estimated Needs in 24 hours (mL/kg): 30 mL/kg     Dietary Orders   NPO Diet Except: Ice chips;      Nutrition Diagnosis   Malnutrition Diagnosis  Patient has Malnutrition Diagnosis: Yes  Diagnosis Status: Active  Malnutrition Diagnosis: Severe malnutrition related to chronic disease or condition  Related to: dysphagia  As Evidenced by: reported intake <75% estimated needs for > 2 months, moderate-severe muslce & subcutaneous fat depletion, low BMI.    Patient has Nutrition Diagnosis: Yes  Nutrition Diagnosis 1: Inadequate protein energy intake  Diagnosis Status (1): Active  Related to (1): dysphagia  As Evidenced by (1): NPO     Nutrition Interventions/Recommendations   Nutrition Prescription: Nutrition prescription for oral nutrition  Individualized Nutrition Prescription Provided for : Await MBS results to assess if " he can safely tolerate oral diet. If SLP recommends NPO status, sisters are unsure if he would want nutrition support.  MD to manage IVF as indicated.    Food and/or Nutrient Delivery Interventions  Goal: intake meets >75% estimated nutrient needs.      Nutrition Monitoring and Evaluation   Food and Nutrient Related History   Fluid Intake: Estimated fluid intake    Anthropometrics: Body Composition/Growth/Weight History  Body Weight: Body weight - Promote weight restoration    Biochemical Data, Medical Tests and Procedures  Electrolyte and Renal Panel: BUN, Calcium, serum, Chloride, Creatinine, Magnesium, Phosphorus, Potassium, Sodium  Criteria: as indicated    Gastrointestinal Profile: Gastroesophageal reflux monitoring, Modified barium swallow  Criteria: daily    Glucose/Endocrine Profile: Glucose within normal limits ( mg/dL)  Criteria: as indicated    Nutritional Anemia Profile: Folate, serum, Hematocrit, Hemoglobin, Iron, serum  Criteria: as indicated    Vitamin Profile: Vitamin D, 25 hydroxy, Other (Comment)  Criteria: as indicated    Nutrition Focused Physical Findings   Digestive System Finding: Anorexia, Constipation, Diarrhea, Nausea, Vomiting  Criteria: daily    Mouth Finding: Dysphagia  Criteria: daily    Last Date of Nutrition Visit: 06/03/25  Nutrition Follow-Up Needed?: Dietitian to reassess per policy  Follow up Comment: svr PCMN/NPO-TR

## 2025-06-03 NOTE — PROGRESS NOTES
Speech-Language Pathology                 Therapy Communication Note    Patient Name: Robert Luna  MRN: [id number]  Department: [place] [id number]  Room: [address] [place] [address]  Today's Date: 6/3/2025     Discipline: Speech Language Pathology    Missed Time: Attempt    Comment: Patient originally scheduled for MBSS this date, however O2 desaturation occurred during bedside echo. Increased WOB reported. Currently, transferred to SDU and receiving Airvo 50L at 50%. Increased aspiration risk exists given tenuous respiratory status. Will tentatively reschedule MBSS for 6/4 as patient condition permits.

## 2025-06-03 NOTE — PROGRESS NOTES
Robert Luna is a 59 y.o. male admitted for symptomatic anemia. Pharmacy has been consulted for warfarin dosing and monitoring for Aortic Valve Replacement with goal INR of 2.0-3.0.     Home regimen   MON TUE WED THR FRI SAT SUN   Dose 1  mg 1.5  mg 1  mg 1.5  mg 1.5  mg 1  mg 1.5  mg   Total weekly dose: 9 mg  Source: [facility] Note from 5/22/2025    Labs  INR: Pending  Hgb/Hct/Plt  Lab Results   Component Value Date    HGB 8.6 (L) 06/03/2025    HGB 8.2 (L) 06/02/2025    HGB 8.2 (L) 06/01/2025    HGB 7.1 (L) 05/31/2025    HGB 8.6 (L) 05/30/2025        Lab Results   Component Value Date    HCT 30.7 (L) 06/03/2025    HCT 26.9 (L) 06/02/2025    HCT 27.5 (L) 06/01/2025    HCT 22.8 (L) 05/31/2025    HCT 27.3 (L) 05/30/2025        Platelets   Date Value Ref Range Status   06/03/2025 242 150 - 450 x10*3/uL Final   06/02/2025 191 150 - 450 x10*3/uL Final   06/01/2025 199 150 - 450 x10*3/uL Final   05/31/2025 182 150 - 450 x10*3/uL Final   05/30/2025 207 150 - 450 x10*3/uL Final      Warfarin Therapy   Current regimen: resuming home reigmen  Bridging: None needed   Interacting medications: interacting medication(s) of Fluconazole, Unasyn increase bleeding risk    Dosing History During Current Admission  Date 5/28 5/29 5/30 5/31 6/1 6/2 6/3   INR 1.9 2.2 2.9 2.1 1.9 2.1 Pending   Dose  (mg) See plan 1.5 mg 1.5 mg 1 mg 1.5 mg 1 mg Hold     Assessment and Plan  Patient's INR today is pending. Hemaglobin/hematocrit/platelets are stable.  Warfarin inpatient plan: Hold dose today.  Patient started on 14 day course of Fluconazole for Candida per GI. Plans to hold Warfarin on Tuesdays and Saturdays to reduce overall weekly dose by at least 20% due to interaction. Ok'd by GI and Dr. Walker.  Monitor s/sx of bleeding including epistaxis, hematuria, unusual bruising, hemoptysis, hematochezia as well as s/sx of stroke including impaired speech, unilateral paralysis, blurry vision.  Pharmacy will continue to monitor the patient and adjust  therapy as needed.      Thank you for the consult. Please do not hesitate to contact a pharmacist with any questions.    Natividad Rosas, LigiaD

## 2025-06-03 NOTE — PROGRESS NOTES
Vancomycin Dosing by Pharmacy- INITIAL    Robert Luna is a 59 y.o. year old male who Pharmacy has been consulted for vancomycin dosing for pneumonia. Based on the patient's indication and renal status this patient will be dosed based on a goal AUC of 400-600.     Renal function is currently stable.    Visit Vitals  /66 (BP Location: Right leg, Patient Position: Lying)   Pulse 96   Temp 37.1 °C (98.7 °F) (Temporal)   Resp (!) 29        Lab Results   Component Value Date    CREATININE 0.63 2025    CREATININE 0.56 2025    CREATININE 0.59 2025    CREATININE 0.63 2025        Patient weight is as follows:   Vitals:    25 1345   Weight: (!) 40.6 kg (89 lb 8.1 oz)       Cultures:  No results found for the encounter in last 14 days.        I/O last 3 completed shifts:  In: 400 (9.9 mL/kg) [IV Piggyback:400]  Out: 1975 (48.6 mL/kg) [Urine:1975 (1.4 mL/kg/hr)]  Weight: 40.6 kg   I/O during current shift:  I/O this shift:  In: -   Out: 175 [Urine:175]    Temp (24hrs), Av.7 °C (98.1 °F), Min:36.3 °C (97.4 °F), Max:37.1 °C (98.7 °F)         Assessment/Plan     Patient will not be given a loading dose.  Will initiate vancomycin maintenance, 750 mg every 12 hours.    This dosing regimen is predicted by [employer] to result in the following pharmacokinetic parameters:  Exposure target: AUC24 (range) 400-600 mg/L.hr   FXY29-54: 410 mg/L.hr  AUC24,ss: 499 mg/L.hr  Probability of AUC24 > 400: 73 %  Ctrough,ss: 14.7 mg/L  Probability of Ctrough,ss > 20: 25 %  Follow-up level will be ordered on  at 0500 unless clinically indicated sooner.  Will continue to monitor renal function daily while on vancomycin and order serum creatinine at least every 48 hours if not already ordered.  Follow for continued vancomycin needs, clinical response, and signs/symptoms of toxicity.       Radha Marcos, PharmD

## 2025-06-03 NOTE — CARE PLAN
The patient's goals for the shift include      The clinical goals for the shift include Remain safe, HDS, free of pain, decrease oxygen demand, and maintain skin integrity throughout shift.    Over the shift, the patient did not make progress toward the following goals. Barriers to progression include       Problem: Pain - Adult  Goal: Verbalizes/displays adequate comfort level or baseline comfort level  Outcome: Progressing     Problem: Safety - Adult  Goal: Free from fall injury  Outcome: Progressing     Problem: Discharge Planning  Goal: Discharge to home or other facility with appropriate resources  Outcome: Progressing     Problem: Chronic Conditions and Co-morbidities  Goal: Patient's chronic conditions and co-morbidity symptoms are monitored and maintained or improved  Outcome: Progressing     Problem: Nutrition  Goal: Nutrient intake appropriate for maintaining nutritional needs  Outcome: Progressing     Problem: Fall/Injury  Goal: Not fall by end of shift  Outcome: Progressing  Goal: Be free from injury by end of the shift  Outcome: Progressing  Goal: Verbalize understanding of personal risk factors for fall in the hospital  Outcome: Progressing  Goal: Verbalize understanding of risk factor reduction measures to prevent injury from fall in the home  Outcome: Progressing  Goal: Use assistive devices by end of the shift  Outcome: Progressing  Goal: Pace activities to prevent fatigue by end of the shift  Outcome: Progressing

## 2025-06-03 NOTE — CONSULTS
"Reason For Consult  Acute hypoxic respiratory failure following suspected aspiration; clinically worsening    History Of Present Illness  Robert Luna is a 59 y.o. male with past medical history of AVR (s/p mechanical valve on Coumadin), congenital aortic coarctation, HLD, KEV, MAHESH who presented to [facility] ED on 5/27 for shortness of breath that had been persistent for the previous month but had acutely worsened in the 24 hours prior to his presentation.  Labs were without leukocytosis, H/H 8.3/27.8, . He was transferred to [facility] for suspected occult GI blood loss with symptomatic anemia. EGD/colonoscopy done on 5/29 demonstrating no old or active bleeding.  Patient was started on Venofer & was transfused 1 PRBC on 5/29.  On 5/30 patient had new hypoxic episode requiring 4 L, concerning for post endoscopy aspiration event.  CXR showed bilateral airspace opacities with new leukocytosis patient was started on Unasyn (5/30-6/3).  CT chest completed on 6/1 demonstrating multifocal pneumonia with interstitial and alveolar edema.  Leukocytosis noted on 6/2.  Flu A/B, RSV and COVID were all negative.  Cardiology consulted on 6/2 for suspected heart failure exacerbation.  Echo completed on 6/3 with patient experiencing a hypoxic event requiring escalation to Airvo 50 L / 50%.  MRSA swab was negative.  Patient was transitioned to IV Zosyn and fluconazole (for candida esophagitis).  Pulmonology is now consulted to assist in further evaluation and management of acute hypoxic respiratory failure following suspected aspiration.     Patient seen and examined with presentation as above.  Patient currently sitting up in bed on Airvo 50 L / 53%, saturation 93 to 94%.  Patient reports that he is feeling better without dyspnea at rest.  Patient reports his only symptoms in the month prior to presentation were \"aches in the body\" and then finding it \"hard to breathe\".  He reported that his dyspnea with exertion began to present " "itself within a few steps, prompting him to come to the ED.  Patient denied chest pain, fever, chills, nausea and emesis.  Additionally reported \"dried blood out of my nose and mouth\" 1-2 days before going to [facility].  Additionally reports that his \"ears are clogged\", reports sinus issues with runny nose and PND.  Denies hemoptysis.  No known ill contacts. He has no known family history of lung cancer denies previous lung pathology.  He has never taken pulmonary medications as an adult and has never been on home oxygen.     Past Medical History  He has a past medical history of Heart valve disease.    Surgical History  He has a past surgical history that includes Aortic valve replacement and Cardiac valve replacement (02/1997).     Social History  He reports that he has never smoked. He has never used smokeless tobacco. He reports that he does not currently use alcohol. He reports that he does not use drugs.  Patient confirms that he has never smoked or vaped.  He did have extensive alcohol use primarily beer than vodka from the age of 16 until he quit in 2011.  He denied recreational drug use.  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      Family History  Family History[1]     Allergies  Patient has no known allergies.    Review of Systems  Review of Systems   Constitutional:  Positive for fatigue. Negative for chills and fever.   HENT:  Positive for dental problem, postnasal drip, rhinorrhea, sinus pressure, sinus pain and trouble swallowing. Negative for congestion.    Respiratory:  Positive for shortness of breath.    Cardiovascular:  Negative for chest pain and leg swelling.   Gastrointestinal:  Negative for constipation, diarrhea, nausea and vomiting.       Physical Exam    Constitutional:   Very thin, NAD, cooperative  HENT: Atraumatic, moist mucous membranes, poor dentition with missing teeth " "(lower tooth appears decayed)  Eyes: PERRL  Neck: Supple, + JVD  Cardiovascular: Regular rate and rhythm, HR 90s, + murmur/aortic valve click  Pulmonary: Fair air entry with diminished bases bilaterally, clear upper fields, posterior mid lung mild coarse crackles bilaterally; no conversational dyspnea noted; on Airvo 50 L / 53%  Abdominal: Thin, nondistended, nontender, + BS  Musculoskeletal: Fair active range of motion with fair strength  Extremities:   No BLE edema  Lymphadenopathy: No nuchal LAP  Skin: Very pale, warm core, cooler extremities, dry  Neurological: Alert and oriented with clear and appropriate speech  Psychiatric:    Appropriate mood and behavior    Medications:  Scheduled Medications[2]   PRN Medications[3]     Last Recorded Vitals  Blood pressure 110/62, pulse 92, temperature 37.2 °C (99 °F), temperature source Temporal, resp. rate 24, height 1.499 m (4' 11\"), weight (!) 40.6 kg (89 lb 8.1 oz), SpO2 98%.    Relevant Results  Results for orders placed or performed during the hospital encounter of 05/27/25 (from the past 96 hours)   Protime-INR   Result Value Ref Range    Protime 23.7 (H) 9.8 - 12.4 seconds    INR 2.1 (H) 0.9 - 1.1   Green Top   Result Value Ref Range    Extra Tube Hold for add-ons.    CBC and Auto Differential   Result Value Ref Range    WBC 9.7 4.4 - 11.3 x10*3/uL    nRBC 0.4 (H) 0.0 - 0.0 /100 WBCs    RBC 2.90 (L) 4.50 - 5.90 x10*6/uL    Hemoglobin 7.1 (L) 13.5 - 17.5 g/dL    Hematocrit 22.8 (L) 41.0 - 52.0 %    MCV 79 (L) 80 - 100 fL    MCH 24.5 (L) 26.0 - 34.0 pg    MCHC 31.1 (L) 32.0 - 36.0 g/dL    RDW 15.6 (H) 11.5 - 14.5 %    Platelets 182 150 - 450 x10*3/uL    Neutrophils % 87.1 40.0 - 80.0 %    Immature Granulocytes %, Automated 0.7 0.0 - 0.9 %    Lymphocytes % 3.6 13.0 - 44.0 %    Monocytes % 8.3 2.0 - 10.0 %    Eosinophils % 0.2 0.0 - 6.0 %    Basophils % 0.1 0.0 - 2.0 %    Neutrophils Absolute 8.42 (H) 1.20 - 7.70 x10*3/uL    Immature Granulocytes Absolute, Automated " 0.07 0.00 - 0.70 x10*3/uL    Lymphocytes Absolute 0.35 (L) 1.20 - 4.80 x10*3/uL    Monocytes Absolute 0.80 0.10 - 1.00 x10*3/uL    Eosinophils Absolute 0.02 0.00 - 0.70 x10*3/uL    Basophils Absolute 0.01 0.00 - 0.10 x10*3/uL   Basic Metabolic Panel   Result Value Ref Range    Glucose 130 (H) 74 - 99 mg/dL    Sodium 138 136 - 145 mmol/L    Potassium 4.2 3.5 - 5.3 mmol/L    Chloride 108 (H) 98 - 107 mmol/L    Bicarbonate 20 (L) 21 - 32 mmol/L    Anion Gap 14 10 - 20 mmol/L    Urea Nitrogen 16 6 - 23 mg/dL    Creatinine 0.63 0.50 - 1.30 mg/dL    eGFR >90 >60 mL/min/1.73m*2    Calcium 8.4 (L) 8.6 - 10.3 mg/dL   Protime-INR   Result Value Ref Range    Protime 20.7 (H) 9.8 - 12.4 seconds    INR 1.9 (H) 0.9 - 1.1   CBC and Auto Differential   Result Value Ref Range    WBC 10.8 4.4 - 11.3 x10*3/uL    nRBC 0.5 (H) 0.0 - 0.0 /100 WBCs    RBC 3.34 (L) 4.50 - 5.90 x10*6/uL    Hemoglobin 8.2 (L) 13.5 - 17.5 g/dL    Hematocrit 27.5 (L) 41.0 - 52.0 %    MCV 82 80 - 100 fL    MCH 24.6 (L) 26.0 - 34.0 pg    MCHC 29.8 (L) 32.0 - 36.0 g/dL    RDW 16.4 (H) 11.5 - 14.5 %    Platelets 199 150 - 450 x10*3/uL    Neutrophils % 85.6 40.0 - 80.0 %    Immature Granulocytes %, Automated 1.2 (H) 0.0 - 0.9 %    Lymphocytes % 3.5 13.0 - 44.0 %    Monocytes % 8.0 2.0 - 10.0 %    Eosinophils % 1.4 0.0 - 6.0 %    Basophils % 0.3 0.0 - 2.0 %    Neutrophils Absolute 9.27 (H) 1.20 - 7.70 x10*3/uL    Immature Granulocytes Absolute, Automated 0.13 0.00 - 0.70 x10*3/uL    Lymphocytes Absolute 0.38 (L) 1.20 - 4.80 x10*3/uL    Monocytes Absolute 0.87 0.10 - 1.00 x10*3/uL    Eosinophils Absolute 0.15 0.00 - 0.70 x10*3/uL    Basophils Absolute 0.03 0.00 - 0.10 x10*3/uL   Basic Metabolic Panel   Result Value Ref Range    Glucose 119 (H) 74 - 99 mg/dL    Sodium 139 136 - 145 mmol/L    Potassium 4.5 3.5 - 5.3 mmol/L    Chloride 106 98 - 107 mmol/L    Bicarbonate 27 21 - 32 mmol/L    Anion Gap 11 10 - 20 mmol/L    Urea Nitrogen 16 6 - 23 mg/dL    Creatinine 0.59  0.50 - 1.30 mg/dL    eGFR >90 >60 mL/min/1.73m*2    Calcium 8.5 (L) 8.6 - 10.3 mg/dL   Red Top   Result Value Ref Range    Extra Tube Hold for add-ons.    B-type natriuretic peptide   Result Value Ref Range    BNP 1,832 (H) 0 - 99 pg/mL   Protime-INR   Result Value Ref Range    Protime 22.8 (H) 9.8 - 12.4 seconds    INR 2.1 (H) 0.9 - 1.1   Basic Metabolic Panel   Result Value Ref Range    Glucose 133 (H) 74 - 99 mg/dL    Sodium 139 136 - 145 mmol/L    Potassium 3.6 3.5 - 5.3 mmol/L    Chloride 102 98 - 107 mmol/L    Bicarbonate 26 21 - 32 mmol/L    Anion Gap 15 10 - 20 mmol/L    Urea Nitrogen 20 6 - 23 mg/dL    Creatinine 0.56 0.50 - 1.30 mg/dL    eGFR >90 >60 mL/min/1.73m*2    Calcium 9.0 8.6 - 10.3 mg/dL   CBC   Result Value Ref Range    WBC 12.1 (H) 4.4 - 11.3 x10*3/uL    nRBC 0.0 0.0 - 0.0 /100 WBCs    RBC 3.25 (L) 4.50 - 5.90 x10*6/uL    Hemoglobin 8.2 (L) 13.5 - 17.5 g/dL    Hematocrit 26.9 (L) 41.0 - 52.0 %    MCV 83 80 - 100 fL    MCH 25.2 (L) 26.0 - 34.0 pg    MCHC 30.5 (L) 32.0 - 36.0 g/dL    RDW 17.5 (H) 11.5 - 14.5 %    Platelets 191 150 - 450 x10*3/uL   Magnesium   Result Value Ref Range    Magnesium 1.80 1.60 - 2.40 mg/dL   Respiratory Culture/Smear    Specimen: SPUTUM; Fluid   Result Value Ref Range    Respiratory Culture/Smear Culture in progress     Gram Stain       Gram stain indicates specimen consists of lower respiratory tract secretions.    Gram Stain No predominant organism    Basic Metabolic Panel   Result Value Ref Range    Glucose 91 74 - 99 mg/dL    Sodium 140 136 - 145 mmol/L    Potassium 4.3 3.5 - 5.3 mmol/L    Chloride 100 98 - 107 mmol/L    Bicarbonate 25 21 - 32 mmol/L    Anion Gap 19 10 - 20 mmol/L    Urea Nitrogen 25 (H) 6 - 23 mg/dL    Creatinine 0.63 0.50 - 1.30 mg/dL    eGFR >90 >60 mL/min/1.73m*2    Calcium 9.0 8.6 - 10.3 mg/dL   Magnesium   Result Value Ref Range    Magnesium 1.87 1.60 - 2.40 mg/dL   CBC   Result Value Ref Range    WBC 10.0 4.4 - 11.3 x10*3/uL    nRBC 0.3 (H)  0.0 - 0.0 /100 WBCs    RBC 3.49 (L) 4.50 - 5.90 x10*6/uL    Hemoglobin 8.6 (L) 13.5 - 17.5 g/dL    Hematocrit 30.7 (L) 41.0 - 52.0 %    MCV 88 80 - 100 fL    MCH 24.6 (L) 26.0 - 34.0 pg    MCHC 28.0 (L) 32.0 - 36.0 g/dL    RDW 19.1 (H) 11.5 - 14.5 %    Platelets 242 150 - 450 x10*3/uL   Protime-INR   Result Value Ref Range    Protime 24.4 (H) 9.8 - 12.4 seconds    INR 2.2 (H) 0.9 - 1.1   Transthoracic Echo (TTE) Complete   Result Value Ref Range    BSA 1.3 m2   MRSA Surveillance for Vancomycin De-escalation, PCR    Specimen: Anterior Nares; Swab   Result Value Ref Range    MRSA PCR Not Detected Not Detected   Sars-CoV-2, Influenza A/B and RSV PCR   Result Value Ref Range    Coronavirus 2019, PCR Not Detected Not Detected    Flu A Result Not Detected Not Detected    Flu B Result Not Detected Not Detected    RSV PCR Not Detected Not Detected      XR chest 1 view  Result Date: 6/3/2025  Interpreted By:  Maya Christie, STUDY: XR CHEST 1 VIEW;  6/3/2025 12:33 pm   INDICATION: Signs/Symptoms:short of breath.     COMPARISON: 05/30/2025   ACCESSION NUMBER(S): [id number]   ORDERING CLINICIAN: CJ NGUYEN   FINDINGS: Ill-defined patchy bilateral infiltrates, slightly improved on the left and increased on the right, with probable bilateral Kerley B-lines. Bilateral pleural effusions increased since the previous exam. The cardiomediastinal silhouette remains enlarged status post sternotomy.       Waxing and waning bilateral infiltrates/edema with increasing bilateral pleural effusions.   MACRO: None.   Signed by: Maya Christie 6/3/2025 12:49 PM Dictation workstation:   [id number]    CT chest wo IV contrast  Result Date: 6/1/2025  Interpreted By:  Andrea Montes, STUDY: CT CHEST WO IV CONTRAST;  6/1/2025 3:48 pm   INDICATION: Shortness-of-breath   COMPARISON: CXR performed 05/30/2025   ACCESSION NUMBER(S): [id number]   ORDERING CLINICIAN: YO BUENO   TECHNIQUE: Helical data acquisition of the chest was obtained  without  IV contrast material.  Images were reformatted in axial, coronal, and sagittal planes.   FINDINGS: LUNGS AND AIRWAYS: There are patchy consolidative/alveolar opacities bilaterally noted, some of which are solid and others mixed attenuation and present within all lobes, greater left than right. Findings are superimposed on interlobular septal thickening and scattered areas of parenchymal nodularity bilaterally which are somewhat motion blurred. Small-to-moderate bilateral pleural effusions are also present. No pneumothorax is seen on either side. No central airway obstruction.   MEDIASTINUM AND MELLISA, LOWER NECK AND AXILLA: No significantly enlarged intrathoracic lymph nodes are identified. The esophagus is patulous and air-filled. No masses are identified in the lower neck   HEART AND VESSELS: The heart is enlarged. The patient is status post prior sternotomy with aortic root and ascending aortic repair and placement of a prosthetic aortic valve. No evident postoperative complication on this noncontrast exam. Scattered atherosclerotic calcifications. The pulmonary trunk is severely dilated, measures 3.6 cm. No thoracic aortic aneurysm. The heart is enlarged with severe biatrial dilatation. Coarsely calcified mitral valve annulus. There is evidence for anemia with increased attenuation of the myocardium relative to the blood pool.   UPPER ABDOMEN: No acute upper abdominal finding is identified on this noncontrast exam   CHEST WALL AND OSSEOUS STRUCTURES: No significant soft tissue findings. No lytic or blastic osseous lesion is identified. Mild anterior wedge compression deformity of the T10 vertebral body level.       1. Findings most compatible with interstitial and alveolar edema with interstitial thickening and patchy consolidative areas/areas of alveolar opacity, mixed solid and sub-solid in attenuation with areas of nodularity also noted, some with tree-in-bud configuration. The possibility of a superimposed  pneumonia/pneumonitis and bronchiolitis is not excluded. Advise short-term radiographic follow-up to reassess. Appearance on the  radiograph from the current CT scan quite similar to the 05/30/2025 CXR. If the chest radiograph appearance does not entirely normalized, follow-up CT will be warranted. 2. Prior sternotomy and repair of the aortic root and ascending aorta with mechanical aortic valve replacement. Atherosclerotic disease. Severe biatrial dilatation. Severely dilated pulmonary trunk. Advise attention on recent echocardiogram. 3. Evidence for anemia.   MACRO: None   Signed by: Andrea Montes 6/1/2025 5:40 PM Dictation workstation:   [id number]    XR chest 1 view  Result Date: 5/30/2025  Interpreted By:  Iram Baca, STUDY: XR CHEST 1 VIEW; 5/30/2025 9:36 am   INDICATION: Signs/Symptoms:leukocytosis, hypoxia   COMPARISON: Radiographs 05/27/2025   ACCESSION NUMBER(S): [id number]   ORDERING CLINICIAN: DEEP PORTILLO   TECHNIQUE: Single frontal view of the chest performed.   FINDINGS:   LINES AND DEVICES: None.   LUNGS: New bilateral perihilar and left basilar airspace opacities are noted. There is new blunting of the right costophrenic angle suggestive of a trace effusion. No pneumothorax.   CARDIOMEDIASTINAL SILHOUETTE: Stable cardiomegaly. Prior median sternotomy.         New bilateral, left greater than right, airspace opacities compatible with pneumonia, although pulmonary edema may appear similarly.   MACRO None   Signed by: Iram Baca 5/30/2025 12:33 PM Dictation workstation:   [id number]    Assessment/Plan     Robert Luna is a 59 y.o. male with past medical history of AVR (s/p mechanical valve on Coumadin), congenital aortic coarctation, HLD, KEV, MAHESH who presented to [facility] ED on 5/27 for shortness of breath that had been persistent for the previous month but had acutely worsened in the 24 hours prior to his presentation.  Labs were without leukocytosis, H/H 8.3/27.8, . He was  transferred to [facility] for suspected occult GI blood loss with symptomatic anemia. EGD/colonoscopy done on 5/29 demonstrating no old or active bleeding.  Patient was started on Venofer & was transfused 1 PRBC on 5/29.  On 5/30 patient had new hypoxic episode requiring 4 L, concerning for post endoscopy aspiration event.  CXR showed bilateral airspace opacities with new leukocytosis patient was started on Unasyn (5/30-6/3).  CT chest completed on 6/1 demonstrating multifocal pneumonia with interstitial and alveolar edema.  Leukocytosis noted on 6/2.  Flu A/B, RSV and COVID were all negative.  Cardiology consulted on 6/2 for suspected heart failure exacerbation.  Echo completed on 6/3 with patient experiencing a hypoxic event requiring escalation to Airvo 50 L / 50%.  MRSA swab was negative.  Patient was transitioned to IV Zosyn and fluconazole (for candida esophagitis).  Pulmonology is now consulted to assist in further evaluation and management of acute hypoxic respiratory failure following suspected aspiration.     Impressions:    #Acute hypoxic respiratory failure: Multifactorial 2/2 causes below, post EGD aspiration, worsening MR; baseline is room air, currently requiring Airvo    #Pneumonia, multifocal +/- pneumonitis: CT demonstrating patchy consolidative alveolar opacities in all lobes (L>R); likely aspiration given failed swallow eval on 6/2  Completed Unasyn 5/30 - 6/3  Started on Zosyn 6/3  Flu, RSV and COVID-negative; MRSA swab negative  Legionella urine antigens negative  Strep urine antigen pending  Respiratory culture 6/2 culture in progress  Procalcitonin pending    #Pleural effusions, bilateral: Small to moderate noted on CT chest, parapneumonic versus heart failure; currently on high oxygen requirements without acute distress    #Volume overload/acute on chronic HFpEF: 2024 with pseudo normal pattern of LV diastolic filling; echo with CT noting interlobular septal thickening, admission ; +  bilateral pleural effusions    #Pulmonary hypertension: CT on 6/1 with dilated pulmonary trunk at 3.6 cm; echo 6/3 with RVSP 60 mmHg, severely enlarged RV with mildly reduced RV systolic function; previous echo 02/2024 normal RV, normal RV systolic function and RVSP 40 mmHg    #Dysphagia with concern for aspiration: patient with previous history of noting difficulty swallowing and choking; failed swallow evaluation on 6/2, only allowed single ice chips as tolerated (3-5 an hour)    #Poor dentition: one lower tooth looks decayed; concern for aspiration of oral secretions     Recommendations:  - Continue supplemental oxygen, wean for saturation 92-95%; currently on AirVo  - Home O2 eval prior to discharge  - Continue scheduled and as needed DuoNebs  - Continue pulmonary hygiene with I-S and Acapella; may need to add EZ Pap   - Adding Mucinex, Flonase  - Antibiotics per primary; consider atypical coverage  - Diuresis as renal function and hemodynamics allows; currently on 40 mg IV Lasix twice daily  - Monitor pleural effusions for now; no immediate need for thoras  - Agree with MBSS evaluation  - Follow-up on pending studies as above  - Frequent oral care (use oral kits with /sponges and mouth wash)  - DVT prophylaxis: With SCDs, resuming Coumadin  - Patient will need to follow-up with primary care with repeat noncontrasted CT chest in ~8 weeks to ensure resolution of infiltrates; if questions or concerns persist, PCP can refer to pulmonary medicine at that time    Thank you for the consult.  Pulmonology will follow.    I spent 85 minutes in the professional and overall care of this patient.   Michelle Shipley, DAVID-CNS         [1]   Family History  Problem Relation Name Age of Onset    Cancer Mother Alena Villaer     Cancer Father Guy Luna    [2] docusate sodium, 100 mg, oral, BID  ferrous sulfate, 65 mg of elemental iron, oral, Daily  [START ON 6/4/2025] fluconazole, 200 mg, oral, Daily  furosemide, 40 mg,  intravenous, BID  ipratropium-albuteroL, 3 mL, nebulization, TID  metoprolol succinate XL, 25 mg, oral, Daily  piperacillin-tazobactam, 3.375 g, intravenous, q6h  polyethylene glycol, 17 g, oral, Nightly  sennosides, 1 tablet, oral, Nightly  [START ON 6/4/2025] warfarin, 1 mg, oral, Once per day on Monday Wednesday  [START ON 6/5/2025] warfarin, 1.5 mg, oral, Once per day on Sunday Thursday Friday     [3] PRN medications: acetaminophen **OR** acetaminophen **OR** acetaminophen, ipratropium-albuteroL, ondansetron **OR** ondansetron, oxygen

## 2025-06-03 NOTE — PROGRESS NOTES
[facility] Hospitalist Progress Note      Between 7AM-7PM please message me via Epic Secure Chat.  After 7PM please page Nocturnist on call.        Assessment/Plan     Acute Problems    Acute hypoxic respiratory failure - had a suspected aspiration event following EGD/colonoscopy  Component of HF exacerbation/PH  Bilateral pleural effusions  Iron def anemia  Possible occult GI blood loss    Chronic Problems    Mechanical aortic valve replacement - INR goal 2-3  Ascending aorta repair  HTN    Plan    - pulm consult -> broaden abx to IV Vanc and zosyn, check MRSA swab. Check sputum if able. Add procal. Urine antigen testing. COVID/Flu/RSV testing. RT to start HFNC. Check CXR for today  - wean O2 as able  - SLP eval rec NPO and MBSS  - cardiology consulted -> ECHO to evaluate aortic valve. For now can continue IV lasix  - GI consulted, EGD/Colon with no old or active bleeding. No abnormality to explain anemia. Outpatient VCE for complete evaluation of MAHESH. Candida esophagitis started on PO fluconazole  - Coumadin as dosed by pharmacy for hx of AVR, mindful of INR while on fluconazole may have him skip 2 doses per week if DC home  - IV Venofer 300 mg x3 doses ordered; 1 unit pRBC this admit. Trend hgb. Likely would benefit from PO iron supplement at DC. Started here.      Fluids: None  Electrolytes: Replete as needed  Nutrition: NPO  Ortiz: None  Invasive lines: None  Drains: None  O2: NC    DVT Prophylaxis:  Coumadin    Discharge Planning: home once med ready    Plan of care was discussed with patient    Total time spent: At least 38 minutes, providing counseling or in coordination of care. Total time on this day of visit includes record and documentation review before and after visit including documentation and time not explicitly included on EMR time stamp.      Subjective     Robert Luna is a 59 y.o. male on day 7 of admission presenting with Symptomatic anemia.    NAEON. During ECHO more short of breath and hypoxic,  "particularly laying on left side    Review of Systems   Constitutional:  Negative for fever.   Respiratory:  Positive for shortness of breath.    Cardiovascular:  Negative for chest pain.   Gastrointestinal:  Negative for abdominal distention, abdominal pain and vomiting.       Objective     Physical Exam  Vitals reviewed.   Constitutional:       General: He is not in acute distress.  Cardiovascular:      Rate and Rhythm: Normal rate and regular rhythm.   Pulmonary:      Effort: Pulmonary effort is normal.      Breath sounds: Rales present.   Abdominal:      General: There is no distension.      Palpations: Abdomen is soft.   Neurological:      Mental Status: He is alert. Mental status is at baseline.         Last Recorded Vitals  Blood pressure (!) 165/94, pulse 98, temperature 36.7 °C (98.1 °F), temperature source Oral, resp. rate 17, height 1.499 m (4' 11\"), weight (!) 40.6 kg (89 lb 8.1 oz), SpO2 93%.    Medications  Scheduled Medications[1]   PRN Medications[2]                Miguel Walker MD  [facility]         [1] ampicillin-sulbactam, 3 g, intravenous, q6h MARTELL  docusate sodium, 100 mg, oral, BID  ferrous sulfate, 65 mg of elemental iron, oral, Daily  furosemide, 40 mg, intravenous, BID  ipratropium-albuteroL, 3 mL, nebulization, TID  metoprolol succinate XL, 25 mg, oral, Daily  polyethylene glycol, 17 g, oral, Nightly  sennosides, 1 tablet, oral, Nightly  warfarin, 1 mg, oral, Once per day on Monday Wednesday Saturday  warfarin, 1.5 mg, oral, Once per day on Sunday Tuesday Thursday Friday     [2] PRN medications: acetaminophen **OR** acetaminophen **OR** acetaminophen, ipratropium-albuteroL, ondansetron **OR** ondansetron, oxygen    "

## 2025-06-03 NOTE — PROGRESS NOTES
Vancomycin Dosing by Pharmacy- Cessation of Therapy    Consult to pharmacy for vancomycin dosing has been discontinued by the prescriber, pharmacy will sign off at this time.    Please call pharmacy if there are further questions or re-enter a consult if vancomycin is resumed.     Yisel Ramirez, LigiaD

## 2025-06-03 NOTE — PROGRESS NOTES
06/03/25 0910   Rapid Rounds   Attendance Provider;Care Transitions   Expected Discharge Disposition Home   Today we still await: Clinical stability;Procedure (Comment)     PLAN/BARRIER: Echo, MBS, INR, wean O2 cardiology following,IV diuresis, IV antibiotics  DISP: home  HHC: none at this time  DME: none at this time  ADOD: 2-4 days  Caryn Brooks RN

## 2025-06-03 NOTE — SIGNIFICANT EVENT
Called to bedside for desaturation during bedside echo, Dr. Walker at bedside as well.  Patient placed on Airvo with improvement in WOB and saturation. Plan to move to stepdown.

## 2025-06-03 NOTE — RESULT ENCOUNTER NOTE
Brushing consistent with candida, will treat with fluconazole, as patient is still inpatient discussed w pharmacy regarding adjustment in warfarin dosing and monitoring INR. We will start treatment while inpatient and continue on discharge with close INR monitoring. Follow-up with GI for VCE for complete w.up of anemia   12y F with 2 weeks of constant RLQ pain, went to OB this morning and had Pelvic US this morning and was told normal, had urine evaluated. States that went to PCP and told to come here for appendix eval. Pt states that she is eating and drinking fine, denies nausea, vomiting. She says pain is constant and never goes away becomes sharp sometimes. Pt feels crampy pain in that area when urinating. Denies fever, back pain, dysuria. denies vaginal discharge.

## 2025-06-03 NOTE — PROGRESS NOTES
Subjective Data:  Still c/o of SOB   On 5L NC  Scheduled for swallow study  -1075  -Increasing O2 requirements-> being transferred to stepdown for Airvo      Objective Data:  Last Recorded Vitals:  Vitals:    25 0431 25 0727 25 0807 25 1051   BP: 153/88  (!) 165/94    BP Location: Right leg  Left leg    Patient Position: Lying  Lying    Pulse: 98      Resp: 17  17    Temp: 36.3 °C (97.4 °F)  36.7 °C (98.1 °F)    TempSrc: Temporal  Oral    SpO2: 94% 92% 93% 90%   Weight:       Height:         Medical Gas Therapy: Supplemental oxygen  Medical Gas Delivery Method: Nasal cannula  Weight  Av.6 kg (89 lb 8.1 oz)  Min: 40.6 kg (89 lb 8.1 oz)  Max: 40.6 kg (89 lb 8.1 oz)    LABS:  CMP:  Results from last 7 days   Lab Units 25  0715 25  0655 25  0700 25  0744 25  0702 25  0635 25  0621 25  1605   SODIUM mmol/L 140 139 139 138 135* 135* 136 136   POTASSIUM mmol/L 4.3 3.6 4.5 4.2 4.2 3.7 4.7 4.4   CHLORIDE mmol/L 100 102 106 108* 105 105 106 107   CO2 mmol/L 25 26 27 20* 20* 20* 20* 22   ANION GAP mmol/L 19 15 11 14 14 14 15 11   BUN mg/dL 25* 20 16 16 10 10 26* 27*   CREATININE mg/dL 0.63 0.56 0.59 0.63 0.59 0.56 0.75 0.75   EGFR mL/min/1.73m*2 >90 >90 >90 >90 >90 >90 >90 >90   MAGNESIUM mg/dL 1.87 1.80  --   --   --   --   --   --    ALBUMIN g/dL  --   --   --   --   --   --   --  4.1   ALT U/L  --   --   --   --   --   --   --  16   AST U/L  --   --   --   --   --   --   --  23   BILIRUBIN TOTAL mg/dL  --   --   --   --   --   --   --  0.7     CBC:  Results from last 7 days   Lab Units 25  0805 25  0655 25  0700 25  0744 25  0702 25  2211 25  0635 25  0621   WBC AUTO x10*3/uL 10.0 12.1* 10.8 9.7 17.9* 12.0* 4.9 6.6   HEMOGLOBIN g/dL 8.6* 8.2* 8.2* 7.1* 8.6* 8.7* 7.7* 8.3*   HEMATOCRIT % 30.7* 26.9* 27.5* 22.8* 27.3* 28.0* 25.6* 25.9*   PLATELETS AUTO x10*3/uL 242 191 199 182 207 215 216 284   MCV fL  "88 83 82 79* 80 78* 77* 76*     COAG:   Results from last 7 days   Lab Units 06/03/25  0924 06/02/25  0655 06/01/25  0659 05/31/25  0525 05/30/25  0231 05/29/25  0637 05/28/25  0621 05/27/25  1701   INR  2.2* 2.1* 1.9* 2.1* 2.9* 2.2* 1.9* 1.9*     ABO: No results found for: \"ABO\"  HEME/ENDO:  Results from last 7 days   Lab Units 05/28/25  0621   FERRITIN ng/mL 13*   IRON SATURATION % 4*      CARDIAC:   Results from last 7 days   Lab Units 06/01/25  0700 05/28/25  0621 05/27/25  1701 05/27/25  1605   LD U/L  --  181  --   --    TROPHS ng/L  --   --  5 6   BNP pg/mL 1,832*  --  867*  --              Last I/O:    Intake/Output Summary (Last 24 hours) at 6/3/2025 1150  Last data filed at 6/3/2025 1033  Gross per 24 hour   Intake 200 ml   Output 1450 ml   Net -1250 ml     Net IO Since Admission: 786.83 mL [06/03/25 1150]      Imaging Results:  ECG 12 Lead  Result Date: 6/1/2025  Normal sinus rhythm Normal ECG Confirmed by Brandon Greene ([id number]) on 6/1/2025 8:48:34 PM    XR chest 2 views  Result Date: 5/27/2025  STUDY: Chest Radiographs;  5/27/2025 4:34PM INDICATION: Dyspnea. COMPARISON: None Available. ACCESSION NUMBER(S): [id number] ORDERING CLINICIAN: DRE CARCAMO TECHNIQUE:  Frontal and lateral chest. FINDINGS: CARDIOMEDIASTINAL SILHOUETTE: Cardiomediastinal silhouette is normal in size and configuration. Multiple median sternotomy wires are consistent with status post cardiothoracic surgery.  LUNGS: Lungs are clear.  ABDOMEN: No remarkable upper abdominal findings.  BONES: No acute osseous changes.    No acute cardiopulmonary process. Signed by Silvio Wall MD    XR shoulder right 2+ views  Result Date: 5/27/2025  STUDY: Shoulder Radiographs; 05/27/2025 04:34 PM INDICATION: Pain. COMPARISON: None available. ACCESSION NUMBER(S): [id number] ORDERING CLINICIAN: DRE CARCAMO TECHNIQUE:  Three view(s) of the right shoulder. FINDINGS:  The osseous structures are intact with no evidence for acute fracture or " focal destruction.  The alignment is anatomic.  No soft tissue abnormality is seen.    No evidence for acute fracture or dislocation. Signed by Silvio Wall MD          Past Cardiology Tests (Last 3 Years):  EKG:  Results for orders placed during the hospital encounter of 05/27/25    ECG 12 Lead    Narrative  Normal sinus rhythm  Normal ECG  Confirmed by Brandon Greene ([id number]) on 6/1/2025 8:48:34 PM    Echo:  Results for orders placed during the hospital encounter of 02/06/24    Transthoracic Echo (TTE) Complete    Narrative  [facility]  [address], [place] [id number]  Phone [phone]    TRANSTHORACIC ECHOCARDIOGRAM REPORT      Patient Name:      VLAD Mitchell Physician:   [id number] Emmett Hoang DO  Study Date:        2/6/2024           Ordering Provider:   [id number] VLAD PHILLIP  MRN/PID:           [id number]           Fellow:  Accession#:        [id number]       Nurse:  Date of Birth/Age: 1965 / 58      Sonographer:         Molly Schaeffer RDCS  years  Gender:            M                  Additional Staff:  Height:            149.86 cm          Admit Date:          2/6/2024  Weight:            44.00 kg           Admission Status:    Outpatient  BSA:               1.36 m2            Department Location: [facility]  Blood Pressure: 128 /70 mmHg    Study Type:    TRANSTHORACIC ECHO (TTE) COMPLETE  Diagnosis/ICD: Presence of prosthetic heart valve-Z95.2  Indication:    presence of prosthetic heart valve  CPT Codes:     Echo Complete w Full Doppler-[id number]    Patient History:  Pertinent History: Aortic valve replacement 1997 [place] mechanical valve.    Study Detail: The following Echo studies were performed: 2D, M-Mode, Doppler and  color flow.      PHYSICIAN INTERPRETATION:  Left Ventricle: Left ventricular systolic function is normal, with an estimated ejection fraction of 60-65%. There are no regional wall motion abnormalities. The left ventricular cavity size is normal. The  left ventricular septal wall thickness is mildly increased. There is mildly increased left ventricular posterior wall thickness. Spectral Doppler shows a pseudonormal pattern of left ventricular diastolic filling.  Left Atrium: The left atrium is moderately dilated.  Right Ventricle: The right ventricle is normal in size. There is normal right ventricular global systolic function.  Right Atrium: The right atrium is normal in size.  Aortic Valve: There is a prosthetic aortic valve present. There is evidence of moderate aortic valve stenosis.  There is a a [employer] mechanical aortic valve prosthesis. There is no evidence of aortic valve regurgitation. The peak instantaneous gradient of the aortic valve is 65.9 mmHg. The mean gradient of the aortic valve is 32.0 mmHg.  Mitral Valve: The mitral valve is moderately thickened. There is moderate mitral valve prolapse. There is moderate mitral valve regurgitation.  Tricuspid Valve: The tricuspid valve is structurally normal. There is mild tricuspid regurgitation.  Pulmonic Valve: The pulmonic valve is not well visualized. The pulmonic valve regurgitation was not well visualized.  Pericardium: There is no pericardial effusion noted.  Aorta: The aortic root is normal.      CONCLUSIONS:  1. Left ventricular systolic function is normal with a 60-65% estimated ejection fraction.  2. Spectral Doppler shows a pseudonormal pattern of left ventricular diastolic filling.  3. The left atrium is moderately dilated.  4. The mitral valve is moderately thickened.  5. Moderate mitral valve regurgitation.  6. There is moderate mitral valve prolapse.  7. Moderate aortic valve stenosis.  8. There is a mechanical aortic valve prosthesis present.    QUANTITATIVE DATA SUMMARY:  2D MEASUREMENTS:  Normal Ranges:  IVSd:          0.65 cm   (0.6-1.1cm)  LVPWd:         0.73 cm   (0.6-1.1cm)  LVIDd:         4.64 cm   (3.9-5.9cm)  LVIDs:         2.82 cm  LV Mass Index: 73.2 g/m2  LV % FS        39.2  "%    LA VOLUME:  Normal Ranges:  LA Vol A4C:        104.3 ml  (22+/-6mL/m2)  LA Vol Index A4C:  76.9ml/m2  LA Area A4C:       29.1 cm2  LA Major Axis A4C: 6.9 cm  LA Vol A4C:        98.5 ml    RA VOLUME BY A/L METHOD:  Normal Ranges:  RA Vol A4C:        18.3 ml    (8.3-19.5ml)  RA Vol Index A4C:  13.5 ml/m2  RA Area A4C:       10.1 cm2  RA Major Axis A4C: 4.7 cm    LV SYSTOLIC FUNCTION BY 2D PLANIMETRY (MOD):  Normal Ranges:  EF-A4C View: 62.4 % (>=55%)    LV DIASTOLIC FUNCTION:  Normal Ranges:  MV Peak E:    1.72 m/s (0.7-1.2 m/s)  MV Peak A:    1.57 m/s (0.42-0.7 m/s)  E/A Ratio:    1.10     (1.0-2.2)  MV e'         0.08 m/s (>8.0)  MV lateral e' 0.08 m/s  MV medial e'  0.07 m/s  E/e' Ratio:   22.93    (<8.0)    MITRAL VALVE:  Normal Ranges:  MV DT: 293 msec (150-240msec)    AORTIC VALVE:  Normal Ranges:  AoV Vmax:                4.06 m/s  (<=1.7m/s)  AoV Peak P.9 mmHg (<20mmHg)  AoV Mean P.0 mmHg (1.7-11.5mmHg)  LVOT Max Maco:            1.57 m/s  (<=1.1m/s)  AoV VTI:                 60.00 cm  (18-25cm)  LVOT VTI:                26.90 cm  LVOT Diameter:           1.70 cm   (1.8-2.4cm)  AoV Area, VTI:           1.02 cm2  (2.5-5.5cm2)  AoV Area,Vmax:           0.88 cm2  (2.5-4.5cm2)  AoV Dimensionless Index: 0.45      RIGHT VENTRICLE:  TAPSE: 21.7 mm  RV s'  0.10 m/s    TRICUSPID VALVE/RVSP:  Normal Ranges:  Peak TR Velocity: 3.04 m/s  RV Syst Pressure: 40.0 mmHg (< 30mmHg)      [id number] Emmett Hoang   Electronically signed on 2024 at 7:52:07 AM        ** Final **    Ejection Fractions:  No results found for: \"EF\"  Cath:  No results found for this or any previous visit.    Stress Test:  No results found for this or any previous visit.    Cardiac Imaging:  No results found for this or any previous visit.      Inpatient Medications:  Scheduled Medications[1]  PRN Medications[2]  Continuous Medications[3]    Physical Exam:  General:  Patient is awake, alert, and oriented.  Patient " "is in no acute distress.  HEENT:  Pupils equal and reactive.  Normocephalic.  Moist mucosa.    Neck:  No thyromegaly.  Normal Jugular Venous Pressure.  Cardiovascular:  Regular rate and rhythm.  Normal S1 and S2.  Pulmonary:  Clear to auscultation bilaterally.  Abdomen:  Soft. Non-tender.   Non-distended.  Positive bowel sounds.  Lower Extremities:  2+ pedal pulses. No LE edema.  Neurologic:  Cranial nerves intact.  No focal deficit.   Skin: Skin warm and dry, normal skin turgor.   Psychiatric: Normal affect.     Assessment/Plan   Cards: [facility] 10/2024     Robert Luna is a 59 y.o. male with a history of aortic valve replacement on Coumadin 1997 , hypertension, iron deficiency anemia who presented to [facility] on 5/27/2005 with complaints of dyspnea on exertion.  While here at [facility] GI was consulted EGD/colonoscopy showed no new active bleeding.  He received IV Venofer 300 mg x 3 and 1 unit of packed red blood cells.  He is also receiving antibiotics for possible aspiration pneumonia/pneumonitis.  Cardiology is now asked to see him for \"Presented with worsening dyspnea on exertion. Suspect HF exacerbation, BNP is up. Has mechanical aortic valve last ECHO I can see from 1 year ago mean gradient 32 mmHg \"     CT of chest 6/1/25  IMPRESSION:  1. Findings most compatible with interstitial and alveolar edema with  interstitial thickening and patchy consolidative areas/areas of  alveolar opacity, mixed solid and sub-solid in attenuation with areas  of nodularity also noted, some with tree-in-bud configuration. The  possibility of a superimposed pneumonia/pneumonitis and bronchiolitis  is not excluded. Advise short-term radiographic follow-up to  reassess. Appearance on the  radiograph from the current CT scan  quite similar to the 05/30/2025 CXR. If the chest radiograph  appearance does not entirely normalized, follow-up CT will be  warranted.  2. Prior sternotomy and repair of the aortic root and ascending aorta  with " mechanical aortic valve replacement. Atherosclerotic disease.  Severe biatrial dilatation. Severely dilated pulmonary trunk. Advise  attention on recent echocardiogram.  3. Evidence for anemia.     #Rule out acute diastolic heart failure-BNP elevated, looks euvolemic on exam    #Iron deficiency anemia  #Mechanical aortic valve INR goal -aortic valve is 65.9 mmHg-> The mean gradient of the aortic valve is 32.0 mmHg, [place] from 1997  #HTN -suboptimal   #Aspiration PN 2/2 recent EGD -chest CT compatible with PN vs edema, WBC with left shift     Home cardiology meds include metoprolol succinate 25 mg daily and Coumadin.     RECS:   -We will obtain a transthoracic echocardiogram for structural evaluation including ejection fraction, assessment of regional wall motion abnormalities or valvular disease, and further evaluation of hemodynamics.    - Dyspnea on exertion prior to hospital stay 2/2 AVR stenosis with combination of being iron deficiency anemia-> aortic valve stenosis can be further worked up as an outpatient.  -There was some question regarding bioprosthetic aortic stenosis as he had elevated gradients on echocardiogram in 2024 or approximately 60/32 mm respectively. The records of an echocardiogram from 2007 in which he had gradients of 55/25 mmHg. His haptoglobin and LDH were normal on arrival which goes against lysis causing his anemia.   - Continue with IV antibiotics for aspiration pneumonia-> possible steroids in addition?   - Consider possible Pulmonary consult   - Will continue IV Lasix 40 mg twice daily for now  - Maintain INR 2-3-> currently on Coumadin   - Will need follow up with his cardiologist after discharge           Code Status:  Full Code    I spent 40 minutes in the professional and overall care of this patient.        DVAID Bundy-CNP       [1]   Scheduled medications   Medication Dose Route Frequency    ampicillin-sulbactam  3 g intravenous q6h MARTELL    docusate sodium  100 mg  oral BID    ferrous sulfate  65 mg of elemental iron oral Daily    [START ON 6/4/2025] fluconazole  200 mg oral Daily    furosemide  40 mg intravenous BID    ipratropium-albuteroL  3 mL nebulization TID    metoprolol succinate XL  25 mg oral Daily    polyethylene glycol  17 g oral Nightly    sennosides  1 tablet oral Nightly    [START ON 6/4/2025] warfarin  1 mg oral Once per day on Monday Wednesday    [START ON 6/5/2025] warfarin  1.5 mg oral Once per day on Sunday Thursday Friday   [2]   PRN medications   Medication    acetaminophen    Or    acetaminophen    Or    acetaminophen    ipratropium-albuteroL    ondansetron    Or    ondansetron    oxygen   [3]   Continuous Medications   Medication Dose Last Rate

## 2025-06-04 ENCOUNTER — APPOINTMENT (OUTPATIENT)
Dept: RADIOLOGY | Facility: HOSPITAL | Age: 60
End: 2025-06-04
Payer: MEDICARE

## 2025-06-04 ENCOUNTER — SURGERY (OUTPATIENT)
Age: 60
End: 2025-06-04
Payer: MEDICARE

## 2025-06-04 LAB
ANION GAP SERPL CALC-SCNC: 20 MMOL/L (ref 10–20)
BACTERIA SPEC RESP CULT: NORMAL
BUN SERPL-MCNC: 33 MG/DL (ref 6–23)
CALCIUM SERPL-MCNC: 8.5 MG/DL (ref 8.6–10.3)
CHLORIDE SERPL-SCNC: 98 MMOL/L (ref 98–107)
CO2 SERPL-SCNC: 28 MMOL/L (ref 21–32)
CREAT SERPL-MCNC: 0.77 MG/DL (ref 0.5–1.3)
EGFRCR SERPLBLD CKD-EPI 2021: >90 ML/MIN/1.73M*2
ERYTHROCYTE [DISTWIDTH] IN BLOOD BY AUTOMATED COUNT: 21.5 % (ref 11.5–14.5)
GLUCOSE SERPL-MCNC: 79 MG/DL (ref 74–99)
GRAM STN SPEC: NORMAL
GRAM STN SPEC: NORMAL
HCT VFR BLD AUTO: 28.5 % (ref 41–52)
HGB BLD-MCNC: 8.6 G/DL (ref 13.5–17.5)
HOLD SPECIMEN: NORMAL
INR PPP: 3.7 (ref 0.9–1.1)
LEGIONELLA AG UR QL: NEGATIVE
MAGNESIUM SERPL-MCNC: 1.81 MG/DL (ref 1.6–2.4)
MCH RBC QN AUTO: 26.1 PG (ref 26–34)
MCHC RBC AUTO-ENTMCNC: 30.2 G/DL (ref 32–36)
MCV RBC AUTO: 87 FL (ref 80–100)
NRBC BLD-RTO: 0.5 /100 WBCS (ref 0–0)
PLATELET # BLD AUTO: 197 X10*3/UL (ref 150–450)
POTASSIUM SERPL-SCNC: 3.4 MMOL/L (ref 3.5–5.3)
PROCALCITONIN SERPL-MCNC: 0.22 NG/ML
PROTHROMBIN TIME: 40.5 SECONDS (ref 9.8–12.4)
RBC # BLD AUTO: 3.29 X10*6/UL (ref 4.5–5.9)
S PNEUM AG UR QL: NEGATIVE
SODIUM SERPL-SCNC: 143 MMOL/L (ref 136–145)
WBC # BLD AUTO: 9.9 X10*3/UL (ref 4.4–11.3)

## 2025-06-04 PROCEDURE — 2500000002 HC RX 250 W HCPCS SELF ADMINISTERED DRUGS (ALT 637 FOR MEDICARE OP, ALT 636 FOR OP/ED): Performed by: NURSE PRACTITIONER

## 2025-06-04 PROCEDURE — 2500000004 HC RX 250 GENERAL PHARMACY W/ HCPCS (ALT 636 FOR OP/ED): Performed by: NURSE PRACTITIONER

## 2025-06-04 PROCEDURE — 94668 MNPJ CHEST WALL SBSQ: CPT

## 2025-06-04 PROCEDURE — 74230 X-RAY XM SWLNG FUNCJ C+: CPT | Performed by: RADIOLOGY

## 2025-06-04 PROCEDURE — 99233 SBSQ HOSP IP/OBS HIGH 50: CPT | Performed by: INTERNAL MEDICINE

## 2025-06-04 PROCEDURE — 92611 MOTION FLUOROSCOPY/SWALLOW: CPT | Mod: GN

## 2025-06-04 PROCEDURE — 2500000002 HC RX 250 W HCPCS SELF ADMINISTERED DRUGS (ALT 637 FOR MEDICARE OP, ALT 636 FOR OP/ED): Performed by: STUDENT IN AN ORGANIZED HEALTH CARE EDUCATION/TRAINING PROGRAM

## 2025-06-04 PROCEDURE — 80048 BASIC METABOLIC PNL TOTAL CA: CPT | Performed by: STUDENT IN AN ORGANIZED HEALTH CARE EDUCATION/TRAINING PROGRAM

## 2025-06-04 PROCEDURE — 2500000001 HC RX 250 WO HCPCS SELF ADMINISTERED DRUGS (ALT 637 FOR MEDICARE OP): Performed by: INTERNAL MEDICINE

## 2025-06-04 PROCEDURE — 2060000001 HC INTERMEDIATE ICU ROOM DAILY

## 2025-06-04 PROCEDURE — 71045 X-RAY EXAM CHEST 1 VIEW: CPT

## 2025-06-04 PROCEDURE — 99232 SBSQ HOSP IP/OBS MODERATE 35: CPT | Performed by: CLINICAL NURSE SPECIALIST

## 2025-06-04 PROCEDURE — 74230 X-RAY XM SWLNG FUNCJ C+: CPT

## 2025-06-04 PROCEDURE — 99233 SBSQ HOSP IP/OBS HIGH 50: CPT | Performed by: STUDENT IN AN ORGANIZED HEALTH CARE EDUCATION/TRAINING PROGRAM

## 2025-06-04 PROCEDURE — 2500000005 HC RX 250 GENERAL PHARMACY W/O HCPCS: Performed by: STUDENT IN AN ORGANIZED HEALTH CARE EDUCATION/TRAINING PROGRAM

## 2025-06-04 PROCEDURE — 36415 COLL VENOUS BLD VENIPUNCTURE: CPT | Performed by: STUDENT IN AN ORGANIZED HEALTH CARE EDUCATION/TRAINING PROGRAM

## 2025-06-04 PROCEDURE — 76000 FLUOROSCOPY <1 HR PHYS/QHP: CPT | Performed by: HOSPITALIST

## 2025-06-04 PROCEDURE — 2500000002 HC RX 250 W HCPCS SELF ADMINISTERED DRUGS (ALT 637 FOR MEDICARE OP, ALT 636 FOR OP/ED): Performed by: CLINICAL NURSE SPECIALIST

## 2025-06-04 PROCEDURE — 2500000001 HC RX 250 WO HCPCS SELF ADMINISTERED DRUGS (ALT 637 FOR MEDICARE OP): Performed by: STUDENT IN AN ORGANIZED HEALTH CARE EDUCATION/TRAINING PROGRAM

## 2025-06-04 PROCEDURE — 2500000001 HC RX 250 WO HCPCS SELF ADMINISTERED DRUGS (ALT 637 FOR MEDICARE OP): Performed by: NURSE PRACTITIONER

## 2025-06-04 PROCEDURE — 94640 AIRWAY INHALATION TREATMENT: CPT

## 2025-06-04 PROCEDURE — 99223 1ST HOSP IP/OBS HIGH 75: CPT | Performed by: NURSE PRACTITIONER

## 2025-06-04 PROCEDURE — 92526 ORAL FUNCTION THERAPY: CPT | Mod: GN

## 2025-06-04 PROCEDURE — 85027 COMPLETE CBC AUTOMATED: CPT | Performed by: STUDENT IN AN ORGANIZED HEALTH CARE EDUCATION/TRAINING PROGRAM

## 2025-06-04 PROCEDURE — 71045 X-RAY EXAM CHEST 1 VIEW: CPT | Performed by: STUDENT IN AN ORGANIZED HEALTH CARE EDUCATION/TRAINING PROGRAM

## 2025-06-04 PROCEDURE — 2500000005 HC RX 250 GENERAL PHARMACY W/O HCPCS: Performed by: NURSE PRACTITIONER

## 2025-06-04 PROCEDURE — 85610 PROTHROMBIN TIME: CPT | Performed by: STUDENT IN AN ORGANIZED HEALTH CARE EDUCATION/TRAINING PROGRAM

## 2025-06-04 PROCEDURE — 2500000004 HC RX 250 GENERAL PHARMACY W/ HCPCS (ALT 636 FOR OP/ED): Performed by: STUDENT IN AN ORGANIZED HEALTH CARE EDUCATION/TRAINING PROGRAM

## 2025-06-04 PROCEDURE — 2500000004 HC RX 250 GENERAL PHARMACY W/ HCPCS (ALT 636 FOR OP/ED): Performed by: CLINICAL NURSE SPECIALIST

## 2025-06-04 PROCEDURE — 9420000001 HC RT PATIENT EDUCATION 5 MIN

## 2025-06-04 PROCEDURE — 83735 ASSAY OF MAGNESIUM: CPT | Performed by: STUDENT IN AN ORGANIZED HEALTH CARE EDUCATION/TRAINING PROGRAM

## 2025-06-04 RX ORDER — VANCOMYCIN HYDROCHLORIDE 1 G/20ML
INJECTION, POWDER, LYOPHILIZED, FOR SOLUTION INTRAVENOUS DAILY PRN
Status: DISCONTINUED | OUTPATIENT
Start: 2025-06-04 | End: 2025-06-05

## 2025-06-04 RX ORDER — POTASSIUM CHLORIDE 14.9 MG/ML
20 INJECTION INTRAVENOUS ONCE
Status: COMPLETED | OUTPATIENT
Start: 2025-06-04 | End: 2025-06-04

## 2025-06-04 RX ORDER — DOXYCYCLINE HYCLATE 100 MG
100 TABLET ORAL EVERY 12 HOURS SCHEDULED
Status: DISCONTINUED | OUTPATIENT
Start: 2025-06-04 | End: 2025-06-04

## 2025-06-04 RX ORDER — VANCOMYCIN HYDROCHLORIDE 750 MG/150ML
750 INJECTION, SOLUTION INTRAVENOUS EVERY 12 HOURS
Status: DISCONTINUED | OUTPATIENT
Start: 2025-06-04 | End: 2025-06-05

## 2025-06-04 RX ORDER — MAGNESIUM SULFATE HEPTAHYDRATE 40 MG/ML
2 INJECTION, SOLUTION INTRAVENOUS ONCE
Status: COMPLETED | OUTPATIENT
Start: 2025-06-04 | End: 2025-06-04

## 2025-06-04 RX ORDER — VANCOMYCIN HYDROCHLORIDE 1 G/20ML
INJECTION, POWDER, LYOPHILIZED, FOR SOLUTION INTRAVENOUS DAILY PRN
Status: DISCONTINUED | OUTPATIENT
Start: 2025-06-04 | End: 2025-06-04

## 2025-06-04 RX ADMIN — FLUCONAZOLE 200 MG: 100 TABLET ORAL at 08:18

## 2025-06-04 RX ADMIN — SENNOSIDES 8.6 MG: 8.6 TABLET, FILM COATED ORAL at 20:14

## 2025-06-04 RX ADMIN — BARIUM SULFATE 40 ML: 400 SUSPENSION ORAL at 13:18

## 2025-06-04 RX ADMIN — DOCUSATE SODIUM 100 MG: 100 CAPSULE, LIQUID FILLED ORAL at 08:18

## 2025-06-04 RX ADMIN — PIPERACILLIN SODIUM AND TAZOBACTAM SODIUM 3.38 G: 3; .375 INJECTION, SOLUTION INTRAVENOUS at 13:42

## 2025-06-04 RX ADMIN — Medication 50 L/MIN: at 03:25

## 2025-06-04 RX ADMIN — PIPERACILLIN SODIUM AND TAZOBACTAM SODIUM 3.38 G: 3; .375 INJECTION, SOLUTION INTRAVENOUS at 20:14

## 2025-06-04 RX ADMIN — FLUTICASONE PROPIONATE 2 SPRAY: 50 SPRAY, METERED NASAL at 08:17

## 2025-06-04 RX ADMIN — PIPERACILLIN SODIUM AND TAZOBACTAM SODIUM 3.38 G: 3; .375 INJECTION, SOLUTION INTRAVENOUS at 08:17

## 2025-06-04 RX ADMIN — IPRATROPIUM BROMIDE AND ALBUTEROL SULFATE 3 ML: 2.5; .5 SOLUTION RESPIRATORY (INHALATION) at 08:26

## 2025-06-04 RX ADMIN — BARIUM SULFATE 5 ML: 400 PASTE ORAL at 13:20

## 2025-06-04 RX ADMIN — MAGNESIUM SULFATE HEPTAHYDRATE 2 G: 40 INJECTION, SOLUTION INTRAVENOUS at 09:19

## 2025-06-04 RX ADMIN — IPRATROPIUM BROMIDE AND ALBUTEROL SULFATE 3 ML: 2.5; .5 SOLUTION RESPIRATORY (INHALATION) at 20:03

## 2025-06-04 RX ADMIN — POTASSIUM CHLORIDE 20 MEQ: 14.9 INJECTION, SOLUTION INTRAVENOUS at 09:19

## 2025-06-04 RX ADMIN — IPRATROPIUM BROMIDE AND ALBUTEROL SULFATE 3 ML: 2.5; .5 SOLUTION RESPIRATORY (INHALATION) at 14:17

## 2025-06-04 RX ADMIN — POLYETHYLENE GLYCOL 3350 17 G: 17 POWDER, FOR SOLUTION ORAL at 20:14

## 2025-06-04 RX ADMIN — ACETAMINOPHEN 650 MG: 325 TABLET ORAL at 08:18

## 2025-06-04 RX ADMIN — VANCOMYCIN HYDROCHLORIDE 750 MG: 750 INJECTION, SOLUTION INTRAVENOUS at 16:57

## 2025-06-04 RX ADMIN — FERROUS SULFATE TAB 325 MG (65 MG ELEMENTAL FE) 1 TABLET: 325 (65 FE) TAB at 08:18

## 2025-06-04 RX ADMIN — BARIUM SULFATE 10 ML: 400 SUSPENSION ORAL at 13:19

## 2025-06-04 RX ADMIN — FUROSEMIDE 40 MG: 10 INJECTION, SOLUTION INTRAMUSCULAR; INTRAVENOUS at 08:18

## 2025-06-04 RX ADMIN — FUROSEMIDE 40 MG: 10 INJECTION, SOLUTION INTRAMUSCULAR; INTRAVENOUS at 14:42

## 2025-06-04 RX ADMIN — Medication 50 L/MIN: at 23:26

## 2025-06-04 RX ADMIN — PIPERACILLIN SODIUM AND TAZOBACTAM SODIUM 3.38 G: 3; .375 INJECTION, SOLUTION INTRAVENOUS at 01:38

## 2025-06-04 RX ADMIN — AZITHROMYCIN MONOHYDRATE 500 MG: 500 INJECTION, POWDER, LYOPHILIZED, FOR SOLUTION INTRAVENOUS at 14:42

## 2025-06-04 RX ADMIN — BARIUM SULFATE 40 ML: 0.81 POWDER, FOR SUSPENSION ORAL at 13:18

## 2025-06-04 RX ADMIN — Medication 50 L/MIN: at 08:27

## 2025-06-04 RX ADMIN — METOPROLOL SUCCINATE 25 MG: 25 TABLET, EXTENDED RELEASE ORAL at 08:18

## 2025-06-04 ASSESSMENT — COGNITIVE AND FUNCTIONAL STATUS - GENERAL
EATING MEALS: A LITTLE
WALKING IN HOSPITAL ROOM: A LITTLE
HELP NEEDED FOR BATHING: A LITTLE
PERSONAL GROOMING: A LITTLE
STANDING UP FROM CHAIR USING ARMS: A LITTLE
DRESSING REGULAR UPPER BODY CLOTHING: A LITTLE
MOVING TO AND FROM BED TO CHAIR: A LITTLE
EATING MEALS: A LITTLE
DRESSING REGULAR UPPER BODY CLOTHING: A LITTLE
MOBILITY SCORE: 19
HELP NEEDED FOR BATHING: A LITTLE
CLIMB 3 TO 5 STEPS WITH RAILING: A LITTLE
DAILY ACTIVITIY SCORE: 18
WALKING IN HOSPITAL ROOM: A LITTLE
STANDING UP FROM CHAIR USING ARMS: A LITTLE
CLIMB 3 TO 5 STEPS WITH RAILING: A LITTLE
PERSONAL GROOMING: A LITTLE
TOILETING: A LITTLE
MOVING TO AND FROM BED TO CHAIR: A LITTLE
TURNING FROM BACK TO SIDE WHILE IN FLAT BAD: A LITTLE
DRESSING REGULAR LOWER BODY CLOTHING: A LITTLE
DAILY ACTIVITIY SCORE: 18
DRESSING REGULAR LOWER BODY CLOTHING: A LITTLE
MOBILITY SCORE: 19
TOILETING: A LITTLE
TURNING FROM BACK TO SIDE WHILE IN FLAT BAD: A LITTLE

## 2025-06-04 ASSESSMENT — PAIN - FUNCTIONAL ASSESSMENT
PAIN_FUNCTIONAL_ASSESSMENT: 0-10

## 2025-06-04 ASSESSMENT — ENCOUNTER SYMPTOMS
CONSTIPATION: 1
SHORTNESS OF BREATH: 1
ABDOMINAL PAIN: 0
HEMATOLOGIC/LYMPHATIC NEGATIVE: 1
FEVER: 0
ALLERGIC/IMMUNOLOGIC NEGATIVE: 1
ENDOCRINE NEGATIVE: 1
EYES NEGATIVE: 1
RESPIRATORY NEGATIVE: 1
NEUROLOGICAL NEGATIVE: 1
MUSCULOSKELETAL NEGATIVE: 1
CARDIOVASCULAR NEGATIVE: 1
PALPITATIONS: 0
TROUBLE SWALLOWING: 1
PSYCHIATRIC NEGATIVE: 1
ABDOMINAL DISTENTION: 0
GASTROINTESTINAL NEGATIVE: 1
VOMITING: 0
UNEXPECTED WEIGHT CHANGE: 1
FATIGUE: 1
WEAKNESS: 1
SORE THROAT: 1

## 2025-06-04 ASSESSMENT — PAIN SCALES - GENERAL
PAINLEVEL_OUTOF10: 0 - NO PAIN

## 2025-06-04 NOTE — POST-PROCEDURE NOTE
Physician Transition of Care Summary  Invasive Cardiovascular Lab    Procedure Date: 6/4/2025  Attending:    Ramana Guo - Primary  Resident/Fellow/Other Assistant: Surgeons and Role:  * No surgeons found with a matching role *    Indications:   Pre-op Diagnosis      * Aortic valve replaced [Z95.2]    Post-procedure diagnosis:   Post-op Diagnosis     * Aortic valve replaced [Z95.2]    Procedure(s):   Valve Fluoroscopy  74697 - CHG FLUOROSCOPY UP TO 1 HOUR PHYSICIAN/QHP TIME    Procedure Findings:   Fluoroscopy with normal movement of the prosthetic bileaflet aortic valve.    Complications:   None.     Stents/Implants:   None.     Anticoagulation/Antiplatelet Plan:   NA.     Estimated Blood Loss:   0 mL    Anesthesia: Moderate Sedation Anesthesia Staff: No anesthesia staff entered.    Any Specimen(s) Removed:   No specimens collected during this procedure.    Disposition:   Further management as per inpatient cardiology team.      Electronically signed by: Lisa Guo MD, 6/4/2025 6:12 PM

## 2025-06-04 NOTE — PROGRESS NOTES
Subjective Data:  Still SOB  Remains on 50L nc  Fluoroscopy of aortic valve today with functioning leaflets      Objective Data:  Last Recorded Vitals:  Vitals:    25 1112 25 1113 25 1129 25 1203   BP:  110/65  108/60   BP Location:    Left arm   Patient Position:    Lying   Pulse:  78  79   Resp:  18  20   Temp:    36.5 °C (97.7 °F)   TempSrc:    Temporal   SpO2: 99% 99% 94% 92%   Weight:       Height:         Medical Gas Therapy: Supplemental oxygen  Medical Gas Delivery Method: High flow nasal cannula (pt returned from procedure and placed back on airvo at previous settings)  Weight  Av.6 kg (89 lb 8.1 oz)  Min: 40.6 kg (89 lb 8.1 oz)  Max: 40.6 kg (89 lb 8.1 oz)    LABS:  CMP:  Results from last 7 days   Lab Units 25  0525  0715 25  0655 25  0725  0744 25  0702 25  0635   SODIUM mmol/L 143 140 139 139 138 135* 135*   POTASSIUM mmol/L 3.4* 4.3 3.6 4.5 4.2 4.2 3.7   CHLORIDE mmol/L 98 100 102 106 108* 105 105   CO2 mmol/L 28 25 26 27 20* 20* 20*   ANION GAP mmol/L 20 19 15 11 14 14 14   BUN mg/dL 33* 25* 20 16 16 10 10   CREATININE mg/dL 0.77 0.63 0.56 0.59 0.63 0.59 0.56   EGFR mL/min/1.73m*2 >90 >90 >90 >90 >90 >90 >90   MAGNESIUM mg/dL 1.81 1.87 1.80  --   --   --   --      CBC:  Results from last 7 days   Lab Units 25  0518 25  0805 25  0655 25  0700 25  0744 25  0702 25  2211 25  0635   WBC AUTO x10*3/uL 9.9 10.0 12.1* 10.8 9.7 17.9* 12.0* 4.9   HEMOGLOBIN g/dL 8.6* 8.6* 8.2* 8.2* 7.1* 8.6* 8.7* 7.7*   HEMATOCRIT % 28.5* 30.7* 26.9* 27.5* 22.8* 27.3* 28.0* 25.6*   PLATELETS AUTO x10*3/uL 197 242 191 199 182 207 215 216   MCV fL 87 88 83 82 79* 80 78* 77*     COAG:   Results from last 7 days   Lab Units 25  0518 25  0924 25  0655 25  0659 25  0525 25  0231 25  0637   INR  3.7* 2.2* 2.1* 1.9* 2.1* 2.9* 2.2*     ABO: No results found for:  "\"ABO\"  HEME/ENDO:         CARDIAC:   Results from last 7 days   Lab Units 06/01/25  0700   BNP pg/mL 1,832*             Last I/O:    Intake/Output Summary (Last 24 hours) at 6/4/2025 1425  Last data filed at 6/4/2025 1200  Gross per 24 hour   Intake 300 ml   Output 700 ml   Net -400 ml     Net IO Since Admission: 386.83 mL [06/04/25 1425]      Imaging Results:  ECG 12 Lead  Result Date: 6/1/2025  Normal sinus rhythm Normal ECG Confirmed by Brandon Greene ([id number]) on 6/1/2025 8:48:34 PM    XR chest 2 views  Result Date: 5/27/2025  STUDY: Chest Radiographs;  5/27/2025 4:34PM INDICATION: Dyspnea. COMPARISON: None Available. ACCESSION NUMBER(S): [id number] ORDERING CLINICIAN: DRE CARCAMO TECHNIQUE:  Frontal and lateral chest. FINDINGS: CARDIOMEDIASTINAL SILHOUETTE: Cardiomediastinal silhouette is normal in size and configuration. Multiple median sternotomy wires are consistent with status post cardiothoracic surgery.  LUNGS: Lungs are clear.  ABDOMEN: No remarkable upper abdominal findings.  BONES: No acute osseous changes.    No acute cardiopulmonary process. Signed by Silvio Wall MD    XR shoulder right 2+ views  Result Date: 5/27/2025  STUDY: Shoulder Radiographs; 05/27/2025 04:34 PM INDICATION: Pain. COMPARISON: None available. ACCESSION NUMBER(S): [id number] ORDERING CLINICIAN: DRE CARCAMO TECHNIQUE:  Three view(s) of the right shoulder. FINDINGS:  The osseous structures are intact with no evidence for acute fracture or focal destruction.  The alignment is anatomic.  No soft tissue abnormality is seen.    No evidence for acute fracture or dislocation. Signed by Silvio Wall MD          Past Cardiology Tests (Last 3 Years):  EKG:  Results for orders placed during the hospital encounter of 05/27/25    ECG 12 Lead    Narrative  Normal sinus rhythm  Normal ECG  Confirmed by Brandon Greene ([id number]) on 6/1/2025 8:48:34 PM    Echo:  Results for orders placed during the hospital encounter of " 02/06/24    Transthoracic Echo (TTE) Complete    Narrative  [facility]  [address] , [place] [address]  Phone [phone]    TRANSTHORACIC ECHOCARDIOGRAM REPORT      Patient Name:      VLAD PRIDE         Stephen Physician:   [id number] Emmett Hoang DO  Study Date:        2/6/2024           Ordering Provider:   [id number] VLAD PHILLIP  MRN/PID:           [id number]           Fellow:  Accession#:        [id number]       Nurse:  Date of Birth/Age: 1965 / 58      Sonographer:         Molly Schaeffer RDCS  years  Gender:            M                  Additional Staff:  Height:            149.86 cm          Admit Date:          2/6/2024  Weight:            44.00 kg           Admission Status:    Outpatient  BSA:               1.36 m2            Department Location: [facility]  Blood Pressure: 128 /70 mmHg    Study Type:    TRANSTHORACIC ECHO (TTE) COMPLETE  Diagnosis/ICD: Presence of prosthetic heart valve-Z95.2  Indication:    presence of prosthetic heart valve  CPT Codes:     Echo Complete w Full [id number]    Patient History:  Pertinent History: Aortic valve replacement 1997 [employer]ce mechanical valve.    Study Detail: The following Echo studies were performed: 2D, M-Mode, Doppler and  color flow.      PHYSICIAN INTERPRETATION:  Left Ventricle: Left ventricular systolic function is normal, with an estimated ejection fraction of 60-65%. There are no regional wall motion abnormalities. The left ventricular cavity size is normal. The left ventricular septal wall thickness is mildly increased. There is mildly increased left ventricular posterior wall thickness. Spectral Doppler shows a pseudonormal pattern of left ventricular diastolic filling.  Left Atrium: The left atrium is moderately dilated.  Right Ventricle: The right ventricle is normal in size. There is normal right ventricular global systolic function.  Right Atrium: The right atrium is normal in size.  Aortic Valve: There is a prosthetic  aortic valve present. There is evidence of moderate aortic valve stenosis.  There is a a [employer] mechanical aortic valve prosthesis. There is no evidence of aortic valve regurgitation. The peak instantaneous gradient of the aortic valve is 65.9 mmHg. The mean gradient of the aortic valve is 32.0 mmHg.  Mitral Valve: The mitral valve is moderately thickened. There is moderate mitral valve prolapse. There is moderate mitral valve regurgitation.  Tricuspid Valve: The tricuspid valve is structurally normal. There is mild tricuspid regurgitation.  Pulmonic Valve: The pulmonic valve is not well visualized. The pulmonic valve regurgitation was not well visualized.  Pericardium: There is no pericardial effusion noted.  Aorta: The aortic root is normal.      CONCLUSIONS:  1. Left ventricular systolic function is normal with a 60-65% estimated ejection fraction.  2. Spectral Doppler shows a pseudonormal pattern of left ventricular diastolic filling.  3. The left atrium is moderately dilated.  4. The mitral valve is moderately thickened.  5. Moderate mitral valve regurgitation.  6. There is moderate mitral valve prolapse.  7. Moderate aortic valve stenosis.  8. There is a mechanical aortic valve prosthesis present.    QUANTITATIVE DATA SUMMARY:  2D MEASUREMENTS:  Normal Ranges:  IVSd:          0.65 cm   (0.6-1.1cm)  LVPWd:         0.73 cm   (0.6-1.1cm)  LVIDd:         4.64 cm   (3.9-5.9cm)  LVIDs:         2.82 cm  LV Mass Index: 73.2 g/m2  LV % FS        39.2 %    LA VOLUME:  Normal Ranges:  LA Vol A4C:        104.3 ml  (22+/-6mL/m2)  LA Vol Index A4C:  76.9ml/m2  LA Area A4C:       29.1 cm2  LA Major Axis A4C: 6.9 cm  LA Vol A4C:        98.5 ml    RA VOLUME BY A/L METHOD:  Normal Ranges:  RA Vol A4C:        18.3 ml    (8.3-19.5ml)  RA Vol Index A4C:  13.5 ml/m2  RA Area A4C:       10.1 cm2  RA Major Axis A4C: 4.7 cm    LV SYSTOLIC FUNCTION BY 2D PLANIMETRY (MOD):  Normal Ranges:  EF-A4C View: 62.4 % (>=55%)    LV DIASTOLIC  FUNCTION:  Normal Ranges:  MV Peak E:    1.72 m/s (0.7-1.2 m/s)  MV Peak A:    1.57 m/s (0.42-0.7 m/s)  E/A Ratio:    1.10     (1.0-2.2)  MV e'         0.08 m/s (>8.0)  MV lateral e' 0.08 m/s  MV medial e'  0.07 m/s  E/e' Ratio:   22.93    (<8.0)    MITRAL VALVE:  Normal Ranges:  MV DT: 293 msec (150-240msec)    AORTIC VALVE:  Normal Ranges:  AoV Vmax:                4.06 m/s  (<=1.7m/s)  AoV Peak P.9 mmHg (<20mmHg)  AoV Mean P.0 mmHg (1.7-11.5mmHg)  LVOT Max Maco:            1.57 m/s  (<=1.1m/s)  AoV VTI:                 60.00 cm  (18-25cm)  LVOT VTI:                26.90 cm  LVOT Diameter:           1.70 cm   (1.8-2.4cm)  AoV Area, VTI:           1.02 cm2  (2.5-5.5cm2)  AoV Area,Vmax:           0.88 cm2  (2.5-4.5cm2)  AoV Dimensionless Index: 0.45      RIGHT VENTRICLE:  TAPSE: 21.7 mm  RV s'  0.10 m/s    TRICUSPID VALVE/RVSP:  Normal Ranges:  Peak TR Velocity: 3.04 m/s  RV Syst Pressure: 40.0 mmHg (< 30mmHg)      [id number] Emmett Hoang DO  Electronically signed on 2024 at 7:52:07 AM        ** Final **    Ejection Fractions:  LV EF   Date/Time Value Ref Range Status   2025 12:29 PM 71 %      Cath:  No results found for this or any previous visit.    Stress Test:  No results found for this or any previous visit.    Cardiac Imaging:  No results found for this or any previous visit.      Inpatient Medications:  Scheduled Medications[1]  PRN Medications[2]  Continuous Medications[3]    Physical Exam:  General:  Patient is awake, alert, and oriented.  Patient is in no acute distress.  HEENT:  Pupils equal and reactive.  Normocephalic.  Moist mucosa.    Neck:  No thyromegaly.  Normal Jugular Venous Pressure.  Cardiovascular:  Regular rate and rhythm.  Normal S1 and S2.  Pulmonary:  Clear to auscultation bilaterally.  Abdomen:  Soft. Non-tender.   Non-distended.  Positive bowel sounds.  Lower Extremities:  2+ pedal pulses. No LE edema.  Neurologic:  Cranial nerves intact.  No focal  "deficit.   Skin: Skin warm and dry, normal skin turgor.   Psychiatric: Normal affect.     Assessment/Plan   Cards: [facility] 10/2024     Robert Luna is a 59 y.o. male with a history of aortic valve replacement on Coumadin 1997 , hypertension, iron deficiency anemia who presented to [facility] on 5/27/2005 with complaints of dyspnea on exertion.  While here at [facility] GI was consulted EGD/colonoscopy showed no new active bleeding.  He received IV Venofer 300 mg x 3 and 1 unit of packed red blood cells.  He is also receiving antibiotics for possible aspiration pneumonia/pneumonitis.  Cardiology is now asked to see him for \"Presented with worsening dyspnea on exertion. Suspect HF exacerbation, BNP is up. Has mechanical aortic valve last ECHO I can see from 1 year ago mean gradient 32 mmHg \"       #Acute on chronic respiratory failure  - Seems to be primarily worsened post EGD with concern for aspiration.  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  In retrospect he says that he feels like he knew this was coming and there was an issue leading up to this.  He is not a candidate for redo aortic valve replacement +/- mitral valve repair/replacement due to his body habitus, frailty.  He understands this.  - Continue supportive care and treatment of aspiration pneumonitis  - Okay to continue IV diuresis  - Likely would benefit from afterload reduction from ACE inhibitor or hydralazine but BP remains on the lower side.          Code Status:  Full Code        Rob Berger,        [1]   Scheduled medications "   Medication Dose Route Frequency    azithromycin  500 mg intravenous q24h    docusate sodium  100 mg oral BID    ferrous sulfate  65 mg of elemental iron oral Daily    fluconazole  200 mg oral Daily    fluticasone  2 spray Each Nostril Daily    furosemide  40 mg intravenous BID    ipratropium-albuteroL  3 mL nebulization TID    metoprolol succinate XL  25 mg oral Daily    piperacillin-tazobactam  3.375 g intravenous q6h    polyethylene glycol  17 g oral Nightly    sennosides  1 tablet oral Nightly    vancomycin  750 mg intravenous q12h    [Held by provider] warfarin  1 mg oral Once per day on Monday Wednesday    [START ON 6/5/2025] warfarin  1.5 mg oral Once per day on Sunday Thursday Friday   [2]   PRN medications   Medication    acetaminophen    Or    acetaminophen    Or    acetaminophen    guaiFENesin    ipratropium-albuteroL    ondansetron    Or    ondansetron    oxygen    vancomycin   [3]   Continuous Medications   Medication Dose Last Rate

## 2025-06-04 NOTE — PROGRESS NOTES
"Robert Luna is a 59 y.o. male on day 8 of admission presenting with Symptomatic anemia.    Subjective   No acute events night.  Patient remains on Airvo 50 L / 55%.  Patient saturation low 90s with rest, increases to mid to upper 90s with engaged activity.  Patient underwent fluoroscopy for AVR evaluation today.  Patient reports he is feeling better with improved dyspnea.  Reports occasionally feeling warm but not feverish.  Denies pain, chills, nausea.  Reports rare cough without sputum.  Patient for MBS today.     Objective   Physical Exam   Constitutional:   Very thin, mild increased WOB without acute distress, cooperative  HENT: Atraumatic, moist mucous membranes, poor dentition with missing teeth (lower tooth appears decayed)  Eyes: Nonicteric  Neck: Supple, + JVD  Cardiovascular: Regular rate and rhythm, HR 80s, + murmur/aortic valve click  Pulmonary: Fair air entry with diminished bases bilaterally, clear upper fields, posterior mid lung mild coarse crackles bilaterally; mild conversational dyspnea noted; on Airvo 50 L / 55%  Abdominal: Thin, nondistended, nontender, + BS  Musculoskeletal: Fair active range of motion with fair strength  Extremities:   No BLE edema  Lymphadenopathy: No nuchal LAP  Skin: Very pale, warm core, cooler extremities, dry  Neurological: Alert and oriented with clear and appropriate speech  Psychiatric:    Appropriate mood and behavior     Medications:  Scheduled Medications[1]   PRN Medications[2]     Last Recorded Vitals  Blood pressure 110/65, pulse 78, temperature 37 °C (98.6 °F), temperature source Temporal, resp. rate 18, height 1.499 m (4' 11\"), weight (!) 40.6 kg (89 lb 8.1 oz), SpO2 94%.  Intake/Output last 3 Shifts:  I/O last 3 completed shifts:  In: 100 (2.5 mL/kg) [IV Piggyback:100]  Out: 1175 (28.9 mL/kg) [Urine:1175 (0.8 mL/kg/hr)]  Weight: 40.6 kg     Relevant Results  Results for orders placed or performed during the hospital encounter of 05/27/25 (from the past 24 " hours)   Transthoracic Echo (TTE) Complete   Result Value Ref Range    AV pk ottoniel 4.29 m/s    AV mn grad 34 mmHg    LVOT diam 1.80 cm    MV E/A ratio 1.66     LA vol index A/L 78.5 ml/m2    Tricuspid annular plane systolic excursion 1.7 cm    LV EF 71 %    RV free wall pk S' 10.00 cm/s    LVIDd 2.96 cm    RVSP 60 mmHg    Aortic Valve Area by Continuity of VTI 0.50 cm2    Aortic Valve Area by Continuity of Peak Velocity 0.77 cm2    AV pk grad 73 mmHg    LV A4C EF 70.6    MRSA Surveillance for Vancomycin De-escalation, PCR    Specimen: Anterior Nares; Swab   Result Value Ref Range    MRSA PCR Not Detected Not Detected   Procalcitonin   Result Value Ref Range    Procalcitonin 0.22 (H) <=0.07 ng/mL   Sars-CoV-2, Influenza A/B and RSV PCR   Result Value Ref Range    Coronavirus 2019, PCR Not Detected Not Detected    Flu A Result Not Detected Not Detected    Flu B Result Not Detected Not Detected    RSV PCR Not Detected Not Detected   Protime-INR   Result Value Ref Range    Protime 40.5 (H) 9.8 - 12.4 seconds    INR 3.7 (H) 0.9 - 1.1   Basic Metabolic Panel   Result Value Ref Range    Glucose 79 74 - 99 mg/dL    Sodium 143 136 - 145 mmol/L    Potassium 3.4 (L) 3.5 - 5.3 mmol/L    Chloride 98 98 - 107 mmol/L    Bicarbonate 28 21 - 32 mmol/L    Anion Gap 20 10 - 20 mmol/L    Urea Nitrogen 33 (H) 6 - 23 mg/dL    Creatinine 0.77 0.50 - 1.30 mg/dL    eGFR >90 >60 mL/min/1.73m*2    Calcium 8.5 (L) 8.6 - 10.3 mg/dL   CBC   Result Value Ref Range    WBC 9.9 4.4 - 11.3 x10*3/uL    nRBC 0.5 (H) 0.0 - 0.0 /100 WBCs    RBC 3.29 (L) 4.50 - 5.90 x10*6/uL    Hemoglobin 8.6 (L) 13.5 - 17.5 g/dL    Hematocrit 28.5 (L) 41.0 - 52.0 %    MCV 87 80 - 100 fL    MCH 26.1 26.0 - 34.0 pg    MCHC 30.2 (L) 32.0 - 36.0 g/dL    RDW 21.5 (H) 11.5 - 14.5 %    Platelets 197 150 - 450 x10*3/uL   Magnesium   Result Value Ref Range    Magnesium 1.81 1.60 - 2.40 mg/dL      Transthoracic Echo (TTE) Complete  Result Date: 6/3/2025  PHYSICIAN INTERPRETATION:  Left Ventricle: Left ventricular ejection fraction is normal calculated by Leos's biplane at 71%. There are no regional left ventricular wall motion abnormalities. The left ventricular cavity size is normal. There is normal septal and normal posterior left ventricular wall thickness. There is left ventricular concentric remodeling. Left ventricular diastolic filling cannot be determined due to mitral valve repair/replacement. Left Atrium: The left atrial size is severely dilated. Right Ventricle: The right ventricle is severely enlarged. There is mildly reduced right ventricular systolic function. Right Atrium: The right atrium is severely dilated. Aortic Valve: There is a prosthetic aortic valve present. The aortic valve area by VTI is 0.50 cmï¿½ with a peak velocity of 4.29 m/s. The peak and mean gradients are 58 mmHg and 34 mmHg, respectively, with a dimensionless index of 0.21. There is evidence of severe aortic valve stenosis. There is a [employer] Bryce bileaflet mechanical type aortic valve prosthesis with a 21 mm reported size. There is mild aortic valve regurgitation. The mechanical AV leaflet mobility could not be properly assessed due to prosthetic valve artifact. It is recommended to assess leaflet motion and opening/closing angles with cinefluoroscopy. Mitral Valve: Status post mitral valve repair. The doppler estimated peak and mean diastolic pressure gradients are 30.8 mmHg and 9 mmHg, respectively. Barlows syndrome is present. There is mitral valve prolapse of the anterior and posterior leaflets. There is mild calcification of the mitral leaflets and subvalvular apparatus. The mean gradient of the mitral valve is 9 mmHg. There is severe mitral valve regurgitation. The E Vmax is 2.55 m/s. There is probable presence of an underlying MV annular repair with prosthetic ring (no prior history of MVr available). Tricuspid Valve: The tricuspid valve is abnormal. The tricuspid valve annulus appears dilated. There  is moderate to severe tricuspid regurgitation. The doppler estimated RVSP is moderately elevated with a right ventricular systolic pressure of 60 mmHg. Pulmonic Valve: The pulmonic valve is structurally normal. There is trace to mild pulmonic valve regurgitation. Pericardium: Trivial pericardial effusion. Pleural: There is left pleural effusion. Aorta: The aortic root is normal. There is a prosthetic graft seen in the position of the ascending aorta. In comparison to the previous echocardiogram(s): Compared with study dated 2/6/2024, the degree of aortic valve stenosis has slightly increased from 65/2 mmHg to 73/34 mmHg, with an AME of 0.5-0.77 cm2 and a DI of 0.21. The degree of MR has increased from moderate to severe and the RVSP has increased from 40 mmHg to 60 mmHg. Underlying infection/endocarditis to explain progression of MR could not be ruled out in this study. JOANNE assessment is recommended if clinically indicated.      CONCLUSIONS:  1. Left ventricular ejection fraction is normal calculated by Leos's biplane at 71%.  2. There is mildly reduced right ventricular systolic function.  3. Severely enlarged right ventricle.  4. The Doppler-estimated right ventricular systolic pressure (RVSP) is moderately elevated at 60 mmHg.  5. The left atrial size is severely dilated.  6. The right atrium is severely dilated.  7. Severe aortic valve stenosis. The peak and mean gradients are 73 mmHg and 34 mmHg respectively.  8. There is a [employer]. Bryce bileaflet mechanical type aortic valve prosthesis with a 21 mm reported size. The peak and mean gradients are 73 mmHg and 34 mmHg respectively.  9. Mild aortic valve regurgitation. 10. There is probable presence of an underlying MV annular repair with prosthetic ring (no prior history of MVr available). 11. Mitral valve Barlows syndrome is present, with prolapse of anterior and posterior leaflets. 12. Severe mitral valve regurgitation. 13. There is mild calcification of the  mitral leaflets and subvalvular apparatus. 14. Moderate to severe tricuspid regurgitation visualized. 15. The tricuspid valve annulus appears dilated. 16. Prosthetic graft in the ascending aorta position. 17. Compared with study dated 2/6/2024, the degree of aortic valve stenosis has slightly increased from 65/2 mmHg to 73/34 mmHg, with an AME of 0.5-0.77 cm2 and a DI of 0.21. The degree of MR has increased from moderate to severe and the RVSP has increased from 40 mmHg to 60 mmHg. Underlying infection/endocarditis to explain progression of MR could not be ruled out in this study. JOANNE assessment is recommended if clinically indicated. RECOMMENDATIONS: Based on the results of this study and as mechanical AV leaflet mobility could not be properly assessed due to prosthetic valve artifact, it is recommended to assess leaflet motion and opening/closing angles with cinefluoroscopy.      [id number] Ulysses Amin MD Electronically signed on 6/3/2025 at 4:50:49 PM  ** Final (Updated) **     XR chest 1 view  Result Date: 6/3/2025  Interpreted By:  Maya Christie, STUDY: XR CHEST 1 VIEW;  6/3/2025 12:33 pm   INDICATION: Signs/Symptoms:short of breath.     COMPARISON: 05/30/2025   ACCESSION NUMBER(S): [id number]   ORDERING CLINICIAN: CJ NGUYEN   FINDINGS: Ill-defined patchy bilateral infiltrates, slightly improved on the left and increased on the right, with probable bilateral Kerley B-lines. Bilateral pleural effusions increased since the previous exam. The cardiomediastinal silhouette remains enlarged status post sternotomy.       Waxing and waning bilateral infiltrates/edema with increasing bilateral pleural effusions.   MACRO: None.   Signed by: Maya Christie 6/3/2025 12:49 PM Dictation workstation:   [id number]    CT chest wo IV contrast  Result Date: 6/1/2025  Interpreted By:  Andrea Montes, STUDY: CT CHEST WO IV CONTRAST;  6/1/2025 3:48 pm   INDICATION: Shortness-of-breath   COMPARISON: CXR performed 05/30/2025    ACCESSION NUMBER(S): [id number]   ORDERING CLINICIAN: YO BUENO   TECHNIQUE: Helical data acquisition of the chest was obtained  without IV contrast material.  Images were reformatted in axial, coronal, and sagittal planes.   FINDINGS: LUNGS AND AIRWAYS: There are patchy consolidative/alveolar opacities bilaterally noted, some of which are solid and others mixed attenuation and present within all lobes, greater left than right. Findings are superimposed on interlobular septal thickening and scattered areas of parenchymal nodularity bilaterally which are somewhat motion blurred. Small-to-moderate bilateral pleural effusions are also present. No pneumothorax is seen on either side. No central airway obstruction.   MEDIASTINUM AND MELLISA, LOWER NECK AND AXILLA: No significantly enlarged intrathoracic lymph nodes are identified. The esophagus is patulous and air-filled. No masses are identified in the lower neck   HEART AND VESSELS: The heart is enlarged. The patient is status post prior sternotomy with aortic root and ascending aortic repair and placement of a prosthetic aortic valve. No evident postoperative complication on this noncontrast exam. Scattered atherosclerotic calcifications. The pulmonary trunk is severely dilated, measures 3.6 cm. No thoracic aortic aneurysm. The heart is enlarged with severe biatrial dilatation. Coarsely calcified mitral valve annulus. There is evidence for anemia with increased attenuation of the myocardium relative to the blood pool.   UPPER ABDOMEN: No acute upper abdominal finding is identified on this noncontrast exam   CHEST WALL AND OSSEOUS STRUCTURES: No significant soft tissue findings. No lytic or blastic osseous lesion is identified. Mild anterior wedge compression deformity of the T10 vertebral body level.       1. Findings most compatible with interstitial and alveolar edema with interstitial thickening and patchy consolidative areas/areas of alveolar opacity, mixed  solid and sub-solid in attenuation with areas of nodularity also noted, some with tree-in-bud configuration. The possibility of a superimposed pneumonia/pneumonitis and bronchiolitis is not excluded. Advise short-term radiographic follow-up to reassess. Appearance on the  radiograph from the current CT scan quite similar to the 05/30/2025 CXR. If the chest radiograph appearance does not entirely normalized, follow-up CT will be warranted. 2. Prior sternotomy and repair of the aortic root and ascending aorta with mechanical aortic valve replacement. Atherosclerotic disease. Severe biatrial dilatation. Severely dilated pulmonary trunk. Advise attention on recent echocardiogram. 3. Evidence for anemia.   MACRO: None   Signed by: Andrea Montes 6/1/2025 5:40 PM Dictation workstation:   [id number]    XR chest 1 view  Result Date: 5/30/2025  Interpreted By:  Iram Baca, STUDY: XR CHEST 1 VIEW; 5/30/2025 9:36 am   INDICATION: Signs/Symptoms:leukocytosis, hypoxia   COMPARISON: Radiographs 05/27/2025   ACCESSION NUMBER(S): [id number]   ORDERING CLINICIAN: DEEP PORTILLO   TECHNIQUE: Single frontal view of the chest performed.   FINDINGS:   LINES AND DEVICES: None.   LUNGS: New bilateral perihilar and left basilar airspace opacities are noted. There is new blunting of the right costophrenic angle suggestive of a trace effusion. No pneumothorax.   CARDIOMEDIASTINAL SILHOUETTE: Stable cardiomegaly. Prior median sternotomy.         New bilateral, left greater than right, airspace opacities compatible with pneumonia, although pulmonary edema may appear similarly.   MACRO None   Signed by: Iram Baca 5/30/2025 12:33 PM Dictation workstation:   [id number]    Assessment & Plan  Symptomatic anemia    Aortic valve replaced    Robert Luna is a 59 y.o. male with past medical history of AVR (s/p mechanical valve on Coumadin), congenital aortic coarctation, HLD, KEV, MAHESH who presented to [facility] ED on 5/27 for shortness of  breath that had been persistent for the previous month but had acutely worsened in the 24 hours prior to his presentation.  Labs were without leukocytosis, H/H 8.3/27.8, . He was transferred to [facility] for suspected occult GI blood loss with symptomatic anemia. EGD/colonoscopy done on 5/29 demonstrating no old or active bleeding.  Patient was started on Venofer & was transfused 1 PRBC on 5/29.  On 5/30 patient had new hypoxic episode requiring 4 L, concerning for post endoscopy aspiration event.  CXR showed bilateral airspace opacities with new leukocytosis patient was started on Unasyn (5/30-6/3).  CT chest completed on 6/1 demonstrating multifocal pneumonia with interstitial and alveolar edema.  Leukocytosis noted on 6/2.  Flu A/B, RSV and COVID were all negative.  Cardiology consulted on 6/2 for suspected heart failure exacerbation.  Echo completed on 6/3 with patient experiencing a hypoxic event requiring escalation to Airvo 50 L / 50%.  MRSA swab was negative.  Patient was transitioned to IV Zosyn and fluconazole (for candida esophagitis).  Pulmonology is now consulted to assist in further evaluation and management of acute hypoxic respiratory failure following suspected aspiration.      Impressions:     #Acute hypoxic respiratory failure: Multifactorial 2/2 causes below, post EGD aspiration, worsening MR/TR, MVP; baseline is room air, currently requiring Airvo     #Pneumonia, multifocal +/- pneumonitis: CT demonstrating patchy consolidative alveolar opacities in all lobes (L>R); likely aspiration given failed swallow eval on 6/2  Completed Unasyn 5/30 - 6/3  Started on Zosyn 6/3; adding azithromycin IV 5 mg daily x 3 days  Flu, RSV and COVID-negative; MRSA swab negative  Legionella urine antigens negative  Strep urine antigen negative  Respiratory culture 6/2 culture in progress  Procalcitonin 0.22      #Pleural effusions, bilateral: Small to moderate noted on CT chest, parapneumonic versus heart failure;  currently on high oxygen requirements without acute distress     #Volume overload/acute on chronic HFpEF: 2024 with pseudo normal pattern of LV diastolic filling; echo with CT noting interlobular septal thickening, admission ; + bilateral pleural effusions     #Pulmonary hypertension: CT on 6/1 with dilated pulmonary trunk at 3.6 cm; echo 6/3 with RVSP 60 mmHg, severely enlarged RV with mildly reduced RV systolic function ISO moderate to severe TR and severe MR and MV prolapse of anterior and posterior leaflets  previous echo 02/2024 normal RV, normal RV systolic function and RVSP 40 mmHg     #Dysphagia with concern for aspiration: patient with previous history of noting difficulty swallowing and choking; failed swallow evaluation on 6/2, only allowed single ice chips as tolerated (3-5 an hour)     #Poor dentition: one lower tooth looks decayed; concern for aspiration of oral secretions      Recommendations:  - Continue supplemental oxygen, wean for saturation 92-95%; currently on AirVo  - Home O2 eval prior to discharge  - Continue scheduled and as needed DuoNebs  - Continue pulmonary hygiene with I-S and Acapella; may need to add EZ Pap   - Adding Mucinex, Flonase  - Antibiotics per primary; adding azithromycin for atypical coverage  - Diuresis as renal function and hemodynamics allows; currently on 40 mg IV Lasix twice daily  - Monitor pleural effusions for now; no immediate need for thoras  - Agree with MBSS evaluation  - Follow-up on pending studies as above  - Frequent oral care (use oral kits with /sponges and mouth wash)  - DVT prophylaxis: With SCDs, resuming Coumadin  - Patient will need to follow-up with primary care with repeat noncontrasted CT chest in ~8 weeks to ensure resolution of infiltrates; if questions or concerns persist, PCP can refer to pulmonary medicine at that time     Thank you for the consult.  Pulmonology will follow.    I spent 35 minutes in the professional and overall  care of this patient.   DAVID Motta-CNS         [1] docusate sodium, 100 mg, oral, BID  ferrous sulfate, 65 mg of elemental iron, oral, Daily  fluconazole, 200 mg, oral, Daily  fluticasone, 2 spray, Each Nostril, Daily  furosemide, 40 mg, intravenous, BID  ipratropium-albuteroL, 3 mL, nebulization, TID  metoprolol succinate XL, 25 mg, oral, Daily  piperacillin-tazobactam, 3.375 g, intravenous, q6h  polyethylene glycol, 17 g, oral, Nightly  sennosides, 1 tablet, oral, Nightly  [Held by provider] warfarin, 1 mg, oral, Once per day on Monday Wednesday  [START ON 6/5/2025] warfarin, 1.5 mg, oral, Once per day on Sunday Thursday Friday     [2] PRN medications: acetaminophen **OR** acetaminophen **OR** acetaminophen, guaiFENesin, ipratropium-albuteroL, ondansetron **OR** ondansetron, oxygen

## 2025-06-04 NOTE — H&P
History Of Present Illness  Robert Luna is a 59 y.o. male presenting with aortic stenosis s/p AVR, severe aortic gradients noted on TTE 6/3/25, here for fluoroscopy of aortic valve. PMH includes aortic stenosis s/p aortic valve replacement in 1997 on Coumadin, hypertension, iron deficiency anemia, congenital aortic coarctation, HLD, KEV.    Past Medical History:  Medical History[1]     Past Surgical History:  Surgical History[2]       Social History:  Social History[3]    Family History:  Family History[4]     Allergies:  RX Allergies[5]     Home Medications:  Current Outpatient Medications   Medication Instructions    metoprolol succinate XL (Toprol-XL) 25 mg 24 hr tablet TAKE 1 TABLET(25 MG) BY MOUTH EVERY 24 HOURS    warfarin (COUMADIN) 1 mg, oral, See admin instructions       Inpatient Medications:  Scheduled Medications[6]  PRN Medications[7]  Continuous Medications[8]      Review of Systems   Constitutional:  Positive for fatigue.   HENT: Negative.     Eyes: Negative.    Respiratory: Negative.     Cardiovascular: Negative.    Gastrointestinal: Negative.    Endocrine: Negative.    Genitourinary: Negative.    Musculoskeletal: Negative.    Skin: Negative.    Allergic/Immunologic: Negative.    Neurological: Negative.    Hematological: Negative.    Psychiatric/Behavioral: Negative.            Physical Exam  Constitutional:       General: He is awake.      Appearance: He is ill-appearing.   Cardiovascular:      Rate and Rhythm: Normal rate and regular rhythm.      Pulses:           Radial pulses are 2+ on the right side and 2+ on the left side.        Dorsalis pedis pulses are 1+ on the right side and 1+ on the left side.      Heart sounds: Murmur heard.   Pulmonary:      Effort: Accessory muscle usage present.      Breath sounds: Decreased breath sounds present.      Comments: On 15L NRB  Purse-lip breathing noted  Abdominal:      General: Bowel sounds are normal.      Palpations: Abdomen is soft.      Tenderness:  "There is no abdominal tenderness.   Musculoskeletal:      Right lower leg: No edema.      Left lower leg: No edema.   Skin:     General: Skin is warm and dry.   Neurological:      General: No focal deficit present.      Mental Status: He is alert and oriented to person, place, and time.      GCS: GCS eye subscore is 4. GCS verbal subscore is 5. GCS motor subscore is 6.   Psychiatric:         Mood and Affect: Mood normal.         Behavior: Behavior is cooperative.        Sedation Plan    ASA 3     Mallampati class: III.           Last Recorded Vitals  Blood pressure 113/61, pulse 77, temperature 37 °C (98.6 °F), temperature source Temporal, resp. rate 19, height 1.499 m (4' 11\"), weight (!) 40.6 kg (89 lb 8.1 oz), SpO2 100%.    Oxygen Dose: *50 L/min (current setting)    Vitals from the Past 24 Hours  Heart Rate:  [77-96]   Temp:  [36.4 °C (97.5 °F)-37.2 °C (99 °F)]   Resp:  [18-29]   BP: (100-145)/(61-66)   Height:  [149.9 cm (4' 11\")]   Weight:  [40.6 kg (89 lb 8.1 oz)]   SpO2:  [91 %-100 %]     Oxygen Dose: *50 L/min (current setting)    Relevant Results    Labs      CBC:   Recent Labs     06/04/25  0518 06/03/25  0805 06/02/25  0655 06/01/25  0700 05/31/25  0744 05/30/25  0702   WBC 9.9 10.0 12.1* 10.8 9.7 17.9*   HGB 8.6* 8.6* 8.2* 8.2* 7.1* 8.6*   HCT 28.5* 30.7* 26.9* 27.5* 22.8* 27.3*    242 191 199 182 207   MCV 87 88 83 82 79* 80     BMP/CMP:   Recent Labs     06/04/25  0518 06/03/25  0715 06/02/25  0655 06/01/25  0700 05/31/25  0744 05/30/25  0702 05/28/25  0621 05/27/25  1605 09/02/22  1035    140 139 139 138 135*   < > 136 140   K 3.4* 4.3 3.6 4.5 4.2 4.2   < > 4.4 4.5   CL 98 100 102 106 108* 105   < > 107 102   BUN 33* 25* 20 16 16 10   < > 27* 13   CREATININE 0.77 0.63 0.56 0.59 0.63 0.59   < > 0.75 0.6   CO2 28 25 26 27 20* 20*   < > 22 26   CALCIUM 8.5* 9.0 9.0 8.5* 8.4* 8.4*   < > 8.7 9.7   PROT  --   --   --   --   --   --   --  6.9 7.2   BILITOT  --   --   --   --   --   --   --  0.7 " "0.5   ALKPHOS  --   --   --   --   --   --   --  52 75   ALT  --   --   --   --   --   --   --  16 24   AST  --   --   --   --   --   --   --  23 36   GLUCOSE 79 91 133* 119* 130* 93   < > 89 99    < > = values in this interval not displayed.      Magnesium:   Recent Labs     06/04/25  0518 06/03/25  0715 06/02/25  0655   MG 1.81 1.87 1.80     Lipid Panel:   Recent Labs     09/02/22  1035   CHOL 147   HDL 39*   CHHDL 3.8   TRIG 99     Cardiac   Results from last 7 days   Lab Units 06/01/25  0700   BNP pg/mL 1,832*      Hemoglobin A1C:   Recent Labs     07/12/22  1621   HGBA1C 5.4     TSH/ Free T4:   Recent Labs     09/02/22  1035   TSH 1.67     Iron:   Recent Labs     06/01/25  0700 05/28/25  0621 05/27/25  1701   FERRITIN  --  13*  --    TIBC  --  390  --    IRONSAT  --  4*  --    BNP 1,832*  --  867*     Coag:   Results from last 7 days   Lab Units 06/04/25  0518 06/03/25  0924 06/02/25  0655 06/01/25  0659 05/31/25  0525 05/30/25  0231 05/29/25  0637   INR  3.7* 2.2* 2.1* 1.9* 2.1* 2.9* 2.2*     ABO: No results found for: \"ABO\"    Past Cardiology Tests (Last 3 Years):    EKG:  Recent Labs     05/27/25  1604   ATRRATE 69   VENTRATE 69   PRINT 136   QRSDUR 86   QTCFRED 402   QTCCALCB 411     Encounter Date: 05/27/25   ECG 12 Lead   Result Value    Ventricular Rate 69    Atrial Rate 69    ME Interval 136    QRS Duration 86    QT Interval 384    QTC Calculation(Bazett) 411    P Axis 49    R Axis 79    T Axis 73    QRS Count 11    Q Onset 220    P Onset 152    P Offset 215    T Offset 412    QTC Fredericia 402    Narrative    Normal sinus rhythm  Normal ECG  Confirmed by Brandon Greene ([id number]) on 6/1/2025 8:48:34 PM     Echo:  Echocardiogram:   Transthoracic Echo (TTE) Complete 06/03/2025    PHYSICIAN INTERPRETATION:  Left Ventricle: Left ventricular ejection fraction is normal calculated by Leos's biplane at 71%. There are no regional left ventricular wall motion abnormalities. The left ventricular cavity size is " normal. There is normal septal and normal posterior left ventricular wall thickness. There is left ventricular concentric remodeling. Left ventricular diastolic filling cannot be determined due to mitral valve repair/replacement.  Left Atrium: The left atrial size is severely dilated.  Right Ventricle: The right ventricle is severely enlarged. There is mildly reduced right ventricular systolic function.  Right Atrium: The right atrium is severely dilated.  Aortic Valve: There is a prosthetic aortic valve present. The aortic valve area by VTI is 0.50 cmï¿½ with a peak velocity of 4.29 m/s. The peak and mean gradients are 58 mmHg and 34 mmHg, respectively, with a dimensionless index of 0.21. There is evidence of severe aortic valve stenosis. There is a [employer] Bryce bileaflet mechanical type aortic valve prosthesis with a 21 mm reported size. There is mild aortic valve regurgitation. The mechanical AV leaflet mobility could not be properly assessed due to prosthetic valve artifact. It is recommended to assess leaflet motion and opening/closing angles with cinefluoroscopy.  Mitral Valve: Status post mitral valve repair. The doppler estimated peak and mean diastolic pressure gradients are 30.8 mmHg and 9 mmHg, respectively. Barlows syndrome is present. There is mitral valve prolapse of the anterior and posterior leaflets. There is mild calcification of the mitral leaflets and subvalvular apparatus. The mean gradient of the mitral valve is 9 mmHg. There is severe mitral valve regurgitation. The E Vmax is 2.55 m/s. There is probable presence of an underlying MV annular repair with prosthetic ring (no prior history of MVr available).  Tricuspid Valve: The tricuspid valve is abnormal. The tricuspid valve annulus appears dilated. There is moderate to severe tricuspid regurgitation. The doppler estimated RVSP is moderately elevated with a right ventricular systolic pressure of 60 mmHg.  Pulmonic Valve: The pulmonic valve is  structurally normal. There is trace to mild pulmonic valve regurgitation.  Pericardium: Trivial pericardial effusion.  Pleural: There is left pleural effusion.  Aorta: The aortic root is normal. There is a prosthetic graft seen in the position of the ascending aorta.  In comparison to the previous echocardiogram(s): Compared with study dated 2/6/2024, the degree of aortic valve stenosis has slightly increased from 65/2 mmHg to 73/34 mmHg, with an AME of 0.5-0.77 cm2 and a DI of 0.21. The degree of MR has increased from moderate to severe and the RVSP has increased from 40 mmHg to 60 mmHg. Underlying infection/endocarditis to explain progression of MR could not be ruled out in this study. JOANNE assessment is recommended if clinically indicated.    CONCLUSIONS:  1. Left ventricular ejection fraction is normal calculated by Leos's biplane at 71%.  2. There is mildly reduced right ventricular systolic function.  3. Severely enlarged right ventricle.  4. The Doppler-estimated right ventricular systolic pressure (RVSP) is moderately elevated at 60 mmHg.  5. The left atrial size is severely dilated.  6. The right atrium is severely dilated.  7. Severe aortic valve stenosis. The peak and mean gradients are 73 mmHg and 34 mmHg respectively.  8. There is a [employer]. Bryce bileaflet mechanical type aortic valve prosthesis with a 21 mm reported size. The peak and mean gradients are 73 mmHg and 34 mmHg respectively.  9. Mild aortic valve regurgitation.  10. There is probable presence of an underlying MV annular repair with prosthetic ring (no prior history of MVr available).  11. Mitral valve Barlows syndrome is present, with prolapse of anterior and posterior leaflets.  12. Severe mitral valve regurgitation.  13. There is mild calcification of the mitral leaflets and subvalvular apparatus.  14. Moderate to severe tricuspid regurgitation visualized.  15. The tricuspid valve annulus appears dilated.  16. Prosthetic graft in the  ascending aorta position.  17. Compared with study dated 2/6/2024, the degree of aortic valve stenosis has slightly increased from 65/2 mmHg to 73/34 mmHg, with an AME of 0.5-0.77 cm2 and a DI of 0.21. The degree of MR has increased from moderate to severe and the RVSP has increased from 40 mmHg to 60 mmHg. Underlying infection/endocarditis to explain progression of MR could not be ruled out in this study. JOANNE assessment is recommended if clinically indicated.    RECOMMENDATIONS:  Based on the results of this study and as mechanical AV leaflet mobility could not be properly assessed due to prosthetic valve artifact, it is recommended to assess leaflet motion and opening/closing angles with cinefluoroscopy.      Ejection Fractions:  LV EF   Date/Time Value Ref Range Status   06/03/2025 12:29 PM 71 %      Cath:  Coronary Angiography: No results found for this or any previous visit from the past 1800 days.    Right Heart Cath: No results found for this or any previous visit from the past 1800 days.    Stress Test:  Nuclear:No results found for this or any previous visit from the past 1800 days.    Metabolic Stress: No results found for this or any previous visit from the past 1800 days.    Cardiac Imaging:  Cardiac Scoring: No results found for this or any previous visit from the past 1800 days.    Cardiac MRI: No results found for this or any previous visit from the past 1800 days.         Assessment/Plan  Assessment/Plan   Assessment & Plan  Symptomatic anemia    Aortic valve replaced    aortic stenosis s/p AVR, severe aortic gradients noted on TTE 6/3/25  -fluoroscopy of aortic valve with Dr. Guo on 6/4/25       NP discussed with Dr. Guo regarding plan of care/ discharge plan      I spent 30 minutes in the professional and overall care of this patient.      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    Drug use: Never   [4]   Family History  Problem Relation Name Age of Onset    Cancer Mother Alena Luna     Cancer Father Guy Luna    [5] No Known Allergies  [6]   Scheduled medications   Medication Dose Route Frequency    docusate sodium  100 mg oral BID    ferrous sulfate  65 mg of elemental iron oral Daily    fluconazole  200 mg oral Daily    fluticasone  2 spray Each Nostril Daily    furosemide  40 mg intravenous BID    ipratropium-albuteroL  3 mL nebulization TID    magnesium sulfate  2 g intravenous Once    metoprolol succinate XL  25 mg oral Daily    piperacillin-tazobactam  3.375 g intravenous q6h    polyethylene glycol  17 g oral Nightly    potassium chloride  20 mEq intravenous Once    sennosides  1 tablet oral Nightly    [Held by provider] warfarin  1 mg oral Once per day on Monday Wednesday    [START ON 6/5/2025] warfarin  1.5 mg oral Once per day on Sunday Thursday Friday   [7]   PRN medications   Medication    acetaminophen    Or    acetaminophen    Or    acetaminophen    guaiFENesin    ipratropium-albuteroL    ondansetron    Or    ondansetron    oxygen   [8]   Continuous Medications   Medication Dose Last Rate

## 2025-06-04 NOTE — PROGRESS NOTES
06/04/25 1506   Discharge Planning   Expected Discharge Disposition Home   Does the patient need discharge transport arranged? Yes   RoundTrip coordination needed? Yes   Has discharge transport been arranged? No        Patient's oxygen demand increased last night and he was placed on Airvo and transferred to SDU. Patient is diuresing with IV Lasix. He is on IV abx for PNA. He will have MBS today. His H/H this morning was 28.5/ 8.6. Plan is to go home when medically ready.

## 2025-06-04 NOTE — PROGRESS NOTES
Robert Luna is a 59 y.o. male admitted for symptomatic anemia. Pharmacy has been consulted for warfarin dosing and monitoring for Aortic Valve Replacement with goal INR of 2.0-3.0.     Home regimen   MON TUE WED THR FRI SAT SUN   Dose 1  mg 1.5  mg 1  mg 1.5  mg 1.5  mg 1  mg 1.5  mg   Total weekly dose: 9 mg  Source: [facility] Note from 5/22/2025    Labs  INR: 3.7   Hgb/Hct/Plt  Lab Results   Component Value Date    HGB 8.6 (L) 06/04/2025    HGB 8.6 (L) 06/03/2025    HGB 8.2 (L) 06/02/2025    HGB 8.2 (L) 06/01/2025    HGB 7.1 (L) 05/31/2025        Lab Results   Component Value Date    HCT 28.5 (L) 06/04/2025    HCT 30.7 (L) 06/03/2025    HCT 26.9 (L) 06/02/2025    HCT 27.5 (L) 06/01/2025    HCT 22.8 (L) 05/31/2025        Platelets   Date Value Ref Range Status   06/04/2025 197 150 - 450 x10*3/uL Final   06/03/2025 242 150 - 450 x10*3/uL Final   06/02/2025 191 150 - 450 x10*3/uL Final   06/01/2025 199 150 - 450 x10*3/uL Final   05/31/2025 182 150 - 450 x10*3/uL Final      Warfarin Therapy   Current regimen: resuming home reigmen  Bridging: None needed   Interacting medications: interacting medication(s) of Fluconazole, Unasyn increase bleeding risk    Dosing History During Current Admission  Date 5/28 5/29 5/30 5/31 6/1 6/2 6/3 6/4   INR 1.9 2.2 2.9 2.1 1.9 2.1 2.2 3.7   Dose  (mg) See plan 1.5 mg 1.5 mg 1 mg 1.5 mg 1 mg Hold Hold     Assessment and Plan  Patient's INR of 3.7 today supratherapeutic. Hemaglobin/hematocrit/platelets are stable.  Warfarin inpatient plan: Hold dose again today. INR increased from 2.2 to 3.7 in one day. No dose was given yesterday. Will check AM INR  Patient started on 14 day course of Fluconazole for Candida per GI. Plans to hold Warfarin on Tuesdays and Saturdays to reduce overall weekly dose by at least 20% due to interaction. Ok'd by ALEJANDRO and Dr. Walker.  Monitor s/sx of bleeding including epistaxis, hematuria, unusual bruising, hemoptysis, hematochezia as well as s/sx of stroke  including impaired speech, unilateral paralysis, blurry vision.  Pharmacy will continue to monitor the patient and adjust therapy as needed.      Thank you for the consult. Please do not hesitate to contact a pharmacist with any questions.    Vaishali Cloud, PharmD

## 2025-06-04 NOTE — PROGRESS NOTES
[facility] Hospitalist Progress Note      Between 7AM-7PM please message me via Epic Secure Chat.  After 7PM please page Nocturnist on call.        Assessment/Plan     Acute Problems    Acute hypoxic respiratory failure   Aspiration PNA/pneumonitis  Severe stenosis of his mechanical aortic valve as well as severe mitral regurgitation  Elevated RV pressures  Bilateral pleural effusions  Iron def anemia  Possible occult GI blood loss    Chronic Problems    Mechanical aortic valve replacement - INR goal 2-3  Ascending aorta repair  HTN    Plan    - pulm consult -> suspect aspiration PNA/pneumonitis big driving factor. Continue IV Vanc/Zosyn/Azithro for now. Remains of HFNC support.  - ID consult to assist with PNA/abx management appreciated  - SLP appreciated -> failed MBS. NPO rec for now with considerations for GOC/alternative means of nutrition  - cardiology consulted -> can consider afterload reduction/diuresis, not a surgical candidate  - GI consulted, EGD/Colon with no old or active bleeding. No abnormality to explain anemia. Outpatient VCE can be considered for complete evaluation of MAHESH. Candida esophagitis started on PO fluconazole  - Coumadin as dosed by pharmacy for hx of AVR, mindful of INR while on fluconazole may have him skip 2 doses per week if DC home  - IV Venofer 300 mg x3 doses ordered; 1 unit pRBC this admit. Trend hgb. On PO iron supplement now    Fluids: None  Electrolytes: Replete as needed  Nutrition: NPO  Ortiz: None  Invasive lines: None  Drains: None  O2: NC    DVT Prophylaxis:  Coumadin    Discharge Planning: pending GOC    Plan of care was discussed with patient/sister    Total time spent: At least 38 minutes, providing counseling or in coordination of care. Total time on this day of visit includes record and documentation review before and after visit including documentation and time not explicitly included on EMR time stamp.      Subjective     Robert Luna is a 59 y.o. male on day 8 of  "admission presenting with Symptomatic anemia.    TUTU. Remains in SDU on HFNC. His breathing is a bit less labored today.    Review of Systems   Constitutional:  Negative for fever.   Respiratory:  Positive for shortness of breath.    Cardiovascular:  Negative for chest pain.   Gastrointestinal:  Negative for abdominal distention, abdominal pain and vomiting.       Objective     Physical Exam  Vitals reviewed.   Constitutional:       General: He is not in acute distress.  Cardiovascular:      Rate and Rhythm: Normal rate and regular rhythm.   Pulmonary:      Effort: Pulmonary effort is normal.      Breath sounds: Rales present.   Abdominal:      General: There is no distension.      Palpations: Abdomen is soft.   Neurological:      Mental Status: He is alert. Mental status is at baseline.         Last Recorded Vitals  Blood pressure 108/60, pulse 79, temperature 36.5 °C (97.7 °F), temperature source Temporal, resp. rate 20, height 1.499 m (4' 11\"), weight (!) 40.6 kg (89 lb 8.1 oz), SpO2 97%.    Medications  Scheduled Medications[1]   PRN Medications[2]                Miguel Walker MD  [facility]         [1] azithromycin, 500 mg, intravenous, q24h  docusate sodium, 100 mg, oral, BID  ferrous sulfate, 65 mg of elemental iron, oral, Daily  fluconazole, 200 mg, oral, Daily  fluticasone, 2 spray, Each Nostril, Daily  furosemide, 40 mg, intravenous, BID  ipratropium-albuteroL, 3 mL, nebulization, TID  metoprolol succinate XL, 25 mg, oral, Daily  piperacillin-tazobactam, 3.375 g, intravenous, q6h  polyethylene glycol, 17 g, oral, Nightly  sennosides, 1 tablet, oral, Nightly  vancomycin, 750 mg, intravenous, q12h  [Held by provider] warfarin, 1 mg, oral, Once per day on Monday Wednesday  [START ON 6/5/2025] warfarin, 1.5 mg, oral, Once per day on Sunday Thursday Friday     [2] PRN medications: acetaminophen **OR** acetaminophen **OR** acetaminophen, guaiFENesin, ipratropium-albuteroL, ondansetron **OR** ondansetron, " oxygen, vancomycin

## 2025-06-04 NOTE — PROCEDURES
"Speech-Language Pathology     Inpatient Modified Barium Swallow Study    Patient Name: Robert Luna  MRN: [id number]  : 1965  Today's Date: 25  Time Calculation  Start Time: 1256  Stop Time: 1312  Time Calculation (min): 16 min        Modified Barium Swallow Study completed. Informed verbal consent obtained prior to completion of exam. Trials of thin liquids, mildly/moderately thick liquids, purees, solids, and barium tablet with thin liquids were administered for exam this date.  AP view not obtained due to positioning/equipment limitations and patient safety/mobility concerns. MBSS consistency and order of presentation not adhered to due to patient safety concerns. Esophageal sweep not performed given severity of dysphagia and low clinical utility.     SLP: Kaia Shafer, SLP   Contact info: [employer] secure chat    Reason for Referral: C/f aspiration/oropharyngeal dysphagia    Patient Hx:  \"Robert Luna is a 59 y.o. male with a history of aortic valve replacement on warfarin who presented to [facility] ER complaining of dyspnea on exertion that has become progressively worse over the past 24 hours. He was transferred from [facility] ER to [facility] for suspected occult GI blood loss with symptomatic anemia \" Diagnosed with pneumonia and acute hypoxic respiratory failure with need for Airvo 50L at 50%, which was initiated on 6/3/25.  Patient endorsing dysphagia with difficulty swallowing and choking reported.     Respiratory Status: non-rebreather face mask  Current diet: NPO w/ no means of nutrition/hydration    Pain:  Patient exhibits no s/s of pain or discomfort during exam.      Clinical Presentation: Patient presenting with generalized weakness upon arrival to radiology suite. Patient alert and seated fully upright for exam. Cough detected at baseline appearing weak in intensity. Patient exhibited an overall compromised respiratory status requiring  supplemental O2 support via NRB mask. " Increased WOB noted as exam progressed.    RECOMMENDATIONS:   -NPO (Question need for alternative nutrition/hydration)  -1-2 ice chips/hr allowed (after aggressive oral care) for safe swallow stimulation/pleasure and to reduce build-up of colonized bacteria within the oropharynx.    -SLP for dysphagia management    SLP PLAN:  Skilled SLP Services: Skilled SLP intervention for dysphagia is warranted.  SLP Frequency: 3x per week  Duration: 1 month  Treatment/Interventions:   - Oropharyngeal exercises  - Bolus trials  - Patient/caregiver education    Discussed POC: patient  Discussed Risks/Benefits: Yes  Patient/Caregiver Agreeable: Yes    Short term goals established 06/04/25:   Patient will perform oropharyngeal strengthening exercises to improve swallowing function with 90% given min cueing.      Long term goals established 06/04/25:   Patient will resume/maintain oral intake in order to promote optimal oropharyngeal swallow function and reduce risk for dehydration, malnutrition and weight loss.       Education Provided:  Results and recommendations of MBSS reviewed with patient upon completion of exam. POC discussed at this time with  concerns expressed for safety with oral diet. Verbal understanding and agreement given on all accounts. Advised patient of plan to discuss case further with MD.    Treatment Provided Today: N/A     Additional Medical Consults Suggested:   N/A    Repeat Study: Tentatively in 4-6 weeks contingent on display of overall improvement in condition, as pt appropriate, and radiology schedule permits.    Mechanics of the Swallow Summary:  ORAL PHASE:  Lip Closure - Intact  Tongue Control During Bolus Hold - Intact  Bolus prep/mastication - Not tested  Bolus transport/lingual motion - Impaired  Oral residue - Present    PHARYNGEAL PHASE:  Initiation of pharyngeal swallow - Intact  Soft palate elevation - Intact  Laryngeal elevation - Impaired  Anterior hyoid excursion - Impaired  Epiglottic  movement - Impaired  Laryngeal vestibule closure - Impaired  Pharyngeal stripping wave - Impaired  Pharyngeal contraction (A/P view) - Not tested  Pharyngoesophageal segment opening - Impaired (premature closure)  Tongue base retraction - Impaired  Pharyngeal residue - Present    ESOPHAGEAL PHASE:  Esophageal clearance - Not tested    SLP Impressions with Severity Rating:   Pt presents with mild oral and moderate-severe pharyngeal dysphagia upon completion of modified barium swallow study this date. Swallowing physiology is characterized by the above noted deficits. Impairments that most negatively impact swallowing safety and efficiency include reduced TB retraction, decreased hyolaryngeal elevation/epiglottic deflection, decreased laryngeal vestibule closure and hypomobility of PPW. Premature pharyngeal entry noted with thin liquids. Oral, vallecular and piriform sinus residue noted with all liquid consistencies. Increased piriform sinus residue noted with purees. Laryngeal penetration and silent aspiration occurred with thin, mildly/moderately thick liquids. Delayed throat clear occurred in response to aspiration. Unable to confirm if thin and mildly thick liquids effectively cleared trachea. Cued cough did not clear moderately thick aspirate from trachea, which lined posterior tracheal wall. Although no aspiration viewed with puree boluses, bolus trials limited with test controls to prevent same from occurring given degree of swallowing impairment and concerns for airway compromise. Deferred testing of solids given extent of dysphagia exhibited this date along with safety concerns. Although episodes of aspiration may appear to a mild degree, patient at high risk for aspiration due to weak and ineffective cough response, poor oral hygiene with decaying dentition, compromised respiratory status with recent pulmonary decline requiring implementation of Airvo, and limited mobility. Concern for cumulative mild  aspiration events over the course of a meal/day may predispose patient to respiratory compromise given the above stated risk factors. Question etiology of dysphagia and anticipated amount of time required for rehabilitation of swallowing ability. Repeat instrumental assessment advised as respiratory status improves to determine patient safety and readiness to initiate an oral diet.      OUTCOME MEASURES:  Functional Oral Intake Scale  Functional Oral Intake Scale: Level 1        nothing by mouth    EAT-10  N/A     Rosenbek's Penetration Aspiration Scale    Thin Liquids: 8. SILENT ASPIRATION - contrast passes glottis, visible residue, NO pt response]     Nectar Thick Liquids: 8. SILENT ASPIRATION - contrast passes glottis, visible residue, NO pt response]     Honey Thick Liquids: 8. SILENT ASPIRATION - contrast passes glottis, visible residue, NO pt response]     Puree: 1. NO ASPIRATION & NO PENETRATION - no aspiration, contrast does not enter airway    Solids: DNT

## 2025-06-04 NOTE — PROGRESS NOTES
Vancomycin Dosing by Pharmacy- INITIAL    Robert Luna is a 59 y.o. year old male who Pharmacy has been consulted for vancomycin dosing for pneumonia. Based on the patient's indication and renal status this patient will be dosed based on a goal AUC of 400-600.     Renal function is currently stable.    Visit Vitals  /60 (BP Location: Left arm, Patient Position: Lying)   Pulse 79   Temp 36.5 °C (97.7 °F) (Temporal)   Resp 20        Lab Results   Component Value Date    CREATININE 0.77 2025    CREATININE 0.63 2025    CREATININE 0.56 2025    CREATININE 0.59 2025        Patient weight is as follows:   Vitals:    25 1041   Weight: (!) 40.6 kg (89 lb 8.1 oz)       Cultures:  Susceptibility data for the encounter in last 14 days.  Collected Specimen Info Organism   25 Fluid from SPUTUM Normal throat reyes         I/O last 3 completed shifts:  In: 100 (2.5 mL/kg) [IV Piggyback:100]  Out: 1175 (28.9 mL/kg) [Urine:1175 (0.8 mL/kg/hr)]  Weight: 40.6 kg   I/O during current shift:  I/O this shift:  In: 200 [I.V.:50; IV Piggyback:150]  Out: 200 [Urine:200]    Temp (24hrs), Av.9 °C (98.4 °F), Min:36.5 °C (97.7 °F), Max:37.2 °C (99 °F)         Assessment/Plan     Patient will not be given a loading dose.  Will initiate vancomycin maintenance, 750 mg every 12 hours.    This dosing regimen is predicted by [employer] to result in the following pharmacokinetic parameters:  Exposure target: AUC24 (range) 400-600 mg/L.hr   [id number]: 452 mg/L.hr  AUC24,ss: 575 mg/L.hr  Probability of AUC24 > 400: 86 %  Ctrough,ss: 17.8 mg/L  Probability of Ctrough,ss > 20: 39 %  Follow-up level will be ordered on  at 0500 unless clinically indicated sooner.  Will continue to monitor renal function daily while on vancomycin and order serum creatinine at least every 48 hours if not already ordered.  Follow for continued vancomycin needs, clinical response, and signs/symptoms of toxicity.       Radha CORONA  Hemant, PharmD

## 2025-06-04 NOTE — CONSULTS
Inpatient consult to Palliative Care  Consult performed by: Kerri Bloom, DAVID-CNP  Consult ordered by: Miguel Walker MD          Reason For Consult  Reason for Consult: communication / medical decision making     History Of Present Illness  Robert Luna is a 59 y.o. male with past medical history of KEV, HTN, HLD, aortic valve replacement  with aortic graft 1997 on coumadin who presented to outside hospital 5/27/25 with  persistent SOB that had worsened over the prior 24 hours.  He was anemic with Hgb 8.3 and mild elevation of BNP, significant elevation of BUN so there was c/f GI bleed and patient transferred to [facility] for GI consult.  Stool + for occult blood.  On 5/29 EGD and colonoscopy NEG for acute bleeding.  Suspect anemia related to iron deficiency along with occult losses.  He started to develop hypoxia after procedure and it was suspected he might have aspirated but sx persisted and cardiology consulted for possible acute diastolic heart failure and pulm also consulted,  His 02 needs increased and he was transferred to SDU for Airvo therapy.  Echo completed 6/4/25 shows normal EF, reduced right ventricular systolic function, severely enlarged right ventricle, both atria are severely dilated, severe aortic stenosis, mild aortic valve regurg, Mitral valve with severe regurgitation, moderate to severe tricuspid regurgitation.  Patient now with significant structural cardiac changes with heart failure with cor pulmonale, aortic stenosis, bilateral pleural effusions, pulm HTN, and with acute hypoxic respiratory failure.  Patient not a candidate for a valve replacements.  He also is significantly deconditioned and malnourished with BMI of 18.08.  Additionally patient failed swallow evaluation and SLP recommending patient be NPO.  Patient does not have ACP documents.  Palliative medicine consulted to assist with goals of care.            Symptoms (0 - 10, Best to Worst)  [place] Symptom Assessment System  0-10  (Numeric) Pain Score: 0 - No pain         Personal/Social History  He reports that he has never smoked. He has never used smokeless tobacco. He reports that he does not currently use alcohol. He reports that he does not use drugs.         Past Medical History  He has a past medical history of Heart valve disease.    Surgical History  He has a past surgical history that includes Aortic valve replacement and Cardiac valve replacement (02/1997).     Family History  Family History[1]  Allergies  Patient has no known allergies.    Review of Systems   Constitutional:  Positive for fatigue and unexpected weight change.        20# weight loss over the last year   HENT:  Positive for sore throat and trouble swallowing.    Eyes: Negative.    Respiratory:  Positive for shortness of breath.    Cardiovascular: Negative.  Negative for chest pain, palpitations and leg swelling.   Gastrointestinal:  Positive for constipation.        Last BM about 4 days ago, he is passing gas   Endocrine: Negative.    Genitourinary: Negative.    Musculoskeletal: Negative.    Skin: Negative.    Allergic/Immunologic: Negative.    Neurological:  Positive for weakness.   Hematological: Negative.    Psychiatric/Behavioral: Negative.          Physical Exam  Constitutional:       Appearance: He is ill-appearing.      Comments: Patient of short stature and small frame     HENT:      Head: Normocephalic and atraumatic.      Nose: Nose normal.      Mouth/Throat:      Mouth: Mucous membranes are moist.      Pharynx: Oropharynx is clear.   Eyes:      Conjunctiva/sclera: Conjunctivae normal.      Pupils: Pupils are equal, round, and reactive to light.   Cardiovascular:      Rate and Rhythm: Normal rate and regular rhythm.      Heart sounds: Murmur heard.   Pulmonary:      Effort: Pulmonary effort is normal.      Breath sounds: Normal breath sounds.      Comments: On high cristofer  Abdominal:      General: Abdomen is flat. Bowel sounds are normal.      Palpations:  "Abdomen is soft.   Genitourinary:     Comments: deferred  Musculoskeletal:         General: No swelling. Normal range of motion.      Cervical back: Normal range of motion.   Skin:     General: Skin is warm and dry.   Neurological:      Mental Status: He is alert and oriented to person, place, and time.   Psychiatric:         Mood and Affect: Mood normal.         Behavior: Behavior normal.         Last Recorded Vitals  Blood pressure 108/60, pulse 79, temperature 36.5 °C (97.7 °F), temperature source Temporal, resp. rate 20, height 1.499 m (4' 11\"), weight (!) 40.6 kg (89 lb 8.1 oz), SpO2 92%.    Relevant Results  Transthoracic Echo (TTE) Complete  Result Date: 6/3/2025   [facility], [address] [place] [address]              Tel [phone] and Fax [phone] TRANSTHORACIC ECHOCARDIOGRAM REPORT  Patient Name:       VLAD Mitchell Physician:    [id number] Ulysses Amin MD Study Date:         6/3/2025            Ordering Provider:    [id number] YO BUENO MRN/PID:            [id number]            Fellow: Accession#:         [id number]        Nurse: Date of Birth/Age:  1965 / 59 years Sonographer:          Rahda Farias RDCS Gender assigned at  M                   Additional Staff: Birth: Height:             149.99 cm           Admit Date:           6/2/2025 Weight:             40.37 kg            Admission Status:     Inpatient -                                                               Routine BSA / BMI:          1.31 m2 / 17.94     Encounter#:           [id number]                     kg/m2 Blood Pressure:     165/94 mmHg         Department Location:  [facility] Non                                                               Invasive Study Type:    TRANSTHORACIC ECHO (TTE) COMPLETE Diagnosis/ICD: 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 Pertinent History: MV disease. 1997 [place] Mechanical Aortic Valve replacement. Study Detail: The following Echo studies were performed: 2D, M-Mode, Doppler and               color flow. Technically challenging study due to poor acoustic               windows and body habitus.  PHYSICIAN INTERPRETATION: Left Ventricle: Left ventricular ejection fraction is normal calculated by Leos's biplane at 71%. There are no regional left ventricular wall motion abnormalities. The left ventricular cavity size is normal. There is normal septal and normal posterior left ventricular wall thickness. There is left ventricular concentric remodeling. Left ventricular diastolic filling cannot be determined due to mitral valve repair/replacement. Left Atrium: The left atrial size is severely dilated. Right Ventricle: The right ventricle is severely enlarged. There is mildly reduced right ventricular systolic function. Right Atrium: The right atrium is severely dilated. Aortic Valve: There is a prosthetic aortic valve present. The aortic valve area by VTI is 0.50 cmï¿½ with a peak velocity of 4.29 m/s. The peak and mean gradients are 58 mmHg and 34 mmHg, respectively, with a dimensionless index of 0.21. There is evidence of severe aortic valve stenosis. There is a [employer] [place]ce bileaflet mechanical type aortic valve prosthesis with a 21 mm reported size. There is mild aortic valve regurgitation. The mechanical AV leaflet mobility could not be properly assessed due to prosthetic valve artifact. It is recommended to assess leaflet motion and opening/closing angles with cinefluoroscopy. Mitral Valve: Status post mitral valve repair. The doppler estimated peak and mean diastolic pressure gradients are 30.8 mmHg and 9 mmHg, respectively. Barlows syndrome is present. There is mitral valve prolapse of the anterior and  posterior leaflets. There is mild calcification of the mitral leaflets and subvalvular apparatus. The mean gradient of the mitral valve is 9 mmHg. There is severe mitral valve regurgitation. The E Vmax is 2.55 m/s. There is probable presence of an underlying MV annular repair with prosthetic ring (no prior history of MVr available). Tricuspid Valve: The tricuspid valve is abnormal. The tricuspid valve annulus appears dilated. There is moderate to severe tricuspid regurgitation. The doppler estimated RVSP is moderately elevated with a right ventricular systolic pressure of 60 mmHg. Pulmonic Valve: The pulmonic valve is structurally normal. There is trace to mild pulmonic valve regurgitation. Pericardium: Trivial pericardial effusion. Pleural: There is left pleural effusion. Aorta: The aortic root is normal. There is a prosthetic graft seen in the position of the ascending aorta. In comparison to the previous echocardiogram(s): Compared with study dated 2/6/2024, the degree of aortic valve stenosis has slightly increased from 65/2 mmHg to 73/34 mmHg, with an AME of 0.5-0.77 cm2 and a DI of 0.21. The degree of MR has increased from moderate to severe and the RVSP has increased from 40 mmHg to 60 mmHg. Underlying infection/endocarditis to explain progression of MR could not be ruled out in this study. JOANNE assessment is recommended if clinically indicated.  CONCLUSIONS:  1. Left ventricular ejection fraction is normal calculated by Leos's biplane at 71%.  2. There is mildly reduced right ventricular systolic function.  3. Severely enlarged right ventricle.  4. The Doppler-estimated right ventricular systolic pressure (RVSP) is moderately elevated at 60 mmHg.  5. The left atrial size is severely dilated.  6. The right atrium is severely dilated.  7. Severe aortic valve stenosis. The peak and mean gradients are 73 mmHg and 34 mmHg respectively.  8. There is a [employer] Bryce bileaflet mechanical type aortic valve prosthesis  with a 21 mm reported size. The peak and mean gradients are 73 mmHg and 34 mmHg respectively.  9. Mild aortic valve regurgitation. 10. There is probable presence of an underlying MV annular repair with prosthetic ring (no prior history of MVr available). 11. Mitral valve Barlows syndrome is present, with prolapse of anterior and posterior leaflets. 12. Severe mitral valve regurgitation. 13. There is mild calcification of the mitral leaflets and subvalvular apparatus. 14. Moderate to severe tricuspid regurgitation visualized. 15. The tricuspid valve annulus appears dilated. 16. Prosthetic graft in the ascending aorta position. 17. Compared with study dated 2/6/2024, the degree of aortic valve stenosis has slightly increased from 65/2 mmHg to 73/34 mmHg, with an AME of 0.5-0.77 cm2 and a DI of 0.21. The degree of MR has increased from moderate to severe and the RVSP has increased from 40 mmHg to 60 mmHg. Underlying infection/endocarditis to explain progression of MR could not be ruled out in this study. JOANNE assessment is recommended if clinically indicated. RECOMMENDATIONS: Based on the results of this study and as mechanical AV leaflet mobility could not be properly assessed due to prosthetic valve artifact, it is recommended to assess leaflet motion and opening/closing angles with cinefluoroscopy.  QUANTITATIVE DATA SUMMARY:  2D MEASUREMENTS:          Normal Ranges: LAs:             4.39 cm  (2.7-4.0cm) IVSd:            0.77 cm  (0.6-1.1cm) LVPWd:           0.70 cm  (0.6-1.1cm) LVIDd:           2.96 cm  (3.9-5.9cm) LVIDs:           2.09 cm LV Mass Index:   39 g/m2 LVEDV Index:     55 ml/m2 LV % FS          29.5 %  LEFT ATRIUM:                  Normal Ranges: LA Vol A4C:        92.2 ml    (22+/-6mL/m2) LA Vol A2C:        107.0 ml LA Vol BP:         102.7 ml LA Vol Index A4C:  70.5ml/m2 LA Vol Index A2C:  81.8 ml/m2 LA Vol Index BP:   78.5 ml/m2 LA Area A4C:       28.9 cm2 LA Area A2C:       30.1 cm2 LA Major [place]  A4C: 7.7 cm LA Major Axis A2C: 7.2 cm LA Volume Index:   79.5 ml/m2 LA Vol A4C:        84.6 ml LA Vol A2C:        103.2 ml LA Vol Index BSA:  71.8 ml/m2  RIGHT ATRIUM:                 Normal Ranges: RA Vol A4C:        90.1 ml    (8.3-19.5ml) RA Vol Index A4C:  68.9 ml/m2 RA Area A4C:       24.8 cm2 RA Major Axis A4C: 5.8 cm  M-MODE MEASUREMENTS:         Normal Ranges: LAs:                 3.79 cm (2.7-4.0cm)  AORTA MEASUREMENTS:         Normal Ranges: Asc Ao, d:          3.20 cm (2.1-3.4cm)  LV SYSTOLIC FUNCTION:                      Normal Ranges: EF-A4C View:    71 % (>=55%) EF-A2C View:    71 % EF-Biplane:     71 % LV EF Reported: 71 %  LV DIASTOLIC FUNCTION:           Normal Ranges: MV Peak E:             2.55 m/s  (0.7-1.2 m/s) MV Peak A:             1.54 m/s  (0.42-0.7 m/s) E/A Ratio:             1.66      (1.0-2.2) MV e'                  0.075 m/s (>8.0) MV lateral e'          0.06 m/s MV medial e'           0.09 m/s E/e' Ratio:            34.06     (<8.0)  MITRAL VALVE:           Normal Ranges: MV Vmax:      2.77 m/s  (<=1.3m/s) MV peak P.8 mmHg (<5mmHg) MV mean P.0 mmHg  (<2mmHg) MV VTI:       49.52 cm  (10-13cm) MV DT:        109 msec  (150-240msec)  MITRAL INSUFFICIENCY:             Normal Ranges: MR VTI:               116.02 cm MR Vmax:              558.74 cm/s  AORTIC VALVE:                      Normal Ranges: AoV Vmax:                4.29 m/s  (<=1.7m/s) AoV Peak P.5 mmHg (<20mmHg) AoV Mean P.5 mmHg (1.7-11.5mmHg) LVOT Max Maco:            1.42 m/s  (<=1.1m/s) AoV VTI:                 91.00 cm  (18-25cm) LVOT VTI:                19.54 cm LVOT Diameter:           1.80 cm   (1.8-2.4cm) AoV Area, VTI:           0.50 cm2  (2.5-5.5cm2) AoV Area,Vmax:           0.77 cm2  (2.5-4.5cm2) AoV Dimensionless Index: 0.21  RIGHT VENTRICLE: RV Basal 5.60 cm RV Mid   4.10 cm RV Major 7.3 cm TAPSE:   17.0 mm RV s'    0.10 m/s  TRICUSPID VALVE/RVSP:          Normal Ranges:  Peak TR Velocity:     3.77 m/s Est. RA Pressure:     3 RV Syst Pressure:     60       (< 30mmHg) IVC Diam:             1.65 cm  PULMONIC VALVE:          Normal Ranges: PV Accel Time:  55 msec  (>120ms) PV Max Maco:     0.9 m/s  (0.6-0.9m/s) PV Max PG:      3.0 mmHg  AORTA: Asc Ao Diam 3.23 cm  [id number] Ulysses Amin MD Electronically signed on 6/3/2025 at 4:50:49 PM  ** Final (Updated) **     XR chest 1 view  Result Date: 6/3/2025  Interpreted By:  Maya Christie, STUDY: XR CHEST 1 VIEW;  6/3/2025 12:33 pm   INDICATION: Signs/Symptoms:short of breath.     COMPARISON: 05/30/2025   ACCESSION NUMBER(S): [id number]   ORDERING CLINICIAN: CJ NGUYEN   FINDINGS: Ill-defined patchy bilateral infiltrates, slightly improved on the left and increased on the right, with probable bilateral Kerley B-lines. Bilateral pleural effusions increased since the previous exam. The cardiomediastinal silhouette remains enlarged status post sternotomy.       Waxing and waning bilateral infiltrates/edema with increasing bilateral pleural effusions.   MACRO: None.   Signed by: Maya Christie 6/3/2025 12:49 PM Dictation workstation:   [id number]    ECG 12 Lead  Result Date: 6/1/2025  Normal sinus rhythm Normal ECG Confirmed by Brandon Greene ([id number]) on 6/1/2025 8:48:34 PM    CT chest wo IV contrast  Result Date: 6/1/2025  Interpreted By:  Andrea Montes, STUDY: CT CHEST WO IV CONTRAST;  6/1/2025 3:48 pm   INDICATION: Shortness-of-breath   COMPARISON: CXR performed 05/30/2025   ACCESSION NUMBER(S): [id number]   ORDERING CLINICIAN: YO BUENO   TECHNIQUE: Helical data acquisition of the chest was obtained  without IV contrast material.  Images were reformatted in axial, coronal, and sagittal planes.   FINDINGS: LUNGS AND AIRWAYS: There are patchy consolidative/alveolar opacities bilaterally noted, some of which are solid and others mixed attenuation and present within all lobes, greater left than right. Findings are superimposed on interlobular  septal thickening and scattered areas of parenchymal nodularity bilaterally which are somewhat motion blurred. Small-to-moderate bilateral pleural effusions are also present. No pneumothorax is seen on either side. No central airway obstruction.   MEDIASTINUM AND MELLISA, LOWER NECK AND AXILLA: No significantly enlarged intrathoracic lymph nodes are identified. The esophagus is patulous and air-filled. No masses are identified in the lower neck   HEART AND VESSELS: The heart is enlarged. The patient is status post prior sternotomy with aortic root and ascending aortic repair and placement of a prosthetic aortic valve. No evident postoperative complication on this noncontrast exam. Scattered atherosclerotic calcifications. The pulmonary trunk is severely dilated, measures 3.6 cm. No thoracic aortic aneurysm. The heart is enlarged with severe biatrial dilatation. Coarsely calcified mitral valve annulus. There is evidence for anemia with increased attenuation of the myocardium relative to the blood pool.   UPPER ABDOMEN: No acute upper abdominal finding is identified on this noncontrast exam   CHEST WALL AND OSSEOUS STRUCTURES: No significant soft tissue findings. No lytic or blastic osseous lesion is identified. Mild anterior wedge compression deformity of the T10 vertebral body level.       1. Findings most compatible with interstitial and alveolar edema with interstitial thickening and patchy consolidative areas/areas of alveolar opacity, mixed solid and sub-solid in attenuation with areas of nodularity also noted, some with tree-in-bud configuration. The possibility of a superimposed pneumonia/pneumonitis and bronchiolitis is not excluded. Advise short-term radiographic follow-up to reassess. Appearance on the  radiograph from the current CT scan quite similar to the 05/30/2025 CXR. If the chest radiograph appearance does not entirely normalized, follow-up CT will be warranted. 2. Prior sternotomy and repair of  the aortic root and ascending aorta with mechanical aortic valve replacement. Atherosclerotic disease. Severe biatrial dilatation. Severely dilated pulmonary trunk. Advise attention on recent echocardiogram. 3. Evidence for anemia.   MACRO: None   Signed by: Andrea Montes 6/1/2025 5:40 PM Dictation workstation:   [id number]    XR chest 1 view  Result Date: 5/30/2025  Interpreted By:  Iram Baca, STUDY: XR CHEST 1 VIEW; 5/30/2025 9:36 am   INDICATION: Signs/Symptoms:leukocytosis, hypoxia   COMPARISON: Radiographs 05/27/2025   ACCESSION NUMBER(S): [id number]   ORDERING CLINICIAN: DEEP PORTILLO   TECHNIQUE: Single frontal view of the chest performed.   FINDINGS:   LINES AND DEVICES: None.   LUNGS: New bilateral perihilar and left basilar airspace opacities are noted. There is new blunting of the right costophrenic angle suggestive of a trace effusion. No pneumothorax.   CARDIOMEDIASTINAL SILHOUETTE: Stable cardiomegaly. Prior median sternotomy.         New bilateral, left greater than right, airspace opacities compatible with pneumonia, although pulmonary edema may appear similarly.   MACRO None   Signed by: Iram Baca 5/30/2025 12:33 PM Dictation workstation:   [id number]    Colonoscopy Diagnostic  Result Date: 5/29/2025  Table formatting from the original result was not included. Impression External medium hemorrhoids Healthy end-to-side ileocolonic anastomosis in the ascending colon No blood, mass or mucosal abnormality was observed in the neoterminal ileum, ascending colon, hepatic flexure, transverse colon, splenic flexure, descending colon, sigmoid colon, rectosigmoid or rectum. The neoterminal ileum appeared normal. Findings External medium hemorrhoids observed during digital rectal exam and retroflexion Healthy end-to-side ileocolonic anastomosis in the ascending colon; no bleeding was observed No blood, mass or mucosal abnormality was observed in the neoterminal ileum, ascending colon, hepatic  flexure, transverse colon, splenic flexure, descending colon, sigmoid colon, rectosigmoid or rectum. The neoterminal ileum appeared normal.  Recommendation Repeat colonoscopy in 5 years, due: 5/28/2030 Return to hospital floor for ongoing care. See inpatient care note for full post procedure recommendations.  Indication Iron deficiency anemia due to chronic blood loss, Symptomatic anemia Post-Op Diagnosis Other hemorrhoids Staff Staff Role Jessica Amador MD Proceduralist Medications See Anesthesia Record. Preprocedure A history and physical has been performed, and patient medication allergies have been reviewed. The patient's tolerance of previous anesthesia has been reviewed. The risks and benefits of the procedure and the sedation options and risks were discussed with the patient. All questions were answered and informed consent obtained. Details of the Procedure The patient was already under sedation prior to the procedure. The patient's blood pressure, ECG, ETCO2, heart rate, level of consciousness, oxygen and respirations were monitored throughout the procedure. A digital rectal exam was performed. The scope was introduced through the anus and advanced to the terminal ileum. Retroflexion was performed in the rectum. The quality of bowel preparation was evaluated using the [place] Bowel Preparation Scale with scores of: right colon = 2, transverse colon = 2, left colon = 2. The total BBPS score was 6. Bowel prep was adequate. The patient experienced no blood loss. The procedure was moderately difficult due to loops in the digestive tract. In response to procedure difficulty, counter pressure was applied. The patient tolerated the procedure well. There were no apparent adverse events. Events Procedure Events Event Event Time ENDO SCOPE IN TIME 5/29/2025  3:45 PM ENDO SCOPE OUT TIME 5/29/2025  3:52 PM ENDO SCOPE IN TIME 5/29/2025  3:58 PM ENDO CECUM REACHED 5/29/2025  4:08 PM ENDO SCOPE OUT TIME 5/29/2025   4:14 PM Specimens ID Type Source Tests Collected by Time 1 :  Tissue ANTRUM BODY BIOPSY SURGICAL PATHOLOGY EXAM Sadia Rosa-Palomo, RN 5/29/2025 1550 2 :  Tissue DUODENUM SECOND PART BIOPSY SURGICAL PATHOLOGY EXAM Sadia Rosa-Palomo, RN 5/29/2025 1550 3 : mid/proximal esophagus Non-Gynecologic Cytology ESOPHAGEAL BRUSH CYTOLOGY CONSULTATION (NON-GYNECOLOGIC) Sadia Melgoza, RN 5/29/2025 1558 Procedure Location [address] [address] [phone] Referring Provider Jessica Amador MD Procedure Provider MD Jessica Michaels     Esophagogastroduodenoscopy (EGD)  Result Date: 5/29/2025  Table formatting from the original result was not included. Impression The upper third of the esophagus, lower third of the esophagus and Z-line appeared normal. Abnormal mucosa with plaque in the middle third of the esophagus; collected sample to send to pathology with brush The cardia, fundus of the stomach and body of the stomach appeared normal. Mild erythematous mucosa in the antrum Performed forceps biopsies in the body of the stomach and antrum to rule out H. pylori The duodenal bulb, 1st part of the duodenum and 2nd part of the duodenum appeared normal. Performed random biopsy to rule out celiac disease. No blood, mass or ulcer was observed in the esophagus, stomach or duodenum. Findings The upper third of the esophagus, lower third of the esophagus and Z-line appeared normal. Z-line is 38 cm from the incisors. Abnormal mucosa with plaque in the middle third of the esophagus; collected sample to send to pathology with brush The cardia, fundus of the stomach and body of the stomach appeared normal. Mild erythematous mucosa in the antrum Performed forceps biopsies in the body of the stomach and antrum to rule out H. pylori The duodenal bulb, 1st part of the duodenum and 2nd part of the duodenum appeared normal. Performed random  biopsy using biopsy forceps to rule out celiac disease. No blood, mass or ulcer was observed in the esophagus, stomach or duodenum. Recommendation Await pathology results Proceed directly with EGD Further recommendations based on colonoscopy, see inpatient care plan  Indication Symptomatic anemia Post-Op Diagnosis Acute superficial gastritis without hemorrhage Staff Staff Role Jessica Amador MD Proceduralist Medications See Anesthesia Record. Preprocedure A history and physical has been performed, and patient medication allergies have been reviewed. The patient's tolerance of previous anesthesia has been reviewed. The risks and benefits of the procedure and the sedation options and risks were discussed with the patient. All questions were answered and informed consent obtained. Details of the Procedure The patient underwent monitored anesthesia care, which was administered by an anesthesia professional. The patient's blood pressure, ECG, ETCO2, heart rate, level of consciousness, oxygen and respirations were monitored throughout the procedure. The scope was introduced through the mouth and advanced to the second part of the duodenum. Retroflexion was performed in the cardia. Prior to the procedure, the patient's H. Pylori status was unknown. The patient experienced no blood loss. The procedure was not difficult. The patient tolerated the procedure well. There were no apparent adverse events. Events Procedure Events Event Event Time ENDO SCOPE IN TIME 5/29/2025  3:45 PM ENDO SCOPE OUT TIME 5/29/2025  3:52 PM Specimens ID Type Source Tests Collected by Time 1 :  Tissue ANTRUM BODY BIOPSY SURGICAL PATHOLOGY EXAM Sadia Melgoza, RN 5/29/2025 5151 2 :  Tissue DUODENUM SECOND PART BIOPSY SURGICAL PATHOLOGY EXAM Sadia Melgoza RN 5/29/2025 8850 3 : mid/proximal esophagus Non-Gynecologic Cytology ESOPHAGEAL BRUSH CYTOLOGY CONSULTATION (NON-GYNECOLOGIC) Sadia Melgoza, RN 5/29/2025 3129 Procedure  Location [facility] [address] [id number] [phone] Referring Provider Jessica Amador MD Procedure Provider Jessica Amador MD     XR chest 2 views  Result Date: 5/27/2025  STUDY: Chest Radiographs;  5/27/2025 4:34PM INDICATION: Dyspnea. COMPARISON: None Available. ACCESSION NUMBER(S): [id number] ORDERING CLINICIAN: DRE CARCAMO TECHNIQUE:  Frontal and lateral chest. FINDINGS: CARDIOMEDIASTINAL SILHOUETTE: Cardiomediastinal silhouette is normal in size and configuration. Multiple median sternotomy wires are consistent with status post cardiothoracic surgery.  LUNGS: Lungs are clear.  ABDOMEN: No remarkable upper abdominal findings.  BONES: No acute osseous changes.    No acute cardiopulmonary process. Signed by Silvio Wall MD    XR shoulder right 2+ views  Result Date: 5/27/2025  STUDY: Shoulder Radiographs; 05/27/2025 04:34 PM INDICATION: Pain. COMPARISON: None available. ACCESSION NUMBER(S): [id number] ORDERING CLINICIAN: DRE CARCAMO TECHNIQUE:  Three view(s) of the right shoulder. FINDINGS:  The osseous structures are intact with no evidence for acute fracture or focal destruction.  The alignment is anatomic.  No soft tissue abnormality is seen.    No evidence for acute fracture or dislocation. Signed by Silvio Wall MD     Results for orders placed or performed during the hospital encounter of 05/27/25 (from the past 24 hours)   Streptococcus pneumoniae Antigen, Urine    Specimen: Clean Catch/Voided; Urine   Result Value Ref Range    Streptococcus pneumoniae Ag, Urine Negative Negative   Legionella Antigen, Urine    Specimen: Clean Catch/Voided; Urine   Result Value Ref Range    L. pneumophila Urine Ag Negative Negative   Protime-INR   Result Value Ref Range    Protime 40.5 (H) 9.8 - 12.4 seconds    INR 3.7 (H) 0.9 - 1.1   Basic Metabolic Panel   Result Value Ref Range    Glucose 79 74 - 99 mg/dL    Sodium 143 136 - 145 mmol/L     Potassium 3.4 (L) 3.5 - 5.3 mmol/L    Chloride 98 98 - 107 mmol/L    Bicarbonate 28 21 - 32 mmol/L    Anion Gap 20 10 - 20 mmol/L    Urea Nitrogen 33 (H) 6 - 23 mg/dL    Creatinine 0.77 0.50 - 1.30 mg/dL    eGFR >90 >60 mL/min/1.73m*2    Calcium 8.5 (L) 8.6 - 10.3 mg/dL   CBC   Result Value Ref Range    WBC 9.9 4.4 - 11.3 x10*3/uL    nRBC 0.5 (H) 0.0 - 0.0 /100 WBCs    RBC 3.29 (L) 4.50 - 5.90 x10*6/uL    Hemoglobin 8.6 (L) 13.5 - 17.5 g/dL    Hematocrit 28.5 (L) 41.0 - 52.0 %    MCV 87 80 - 100 fL    MCH 26.1 26.0 - 34.0 pg    MCHC 30.2 (L) 32.0 - 36.0 g/dL    RDW 21.5 (H) 11.5 - 14.5 %    Platelets 197 150 - 450 x10*3/uL   Magnesium   Result Value Ref Range    Magnesium 1.81 1.60 - 2.40 mg/dL      Scheduled medications  Scheduled Medications[2]  Continuous medications  Continuous Medications[3]  PRN medications  PRN Medications[4]       Assessment/Plan      Robert Luna is a 59 y.o. male with past medical history of KEV, HTN, HLD, aortic valve replacement  with aortic graft 1997 on coumadin who presented to outside hospital 5/27/25 with  persistent SOB that had worsened over the prior 24 hours.  He was anemic with Hgb 8.3 and mild elevation of BNP, significant elevation of BUN so there was c/f GI bleed and patient transferred to [facility] for GI consult.  Stool + for occult blood.  On 5/29 EGD and colonoscopy NEG for acute bleeding.  Suspect anemia related to iron deficiency along with occult losses.  He started to develop hypoxia after procedure and it was suspected he might have aspirated but sx persisted and cardiology consulted for possible acute diastolic heart failure and pulm also consulted,  His 02 needs increased and he was transferred to SDU for Airvo therapy.  Echo completed 6/4/25 shows normal EF, reduced right ventricular systolic function, severely enlarged right ventricle, both atria are severely dilated, severe aortic stenosis, mild aortic valve regurg, Mitral valve with severe regurgitation, moderate  "to severe tricuspid regurgitation.  Patient now with significant structural cardiac changes with heart failure with cor pulmonale, aortic stenosis, bilateral pleural effusions, pulm HTN, and with acute hypoxic respiratory failure.  Patient not a candidate for a valve replacements.  He also is significantly deconditioned and malnourished with BMI of 18.08.  Additionally patient failed swallow evaluation and SLP recommending patient be NPO.  Patient does not have ACP documents.  Palliative medicine consulted to assist with goals of care.      Goals of care: Patient oriented, able to make his needs known.  Sister Luis is at bedside and has a blank HCPOA form in hand and they plan to fill out.  They report patient's sister Sasha would be first and Luis would be second.  We reviewed his cardiopulmonary status.  Patient reports thinking one day recently that his dog was probably going to out live him and he felt very peaceful about that.  He has been aware of his decline and had been told at one point he would not live past his 50s so he feels he has been gifted with extra years.  He still wants to \"fight for a bit\" if possible.  Code status discussion:  reviewed procedure of CPR, typical interventions needed if someone survives attempt, and effects of cardiopulmonary arrest on the body, especially if prolonged.  Reviewed that patient likely to have poor chance at functional recovery in light of their other health conditions.   Patient does not want CPR, but would be OK with intubation short term if needed.  He reports feeling very hungry and OK with NG feeding if it helps him if gets him a bit of strength  -code status changed to DNR  -will place OH Portable form when closer to discharge  -will discuss hospice as a possibility on future visits    Dysphagia:  patient absolutely OK about NG as he is hungry  -Dr. Guzman notified about NG request  -chloro septic spray ordered for potential irritation after " placement    Dyspnea related to heart failure, valve disease, and aspiration PNA.  Patient feels like breathing is OK at moment on high flow 02  -resp tx/ATB/cardiac tx per respective teams  -will not initiate opiates at this time but will monitor for air hunger and initiate if needed    Constipation:  patient reports last BM was Monday.  Bowel sounds a bit hypoactive, he is passing gas  -will initiate something(likely MOM) once patient has NG, he is not interested in suppository at this moment    I spent 20 minutes in the professional and overall care of this patient related to ACP in addition to other time spent in assessment, chart review, and coordination of care         Kerri Bloom, APRN-CNP         [1]   Family History  Problem Relation Name Age of Onset    Cancer Mother Alena Villaer     Cancer Father Guy Luna    [2] azithromycin, 500 mg, intravenous, q24h  docusate sodium, 100 mg, oral, BID  ferrous sulfate, 65 mg of elemental iron, oral, Daily  fluconazole, 200 mg, oral, Daily  fluticasone, 2 spray, Each Nostril, Daily  furosemide, 40 mg, intravenous, BID  ipratropium-albuteroL, 3 mL, nebulization, TID  metoprolol succinate XL, 25 mg, oral, Daily  piperacillin-tazobactam, 3.375 g, intravenous, q6h  polyethylene glycol, 17 g, oral, Nightly  sennosides, 1 tablet, oral, Nightly  vancomycin, 750 mg, intravenous, q12h  [Held by provider] warfarin, 1 mg, oral, Once per day on Monday Wednesday  [START ON 6/5/2025] warfarin, 1.5 mg, oral, Once per day on Sunday Thursday Friday  [3]    [4] PRN medications: acetaminophen **OR** acetaminophen **OR** acetaminophen, guaiFENesin, ipratropium-albuteroL, ondansetron **OR** ondansetron, oxygen, vancomycin

## 2025-06-05 LAB
ANION GAP SERPL CALC-SCNC: 11 MMOL/L (ref 10–20)
BASE EXCESS BLDA CALC-SCNC: 26.4 MMOL/L (ref -2–3)
BODY TEMPERATURE: 37 DEGREES CELSIUS
BUN SERPL-MCNC: 25 MG/DL (ref 6–23)
CALCIUM SERPL-MCNC: 7.8 MG/DL (ref 8.6–10.3)
CHLORIDE SERPL-SCNC: 94 MMOL/L (ref 98–107)
CO2 SERPL-SCNC: 40 MMOL/L (ref 21–32)
CREAT SERPL-MCNC: 0.64 MG/DL (ref 0.5–1.3)
EGFRCR SERPLBLD CKD-EPI 2021: >90 ML/MIN/1.73M*2
ERYTHROCYTE [DISTWIDTH] IN BLOOD BY AUTOMATED COUNT: 22.5 % (ref 11.5–14.5)
GLUCOSE BLD MANUAL STRIP-MCNC: 145 MG/DL (ref 74–99)
GLUCOSE SERPL-MCNC: 139 MG/DL (ref 74–99)
HCO3 BLDA-SCNC: 51.4 MMOL/L (ref 22–26)
HCT VFR BLD AUTO: 27.9 % (ref 41–52)
HGB BLD-MCNC: 8.5 G/DL (ref 13.5–17.5)
INHALED O2 CONCENTRATION: 45 %
INR PPP: 3.8 (ref 0.9–1.1)
MAGNESIUM SERPL-MCNC: 2.01 MG/DL (ref 1.6–2.4)
MCH RBC QN AUTO: 25.4 PG (ref 26–34)
MCHC RBC AUTO-ENTMCNC: 30.5 G/DL (ref 32–36)
MCV RBC AUTO: 84 FL (ref 80–100)
NRBC BLD-RTO: 0.4 /100 WBCS (ref 0–0)
OXYHGB MFR BLDA: 77.7 % (ref 94–98)
PCO2 BLDA: 50 MM HG (ref 38–42)
PH BLDA: 7.62 PH (ref 7.38–7.42)
PLATELET # BLD AUTO: 198 X10*3/UL (ref 150–450)
PO2 BLDA: 48 MM HG (ref 85–95)
POTASSIUM SERPL-SCNC: 2.7 MMOL/L (ref 3.5–5.3)
PROTHROMBIN TIME: 42.2 SECONDS (ref 9.8–12.4)
RBC # BLD AUTO: 3.34 X10*6/UL (ref 4.5–5.9)
SAO2 % BLDA: 79 % (ref 94–100)
SODIUM SERPL-SCNC: 142 MMOL/L (ref 136–145)
VANCOMYCIN SERPL-MCNC: 26.5 UG/ML (ref 5–20)
WBC # BLD AUTO: 9.4 X10*3/UL (ref 4.4–11.3)

## 2025-06-05 PROCEDURE — 80048 BASIC METABOLIC PNL TOTAL CA: CPT | Performed by: NURSE PRACTITIONER

## 2025-06-05 PROCEDURE — 36600 WITHDRAWAL OF ARTERIAL BLOOD: CPT

## 2025-06-05 PROCEDURE — 2500000002 HC RX 250 W HCPCS SELF ADMINISTERED DRUGS (ALT 637 FOR MEDICARE OP, ALT 636 FOR OP/ED): Performed by: NURSE PRACTITIONER

## 2025-06-05 PROCEDURE — 2060000001 HC INTERMEDIATE ICU ROOM DAILY

## 2025-06-05 PROCEDURE — 2500000002 HC RX 250 W HCPCS SELF ADMINISTERED DRUGS (ALT 637 FOR MEDICARE OP, ALT 636 FOR OP/ED): Performed by: HOSPITALIST

## 2025-06-05 PROCEDURE — 99233 SBSQ HOSP IP/OBS HIGH 50: CPT | Performed by: STUDENT IN AN ORGANIZED HEALTH CARE EDUCATION/TRAINING PROGRAM

## 2025-06-05 PROCEDURE — 82947 ASSAY GLUCOSE BLOOD QUANT: CPT

## 2025-06-05 PROCEDURE — 85610 PROTHROMBIN TIME: CPT | Performed by: NURSE PRACTITIONER

## 2025-06-05 PROCEDURE — 2500000004 HC RX 250 GENERAL PHARMACY W/ HCPCS (ALT 636 FOR OP/ED): Performed by: INTERNAL MEDICINE

## 2025-06-05 PROCEDURE — 2500000001 HC RX 250 WO HCPCS SELF ADMINISTERED DRUGS (ALT 637 FOR MEDICARE OP): Performed by: NURSE PRACTITIONER

## 2025-06-05 PROCEDURE — 2500000004 HC RX 250 GENERAL PHARMACY W/ HCPCS (ALT 636 FOR OP/ED): Performed by: NURSE PRACTITIONER

## 2025-06-05 PROCEDURE — 99232 SBSQ HOSP IP/OBS MODERATE 35: CPT | Performed by: NURSE PRACTITIONER

## 2025-06-05 PROCEDURE — 2500000004 HC RX 250 GENERAL PHARMACY W/ HCPCS (ALT 636 FOR OP/ED): Performed by: STUDENT IN AN ORGANIZED HEALTH CARE EDUCATION/TRAINING PROGRAM

## 2025-06-05 PROCEDURE — 82810 BLOOD GASES O2 SAT ONLY: CPT | Performed by: STUDENT IN AN ORGANIZED HEALTH CARE EDUCATION/TRAINING PROGRAM

## 2025-06-05 PROCEDURE — 36415 COLL VENOUS BLD VENIPUNCTURE: CPT | Performed by: NURSE PRACTITIONER

## 2025-06-05 PROCEDURE — 0D9670Z DRAINAGE OF STOMACH WITH DRAINAGE DEVICE, VIA NATURAL OR ARTIFICIAL OPENING: ICD-10-PCS | Performed by: STUDENT IN AN ORGANIZED HEALTH CARE EDUCATION/TRAINING PROGRAM

## 2025-06-05 PROCEDURE — 83735 ASSAY OF MAGNESIUM: CPT | Performed by: NURSE PRACTITIONER

## 2025-06-05 PROCEDURE — 85027 COMPLETE CBC AUTOMATED: CPT | Performed by: NURSE PRACTITIONER

## 2025-06-05 PROCEDURE — 94640 AIRWAY INHALATION TREATMENT: CPT

## 2025-06-05 PROCEDURE — 2500000004 HC RX 250 GENERAL PHARMACY W/ HCPCS (ALT 636 FOR OP/ED): Performed by: HOSPITALIST

## 2025-06-05 PROCEDURE — 80202 ASSAY OF VANCOMYCIN: CPT | Performed by: STUDENT IN AN ORGANIZED HEALTH CARE EDUCATION/TRAINING PROGRAM

## 2025-06-05 PROCEDURE — 99233 SBSQ HOSP IP/OBS HIGH 50: CPT | Performed by: CLINICAL NURSE SPECIALIST

## 2025-06-05 RX ORDER — ACETAZOLAMIDE 500 MG/5ML
500 INJECTION, POWDER, LYOPHILIZED, FOR SOLUTION INTRAVENOUS ONCE
Status: COMPLETED | OUTPATIENT
Start: 2025-06-05 | End: 2025-06-05

## 2025-06-05 RX ORDER — POTASSIUM CHLORIDE 20 MEQ/1
40 TABLET, EXTENDED RELEASE ORAL ONCE
Status: COMPLETED | OUTPATIENT
Start: 2025-06-05 | End: 2025-06-05

## 2025-06-05 RX ORDER — HYDROMORPHONE HYDROCHLORIDE 0.2 MG/ML
0.2 INJECTION INTRAMUSCULAR; INTRAVENOUS; SUBCUTANEOUS
Status: DISCONTINUED | OUTPATIENT
Start: 2025-06-05 | End: 2025-06-12 | Stop reason: HOSPADM

## 2025-06-05 RX ORDER — POTASSIUM CHLORIDE 14.9 MG/ML
20 INJECTION INTRAVENOUS ONCE
Status: COMPLETED | OUTPATIENT
Start: 2025-06-05 | End: 2025-06-05

## 2025-06-05 RX ADMIN — FERROUS SULFATE TAB 325 MG (65 MG ELEMENTAL FE) 1 TABLET: 325 (65 FE) TAB at 09:46

## 2025-06-05 RX ADMIN — POTASSIUM CHLORIDE 20 MEQ: 14.9 INJECTION, SOLUTION INTRAVENOUS at 09:17

## 2025-06-05 RX ADMIN — FLUCONAZOLE 200 MG: 100 TABLET ORAL at 09:45

## 2025-06-05 RX ADMIN — METOPROLOL SUCCINATE 25 MG: 25 TABLET, EXTENDED RELEASE ORAL at 09:46

## 2025-06-05 RX ADMIN — POLYETHYLENE GLYCOL 3350 17 G: 17 POWDER, FOR SOLUTION ORAL at 21:07

## 2025-06-05 RX ADMIN — PIPERACILLIN SODIUM AND TAZOBACTAM SODIUM 3.38 G: 3; .375 INJECTION, SOLUTION INTRAVENOUS at 10:06

## 2025-06-05 RX ADMIN — IPRATROPIUM BROMIDE AND ALBUTEROL SULFATE 3 ML: 2.5; .5 SOLUTION RESPIRATORY (INHALATION) at 19:38

## 2025-06-05 RX ADMIN — PIPERACILLIN SODIUM AND TAZOBACTAM SODIUM 3.38 G: 3; .375 INJECTION, SOLUTION INTRAVENOUS at 01:03

## 2025-06-05 RX ADMIN — AMPICILLIN SODIUM AND SULBACTAM SODIUM 1.5 G: 1; .5 INJECTION, POWDER, FOR SOLUTION INTRAMUSCULAR; INTRAVENOUS at 21:28

## 2025-06-05 RX ADMIN — IPRATROPIUM BROMIDE AND ALBUTEROL SULFATE 3 ML: 2.5; .5 SOLUTION RESPIRATORY (INHALATION) at 07:09

## 2025-06-05 RX ADMIN — VANCOMYCIN HYDROCHLORIDE 750 MG: 750 INJECTION, SOLUTION INTRAVENOUS at 03:45

## 2025-06-05 RX ADMIN — POTASSIUM CHLORIDE 40 MEQ: 1500 TABLET, EXTENDED RELEASE ORAL at 09:46

## 2025-06-05 RX ADMIN — IPRATROPIUM BROMIDE AND ALBUTEROL SULFATE 3 ML: 2.5; .5 SOLUTION RESPIRATORY (INHALATION) at 13:16

## 2025-06-05 RX ADMIN — ACETAZOLAMIDE 500 MG: 500 INJECTION, POWDER, LYOPHILIZED, FOR SOLUTION INTRAVENOUS at 15:27

## 2025-06-05 RX ADMIN — DOCUSATE SODIUM 100 MG: 100 CAPSULE, LIQUID FILLED ORAL at 09:46

## 2025-06-05 RX ADMIN — AMPICILLIN SODIUM AND SULBACTAM SODIUM 1.5 G: 1; .5 INJECTION, POWDER, FOR SOLUTION INTRAMUSCULAR; INTRAVENOUS at 18:03

## 2025-06-05 RX ADMIN — FUROSEMIDE 40 MG: 10 INJECTION, SOLUTION INTRAMUSCULAR; INTRAVENOUS at 09:17

## 2025-06-05 ASSESSMENT — COGNITIVE AND FUNCTIONAL STATUS - GENERAL
TOILETING: A LITTLE
MOVING TO AND FROM BED TO CHAIR: A LITTLE
EATING MEALS: A LITTLE
WALKING IN HOSPITAL ROOM: A LITTLE
DAILY ACTIVITIY SCORE: 18
PERSONAL GROOMING: A LITTLE
CLIMB 3 TO 5 STEPS WITH RAILING: A LITTLE
EATING MEALS: A LITTLE
DRESSING REGULAR LOWER BODY CLOTHING: A LITTLE
PERSONAL GROOMING: A LITTLE
TURNING FROM BACK TO SIDE WHILE IN FLAT BAD: A LITTLE
DRESSING REGULAR UPPER BODY CLOTHING: A LITTLE
DRESSING REGULAR UPPER BODY CLOTHING: A LITTLE
MOBILITY SCORE: 19
MOBILITY SCORE: 19
TURNING FROM BACK TO SIDE WHILE IN FLAT BAD: A LITTLE
TOILETING: A LITTLE
DRESSING REGULAR LOWER BODY CLOTHING: A LITTLE
STANDING UP FROM CHAIR USING ARMS: A LITTLE
DAILY ACTIVITIY SCORE: 18
CLIMB 3 TO 5 STEPS WITH RAILING: A LITTLE
HELP NEEDED FOR BATHING: A LITTLE
HELP NEEDED FOR BATHING: A LITTLE
STANDING UP FROM CHAIR USING ARMS: A LITTLE
MOVING TO AND FROM BED TO CHAIR: A LITTLE
WALKING IN HOSPITAL ROOM: A LITTLE

## 2025-06-05 ASSESSMENT — PAIN SCALES - GENERAL
PAINLEVEL_OUTOF10: 0 - NO PAIN

## 2025-06-05 ASSESSMENT — ENCOUNTER SYMPTOMS
VOMITING: 0
SHORTNESS OF BREATH: 1
FEVER: 0
ABDOMINAL PAIN: 0
ABDOMINAL DISTENTION: 0

## 2025-06-05 ASSESSMENT — PAIN - FUNCTIONAL ASSESSMENT
PAIN_FUNCTIONAL_ASSESSMENT: 0-10

## 2025-06-05 ASSESSMENT — PAIN SCALES - PAIN ASSESSMENT IN ADVANCED DEMENTIA (PAINAD): BREATHING: NORMAL

## 2025-06-05 ASSESSMENT — PAIN SCALES - WONG BAKER: WONGBAKER_NUMERICALRESPONSE: NO HURT

## 2025-06-05 NOTE — PROGRESS NOTES
Vancomycin Dosing by Pharmacy- FOLLOW UP    Robert Luna is a 59 y.o. year old male who Pharmacy has been consulted for vancomycin dosing for pneumonia. Based on the patient's indication and renal status this patient is being dosed based on a goal AUC of 400-600.     Renal function is currently stable.    Current vancomycin dose: 750 mg given every 12 hours    Estimated vancomycin AUC on current dose: 533 mg/L.hr     Visit Vitals  /59 (BP Location: Right leg, Patient Position: Lying)   Pulse 87   Temp 36.6 °C (97.8 °F) (Temporal)   Resp 16        Lab Results   Component Value Date    CREATININE 0.64 2025    CREATININE 0.77 2025    CREATININE 0.63 2025    CREATININE 0.56 2025        Patient weight is as follows:   Vitals:    25 0401   Weight: (!) 40.1 kg (88 lb 4.8 oz)       Cultures:  Susceptibility data for the encounter in last 14 days.  Collected Specimen Info Organism   25 Fluid from SPUTUM Normal throat reyes        I/O last 3 completed shifts:  In: 810 (20 mL/kg) [I.V.:50 (1.2 mL/kg); NG/GT:60; IV Piggyback:700]  Out: 0 (50.5 mL/kg) [Urine:0 (1.4 mL/kg/hr)]  Weight: 40.6 kg   I/O during current shift:  I/O this shift:  In: 50 [NG/GT:50]  Out: 250 [Urine:250]    Temp (24hrs), Av.8 °C (98.2 °F), Min:36.5 °C (97.7 °F), Max:37.1 °C (98.8 °F)      Assessment/Plan    Within goal AUC range. Continue current vancomycin regimen.    This dosing regimen is predicted by [employer] to result in the following pharmacokinetic parameters:  Exposure target: AUC24 (range) 400-600 mg/L.hr   IDA43-50: 499 mg/L.hr  AUC24,ss: 533 mg/L.hr  Probability of AUC24 > 400: 87 %  Ctrough,ss: 15.3 mg/L  Probability of Ctrough,ss > 20: 22 %    The next level will be obtained on 6/6 at AM labs. May be obtained sooner if clinically indicated.   Will continue to monitor renal function daily while on vancomycin and order serum creatinine at least every 48 hours if not already ordered.  Follow for  continued vancomycin needs, clinical response, and signs/symptoms of toxicity.       Geovani Baxter, PharmD

## 2025-06-05 NOTE — PROGRESS NOTES
06/05/25 1409   Discharge Planning   Expected Discharge Disposition [facility]         Patient remains on Airvo. He is still diuresing. Patient failed swal eval. Hospice referral was placed and they will meet with family tomorrow afternoon. Will continue to follow for discharge needs.

## 2025-06-05 NOTE — PROGRESS NOTES
Robert Luna is a 59 y.o. male admitted for symptomatic anemia. Pharmacy has been consulted for warfarin dosing and monitoring for Aortic Valve Replacement with goal INR of 2.0-3.0.      Home regimen    MON TUE WED THR FRI SAT SUN   Dose 1  mg 1.5  mg 1  mg 1.5  mg 1.5  mg 1  mg 1.5  mg   Total weekly dose: 9 mg  Source: [facility] Note from 5/22/2025    Labs  INR: 3.8  Hgb/Hct/Plt  Lab Results   Component Value Date    HGB 8.5 (L) 06/05/2025    HGB 8.6 (L) 06/04/2025    HGB 8.6 (L) 06/03/2025    HGB 8.2 (L) 06/02/2025    HGB 8.2 (L) 06/01/2025        Lab Results   Component Value Date    HCT 27.9 (L) 06/05/2025    HCT 28.5 (L) 06/04/2025    HCT 30.7 (L) 06/03/2025    HCT 26.9 (L) 06/02/2025    HCT 27.5 (L) 06/01/2025        Platelets   Date Value Ref Range Status   06/05/2025 198 150 - 450 x10*3/uL Final   06/04/2025 197 150 - 450 x10*3/uL Final   06/03/2025 242 150 - 450 x10*3/uL Final   06/02/2025 191 150 - 450 x10*3/uL Final   06/01/2025 199 150 - 450 x10*3/uL Final      Warfarin Therapy   Current regimen: resuming home reigmen but fluconazole started byGI, and plan to have patient hold warfarin on Tuesdays and Saturdays while completing 14 day duration   Bridging: None needed  Interacting medications: interacting medication(s) of fluconazole and azithromycin increase bleeding risk    Dosing History During Current Admission  Date 5/28 5/29 5/30 5/31 6/1 6/2 6/3 6/4 6/5   INR 1.9 2.2 2.9 2.1 1.9 2.1 2.2 3.7 3.8   Dose  (mg) See plan 1.5 mg 1.5 mg 1 mg 1.5 mg 1 mg Hold Hold Hold        Assessment and Plan  Patient's INR of 3.8 today is Supratherapeutic. Hemoglobin/hematocrit/platelet stable. RN aware and confirms no overt sings or symptoms of bleeding at this time and will communicate with providers if anything changes   Warfarin inpatient plan: Hold warfarin today.  Continue to monitor s/sx of bleeding including epistaxis, hematuria, unusual bruising, hemoptysis, hematochezia as well as s/sx of stroke including  impaired speech, unilateral paralysis, blurry vision.  Pharmacy will continue to monitor the patient and adjust therapy as needed.    Thank you for the consult. Please do not hesitate to contact a pharmacist with any questions.    Graciela Amaya, PharmD

## 2025-06-05 NOTE — CONSULTS
INFECTIOUS DISEASE INPATIENT INITIAL CONSULTATION    Referred By: Miguel Walker    Reason For Consult: nosocomial PNA    HPI:  This is a 59 y.o. male with PMH of mechanical aortic valve, HTN, malnutrition/low weight who presented with shortness of breath.    Diagnosed with aspiration PNA. Initially with fever which has resolved along with leukocytosis. Seemed to be improving on IV Unasyn but changed to IV Vanc/Zosyn/Azithro the other day. MRSA nares PCR negative. Sputum cx with normal throat reyes.     Allergies:  Patient has no known allergies.     Vitals (Last 24 Hours):  Heart Rate:  [69-98]   Temp:  [36.6 °C (97.8 °F)-37.1 °C (98.8 °F)]   Resp:  [16-20]   BP: ()/(53-62)   Weight:  [40.1 kg (88 lb 4.8 oz)]   SpO2:  [92 %-98 %]      PHYSICAL EXAM:  Gen - NAD, very thin  Heart - RRR, no murmurs  Lungs - no wheezing  Abd - soft, no ttp, BS present  Skin - no rash    MEDS:  Current Medications[1]     LABS:  Lab Results   Component Value Date    WBC 9.4 06/05/2025    HGB 8.5 (L) 06/05/2025    HCT 27.9 (L) 06/05/2025    MCV 84 06/05/2025     06/05/2025      Results from last 72 hours   Lab Units 06/05/25  0515   SODIUM mmol/L 142   POTASSIUM mmol/L 2.7*   CHLORIDE mmol/L 94*   CO2 mmol/L 40*   BUN mg/dL 25*   CREATININE mg/dL 0.64   GLUCOSE mg/dL 139*   CALCIUM mg/dL 7.8*   ANION GAP mmol/L 11   EGFR mL/min/1.73m*2 >90         Estimated Creatinine Clearance: 67 mL/min (by C-G formula based on SCr of 0.64 mg/dL).       IMAGING:  IMPRESSION:  1.  Enteric tube projects in the gastric fundus. Trace left and small  right pleural effusions with patchy bilateral airspace opacities  stable from prior.    ASSESSMENT/PLAN:    Aspiration PNA - overall I think he is improving. On Airvo but requirements not very high. Sputum cx without anything specific. MRSA nares negative which has a high negative predictive value. Fever/WBC improved on Unasyn - I will place back on this. 1.5g Q6H (lower dose given low weight only  40kg).    Monitoring for adverse effects of abx such as rash/itching/diarrhea.    Will follow. Thanks!    Last London MD  [facility]  Office #[phone]           [1]   Current Facility-Administered Medications:     acetaminophen (Tylenol) tablet 650 mg, 650 mg, oral, q4h PRN, 650 mg at 06/04/25 0818 **OR** acetaminophen (Tylenol) oral liquid 650 mg, 650 mg, oral, q4h PRN **OR** acetaminophen (Tylenol) suppository 650 mg, 650 mg, rectal, q4h PRN, RADHA Wild, DNP    acetaZOLAMIDE (Diamox) injection 500 mg, 500 mg, intravenous, Once, Miguel Walker MD    ampicillin-sulbactam (Unasyn) 1.5 g in sodium chloride 0.9% IV 50 mL, 1.5 g, intravenous, q6h, Last London MD    docusate sodium (Colace) capsule 100 mg, 100 mg, oral, BID, RADHA Wild, DNP, 100 mg at 06/05/25 0946    ferrous sulfate 325 mg (65 mg elemental) tablet 1 tablet, 65 mg of elemental iron, oral, Daily, RADHA Wild, DNP, 1 tablet at 06/05/25 0946    [COMPLETED] fluconazole (Diflucan) tablet 400 mg, 400 mg, oral, Once, 400 mg at 06/03/25 0906 **FOLLOWED BY** fluconazole (Diflucan) tablet 200 mg, 200 mg, oral, Daily, RADHA Wild, DNP, 200 mg at 06/05/25 0945    fluticasone (Flonase) nasal spray 2 spray, 2 spray, Each Nostril, Daily, RADHA Wild, MIMI, 2 spray at 06/04/25 0817    [Held by provider] furosemide (Lasix) injection 40 mg, 40 mg, intravenous, BID, RADHA Wild, DNP, 40 mg at 06/05/25 0917    guaiFENesin (Mucinex) 12 hr tablet 600 mg, 600 mg, oral, BID PRN, RADHA Wild, DNP    ipratropium-albuteroL (Duo-Neb) 0.5-2.5 mg/3 mL nebulizer solution 3 mL, 3 mL, nebulization, q2h PRN, RADHA Wild, DNP, 3 mL at 06/02/25 0439    ipratropium-albuteroL (Duo-Neb) 0.5-2.5 mg/3 mL nebulizer solution 3 mL, 3 mL, nebulization, TID, RADHA Wild, DNP, 3 mL at 06/05/25 1316    metoprolol succinate  XL (Toprol-XL) 24 hr tablet 25 mg, 25 mg, oral, Daily, RADHA Wild, MIMI, 25 mg at 06/05/25 0946    ondansetron (Zofran) tablet 4 mg, 4 mg, oral, q8h PRN **OR** ondansetron (Zofran) injection 4 mg, 4 mg, intravenous, q8h PRN, RADHA Wild, MIMI, 4 mg at 05/29/25 2029    oxygen (O2) therapy, , inhalation, Continuous PRN - O2/gases, Miguel Walker MD, 45 L/min at 06/05/25 1316    phenoL (Chloraseptic) 1.4 % mouth/throat spray 1 spray, 1 spray, Mouth/Throat, q2h PRN, RADHA Zimmerman    polyethylene glycol (Glycolax, Miralax) packet 17 g, 17 g, oral, Nightly, RADHA Wild DNP, 17 g at 06/04/25 2014    sennosides (Senokot) tablet 8.6 mg, 1 tablet, oral, Nightly, RADHA Wild DNP, 8.6 mg at 06/04/25 2014    [Held by provider] warfarin (Coumadin) tablet 1 mg, 1 mg, oral, Once per day on Monday Wednesday, RADHA Wild DNP    [Held by provider] warfarin (Coumadin) tablet 1.5 mg, 1.5 mg, oral, Once per day on Sunday Thursday Friday, RADHA Wild DNP

## 2025-06-05 NOTE — PROGRESS NOTES
Music Therapy Note        Therapy Session  Referral Type: New referral this admission  Visit Type: New visit  Session Start Time: 1202  Intervention Delivery: In-person  Conflict of Service: Asleep  Number of family members present: 0  Number of staff members present: 0       Narrative  Assessment Detail: At the time of assessment pt was asleep with no family present at bedside.  Follow-up: MT will follow up with pt as able.    Education Documentation  No documentation found.

## 2025-06-05 NOTE — PROGRESS NOTES
"Vlad Luna is a 59 y.o. male on day 9 of admission presenting with Symptomatic anemia.      Interval Hx and Subjective NG placed overnight for feeding.  Patient reports he feels \"okay\", the quality of his breathing has varied throughout the day.  Tolerating NG with minimal discomfort.       Objective  Blood pressure 104/59, pulse 87, temperature 36.6 °C (97.8 °F), temperature source Temporal, resp. rate 16, height 1.499 m (4' 11\"), weight (!) 40.1 kg (88 lb 4.8 oz), SpO2 92%.    General:  appears ill although not toxic  Neuro: alert, oriented to situation.  No focal findings  Psych:  normal affect, engaged, cooperative  HEENT:  oral mucosa moist without exudate, tongue midline.  PERRLA, no discharge.   NG in place  Resps:  resps unlabored, lungs CTA, no cough noted  Card:  RRR, +  murmur   GI:  normal bowel sounds, soft and non tender  :  deferred  MS:  No injury or deformity noted  Integ:  skin warm and dry overall        Imaging  XR chest 1 view  Result Date: 6/4/2025  1.  Enteric tube projects in the gastric fundus. Trace left and small right pleural effusions with patchy bilateral airspace opacities stable from prior.     MACRO: None   Signed by: Joe Pal 6/4/2025 5:34 PM Dictation workstation:   [id number]    FL modified barium swallow study  Result Date: 6/4/2025  Aspiration with multiple consistencies.   MACRO: None   Signed by: Gilmar Arreola 6/4/2025 4:49 PM Dictation workstation:   [id number]    XR chest 1 view  Result Date: 6/3/2025  Waxing and waning bilateral infiltrates/edema with increasing bilateral pleural effusions.   MACRO: None.   Signed by: Maya Christie 6/3/2025 12:49 PM Dictation workstation:   [id number]      Cardiology, Vascular, and Other Imaging  Cardiac Catheterization Procedure  Result Date: 6/4/2025   [facility], Cath Lab, [address] [place] [address] Cardiovascular Catheterization Report Patient Name:      VLAD LUNA          Performing Physician:  " [id number] Lisa Guo MD Study Date:        6/4/2025            Verifying Physician:   [id number] Guo MD MRN/PID:           [id number]            Cardiologist/Co-Scrub: Accession#:        [id number]        Ordering Provider:     [id number] ARIELLA RANDOLPH Date of Birth/Age: 1965 / 59 years Cardiologist: Gender:            M                   Fellow: Encounter#:        [id number]          Surgeon:  Study: Fluoroscopy  Indications:  Complications: No in-lab complications observed.  Cardiac Cath Post Procedure Notes: Post Procedure Diagnosis: See below. Blood Loss:               Estimated blood loss during the procedure was NA mls. Specimens Removed:        Number of specimen(s) removed: none. ____________________________________________________________________________________ CONCLUSIONS:  1. Fluoroscopy with normal movement of the prosthetic bileaflet aortic valve.  2. Further management as per inpatient cardiology team. 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  CPT Codes: Fluoroscopy-33066  [id number] Lisa Guo MD Performing Physician Electronically signed by [id number] Guo MD on 6/4/2025 at 6:14:58 PM  ** Final **     Transthoracic Echo (TTE) Complete  Result Date: 6/3/2025   [facility], [address] [place] [address]              Tel [phone] and Fax [phone] TRANSTHORACIC ECHOCARDIOGRAM REPORT  Patient Name:       VLAD Mitchell Physician:    [id number] Ulysses Amin MD Study Date:         6/3/2025            Ordering Provider:    [id number] YO BUENO MRN/PID:            [id number]            Fellow: Accession#:         [id number]        Nurse:  Date of Birth/Age:  1965 / 59 years Sonographer:          Radha Farias RDCS Gender assigned at  M                   Additional Staff: Birth: Height:             149.99 cm           Admit Date:           6/2/2025 Weight:             40.37 kg            Admission Status:     Inpatient -                                                               Routine BSA / BMI:          1.31 m2 / 17.94     Encounter#:           [id number]                     kg/m2 Blood Pressure:     165/94 mmHg         Department Location:  [facility] Non                                                               Invasive Study Type:    TRANSTHORACIC ECHO (TTE) COMPLETE Diagnosis/ICD: 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 Pertinent History: MV disease. 1997 [employer]ryce Mechanical Aortic Valve replacement. Study Detail: The following Echo studies were performed: 2D, M-Mode, Doppler and               color flow. Technically challenging study due to poor acoustic               windows and body habitus.  PHYSICIAN INTERPRETATION: Left Ventricle: Left ventricular ejection fraction is normal calculated by Leos's biplane at 71%. There are no regional left ventricular wall motion abnormalities. The left ventricular cavity size is normal. There is normal septal and normal posterior left ventricular wall thickness. There is left ventricular concentric remodeling. Left ventricular diastolic filling cannot be determined due to mitral valve repair/replacement. Left Atrium: The left atrial size is severely dilated. Right Ventricle: The right ventricle is severely enlarged. There is mildly reduced right ventricular systolic function. Right Atrium: The right atrium is severely dilated. Aortic Valve: There is a prosthetic aortic valve present. The aortic valve area by VTI is 0.50 cmï¿½ with a peak velocity of  4.29 m/s. The peak and mean gradients are 58 mmHg and 34 mmHg, respectively, with a dimensionless index of 0.21. There is evidence of severe aortic valve stenosis. There is a [employer]. Bryce bileaflet mechanical type aortic valve prosthesis with a 21 mm reported size. There is mild aortic valve regurgitation. The mechanical AV leaflet mobility could not be properly assessed due to prosthetic valve artifact. It is recommended to assess leaflet motion and opening/closing angles with cinefluoroscopy. Mitral Valve: Status post mitral valve repair. The doppler estimated peak and mean diastolic pressure gradients are 30.8 mmHg and 9 mmHg, respectively. Barlows syndrome is present. There is mitral valve prolapse of the anterior and posterior leaflets. There is mild calcification of the mitral leaflets and subvalvular apparatus. The mean gradient of the mitral valve is 9 mmHg. There is severe mitral valve regurgitation. The E Vmax is 2.55 m/s. There is probable presence of an underlying MV annular repair with prosthetic ring (no prior history of MVr available). Tricuspid Valve: The tricuspid valve is abnormal. The tricuspid valve annulus appears dilated. There is moderate to severe tricuspid regurgitation. The doppler estimated RVSP is moderately elevated with a right ventricular systolic pressure of 60 mmHg. Pulmonic Valve: The pulmonic valve is structurally normal. There is trace to mild pulmonic valve regurgitation. Pericardium: Trivial pericardial effusion. Pleural: There is left pleural effusion. Aorta: The aortic root is normal. There is a prosthetic graft seen in the position of the ascending aorta. In comparison to the previous echocardiogram(s): Compared with study dated 2/6/2024, the degree of aortic valve stenosis has slightly increased from 65/2 mmHg to 73/34 mmHg, with an AME of 0.5-0.77 cm2 and a DI of 0.21. The degree of MR has increased from moderate to severe and the RVSP has increased from 40 mmHg to 60 mmHg.  Underlying infection/endocarditis to explain progression of MR could not be ruled out in this study. JOANNE assessment is recommended if clinically indicated.  CONCLUSIONS:  1. Left ventricular ejection fraction is normal calculated by Leos's biplane at 71%.  2. There is mildly reduced right ventricular systolic function.  3. Severely enlarged right ventricle.  4. The Doppler-estimated right ventricular systolic pressure (RVSP) is moderately elevated at 60 mmHg.  5. The left atrial size is severely dilated.  6. The right atrium is severely dilated.  7. Severe aortic valve stenosis. The peak and mean gradients are 73 mmHg and 34 mmHg respectively.  8. There is a [employer]. Bryce bileaflet mechanical type aortic valve prosthesis with a 21 mm reported size. The peak and mean gradients are 73 mmHg and 34 mmHg respectively.  9. Mild aortic valve regurgitation. 10. There is probable presence of an underlying MV annular repair with prosthetic ring (no prior history of MVr available). 11. Mitral valve Barlows syndrome is present, with prolapse of anterior and posterior leaflets. 12. Severe mitral valve regurgitation. 13. There is mild calcification of the mitral leaflets and subvalvular apparatus. 14. Moderate to severe tricuspid regurgitation visualized. 15. The tricuspid valve annulus appears dilated. 16. Prosthetic graft in the ascending aorta position. 17. Compared with study dated 2/6/2024, the degree of aortic valve stenosis has slightly increased from 65/2 mmHg to 73/34 mmHg, with an AME of 0.5-0.77 cm2 and a DI of 0.21. The degree of MR has increased from moderate to severe and the RVSP has increased from 40 mmHg to 60 mmHg. Underlying infection/endocarditis to explain progression of MR could not be ruled out in this study. JOANNE assessment is recommended if clinically indicated. RECOMMENDATIONS: Based on the results of this study and as mechanical AV leaflet mobility could not be properly assessed due to prosthetic valve  artifact, it is recommended to assess leaflet motion and opening/closing angles with cinefluoroscopy.  QUANTITATIVE DATA SUMMARY:  2D MEASUREMENTS:          Normal Ranges: LAs:             4.39 cm  (2.7-4.0cm) IVSd:            0.77 cm  (0.6-1.1cm) LVPWd:           0.70 cm  (0.6-1.1cm) LVIDd:           2.96 cm  (3.9-5.9cm) LVIDs:           2.09 cm LV Mass Index:   39 g/m2 LVEDV Index:     55 ml/m2 LV % FS          29.5 %  LEFT ATRIUM:                  Normal Ranges: LA Vol A4C:        92.2 ml    (22+/-6mL/m2) LA Vol A2C:        107.0 ml LA Vol BP:         102.7 ml LA Vol Index A4C:  70.5ml/m2 LA Vol Index A2C:  81.8 ml/m2 LA Vol Index BP:   78.5 ml/m2 LA Area A4C:       28.9 cm2 LA Area A2C:       30.1 cm2 LA Major Axis A4C: 7.7 cm LA Major Axis A2C: 7.2 cm LA Volume Index:   79.5 ml/m2 LA Vol A4C:        84.6 ml LA Vol A2C:        103.2 ml LA Vol Index BSA:  71.8 ml/m2  RIGHT ATRIUM:                 Normal Ranges: RA Vol A4C:        90.1 ml    (8.3-19.5ml) RA Vol Index A4C:  68.9 ml/m2 RA Area A4C:       24.8 cm2 RA Major Axis A4C: 5.8 cm  M-MODE MEASUREMENTS:         Normal Ranges: LAs:                 3.79 cm (2.7-4.0cm)  AORTA MEASUREMENTS:         Normal Ranges: Asc Ao, d:          3.20 cm (2.1-3.4cm)  LV SYSTOLIC FUNCTION:                      Normal Ranges: EF-A4C View:    71 % (>=55%) EF-A2C View:    71 % EF-Biplane:     71 % LV EF Reported: 71 %  LV DIASTOLIC FUNCTION:           Normal Ranges: MV Peak E:             2.55 m/s  (0.7-1.2 m/s) MV Peak A:             1.54 m/s  (0.42-0.7 m/s) E/A Ratio:             1.66      (1.0-2.2) MV e'                  0.075 m/s (>8.0) MV lateral e'          0.06 m/s MV medial e'           0.09 m/s E/e' Ratio:            34.06     (<8.0)  MITRAL VALVE:           Normal Ranges: MV Vmax:      2.77 m/s  (<=1.3m/s) MV peak P.8 mmHg (<5mmHg) MV mean P.0 mmHg  (<2mmHg) MV VTI:       49.52 cm  (10-13cm) MV DT:        109 msec  (150-240msec)  MITRAL INSUFFICIENCY:              Normal Ranges: MR VTI:               116.02 cm MR Vmax:              558.74 cm/s  AORTIC VALVE:                      Normal Ranges: AoV Vmax:                4.29 m/s  (<=1.7m/s) AoV Peak P.5 mmHg (<20mmHg) AoV Mean P.5 mmHg (1.7-11.5mmHg) LVOT Max Maco:            1.42 m/s  (<=1.1m/s) AoV VTI:                 91.00 cm  (18-25cm) LVOT VTI:                19.54 cm LVOT Diameter:           1.80 cm   (1.8-2.4cm) AoV Area, VTI:           0.50 cm2  (2.5-5.5cm2) AoV Area,Vmax:           0.77 cm2  (2.5-4.5cm2) AoV Dimensionless Index: 0.21  RIGHT VENTRICLE: RV Basal 5.60 cm RV Mid   4.10 cm RV Major 7.3 cm TAPSE:   17.0 mm RV s'    0.10 m/s  TRICUSPID VALVE/RVSP:          Normal Ranges: Peak TR Velocity:     3.77 m/s Est. RA Pressure:     3 RV Syst Pressure:     60       (< 30mmHg) IVC Diam:             1.65 cm  PULMONIC VALVE:          Normal Ranges: PV Accel Time:  55 msec  (>120ms) PV Max Maco:     0.9 m/s  (0.6-0.9m/s) PV Max PG:      3.0 mmHg  AORTA: Asc Ao Diam 3.23 cm  [id number] Ulysses Amin MD Electronically signed on 6/3/2025 at 4:50:49 PM  ** Final (Updated) **        Results for orders placed or performed during the hospital encounter of 25 (from the past 24 hours)   Protime-INR   Result Value Ref Range    Protime 42.2 (H) 9.8 - 12.4 seconds    INR 3.8 (H) 0.9 - 1.1   Basic Metabolic Panel   Result Value Ref Range    Glucose 139 (H) 74 - 99 mg/dL    Sodium 142 136 - 145 mmol/L    Potassium 2.7 (LL) 3.5 - 5.3 mmol/L    Chloride 94 (L) 98 - 107 mmol/L    Bicarbonate 40 (HH) 21 - 32 mmol/L    Anion Gap 11 10 - 20 mmol/L    Urea Nitrogen 25 (H) 6 - 23 mg/dL    Creatinine 0.64 0.50 - 1.30 mg/dL    eGFR >90 >60 mL/min/1.73m*2    Calcium 7.8 (L) 8.6 - 10.3 mg/dL   CBC   Result Value Ref Range    WBC 9.4 4.4 - 11.3 x10*3/uL    nRBC 0.4 (H) 0.0 - 0.0 /100 WBCs    RBC 3.34 (L) 4.50 - 5.90 x10*6/uL    Hemoglobin 8.5 (L) 13.5 - 17.5 g/dL    Hematocrit 27.9 (L) 41.0 - 52.0 %    MCV 84  80 - 100 fL    MCH 25.4 (L) 26.0 - 34.0 pg    MCHC 30.5 (L) 32.0 - 36.0 g/dL    RDW 22.5 (H) 11.5 - 14.5 %    Platelets 198 150 - 450 x10*3/uL   Magnesium   Result Value Ref Range    Magnesium 2.01 1.60 - 2.40 mg/dL   Vancomycin   Result Value Ref Range    Vancomycin 26.5 (H) 5.0 - 20.0 ug/mL      Scheduled medications  Scheduled Medications[1]  Continuous medications  Continuous Medications[2]  PRN medications  PRN Medications[3]     Dietary Orders (From admission, onward)       Start     Ordered    06/04/25 1753  Enteral feeding with NPO NG (nasogastric tube); 10; 60; Water; Tap water; Every 4 hours  Diet effective now        Comments: Advance by 10mL every 8 hours to goal rate of 50 mL/hr   Question Answer Comment   Tube feeding formula: Vital 1.5    Feeding route: NG (nasogastric tube)    Tube feeding continuous rate (mL/hr): 10    Tube feeding flush (mL): 60    Flush type: Water    Water type: Tap water    Flush frequency: Every 4 hours        06/04/25 1753 05/27/25 2232  May Participate in Room Service  ( ROOM SERVICE MAY PARTICIPATE)  Once        Question:  .  Answer:  Yes    05/27/25 2231                     Assessment/Plan     Robert Luna is a 59 y.o. male with past medical history of KEV, HTN, HLD, aortic valve replacement  with aortic graft 1997 on coumadin who presented to outside hospital 5/27/25 with  persistent SOB that had worsened over the prior 24 hours.  He was anemic with Hgb 8.3 and mild elevation of BNP, significant elevation of BUN so there was c/f GI bleed and patient transferred to [facility] for GI consult.  Stool + for occult blood.  On 5/29 EGD and colonoscopy NEG for acute bleeding.  Suspect anemia related to iron deficiency along with occult losses.  He started to develop hypoxia after procedure and it was suspected he might have aspirated but sx persisted and cardiology consulted for possible acute diastolic heart failure and pulm also consulted,  His 02 needs increased and he was  "transferred to SDU for Airvo therapy.  Echo completed 6/4/25 shows normal EF, reduced right ventricular systolic function, severely enlarged right ventricle, both atria are severely dilated, severe aortic stenosis, mild aortic valve regurg, Mitral valve with severe regurgitation, moderate to severe tricuspid regurgitation.  Patient now with significant structural cardiac changes with heart failure with cor pulmonale, aortic stenosis, bilateral pleural effusions, pulm HTN, and with acute hypoxic respiratory failure.  Patient not a candidate for a valve replacements.  He also is significantly deconditioned and malnourished with BMI of 18.08.  Additionally patient failed swallow evaluation and SLP recommending patient be NPO.  Patient does not have ACP documents.  Palliative medicine consulted to assist with goals of care.       Goals of care: Patient oriented, able to make his needs known.  Sister Luis is at bedside and has a blank HCPOA form in hand and they plan to fill out.  They report patient's sister Sasha would be first and Luis would be second.  We reviewed his cardiopulmonary status.  Patient reports thinking one day recently that his dog was probably going to out live him and he felt very peaceful about that.  He has been aware of his decline and had been told at one point he would not live past his 50s so he feels he has been gifted with extra years.  He still wants to \"fight for a bit\" if possible.  Code status discussion:  reviewed procedure of CPR, typical interventions needed if someone survives attempt, and effects of cardiopulmonary arrest on the body, especially if prolonged.  Reviewed that patient likely to have poor chance at functional recovery in light of their other health conditions.   Patient does not want CPR, but would be OK with intubation short term if needed.  He reports feeling very hungry and OK with NG feeding if it helps him if gets him a bit of strength.  6/5/25 spoke with sister " Luis.  She reports many family members are coming in this weekend from out of state to see patient, wonders if this is a good idea.  I informed her that if they are hoping for a nice family get together, now is the time as I would expect patient's condition to continue to decline and that the chance of meaningful visits may not present itself if we look into the next few months.  Sisters and patient have now discussed hospice.  They are very comfortable with setting up a meeting, at this point more for information than immediate disposition as he is still hopeful to improve.  Patient also clarified he would not want intubation and OK with changing status to DNR/DNI  -code status changed to DNR/DNI  -will place [place] Portable form when closer to discharge  -hospice consult placed, palliative care SW to send referral.  Family hopes to meet tomorrow afternoon     Dysphagia:  patient tolerating NG  -chloro septic spray ordered for potential irritation      Dyspnea related to heart failure, valve disease, and aspiration PNA.  Patient feels like breathing is OK at moment on high flow 02  -resp tx/ATB/cardiac tx per respective teams  -adding hydromorphone 0.2 mg IV Q 3 hours PRN dyspnea     Constipation:  + bm overnight 6/5  -will monitor       Radha Bloom APRN CNP          [1] azithromycin, 500 mg, intravenous, q24h  docusate sodium, 100 mg, oral, BID  ferrous sulfate, 65 mg of elemental iron, oral, Daily  fluconazole, 200 mg, oral, Daily  fluticasone, 2 spray, Each Nostril, Daily  furosemide, 40 mg, intravenous, BID  ipratropium-albuteroL, 3 mL, nebulization, TID  metoprolol succinate XL, 25 mg, oral, Daily  piperacillin-tazobactam, 3.375 g, intravenous, q6h  polyethylene glycol, 17 g, oral, Nightly  potassium chloride, 20 mEq, intravenous, Once  potassium chloride CR, 40 mEq, oral, Once  sennosides, 1 tablet, oral, Nightly  vancomycin, 750 mg, intravenous, q12h  [Held by provider] warfarin, 1 mg, oral, Once per day on  Monday Wednesday  warfarin, 1.5 mg, oral, Once per day on Sunday Thursday Friday  [2]    [3] PRN medications: acetaminophen **OR** acetaminophen **OR** acetaminophen, guaiFENesin, ipratropium-albuteroL, ondansetron **OR** ondansetron, oxygen, phenoL, vancomycin

## 2025-06-05 NOTE — PROGRESS NOTES
[facility] Hospitalist Progress Note      Between 7AM-7PM please message me via Epic Secure Chat.  After 7PM please page Nocturnist on call.        Assessment/Plan     Acute Problems    Acute hypoxic respiratory failure   Aspiration PNA/pneumonitis  Severe stenosis of his mechanical aortic valve as well as severe mitral regurgitation  Elevated RV pressures  Bilateral pleural effusions  Iron def anemia  Possible occult GI blood loss    Chronic Problems    Mechanical aortic valve replacement - INR goal 2-3  Ascending aorta repair  HTN    Plan    - pulm consult -> suspect aspiration PNA/pneumonitis big driving factor. Continue IV Vanc/Zosyn/Azithro for now. Urine antigens negative. Procal minimally elevated 0.22. Remains of HFNC support.  - ID consult to assist with PNA/abx management appreciated  - SLP appreciated -> failed MBS. NPO rec for now with considerations for GOC/alternative means of nutrition. Currently has NG tube receiving TF  - cardiology consulted -> can consider afterload reduction/diuresis, not a surgical candidate. Presently receiving IV lasix  - pal care following -> DNR/DNI, taking day by day  - GI consulted, EGD/Colon with no old or active bleeding. No abnormality to explain anemia. Outpatient VCE can be considered for complete evaluation of MAHESH. Candida esophagitis started on PO fluconazole  - Coumadin as dosed by pharmacy for hx of AVR, mindful of INR while on fluconazole may have him skip 2 doses per week if DC home  - IV Venofer 300 mg x3 doses ordered; 1 unit pRBC this admit. Trend hgb. On PO iron supplement now  - check ABG today  - PT/OT    Fluids: None  Electrolytes: Replete as needed  Nutrition: Tube feed  Ortiz: None  Invasive lines: None  Drains: NG tube  O2: HFNC    DVT Prophylaxis:  Coumadin    Discharge Planning: pending GOC, PT/OT consults placed    Plan of care was discussed with patient/sister    Total time spent: At least 38 minutes, providing counseling or in coordination of care.  "Total time on this day of visit includes record and documentation review before and after visit including documentation and time not explicitly included on EMR time stamp.      Subjective     Robert Luna is a 59 y.o. male on day 9 of admission presenting with Symptomatic anemia.    NAEON. Working on weaning HFNC. Tolerating TF.    Review of Systems   Constitutional:  Negative for fever.   Respiratory:  Positive for shortness of breath.    Cardiovascular:  Negative for chest pain.   Gastrointestinal:  Negative for abdominal distention, abdominal pain and vomiting.       Objective     Physical Exam  Vitals reviewed.   Constitutional:       General: He is not in acute distress.  Cardiovascular:      Rate and Rhythm: Normal rate and regular rhythm.   Pulmonary:      Effort: Pulmonary effort is normal.      Breath sounds: Rales present.   Abdominal:      General: There is no distension.      Palpations: Abdomen is soft.   Neurological:      Mental Status: He is alert. Mental status is at baseline.         Last Recorded Vitals  Blood pressure 104/57, pulse 87, temperature 36.9 °C (98.4 °F), temperature source Temporal, resp. rate 17, height 1.499 m (4' 11\"), weight (!) 40.1 kg (88 lb 4.8 oz), SpO2 95%.    Medications  Scheduled Medications[1]   PRN Medications[2]                Miguel Walker MD  [facility]         [1] azithromycin, 500 mg, intravenous, q24h  docusate sodium, 100 mg, oral, BID  ferrous sulfate, 65 mg of elemental iron, oral, Daily  fluconazole, 200 mg, oral, Daily  fluticasone, 2 spray, Each Nostril, Daily  furosemide, 40 mg, intravenous, BID  ipratropium-albuteroL, 3 mL, nebulization, TID  metoprolol succinate XL, 25 mg, oral, Daily  piperacillin-tazobactam, 3.375 g, intravenous, q6h  polyethylene glycol, 17 g, oral, Nightly  sennosides, 1 tablet, oral, Nightly  vancomycin, 750 mg, intravenous, q12h  [Held by provider] warfarin, 1 mg, oral, Once per day on Monday Wednesday  [Held by provider] " warfarin, 1.5 mg, oral, Once per day on Sunday Thursday Friday     [2] PRN medications: acetaminophen **OR** acetaminophen **OR** acetaminophen, guaiFENesin, ipratropium-albuteroL, ondansetron **OR** ondansetron, oxygen, phenoL, vancomycin

## 2025-06-05 NOTE — PROGRESS NOTES
Nutrition Follow-up Note  Nutrition Assessment      Robert Luna is a 59 y.o. year old male patient with Symptomatic anemia [D64.9]  On day #10 of hospital admission    Per pt/family   -No food allergies  -Intake varies daily; some days he eats 3 meals, some days only 1  -Appetite varies  -No N/V currently (per RN, tolerating TF, a little bit of diarrhea).  -#, not for a couple of months (family disagrees)       TF seen running at 30mL/hr, FWF as ordered. HOB at 45degrees approx.    Scheduled medications  Scheduled Medications[1]  Continuous medications  Continuous Medications[2]  PRN medications  PRN Medications[3]    Nutrition Significant Labs:  BMP Trend:   Results from last 7 days   Lab Units 06/05/25 0515 06/04/25 0518 06/03/25 0715 06/02/25  0655   GLUCOSE mg/dL 139* 79 91 133*   CALCIUM mg/dL 7.8* 8.5* 9.0 9.0   SODIUM mmol/L 142 143 140 139   POTASSIUM mmol/L 2.7* 3.4* 4.3 3.6   CO2 mmol/L 40* 28 25 26   CHLORIDE mmol/L 94* 98 100 102   BUN mg/dL 25* 33* 25* 20   CREATININE mg/dL 0.64 0.77 0.63 0.56    , BG POCT trend:   Results from last 7 days   Lab Units 06/05/25  1506   POCT GLUCOSE mg/dL 145*    , Liver Function Trend:    , Renal Lab Trend:   Results from last 7 days   Lab Units 06/05/25 0515 06/04/25 0518 06/03/25 0715 06/02/25  0655   POTASSIUM mmol/L 2.7* 3.4* 4.3 3.6   SODIUM mmol/L 142 143 140 139   MAGNESIUM mg/dL 2.01 1.81 1.87 1.80   EGFR mL/min/1.73m*2 >90 >90 >90 >90   BUN mg/dL 25* 33* 25* 20   CREATININE mg/dL 0.64 0.77 0.63 0.56    , TPN/PPN Labs:   Results from last 7 days   Lab Units 06/05/25 0515 06/04/25 0518 06/03/25 0715 06/02/25  0655   GLUCOSE mg/dL 139* 79 91 133*   POTASSIUM mmol/L 2.7* 3.4* 4.3 3.6   MAGNESIUM mg/dL 2.01 1.81 1.87 1.80   SODIUM mmol/L 142 143 140 139   CHLORIDE mmol/L 94* 98 100 102    , Lipid Panel:   Lab Results   Component Value Date    CHOL 147 09/02/2022    HDL 39 (L) 09/02/2022    CHHDL 3.8 09/02/2022    TRIG 99 09/02/2022    , Vit D: No  "results found for: \"VITD25\" , Vit B12: No results found for: \"TYHSBVVT69\" , Iron Panel:   Lab Results   Component Value Date    IRON 17 (L) 05/28/2025    TIBC 390 05/28/2025    FERRITIN 13 (L) 05/28/2025    , Folate: No results found for: \"FOLATE\"     Dietary Orders (From admission, onward)       Start     Ordered    06/05/25 1716  Enteral feeding with NPO NG (nasogastric tube); 10; 60; Water; Tap water; Every 4 hours  Diet effective now        Comments: Advance by 10mL every 8 hours to goal rate of 40 mL/hr   Question Answer Comment   Tube feeding formula: Vital 1.5    Feeding route: NG (nasogastric tube)    Tube feeding continuous rate (mL/hr): 10    Tube feeding flush (mL): 60    Flush type: Water    Water type: Tap water    Flush frequency: Every 4 hours        06/05/25 1715 05/27/25 2232  May Participate in Room Service  ( ROOM SERVICE MAY PARTICIPATE)  Once        Question:  .  Answer:  Yes    05/27/25 2231                  History:  Energy Intake: Fair 50-75 %, Good > 75 %  Food and Nutrient History: Pt on TF currently at 30mL/hr    Anthropometrics:  Height: 149.9 cm (4' 11.02\")  Weight: (!) 39.5 kg (87 lb 1.6 oz)  BMI (Calculated): 17.58    Weight Change: -1.36    Wt Readings from Last 5 Encounters:   06/05/25 (!) 39.5 kg (87 lb 1.6 oz)   05/27/25 (!) 40.6 kg (89 lb 8.1 oz)   12/19/24 (!) 40.4 kg (89 lb)   10/07/24 (!) 39.9 kg (88 lb)   03/06/24 (!) 43.5 kg (96 lb)     Weight History / % Weight Change: 12/19/24: 40.4kg, 10/17/24: 39.9kg, 3/6/24: 43.5kg.  Reports UBW of #.  Significant Weight Loss: No       IBW/kg (Dietitian Calculated): 47.3 kg  Percent of IBW: 84 %       Energy Needs:  Height: 149.9 cm (4' 11.02\")  Temp: 37.8 °C (100 °F)    Method for Estimating Needs: 1400- 1460 @ 34-36 kcal/kg    Method for Estimating 24 Hour Protein Needs: 54-63 @ 1.2-1.4 gr/kg IBW    Total Fluid Estimated Needs in 24 Hours (mL): 1350 mL  Total Fluid Estimated Needs in 24 hours (mL/kg): 30 mL/kg     "   Nutrition Focused Physical Findings:  Orbital Fat Pads: Severe (dark circles, hollowing and loose skin)  Buccal Fat Pads: Severe (hollow, sunken and narrow face)    Temporalis: Mild-Moderate (slight depression)  Pectoralis (Clavicular Region): Severe (protruding prominent clavicle)  Deltoid/Trapezius: Severe (squared shoulders, acromion process prominent)    Edema: none       Skin: Negative  Mouth Findings: Dysphagia       Nutrition Diagnosis   Malnutrition Diagnosis  Patient has Malnutrition Diagnosis: Yes  Diagnosis Status: Active  Malnutrition Diagnosis: Severe malnutrition related to chronic disease or condition  Related to: dysphagia  As Evidenced by: reported intake <75% estimated needs for > 2 months, moderate-severe muslce & subcutaneous fat depletion    Patient has Nutrition Diagnosis: Yes  Nutrition Diagnosis 1: Inadequate protein energy intake  Diagnosis Status (1): Active  Related to (1): dysphagia  As Evidenced by (1): need of TF       Nutrition Interventions/Recommendations   Nutrition Prescription: Nutrition prescription for enteral nutrition  Individualized Nutrition Prescription Provided for : Start Vital 1.5 @10mL. If tolerates, advance by 10mL QH until goal rate of 40 mL/hr is reached. Goal rate to provide 100% of EEN. Water flushes per MD.    TF at goal provides 1440kcals, 65g of protein and 733mL of fluid    Food and/or Nutrient Delivery Interventions      Enteral Intake: Management of composition of enteral nutrition, Management of concentration of enteral nutrition, Management of delivery rate of enteral nutrition, Management of route of enteral nutrition, Management of schedule of enteral nutrition, Management of volume of enteral nutrition  Goal: tolerate TF at intervals and goal rate    -HOB at 45 degrees to lower risk for aspiration  -Strict I/Os and daily wts  -RFP and Mg daily; replete prn  -Monitor Na levels; adjust FWF prn  -Should pt show s/s of TF intolerances, hold TF for 2 hours,  restart at half of goal rate    Consider anti-diarrheal, should diarrhea continue.    Collaboration and Referral of Nutrition Care: Collaboration by nutrition professional with other providers  Coordination of Care with Providers: Nursing    Education Documentation  N/A    Nutrition Monitoring and Evaluation   Food and Nutrient Related History  Estimated Energy Intake: Energy intake greater or equal to 75% of estimated energy needs    Fluid Intake: Estimated fluid intake       Enteral and Parenteral Nutrition Intake Determination: Enteral nutrition formula/solution, Enteral nutrition intake - Tolerate TF at goal rate, Enteral nutrition intake - Prevent refeeding, Enteral nutrition intake - To meet > 75% estimated energy needs, Enteral nutrition intake - Other  Criteria: Tolerate TF at goal    Anthropometrics: Body Composition/Growth/Weight History  Body Weight: Body weight - Maintain stable weight    Body Weight Change: Body weight gain - Gradual weight gain     Biochemical Data, Medical Tests and Procedures  Electrolyte and Renal Panel: Other (Comment), Calcium, serum, Calcium, ionized, Potassium, BUN, Chloride  Criteria: As clinically indicated    Gastrointestinal Profile: Other (Comment)  Criteria: As clinically indicated    Glucose/Endocrine Profile: Glucose within normal limits ( mg/dL)  Criteria: As clinically indicated    Nutritional Anemia Profile: Other (Comment)  Criteria: As clinically indicated    Vitamin Profile: Other (Comment)  Criteria: As clinically indicated    Nutrition Focused Physical Findings  Adipose Finding: Loss of subcutaneous fat    Digestive System Finding: Loose stool, Nausea, Vomiting, Diarrhea, Constipation, Abdominal pain, Abdominal distension       Skin Finding: Promote intact skin - Promote skin integrity       Mouth Finding: Dysphagia    Time Spent (min): 40 minutes  Last Date of Nutrition Visit: 06/05/25  Nutrition Follow-Up Needed?: Dietitian to reassess per policy  Follow  up Comment: f/u, LYLA Jimenez            [1] ampicillin-sulbactam, 1.5 g, intravenous, q6h  docusate sodium, 100 mg, oral, BID  ferrous sulfate, 65 mg of elemental iron, oral, Daily  fluconazole, 200 mg, oral, Daily  fluticasone, 2 spray, Each Nostril, Daily  [Held by provider] furosemide, 40 mg, intravenous, BID  ipratropium-albuteroL, 3 mL, nebulization, TID  metoprolol succinate XL, 25 mg, oral, Daily  polyethylene glycol, 17 g, oral, Nightly  sennosides, 1 tablet, oral, Nightly  [Held by provider] warfarin, 1 mg, oral, Once per day on Monday Wednesday  [Held by provider] warfarin, 1.5 mg, oral, Once per day on Sunday Thursday Friday     [2]    [3] PRN medications: acetaminophen **OR** acetaminophen **OR** acetaminophen, guaiFENesin, HYDROmorphone, ipratropium-albuteroL, ondansetron **OR** ondansetron, oxygen, phenoL

## 2025-06-05 NOTE — PROGRESS NOTES
Subjective Data:  Still SOB  NG tube placed overnight  K 2.7 this am  7.62/50/48/51.4/79% ABG  Fluoroscopy of aortic valve yesterday with functioning leaflets      Objective Data:  Last Recorded Vitals:  Vitals:    25 0401 25 0419 25 0709 25 0802   BP:  104/59  107/53   BP Location:  Right leg     Patient Position:  Lying     Pulse:  87     Resp:  16     Temp:  36.6 °C (97.8 °F)  36.7 °C (98.1 °F)   TempSrc:  Temporal  Temporal   SpO2:  98% 92% 95%   Weight: (!) 40.1 kg (88 lb 4.8 oz)      Height:         Medical Gas Therapy: Supplemental oxygen  Medical Gas Delivery Method: High flow nasal cannula  Weight  Av.5 kg (89 lb 4.2 oz)  Min: 40.1 kg (88 lb 4.8 oz)  Max: 40.6 kg (89 lb 8.1 oz)    LABS:  CMP:  Results from last 7 days   Lab Units 25  0515 25  0518 25  0715 25  0655 25  0700 25  0744 25  0702   SODIUM mmol/L 142 143 140 139 139 138 135*   POTASSIUM mmol/L 2.7* 3.4* 4.3 3.6 4.5 4.2 4.2   CHLORIDE mmol/L 94* 98 100 102 106 108* 105   CO2 mmol/L 40* 28 25 26 27 20* 20*   ANION GAP mmol/L 11 20 19 15 11 14 14   BUN mg/dL 25* 33* 25* 20 16 16 10   CREATININE mg/dL 0.64 0.77 0.63 0.56 0.59 0.63 0.59   EGFR mL/min/1.73m*2 >90 >90 >90 >90 >90 >90 >90   MAGNESIUM mg/dL 2.01 1.81 1.87 1.80  --   --   --      CBC:  Results from last 7 days   Lab Units 25  0515 25  0518 25  0805 25  0655 25  0700 25  0744 25  0702 25  2211   WBC AUTO x10*3/uL 9.4 9.9 10.0 12.1* 10.8 9.7 17.9* 12.0*   HEMOGLOBIN g/dL 8.5* 8.6* 8.6* 8.2* 8.2* 7.1* 8.6* 8.7*   HEMATOCRIT % 27.9* 28.5* 30.7* 26.9* 27.5* 22.8* 27.3* 28.0*   PLATELETS AUTO x10*3/uL 198 197 242 191 199 182 207 215   MCV fL 84 87 88 83 82 79* 80 78*     COAG:   Results from last 7 days   Lab Units 25  0515 25  0518 25  0924 25  0655 25  0659 25  0525 25  0231   INR  3.8* 3.7* 2.2* 2.1* 1.9* 2.1* 2.9*     ABO: No results  "found for: \"ABO\"  HEME/ENDO:         CARDIAC:   Results from last 7 days   Lab Units 06/01/25  0700   BNP pg/mL 1,832*             Last I/O:    Intake/Output Summary (Last 24 hours) at 6/5/2025 0924  Last data filed at 6/5/2025 0226  Gross per 24 hour   Intake 710 ml   Output 1425 ml   Net -715 ml     Net IO Since Admission: -478.17 mL [06/05/25 0924]      Imaging Results:  ECG 12 Lead  Result Date: 6/1/2025  Normal sinus rhythm Normal ECG Confirmed by Brandon Greene ([id number]) on 6/1/2025 8:48:34 PM    XR chest 2 views  Result Date: 5/27/2025  STUDY: Chest Radiographs;  5/27/2025 4:34PM INDICATION: Dyspnea. COMPARISON: None Available. ACCESSION NUMBER(S): [id number] ORDERING CLINICIAN: DRE CARCAMO TECHNIQUE:  Frontal and lateral chest. FINDINGS: CARDIOMEDIASTINAL SILHOUETTE: Cardiomediastinal silhouette is normal in size and configuration. Multiple median sternotomy wires are consistent with status post cardiothoracic surgery.  LUNGS: Lungs are clear.  ABDOMEN: No remarkable upper abdominal findings.  BONES: No acute osseous changes.    No acute cardiopulmonary process. Signed by Silvio Wall MD    XR shoulder right 2+ views  Result Date: 5/27/2025  STUDY: Shoulder Radiographs; 05/27/2025 04:34 PM INDICATION: Pain. COMPARISON: None available. ACCESSION NUMBER(S): [id number] ORDERING CLINICIAN: DRE CARCAMO TECHNIQUE:  Three view(s) of the right shoulder. FINDINGS:  The osseous structures are intact with no evidence for acute fracture or focal destruction.  The alignment is anatomic.  No soft tissue abnormality is seen.    No evidence for acute fracture or dislocation. Signed by Silvio Wall MD          Past Cardiology Tests (Last 3 Years):  EKG:  Results for orders placed during the hospital encounter of 05/27/25    ECG 12 Lead    Narrative  Normal sinus rhythm  Normal ECG  Confirmed by Brandon Greene ([id number]) on 6/1/2025 8:48:34 PM    Echo:  Results for orders placed during the hospital encounter of " 02/06/24    Transthoracic Echo (TTE) Complete    Narrative  [facility]  [address] , [place] [address]  Phone [phone]    TRANSTHORACIC ECHOCARDIOGRAM REPORT      Patient Name:      VLAD PRIDE         Stephen Physician:   [id number] Emmett Hoang DO  Study Date:        2/6/2024           Ordering Provider:   [id number] VLAD PHILLIP  MRN/PID:           [id number]           Fellow:  Accession#:        [id number]       Nurse:  Date of Birth/Age: 1965 / 58      Sonographer:         Molly Schaeffer RDCS  years  Gender:            M                  Additional Staff:  Height:            149.86 cm          Admit Date:          2/6/2024  Weight:            44.00 kg           Admission Status:    Outpatient  BSA:               1.36 m2            Department Location: [facility]  Blood Pressure: 128 /70 mmHg    Study Type:    TRANSTHORACIC ECHO (TTE) COMPLETE  Diagnosis/ICD: Presence of prosthetic heart valve-Z95.2  Indication:    presence of prosthetic heart valve  CPT Codes:     Echo Complete w Full Doppler-[id number]    Patient History:  Pertinent History: Aortic valve replacement 1997 [employer] mechanical valve.    Study Detail: The following Echo studies were performed: 2D, M-Mode, Doppler and  color flow.      PHYSICIAN INTERPRETATION:  Left Ventricle: Left ventricular systolic function is normal, with an estimated ejection fraction of 60-65%. There are no regional wall motion abnormalities. The left ventricular cavity size is normal. The left ventricular septal wall thickness is mildly increased. There is mildly increased left ventricular posterior wall thickness. Spectral Doppler shows a pseudonormal pattern of left ventricular diastolic filling.  Left Atrium: The left atrium is moderately dilated.  Right Ventricle: The right ventricle is normal in size. There is normal right ventricular global systolic function.  Right Atrium: The right atrium is normal in size.  Aortic Valve: There is a prosthetic  aortic valve present. There is evidence of moderate aortic valve stenosis.  There is a a [employer] mechanical aortic valve prosthesis. There is no evidence of aortic valve regurgitation. The peak instantaneous gradient of the aortic valve is 65.9 mmHg. The mean gradient of the aortic valve is 32.0 mmHg.  Mitral Valve: The mitral valve is moderately thickened. There is moderate mitral valve prolapse. There is moderate mitral valve regurgitation.  Tricuspid Valve: The tricuspid valve is structurally normal. There is mild tricuspid regurgitation.  Pulmonic Valve: The pulmonic valve is not well visualized. The pulmonic valve regurgitation was not well visualized.  Pericardium: There is no pericardial effusion noted.  Aorta: The aortic root is normal.      CONCLUSIONS:  1. Left ventricular systolic function is normal with a 60-65% estimated ejection fraction.  2. Spectral Doppler shows a pseudonormal pattern of left ventricular diastolic filling.  3. The left atrium is moderately dilated.  4. The mitral valve is moderately thickened.  5. Moderate mitral valve regurgitation.  6. There is moderate mitral valve prolapse.  7. Moderate aortic valve stenosis.  8. There is a mechanical aortic valve prosthesis present.    QUANTITATIVE DATA SUMMARY:  2D MEASUREMENTS:  Normal Ranges:  IVSd:          0.65 cm   (0.6-1.1cm)  LVPWd:         0.73 cm   (0.6-1.1cm)  LVIDd:         4.64 cm   (3.9-5.9cm)  LVIDs:         2.82 cm  LV Mass Index: 73.2 g/m2  LV % FS        39.2 %    LA VOLUME:  Normal Ranges:  LA Vol A4C:        104.3 ml  (22+/-6mL/m2)  LA Vol Index A4C:  76.9ml/m2  LA Area A4C:       29.1 cm2  LA Major Axis A4C: 6.9 cm  LA Vol A4C:        98.5 ml    RA VOLUME BY A/L METHOD:  Normal Ranges:  RA Vol A4C:        18.3 ml    (8.3-19.5ml)  RA Vol Index A4C:  13.5 ml/m2  RA Area A4C:       10.1 cm2  RA Major Axis A4C: 4.7 cm    LV SYSTOLIC FUNCTION BY 2D PLANIMETRY (MOD):  Normal Ranges:  EF-A4C View: 62.4 % (>=55%)    LV DIASTOLIC  FUNCTION:  Normal Ranges:  MV Peak E:    1.72 m/s (0.7-1.2 m/s)  MV Peak A:    1.57 m/s (0.42-0.7 m/s)  E/A Ratio:    1.10     (1.0-2.2)  MV e'         0.08 m/s (>8.0)  MV lateral e' 0.08 m/s  MV medial e'  0.07 m/s  E/e' Ratio:   22.93    (<8.0)    MITRAL VALVE:  Normal Ranges:  MV DT: 293 msec (150-240msec)    AORTIC VALVE:  Normal Ranges:  AoV Vmax:                4.06 m/s  (<=1.7m/s)  AoV Peak P.9 mmHg (<20mmHg)  AoV Mean P.0 mmHg (1.7-11.5mmHg)  LVOT Max Maco:            1.57 m/s  (<=1.1m/s)  AoV VTI:                 60.00 cm  (18-25cm)  LVOT VTI:                26.90 cm  LVOT Diameter:           1.70 cm   (1.8-2.4cm)  AoV Area, VTI:           1.02 cm2  (2.5-5.5cm2)  AoV Area,Vmax:           0.88 cm2  (2.5-4.5cm2)  AoV Dimensionless Index: 0.45      RIGHT VENTRICLE:  TAPSE: 21.7 mm  RV s'  0.10 m/s    TRICUSPID VALVE/RVSP:  Normal Ranges:  Peak TR Velocity: 3.04 m/s  RV Syst Pressure: 40.0 mmHg (< 30mmHg)      [id number] Emmett Hoang DO  Electronically signed on 2024 at 7:52:07 AM        ** Final **    Ejection Fractions:  LV EF   Date/Time Value Ref Range Status   2025 12:29 PM 71 %      Cath:  No results found for this or any previous visit.    Stress Test:  No results found for this or any previous visit.    Cardiac Imaging:  No results found for this or any previous visit.      Inpatient Medications:  Scheduled Medications[1]  PRN Medications[2]  Continuous Medications[3]    Physical Exam:  General:  Patient is awake, alert, and oriented.  Patient is in no acute distress.  HEENT:  Pupils equal and reactive.  Normocephalic.  Moist mucosa.    Neck:  No thyromegaly.  Normal Jugular Venous Pressure.  Cardiovascular:  Regular rate and rhythm.  Normal S1 and S2.  Pulmonary:  Clear to auscultation bilaterally.  Abdomen:  Soft. Non-tender.   Non-distended.  Positive bowel sounds.  Lower Extremities:  2+ pedal pulses. No LE edema.  Neurologic:  Cranial nerves intact.  No focal  "deficit.   Skin: Skin warm and dry, normal skin turgor.   Psychiatric: Normal affect.     Assessment/Plan   Cards: [facility] 10/2024     Robert Luna is a 59 y.o. male with a history of aortic valve replacement on Coumadin 1997 , hypertension, iron deficiency anemia who presented to [facility] on 5/27/2005 with complaints of dyspnea on exertion.  While here at [facility] GI was consulted EGD/colonoscopy showed no new active bleeding.  He received IV Venofer 300 mg x 3 and 1 unit of packed red blood cells.  He is also receiving antibiotics for possible aspiration pneumonia/pneumonitis.  Cardiology is now asked to see him for \"Presented with worsening dyspnea on exertion. Suspect HF exacerbation, BNP is up. Has mechanical aortic valve last ECHO I can see from 1 year ago mean gradient 32 mmHg \"       #Acute on chronic respiratory failure  - Seems to be primarily worsened post EGD with concern for aspiration.  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   In retrospect he says that he feels like he knew this was coming and there was an issue leading up to this.  He is not a candidate for redo aortic valve replacement +/- mitral valve repair/replacement due to his body habitus, frailty.  He understands this.  - Continue supportive care and treatment of aspiration pneumonitis  - Okay to continue IV diuresis  - Dose of Diamox today  - Likely would benefit from afterload reduction from ACE inhibitor or hydralazine but BP remains on the lower side.          Code Status:  DNMITCHELL Neely, APRN-CNP         [1] "   Scheduled medications   Medication Dose Route Frequency    azithromycin  500 mg intravenous q24h    docusate sodium  100 mg oral BID    ferrous sulfate  65 mg of elemental iron oral Daily    fluconazole  200 mg oral Daily    fluticasone  2 spray Each Nostril Daily    furosemide  40 mg intravenous BID    ipratropium-albuteroL  3 mL nebulization TID    metoprolol succinate XL  25 mg oral Daily    piperacillin-tazobactam  3.375 g intravenous q6h    polyethylene glycol  17 g oral Nightly    potassium chloride  20 mEq intravenous Once    potassium chloride CR  40 mEq oral Once    sennosides  1 tablet oral Nightly    vancomycin  750 mg intravenous q12h    [Held by provider] warfarin  1 mg oral Once per day on Monday Wednesday    [Held by provider] warfarin  1.5 mg oral Once per day on Sunday Thursday Friday   [2]   PRN medications   Medication    acetaminophen    Or    acetaminophen    Or    acetaminophen    guaiFENesin    ipratropium-albuteroL    ondansetron    Or    ondansetron    oxygen    phenoL    vancomycin   [3]   Continuous Medications   Medication Dose Last Rate

## 2025-06-05 NOTE — PROGRESS NOTES
PALLIATIVE CARE SOCIAL WORK NOTE     Hospice order received this afternoon. Pt and his sisters, Luis and Sasha interested in an informational meeting with hospice. Offered choice of hospice agency. [facility]erral has been made to [facility] via [facility]. [facility] to reach out to either of pt's sisters to schedule a meeting for tomorrow afternoon. Family is available after 2:00 pm. Will continue to follow as appropriate. Thank you.    MARTÍN Kerr

## 2025-06-05 NOTE — CARE PLAN
The patient's goals for the shift include  keep a healthy O2 saturation    The clinical goals for the shift include Maintain theraputeic 02 level      Problem: Pain - Adult  Goal: Verbalizes/displays adequate comfort level or baseline comfort level  Outcome: Progressing     Problem: Safety - Adult  Goal: Free from fall injury  Outcome: Progressing     Problem: Discharge Planning  Goal: Discharge to home or other facility with appropriate resources  Outcome: Progressing     Problem: Chronic Conditions and Co-morbidities  Goal: Patient's chronic conditions and co-morbidity symptoms are monitored and maintained or improved  Outcome: Progressing     Problem: Nutrition  Goal: Nutrient intake appropriate for maintaining nutritional needs  Outcome: Progressing     Problem: Fall/Injury  Goal: Not fall by end of shift  Outcome: Progressing  Goal: Be free from injury by end of the shift  Outcome: Progressing  Goal: Verbalize understanding of personal risk factors for fall in the hospital  Outcome: Progressing  Goal: Verbalize understanding of risk factor reduction measures to prevent injury from fall in the home  Outcome: Progressing  Goal: Use assistive devices by end of the shift  Outcome: Progressing  Goal: Pace activities to prevent fatigue by end of the shift  Outcome: Progressing

## 2025-06-05 NOTE — PROGRESS NOTES
Robert Luna is a 59 y.o. male on day 9 of admission presenting with Symptomatic anemia.    Subjective   No acute events overnight.  Patient sitting up in bed with sister at bedside.  Patient now s/p NG tube with nutrition started.  Morning labs show significant elevation of serum bicarb.  ABG ordered by primary team.  Mixed venous sample was obtained showing PCO2 51.4, most likely from diuresis.  Will weaned from 50 L / 50% to 45 L / 50% maintaining saturation.  Patient reports that he feels he is breathing.  Continued cough with pale yellow mucus.  Given continual aspiration asked nursing staff to supply the patient with oral suction capabilities.  Denies fever, nausea and pain.  Patient reports that he and his sister will meet with hospice tomorrow.    At the time of the exam patient was listed as DNR and yes to intubation.  Discussed with both patient and his sister the difficulty in maintaining his oxygenation given his pulmonary hypertension and severe aortic and mitral valve dysfunction in the setting of him not being a candidate for surgical intervention.  We discussed that at the present time he is slowly being weaned but should he worsen, an endotracheal tube and a ventilator would not prolong his life and would not provide him comfort.  He has opted to change his CODE STATUS to DNR and no intubation.  Palliative care NP entered the room shortly after this discussion and was made aware and will change his CODE STATUS.     Objective   Physical Exam   Constitutional:   Very thin, mild increased WOB without acute distress, cooperative  HENT: Atraumatic, moist mucous membranes, poor dentition with missing teeth (lower tooth appears decayed)  Eyes: Nonicteric  Neck: Supple, + JVD  Cardiovascular: Regular rate and rhythm, HR 80s, + murmur/aortic valve click  Pulmonary: Fair air entry with diminished bases bilaterally, clear upper fields, posterior mid lung mild coarse crackles bilaterally; mild conversational  "dyspnea noted; on Airvo 45 L / 50%  Abdominal: Thin, nondistended, nontender, + BS  Musculoskeletal: Fair active range of motion with fair strength  Extremities:   No BLE edema  Lymphadenopathy: No nuchal LAP  Skin: Very pale, warm core, cooler extremities, dry  Neurological: Alert and oriented with clear and appropriate speech  Psychiatric:    Appropriate mood and behavior     Medications:  Scheduled Medications[1]   PRN Medications[2]     Last Recorded Vitals  Blood pressure 105/59, pulse 87, temperature 37.8 °C (100 °F), temperature source Temporal, resp. rate 17, height 1.499 m (4' 11\"), weight (!) 40.1 kg (88 lb 4.8 oz), SpO2 98%.  Intake/Output last 3 Shifts:  I/O last 3 completed shifts:  In: 810 (20.2 mL/kg) [I.V.:50 (1.2 mL/kg); NG/GT:110; IV Piggyback:650]  Out: 1925 (48.1 mL/kg) [Urine:1925 (1.3 mL/kg/hr)]  Weight: 40.1 kg     Relevant Results  Results for orders placed or performed during the hospital encounter of 05/27/25 (from the past 24 hours)   Protime-INR   Result Value Ref Range    Protime 42.2 (H) 9.8 - 12.4 seconds    INR 3.8 (H) 0.9 - 1.1   Basic Metabolic Panel   Result Value Ref Range    Glucose 139 (H) 74 - 99 mg/dL    Sodium 142 136 - 145 mmol/L    Potassium 2.7 (LL) 3.5 - 5.3 mmol/L    Chloride 94 (L) 98 - 107 mmol/L    Bicarbonate 40 (HH) 21 - 32 mmol/L    Anion Gap 11 10 - 20 mmol/L    Urea Nitrogen 25 (H) 6 - 23 mg/dL    Creatinine 0.64 0.50 - 1.30 mg/dL    eGFR >90 >60 mL/min/1.73m*2    Calcium 7.8 (L) 8.6 - 10.3 mg/dL   CBC   Result Value Ref Range    WBC 9.4 4.4 - 11.3 x10*3/uL    nRBC 0.4 (H) 0.0 - 0.0 /100 WBCs    RBC 3.34 (L) 4.50 - 5.90 x10*6/uL    Hemoglobin 8.5 (L) 13.5 - 17.5 g/dL    Hematocrit 27.9 (L) 41.0 - 52.0 %    MCV 84 80 - 100 fL    MCH 25.4 (L) 26.0 - 34.0 pg    MCHC 30.5 (L) 32.0 - 36.0 g/dL    RDW 22.5 (H) 11.5 - 14.5 %    Platelets 198 150 - 450 x10*3/uL   Magnesium   Result Value Ref Range    Magnesium 2.01 1.60 - 2.40 mg/dL   Vancomycin   Result Value Ref Range "    Vancomycin 26.5 (H) 5.0 - 20.0 ug/mL   Blood Gas Arterial   Result Value Ref Range    POCT pH, Arterial 7.62 (HH) 7.38 - 7.42 pH    POCT pCO2, Arterial 50 (H) 38 - 42 mm Hg    POCT pO2, Arterial 48 (L) 85 - 95 mm Hg    POCT SO2, Arterial 79 (L) 94 - 100 %    POCT Oxy Hemoglobin, Arterial 77.7 (L) 94.0 - 98.0 %    POCT Base Excess, Arterial 26.4 (H) -2.0 - 3.0 mmol/L    POCT HCO3 Calculated, Arterial 51.4 (H) 22.0 - 26.0 mmol/L    Patient Temperature 37.0 degrees Celsius    FiO2 45 %   POCT GLUCOSE   Result Value Ref Range    POCT Glucose 145 (H) 74 - 99 mg/dL      Transthoracic Echo (TTE) Complete  Result Date: 6/3/2025  PHYSICIAN INTERPRETATION: Left Ventricle: Left ventricular ejection fraction is normal calculated by Leos's biplane at 71%. There are no regional left ventricular wall motion abnormalities. The left ventricular cavity size is normal. There is normal septal and normal posterior left ventricular wall thickness. There is left ventricular concentric remodeling. Left ventricular diastolic filling cannot be determined due to mitral valve repair/replacement. Left Atrium: The left atrial size is severely dilated. Right Ventricle: The right ventricle is severely enlarged. There is mildly reduced right ventricular systolic function. Right Atrium: The right atrium is severely dilated. Aortic Valve: There is a prosthetic aortic valve present. The aortic valve area by VTI is 0.50 cmï¿½ with a peak velocity of 4.29 m/s. The peak and mean gradients are 58 mmHg and 34 mmHg, respectively, with a dimensionless index of 0.21. There is evidence of severe aortic valve stenosis. There is a [employer]. Bryce bileaflet mechanical type aortic valve prosthesis with a 21 mm reported size. There is mild aortic valve regurgitation. The mechanical AV leaflet mobility could not be properly assessed due to prosthetic valve artifact. It is recommended to assess leaflet motion and opening/closing angles with cinefluoroscopy. Mitral  Valve: Status post mitral valve repair. The doppler estimated peak and mean diastolic pressure gradients are 30.8 mmHg and 9 mmHg, respectively. Barlows syndrome is present. There is mitral valve prolapse of the anterior and posterior leaflets. There is mild calcification of the mitral leaflets and subvalvular apparatus. The mean gradient of the mitral valve is 9 mmHg. There is severe mitral valve regurgitation. The E Vmax is 2.55 m/s. There is probable presence of an underlying MV annular repair with prosthetic ring (no prior history of MVr available). Tricuspid Valve: The tricuspid valve is abnormal. The tricuspid valve annulus appears dilated. There is moderate to severe tricuspid regurgitation. The doppler estimated RVSP is moderately elevated with a right ventricular systolic pressure of 60 mmHg. Pulmonic Valve: The pulmonic valve is structurally normal. There is trace to mild pulmonic valve regurgitation. Pericardium: Trivial pericardial effusion. Pleural: There is left pleural effusion. Aorta: The aortic root is normal. There is a prosthetic graft seen in the position of the ascending aorta. In comparison to the previous echocardiogram(s): Compared with study dated 2/6/2024, the degree of aortic valve stenosis has slightly increased from 65/2 mmHg to 73/34 mmHg, with an AME of 0.5-0.77 cm2 and a DI of 0.21. The degree of MR has increased from moderate to severe and the RVSP has increased from 40 mmHg to 60 mmHg. Underlying infection/endocarditis to explain progression of MR could not be ruled out in this study. JOANNE assessment is recommended if clinically indicated.      CONCLUSIONS:  1. Left ventricular ejection fraction is normal calculated by Leos's biplane at 71%.  2. There is mildly reduced right ventricular systolic function.  3. Severely enlarged right ventricle.  4. The Doppler-estimated right ventricular systolic pressure (RVSP) is moderately elevated at 60 mmHg.  5. The left atrial size is  severely dilated.  6. The right atrium is severely dilated.  7. Severe aortic valve stenosis. The peak and mean gradients are 73 mmHg and 34 mmHg respectively.  8. There is a [employer]. Bryce bileaflet mechanical type aortic valve prosthesis with a 21 mm reported size. The peak and mean gradients are 73 mmHg and 34 mmHg respectively.  9. Mild aortic valve regurgitation. 10. There is probable presence of an underlying MV annular repair with prosthetic ring (no prior history of MVr available). 11. Mitral valve Barlows syndrome is present, with prolapse of anterior and posterior leaflets. 12. Severe mitral valve regurgitation. 13. There is mild calcification of the mitral leaflets and subvalvular apparatus. 14. Moderate to severe tricuspid regurgitation visualized. 15. The tricuspid valve annulus appears dilated. 16. Prosthetic graft in the ascending aorta position. 17. Compared with study dated 2/6/2024, the degree of aortic valve stenosis has slightly increased from 65/2 mmHg to 73/34 mmHg, with an AME of 0.5-0.77 cm2 and a DI of 0.21. The degree of MR has increased from moderate to severe and the RVSP has increased from 40 mmHg to 60 mmHg. Underlying infection/endocarditis to explain progression of MR could not be ruled out in this study. JOANNE assessment is recommended if clinically indicated. RECOMMENDATIONS: Based on the results of this study and as mechanical AV leaflet mobility could not be properly assessed due to prosthetic valve artifact, it is recommended to assess leaflet motion and opening/closing angles with cinefluoroscopy.      [id number] Ulysses Amin MD Electronically signed on 6/3/2025 at 4:50:49 PM  ** Final (Updated) **     XR chest 1 view  Result Date: 6/3/2025  Interpreted By:  Maya Christie, STUDY: XR CHEST 1 VIEW;  6/3/2025 12:33 pm   INDICATION: Signs/Symptoms:short of breath.     COMPARISON: 05/30/2025   ACCESSION NUMBER(S): [id number]   ORDERING CLINICIAN: CJ NGUYEN   FINDINGS: Ill-defined patchy  bilateral infiltrates, slightly improved on the left and increased on the right, with probable bilateral Kerley B-lines. Bilateral pleural effusions increased since the previous exam. The cardiomediastinal silhouette remains enlarged status post sternotomy.       Waxing and waning bilateral infiltrates/edema with increasing bilateral pleural effusions.   MACRO: None.   Signed by: Maya Christie 6/3/2025 12:49 PM Dictation workstation:   [id number]    CT chest wo IV contrast  Result Date: 6/1/2025  Interpreted By:  Andrea Montes, STUDY: CT CHEST WO IV CONTRAST;  6/1/2025 3:48 pm   INDICATION: Shortness-of-breath   COMPARISON: CXR performed 05/30/2025   ACCESSION NUMBER(S): [id number]   ORDERING CLINICIAN: YO BUENO   TECHNIQUE: Helical data acquisition of the chest was obtained  without IV contrast material.  Images were reformatted in axial, coronal, and sagittal planes.   FINDINGS: LUNGS AND AIRWAYS: There are patchy consolidative/alveolar opacities bilaterally noted, some of which are solid and others mixed attenuation and present within all lobes, greater left than right. Findings are superimposed on interlobular septal thickening and scattered areas of parenchymal nodularity bilaterally which are somewhat motion blurred. Small-to-moderate bilateral pleural effusions are also present. No pneumothorax is seen on either side. No central airway obstruction.   MEDIASTINUM AND MELLISA, LOWER NECK AND AXILLA: No significantly enlarged intrathoracic lymph nodes are identified. The esophagus is patulous and air-filled. No masses are identified in the lower neck   HEART AND VESSELS: The heart is enlarged. The patient is status post prior sternotomy with aortic root and ascending aortic repair and placement of a prosthetic aortic valve. No evident postoperative complication on this noncontrast exam. Scattered atherosclerotic calcifications. The pulmonary trunk is severely dilated, measures 3.6 cm. No thoracic aortic  aneurysm. The heart is enlarged with severe biatrial dilatation. Coarsely calcified mitral valve annulus. There is evidence for anemia with increased attenuation of the myocardium relative to the blood pool.   UPPER ABDOMEN: No acute upper abdominal finding is identified on this noncontrast exam   CHEST WALL AND OSSEOUS STRUCTURES: No significant soft tissue findings. No lytic or blastic osseous lesion is identified. Mild anterior wedge compression deformity of the T10 vertebral body level.       1. Findings most compatible with interstitial and alveolar edema with interstitial thickening and patchy consolidative areas/areas of alveolar opacity, mixed solid and sub-solid in attenuation with areas of nodularity also noted, some with tree-in-bud configuration. The possibility of a superimposed pneumonia/pneumonitis and bronchiolitis is not excluded. Advise short-term radiographic follow-up to reassess. Appearance on the  radiograph from the current CT scan quite similar to the 05/30/2025 CXR. If the chest radiograph appearance does not entirely normalized, follow-up CT will be warranted. 2. Prior sternotomy and repair of the aortic root and ascending aorta with mechanical aortic valve replacement. Atherosclerotic disease. Severe biatrial dilatation. Severely dilated pulmonary trunk. Advise attention on recent echocardiogram. 3. Evidence for anemia.   MACRO: None   Signed by: Andrea Montes 6/1/2025 5:40 PM Dictation workstation:   [id number]    XR chest 1 view  Result Date: 5/30/2025  Interpreted By:  Iram Baca, STUDY: XR CHEST 1 VIEW; 5/30/2025 9:36 am   INDICATION: Signs/Symptoms:leukocytosis, hypoxia   COMPARISON: Radiographs 05/27/2025   ACCESSION NUMBER(S): [id number]   ORDERING CLINICIAN: DEEP PORTILLO   TECHNIQUE: Single frontal view of the chest performed.   FINDINGS:   LINES AND DEVICES: None.   LUNGS: New bilateral perihilar and left basilar airspace opacities are noted. There is new blunting of the  right costophrenic angle suggestive of a trace effusion. No pneumothorax.   CARDIOMEDIASTINAL SILHOUETTE: Stable cardiomegaly. Prior median sternotomy.         New bilateral, left greater than right, airspace opacities compatible with pneumonia, although pulmonary edema may appear similarly.   MACRO None   Signed by: Iram Baca 5/30/2025 12:33 PM Dictation workstation:   [id number]    Assessment & Plan  Symptomatic anemia    Aortic valve replaced    Robert Luna is a 59 y.o. male with past medical history of AVR (s/p mechanical valve on Coumadin), congenital aortic coarctation, HLD, KEV, MAHESH who presented to [facility] ED on 5/27 for shortness of breath that had been persistent for the previous month but had acutely worsened in the 24 hours prior to his presentation.  Labs were without leukocytosis, H/H 8.3/27.8, . He was transferred to [facility] for suspected occult GI blood loss with symptomatic anemia. EGD/colonoscopy done on 5/29 demonstrating no old or active bleeding.  Patient was started on Venofer & was transfused 1 PRBC on 5/29.  On 5/30 patient had new hypoxic episode requiring 4 L, concerning for post endoscopy aspiration event.  CXR showed bilateral airspace opacities with new leukocytosis patient was started on Unasyn (5/30-6/3).  CT chest completed on 6/1 demonstrating multifocal pneumonia with interstitial and alveolar edema.  Leukocytosis noted on 6/2.  Flu A/B, RSV and COVID were all negative.  Cardiology consulted on 6/2 for suspected heart failure exacerbation.  Echo completed on 6/3 with patient experiencing a hypoxic event requiring escalation to Airvo 50 L / 50%.  MRSA swab was negative.  Patient was transitioned to IV Zosyn and fluconazole (for candida esophagitis).  Pulmonology is now consulted to assist in further evaluation and management of acute hypoxic respiratory failure following suspected aspiration.      Impressions:     #Acute hypoxic respiratory failure: Multifactorial 2/2  causes below, post EGD aspiration, worsening MR/TR, MVP, stenosed mechanical AO valve; baseline is room air, currently requiring Airvo     #Pneumonia, multifocal +/- pneumonitis: CT demonstrating patchy consolidative alveolar opacities in all lobes (L>R); likely aspiration given failed swallow eval on 6/2  Completed Unasyn 5/30 - 6/3  Started on Zosyn 6/3; adding azithromycin IV 5 mg daily x 3 days  Flu, RSV and COVID-negative; MRSA swab negative  Legionella urine antigens negative  Strep urine antigen negative  Respiratory culture 6/2 culture in progress  Procalcitonin 0.22      #Pleural effusions, bilateral: Small to moderate noted on CT chest, parapneumonic versus heart failure; currently on high oxygen requirements without acute distress     #Volume overload/acute on chronic HFpEF: 2024 with pseudo normal pattern of LV diastolic filling; echo with CT noting interlobular septal thickening, admission ; + bilateral pleural effusions     #Pulmonary hypertension: CT on 6/1 with dilated pulmonary trunk at 3.6 cm; echo 6/3 with RVSP 60 mmHg, severely enlarged RV with mildly reduced RV systolic function ISO moderate to severe TR and severe MR and MV prolapse of anterior and posterior leaflets  previous echo 02/2024 normal RV, normal RV systolic function and RVSP 40 mmHg     #Dysphagia with concern for aspiration: patient with previous history of noting difficulty swallowing and choking; failed swallow evaluation on 6/2, only allowed single ice chips as tolerated (3-5 an hour)     #Poor dentition: one lower tooth looks decayed; concern for aspiration of oral secretions      Recommendations:  - Continue supplemental oxygen, wean for saturation 90-95%; currently on AirVo  - Home O2 eval prior to discharge  - Continue scheduled and as needed DuoNebs  - Continue pulmonary hygiene with I-S and Acapella; may need to add EZ Pap   - Adding Mucinex, Flonase  - Antibiotics per primary; adding azithromycin (3 doses) for  atypical coverage  - Diuresis as renal function and hemodynamics allows; currently on 40 mg IV Lasix twice daily; on hold, patient received dose of Diamox  - Monitor pleural effusions for now; no immediate need for thoras  - MBSS evaluation with silent aspiration with all thickness of liquids; no aspiration or penetration with purée; currently with NG tube  - Follow-up on pending studies as above  - Frequent oral care (use oral kits with /sponges and mouth wash)  - DVT prophylaxis: With SCDs, resuming Coumadin  - Patient will need to follow-up with primary care with repeat noncontrasted CT chest in ~8 weeks to ensure resolution of infiltrates; if questions or concerns persist, PCP can refer to pulmonary medicine at that time     Thank you for the consult.  Pulmonology will follow.    I spent 50 minutes in the professional and overall care of this patient.   Michelle Shipley, DAVID-CNS         [1] ampicillin-sulbactam, 1.5 g, intravenous, q6h  docusate sodium, 100 mg, oral, BID  ferrous sulfate, 65 mg of elemental iron, oral, Daily  fluconazole, 200 mg, oral, Daily  fluticasone, 2 spray, Each Nostril, Daily  [Held by provider] furosemide, 40 mg, intravenous, BID  ipratropium-albuteroL, 3 mL, nebulization, TID  metoprolol succinate XL, 25 mg, oral, Daily  polyethylene glycol, 17 g, oral, Nightly  sennosides, 1 tablet, oral, Nightly  [Held by provider] warfarin, 1 mg, oral, Once per day on Monday Wednesday  [Held by provider] warfarin, 1.5 mg, oral, Once per day on Sunday Thursday Friday     [2] PRN medications: acetaminophen **OR** acetaminophen **OR** acetaminophen, guaiFENesin, HYDROmorphone, ipratropium-albuteroL, ondansetron **OR** ondansetron, oxygen, phenoL

## 2025-06-06 LAB
ANION GAP SERPL CALC-SCNC: 12 MMOL/L (ref 10–20)
BUN SERPL-MCNC: 15 MG/DL (ref 6–23)
CALCIUM SERPL-MCNC: 8.5 MG/DL (ref 8.6–10.3)
CHLORIDE SERPL-SCNC: 102 MMOL/L (ref 98–107)
CO2 SERPL-SCNC: 29 MMOL/L (ref 21–32)
CREAT SERPL-MCNC: 0.51 MG/DL (ref 0.5–1.3)
EGFRCR SERPLBLD CKD-EPI 2021: >90 ML/MIN/1.73M*2
ERYTHROCYTE [DISTWIDTH] IN BLOOD BY AUTOMATED COUNT: 23.8 % (ref 11.5–14.5)
GLUCOSE SERPL-MCNC: 111 MG/DL (ref 74–99)
HCT VFR BLD AUTO: 31.2 % (ref 41–52)
HGB BLD-MCNC: 9.3 G/DL (ref 13.5–17.5)
INR PPP: 2 (ref 0.9–1.1)
MAGNESIUM SERPL-MCNC: 2.1 MG/DL (ref 1.6–2.4)
MCH RBC QN AUTO: 26 PG (ref 26–34)
MCHC RBC AUTO-ENTMCNC: 29.8 G/DL (ref 32–36)
MCV RBC AUTO: 87 FL (ref 80–100)
NRBC BLD-RTO: 0.3 /100 WBCS (ref 0–0)
PLATELET # BLD AUTO: 200 X10*3/UL (ref 150–450)
POTASSIUM SERPL-SCNC: 2.9 MMOL/L (ref 3.5–5.3)
PROTHROMBIN TIME: 22.3 SECONDS (ref 9.8–12.4)
RBC # BLD AUTO: 3.58 X10*6/UL (ref 4.5–5.9)
SODIUM SERPL-SCNC: 140 MMOL/L (ref 136–145)
VANCOMYCIN SERPL-MCNC: 6.3 UG/ML (ref 5–20)
WBC # BLD AUTO: 7.2 X10*3/UL (ref 4.4–11.3)

## 2025-06-06 PROCEDURE — 92526 ORAL FUNCTION THERAPY: CPT | Mod: GN

## 2025-06-06 PROCEDURE — 2500000001 HC RX 250 WO HCPCS SELF ADMINISTERED DRUGS (ALT 637 FOR MEDICARE OP): Performed by: NURSE PRACTITIONER

## 2025-06-06 PROCEDURE — 80202 ASSAY OF VANCOMYCIN: CPT | Performed by: PHARMACIST

## 2025-06-06 PROCEDURE — 80048 BASIC METABOLIC PNL TOTAL CA: CPT | Performed by: NURSE PRACTITIONER

## 2025-06-06 PROCEDURE — 2500000002 HC RX 250 W HCPCS SELF ADMINISTERED DRUGS (ALT 637 FOR MEDICARE OP, ALT 636 FOR OP/ED): Performed by: NURSE PRACTITIONER

## 2025-06-06 PROCEDURE — 94664 DEMO&/EVAL PT USE INHALER: CPT

## 2025-06-06 PROCEDURE — 85610 PROTHROMBIN TIME: CPT | Performed by: NURSE PRACTITIONER

## 2025-06-06 PROCEDURE — 85027 COMPLETE CBC AUTOMATED: CPT | Performed by: NURSE PRACTITIONER

## 2025-06-06 PROCEDURE — 2060000001 HC INTERMEDIATE ICU ROOM DAILY

## 2025-06-06 PROCEDURE — 94640 AIRWAY INHALATION TREATMENT: CPT

## 2025-06-06 PROCEDURE — 99233 SBSQ HOSP IP/OBS HIGH 50: CPT

## 2025-06-06 PROCEDURE — 36415 COLL VENOUS BLD VENIPUNCTURE: CPT | Performed by: NURSE PRACTITIONER

## 2025-06-06 PROCEDURE — 2500000005 HC RX 250 GENERAL PHARMACY W/O HCPCS: Performed by: STUDENT IN AN ORGANIZED HEALTH CARE EDUCATION/TRAINING PROGRAM

## 2025-06-06 PROCEDURE — 2500000004 HC RX 250 GENERAL PHARMACY W/ HCPCS (ALT 636 FOR OP/ED): Performed by: INTERNAL MEDICINE

## 2025-06-06 PROCEDURE — 99232 SBSQ HOSP IP/OBS MODERATE 35: CPT | Performed by: CLINICAL NURSE SPECIALIST

## 2025-06-06 PROCEDURE — 2500000001 HC RX 250 WO HCPCS SELF ADMINISTERED DRUGS (ALT 637 FOR MEDICARE OP): Performed by: STUDENT IN AN ORGANIZED HEALTH CARE EDUCATION/TRAINING PROGRAM

## 2025-06-06 PROCEDURE — 9420000001 HC RT PATIENT EDUCATION 5 MIN

## 2025-06-06 PROCEDURE — 99232 SBSQ HOSP IP/OBS MODERATE 35: CPT | Performed by: NURSE PRACTITIONER

## 2025-06-06 PROCEDURE — 83735 ASSAY OF MAGNESIUM: CPT | Performed by: NURSE PRACTITIONER

## 2025-06-06 PROCEDURE — 99233 SBSQ HOSP IP/OBS HIGH 50: CPT | Performed by: STUDENT IN AN ORGANIZED HEALTH CARE EDUCATION/TRAINING PROGRAM

## 2025-06-06 PROCEDURE — 2500000002 HC RX 250 W HCPCS SELF ADMINISTERED DRUGS (ALT 637 FOR MEDICARE OP, ALT 636 FOR OP/ED)

## 2025-06-06 RX ORDER — FUROSEMIDE 20 MG/1
20 TABLET ORAL DAILY
Status: DISCONTINUED | OUTPATIENT
Start: 2025-06-07 | End: 2025-06-07

## 2025-06-06 RX ORDER — POTASSIUM CHLORIDE 1.5 G/1.58G
40 POWDER, FOR SOLUTION ORAL 2 TIMES DAILY
Status: COMPLETED | OUTPATIENT
Start: 2025-06-06 | End: 2025-06-06

## 2025-06-06 RX ADMIN — FERROUS SULFATE TAB 325 MG (65 MG ELEMENTAL FE) 1 TABLET: 325 (65 FE) TAB at 09:46

## 2025-06-06 RX ADMIN — FLUCONAZOLE 200 MG: 100 TABLET ORAL at 09:46

## 2025-06-06 RX ADMIN — CARBOXYMETHYLCELLULOSE SODIUM 1 DROP: 0.5 SOLUTION/ DROPS OPHTHALMIC at 11:33

## 2025-06-06 RX ADMIN — DOCUSATE SODIUM 100 MG: 100 CAPSULE, LIQUID FILLED ORAL at 09:46

## 2025-06-06 RX ADMIN — AMPICILLIN SODIUM AND SULBACTAM SODIUM 1.5 G: 1; .5 INJECTION, POWDER, FOR SOLUTION INTRAMUSCULAR; INTRAVENOUS at 23:44

## 2025-06-06 RX ADMIN — METOPROLOL SUCCINATE 25 MG: 25 TABLET, EXTENDED RELEASE ORAL at 09:46

## 2025-06-06 RX ADMIN — Medication 4 L/MIN: at 19:56

## 2025-06-06 RX ADMIN — AMPICILLIN SODIUM AND SULBACTAM SODIUM 1.5 G: 1; .5 INJECTION, POWDER, FOR SOLUTION INTRAMUSCULAR; INTRAVENOUS at 16:36

## 2025-06-06 RX ADMIN — FLUTICASONE PROPIONATE 2 SPRAY: 50 SPRAY, METERED NASAL at 09:55

## 2025-06-06 RX ADMIN — AMPICILLIN SODIUM AND SULBACTAM SODIUM 1.5 G: 1; .5 INJECTION, POWDER, FOR SOLUTION INTRAMUSCULAR; INTRAVENOUS at 04:35

## 2025-06-06 RX ADMIN — POTASSIUM CHLORIDE 40 MEQ: 1.5 POWDER, FOR SOLUTION ORAL at 09:55

## 2025-06-06 RX ADMIN — POTASSIUM CHLORIDE 40 MEQ: 1.5 POWDER, FOR SOLUTION ORAL at 23:45

## 2025-06-06 RX ADMIN — IPRATROPIUM BROMIDE AND ALBUTEROL SULFATE 3 ML: 2.5; .5 SOLUTION RESPIRATORY (INHALATION) at 19:56

## 2025-06-06 RX ADMIN — IPRATROPIUM BROMIDE AND ALBUTEROL SULFATE 3 ML: 2.5; .5 SOLUTION RESPIRATORY (INHALATION) at 12:14

## 2025-06-06 RX ADMIN — Medication 6 L/MIN: at 04:03

## 2025-06-06 RX ADMIN — AMPICILLIN SODIUM AND SULBACTAM SODIUM 1.5 G: 1; .5 INJECTION, POWDER, FOR SOLUTION INTRAMUSCULAR; INTRAVENOUS at 09:46

## 2025-06-06 RX ADMIN — WARFARIN SODIUM 1.5 MG: 3 TABLET ORAL at 18:32

## 2025-06-06 RX ADMIN — IPRATROPIUM BROMIDE AND ALBUTEROL SULFATE 3 ML: 2.5; .5 SOLUTION RESPIRATORY (INHALATION) at 07:13

## 2025-06-06 ASSESSMENT — ENCOUNTER SYMPTOMS
SHORTNESS OF BREATH: 1
ABDOMINAL DISTENTION: 0
ABDOMINAL PAIN: 0
VOMITING: 0
FEVER: 0

## 2025-06-06 ASSESSMENT — PAIN - FUNCTIONAL ASSESSMENT: PAIN_FUNCTIONAL_ASSESSMENT: 0-10

## 2025-06-06 ASSESSMENT — PAIN SCALES - GENERAL
PAINLEVEL_OUTOF10: 0 - NO PAIN
PAINLEVEL_OUTOF10: 0 - NO PAIN

## 2025-06-06 NOTE — PROGRESS NOTES
Physical Therapy                 Therapy Communication Note    Patient Name: Robert Luna  MRN: [id number]  Department: [facility]  Room: 410/410-A  Today's Date: 6/6/2025     Discipline: Physical Therapy    Missed Visit: PT Missed Visit: Yes       Comment:  Consult placed for hospice; pt and family to meet with hospice this afternoon.  Will defer PT eval until outcome of meeting is known.

## 2025-06-06 NOTE — PROGRESS NOTES
"Vlad Luna is a 59 y.o. male on day 10 of admission presenting with Symptomatic anemia.      Interval Hx and Subjective No acute events overnight.  Patient and family meeting with hospice tonight at 5 PM.  No use of PRN hydromorphone overnight.  He has been weaned to regular nasal cannula.       Objective  Blood pressure 115/67, pulse 78, temperature 37 °C (98.6 °F), temperature source Temporal, resp. rate 20, height 1.499 m (4' 11.02\"), weight (!) 39.5 kg (87 lb 1.6 oz), SpO2 100%.    General:  appears ill although not toxic  Neuro: alert, oriented to situation.  No focal findings  Psych:  normal affect, engaged, cooperative  HEENT:  oral mucosa moist without exudate, tongue midline.  PERRLA, no discharge.   NG in place  Resps:  resps unlabored, lungs CTA, no cough noted  Card:  RRR, +  murmur   GI:  normal bowel sounds, soft and non tender  :  deferred  MS:  No injury or deformity noted  Integ:  skin warm and dry overall       Imaging  XR chest 1 view  Result Date: 6/4/2025  1.  Enteric tube projects in the gastric fundus. Trace left and small right pleural effusions with patchy bilateral airspace opacities stable from prior.     MACRO: None   Signed by: Joe Pal 6/4/2025 5:34 PM Dictation workstation:   [id number]    FL modified barium swallow study  Result Date: 6/4/2025  Aspiration with multiple consistencies.   MACRO: None   Signed by: Gilmar Arreola 6/4/2025 4:49 PM Dictation workstation:   [id number]      Cardiology, Vascular, and Other Imaging  Cardiac Catheterization Procedure  Result Date: 6/4/2025   [facility], Cath Lab, [address] [place] [address] Cardiovascular Catheterization Report Patient Name:      VLAD LUNA          Performing Physician:  [id number] Guo MD Study Date:        6/4/2025            Verifying Physician:   Cesario Guo                                                             "   MD MRN/PID:           [id number]            Cardiologist/Co-Scrub: Accession#:        [id number]        Ordering Provider:     [id number] ARIELLA RANDOLPH Date of Birth/Age: 1965 / 59 years Cardiologist: Gender:            M                   Fellow: Encounter#:        [id number]          Surgeon:  Study: Fluoroscopy  Indications:  Complications: No in-lab complications observed.  Cardiac Cath Post Procedure Notes: Post Procedure Diagnosis: See below. Blood Loss:               Estimated blood loss during the procedure was NA mls. Specimens Removed:        Number of specimen(s) removed: none. ____________________________________________________________________________________ CONCLUSIONS:  1. Fluoroscopy with normal movement of the prosthetic bileaflet aortic valve.  2. Further management as per inpatient cardiology team. 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  CPT Codes: Fluoroscopy-80072  [id number] Lisa Guo MD Performing Physician Electronically signed by [id number] Lisa Guo MD on 6/4/2025 at 6:14:58 PM  ** Final **        Results for orders placed or performed during the hospital encounter of 05/27/25 (from the past 24 hours)   Blood Gas Arterial   Result Value Ref Range    POCT pH, Arterial 7.62 (HH) 7.38 - 7.42 pH    POCT pCO2, Arterial 50 (H) 38 - 42 mm Hg    POCT pO2, Arterial 48 (L) 85 - 95 mm Hg    POCT SO2, Arterial 79 (L) 94 - 100 %    POCT Oxy Hemoglobin, Arterial 77.7 (L) 94.0 - 98.0 %    POCT Base Excess, Arterial 26.4 (H) -2.0 - 3.0 mmol/L    POCT HCO3 Calculated, Arterial 51.4 (H) 22.0 - 26.0 mmol/L    Patient Temperature 37.0 degrees Celsius    FiO2 45 %   POCT GLUCOSE   Result Value Ref Range    POCT Glucose 145 (H) 74 - 99 mg/dL   Protime-INR   Result Value Ref Range    Protime 22.3 (H) 9.8 - 12.4 seconds    INR 2.0 (H) 0.9 - 1.1   Basic Metabolic Panel   Result Value Ref Range    Glucose 111 (H) 74 - 99 mg/dL     Sodium 140 136 - 145 mmol/L    Potassium 2.9 (LL) 3.5 - 5.3 mmol/L    Chloride 102 98 - 107 mmol/L    Bicarbonate 29 21 - 32 mmol/L    Anion Gap 12 10 - 20 mmol/L    Urea Nitrogen 15 6 - 23 mg/dL    Creatinine 0.51 0.50 - 1.30 mg/dL    eGFR >90 >60 mL/min/1.73m*2    Calcium 8.5 (L) 8.6 - 10.3 mg/dL   CBC   Result Value Ref Range    WBC 7.2 4.4 - 11.3 x10*3/uL    nRBC 0.3 (H) 0.0 - 0.0 /100 WBCs    RBC 3.58 (L) 4.50 - 5.90 x10*6/uL    Hemoglobin 9.3 (L) 13.5 - 17.5 g/dL    Hematocrit 31.2 (L) 41.0 - 52.0 %    MCV 87 80 - 100 fL    MCH 26.0 26.0 - 34.0 pg    MCHC 29.8 (L) 32.0 - 36.0 g/dL    RDW 23.8 (H) 11.5 - 14.5 %    Platelets 200 150 - 450 x10*3/uL   Magnesium   Result Value Ref Range    Magnesium 2.10 1.60 - 2.40 mg/dL   Vancomycin   Result Value Ref Range    Vancomycin 6.3 5.0 - 20.0 ug/mL      Scheduled medications  Scheduled Medications[1]  Continuous medications  Continuous Medications[2]  PRN medications  PRN Medications[3]     Dietary Orders (From admission, onward)       Start     Ordered    06/05/25 1716  Enteral feeding with NPO NG (nasogastric tube); 10; 60; Water; Tap water; Every 4 hours  Diet effective now        Comments: Advance by 10mL every 8 hours to goal rate of 40 mL/hr   Question Answer Comment   Tube feeding formula: Vital 1.5    Feeding route: NG (nasogastric tube)    Tube feeding continuous rate (mL/hr): 10    Tube feeding flush (mL): 60    Flush type: Water    Water type: Tap water    Flush frequency: Every 4 hours        06/05/25 1715    05/27/25 2232  May Participate in Room Service  ( ROOM SERVICE MAY PARTICIPATE)  Once        Question:  .  Answer:  Yes    05/27/25 2231                     Assessment/Plan    Robert Luna is a 59 y.o. male with past medical history of KEV, HTN, HLD, aortic valve replacement  with aortic graft 1997 on coumadin who presented to outside hospital 5/27/25 with  persistent SOB that had worsened over the prior 24 hours.  He was anemic with Hgb 8.3 and mild  "elevation of BNP, significant elevation of BUN so there was c/f GI bleed and patient transferred to [facility] for GI consult.  Stool + for occult blood.  On 5/29 EGD and colonoscopy NEG for acute bleeding.  Suspect anemia related to iron deficiency along with occult losses.  He started to develop hypoxia after procedure and it was suspected he might have aspirated but sx persisted and cardiology consulted for possible acute diastolic heart failure and pulm also consulted,  His 02 needs increased and he was transferred to SDU for Airvo therapy.  Echo completed 6/4/25 shows normal EF, reduced right ventricular systolic function, severely enlarged right ventricle, both atria are severely dilated, severe aortic stenosis, mild aortic valve regurg, Mitral valve with severe regurgitation, moderate to severe tricuspid regurgitation.  Patient now with significant structural cardiac changes with heart failure with cor pulmonale, aortic stenosis, bilateral pleural effusions, pulm HTN, and with acute hypoxic respiratory failure.  Patient not a candidate for a valve replacements.  He also is significantly deconditioned and malnourished with BMI of 18.08.  Additionally patient failed swallow evaluation and SLP recommending patient be NPO.  Patient does not have ACP documents.  Palliative medicine consulted to assist with goals of care.       Goals of care: Patient oriented, able to make his needs known.  Sister Luis is at bedside and has a blank HCPOA form in hand and they plan to fill out.  They report patient's sister Sasha would be first and Luis would be second.  We reviewed his cardiopulmonary status.  Patient reports thinking one day recently that his dog was probably going to out live him and he felt very peaceful about that.  He has been aware of his decline and had been told at one point he would not live past his 50s so he feels he has been gifted with extra years.  He still wants to \"fight for a bit\" if possible.  Code " status discussion:  reviewed procedure of CPR, typical interventions needed if someone survives attempt, and effects of cardiopulmonary arrest on the body, especially if prolonged.  Reviewed that patient likely to have poor chance at functional recovery in light of their other health conditions.   Patient does not want CPR, but would be OK with intubation short term if needed.  He reports feeling very hungry and OK with NG feeding if it helps him if gets him a bit of strength.      6/5/25 spoke with sister Luis.  She reports many family members are coming in this weekend from out of state to see patient, wonders if this is a good idea.  I informed her that if they are hoping for a nice family get together, now is the time as I would expect patient's condition to continue to decline and that the chance of meaningful visits may not present itself if we look into the next few months.  Sisters and patient have now discussed hospice.  They are very comfortable with setting up a meeting, at this point more for information than immediate disposition as he is still hopeful to improve.  Patient also clarified he would not want intubation and OK with changing status to DNR/DNI  -continue to honor DNR/DNI  -will place OH Portable form when closer to discharge  -hospice meeting planned today at 5 PM     Dysphagia:  patient tolerating NG  -chloro septic spray ordered for potential irritation      Dyspnea related to heart failure, valve disease, and aspiration PNA.  Patient feels like breathing is OK at moment on high flow 02  -resp tx/ATB/cardiac tx per respective teams  -continue hydromorphone 0.2 mg IV Q 3 hours PRN dyspnea, not yet utilized     Constipation:  + bm overnight 6/6  -will monitor    Malnutrition Diagnosis Status: Active  Malnutrition Diagnosis: Severe malnutrition related to chronic disease or condition  Related to: dysphagia  As Evidenced by: reported intake <75% estimated needs for > 2 months, moderate-severe  muslce & subcutaneous fat depletion  I agree with the dietitian's malnutrition diagnosis.       Time spent in chart review, assessment, coordination of care and documentation was 35 minutes  Radha HERNANDEZ CNP          [1] ampicillin-sulbactam, 1.5 g, intravenous, q6h  docusate sodium, 100 mg, oral, BID  ferrous sulfate, 65 mg of elemental iron, oral, Daily  fluconazole, 200 mg, oral, Daily  fluticasone, 2 spray, Each Nostril, Daily  [Held by provider] furosemide, 40 mg, intravenous, BID  ipratropium-albuteroL, 3 mL, nebulization, TID  metoprolol succinate XL, 25 mg, oral, Daily  polyethylene glycol, 17 g, oral, Nightly  sennosides, 1 tablet, oral, Nightly  [Held by provider] warfarin, 1 mg, oral, Once per day on Monday Wednesday  [Held by provider] warfarin, 1.5 mg, oral, Once per day on Sunday Thursday Friday  [2]    [3] PRN medications: acetaminophen **OR** acetaminophen **OR** acetaminophen, guaiFENesin, HYDROmorphone, ipratropium-albuteroL, ondansetron **OR** ondansetron, oxygen, phenoL

## 2025-06-06 NOTE — PROGRESS NOTES
"Music Therapy Note        Therapy Session  Referral Type: New referral this admission  Visit Type: New visit  Session Start Time: 1148  Session End Time: 1158  Intervention Delivery: In-person  Conflict of Service: None  Number of family members present: 1  Number of staff members present: 0     Pre-assessment  Pain Score: 5 - Moderate pain  Stress Level (0-10): 0  Anxiety Level (0-10): 0  Mood/Affect: Calm, Participative  Verbalized Emotional State: Acceptance         Treatment/Interventions  Music Therapy Interventions: Assessment, Empathic listening/validating emotions  Interruption: No  Patient Fell Asleep at End of Session: No    Post-assessment  Unable to Assess Reason: Did not provide expressive therapy intervention  Continue Visiting: Yes  Total Session Time (min): 10 minutes    Narrative  Assessment Detail: Upon assessment pt reported feeling a little bit of pain in his arm. MT introduced self and services, offering music interventions to assist with pain manegement. Pt engaged in discussion regarding music preferences, particularly classical, folk, blues, and jazz music. Pt discussed playing and collecting instruments for enjoyment. Pt shared he has not been able to play due to his health. MT provided a supportive presence and validated pt as he disucssed feeling content of what his body does. Pt stated \"when my body is ready to give up then it'll be time\". Pt agreed to music and requested f/u.  Follow-up: MT will follow up with pt as able    Education Documentation  No documentation found.          "

## 2025-06-06 NOTE — NURSING NOTE
[facility] RN met with patient, sisters Sasha and Alexandra at the bedside. Patient is very familiar with [facility] and hospice care as he was one of our volunteers at [facility]. Reviewed hospice philosophy, goals of care, services, and IPU for symptom management.  Patient wishes to discuss option of hospice care at home. His goal is to attempt PT/OT initially, discharge to skilled rehab to regain some functionality if possible. If patient is unable to tolerate PT/OT, he and his family state they will contact [facility] to proceed with hospice care. [facility] contact information and literature provided.

## 2025-06-06 NOTE — PROGRESS NOTES
"  INFECTIOUS DISEASE DAILY PROGRESS NOTE    SUBJECTIVE:    No overnight events. No new complaints. Afebrile. No rash/itching/diarrhea. On nasal cannula today.    OBJECTIVE:  VITALS (Last 24 Hours)  /64 (BP Location: Right arm, Patient Position: Lying)   Pulse 79   Temp 36.8 °C (98.2 °F) (Temporal)   Resp 20   Ht 1.499 m (4' 11.02\")   Wt (!) 39.5 kg (87 lb 1.6 oz)   SpO2 100%   BMI 17.58 kg/m²     PHYSICAL EXAM:  Gen - NAD, very thin  Heart - RRR  Lungs - no wh  Abd - soft, no ttp, BS present  Skin - no rash    ABX: IV Unasyn    LABS:  Lab Results   Component Value Date    WBC 7.2 06/06/2025    HGB 9.3 (L) 06/06/2025    HCT 31.2 (L) 06/06/2025    MCV 87 06/06/2025     06/06/2025     Lab Results   Component Value Date    GLUCOSE 111 (H) 06/06/2025    CALCIUM 8.5 (L) 06/06/2025     06/06/2025    K 2.9 (LL) 06/06/2025    CO2 29 06/06/2025     06/06/2025    BUN 15 06/06/2025    CREATININE 0.51 06/06/2025         Estimated Creatinine Clearance: 84.1 mL/min (by C-G formula based on SCr of 0.51 mg/dL).    ASSESSMENT/PLAN:     Aspiration PNA - resolving    IV Unasyn 1.5g Q6H through today and then can stop. Has completed enough abx for aspiration PNA.     Monitoring for adverse effects of abx such as rash/itching/diarrhea.     Will sign off. Please call back with questions. Thanks!    Last London MD  [facility]  Office #[phone]      "

## 2025-06-06 NOTE — PROGRESS NOTES
Subjective Data:  Patient reports feeling significantly better today  He is off high flow oxygen and down to 4 L of supplemental oxygen via nc  He reports chest congestion has reduced significantly  Appears euvolemic on exam  He reports that he and his family will have hospice meeting/discussion this evening    Overnight Events:    None reported     Objective Data:  Last Recorded Vitals:  Vitals:    25 0713 25 0735 25 1134 25 1214   BP:  115/67 112/64    BP Location:  Right arm Right arm    Patient Position:  Lying Lying    Pulse:  78 79    Resp:  20 20    Temp:  37 °C (98.6 °F) 36.8 °C (98.2 °F)    TempSrc:  Temporal Temporal    SpO2: 100% 100% 100% 100%   Weight:       Height:         Medical Gas Therapy: Supplemental oxygen  Medical Gas Delivery Method: High flow nasal cannula  Weight  Av.3 kg (88 lb 14.5 oz)  Min: 39.5 kg (87 lb 1.6 oz)  Max: 40.6 kg (89 lb 8.1 oz)    LABS:  CMP:  Results from last 7 days   Lab Units 25  0550 25  0515 25  0518 25  0715 25  0655 25  0700 25  0744   SODIUM mmol/L 140 142 143 140 139 139 138   POTASSIUM mmol/L 2.9* 2.7* 3.4* 4.3 3.6 4.5 4.2   CHLORIDE mmol/L 102 94* 98 100 102 106 108*   CO2 mmol/L 29 40* 28 25 26 27 20*   ANION GAP mmol/L 12 11 20 19 15 11 14   BUN mg/dL 15 25* 33* 25* 20 16 16   CREATININE mg/dL 0.51 0.64 0.77 0.63 0.56 0.59 0.63   EGFR mL/min/1.73m*2 >90 >90 >90 >90 >90 >90 >90   MAGNESIUM mg/dL 2.10 2.01 1.81 1.87 1.80  --   --      CBC:  Results from last 7 days   Lab Units 25  0550 25  0515 25  0518 25  0805 25  0655 25  0700 25  0744   WBC AUTO x10*3/uL 7.2 9.4 9.9 10.0 12.1* 10.8 9.7   HEMOGLOBIN g/dL 9.3* 8.5* 8.6* 8.6* 8.2* 8.2* 7.1*   HEMATOCRIT % 31.2* 27.9* 28.5* 30.7* 26.9* 27.5* 22.8*   PLATELETS AUTO x10*3/uL 200 198 197 242 191 199 182   MCV fL 87 84 87 88 83 82 79*     COAG:   Results from last 7 days   Lab Units 25  0550  "06/05/25  0515 06/04/25  0518 06/03/25  0924 06/02/25  0655 06/01/25  0659 05/31/25  0525   INR  2.0* 3.8* 3.7* 2.2* 2.1* 1.9* 2.1*     ABO: No results found for: \"ABO\"  HEME/ENDO:     CARDIAC:   Results from last 7 days   Lab Units 06/01/25  0700   BNP pg/mL 1,832*             Last I/O:    Intake/Output Summary (Last 24 hours) at 6/6/2025 1340  Last data filed at 6/6/2025 1027  Gross per 24 hour   Intake 150 ml   Output 2200 ml   Net -2050 ml     Net IO Since Admission: -3,028.17 mL [06/06/25 1340]       Inpatient Medications:  Scheduled Medications[1]  PRN Medications[2]  Continuous Medications[3]    Physical Exam:  General: Pleasant, frail, ill-appearing male, alert and oriented x 3, NAD  HEENT:  Pupils equal and reactive.  Normocephalic.  Moist mucosa.    Neck:  No thyromegaly.  Normal Jugular Venous Pressure.  Cardiovascular:  Regular rate and rhythm. + Cardiac murmur noted.  Normal S1 and S2.  Pulmonary:  Clear to auscultation bilaterally.  Supplemental oxygen in place  Abdomen:  Soft. Non-tender.   Non-distended.  Positive bowel sounds.  Lower Extremities:  2+ pedal pulses. No LE edema.  Neurologic:  Cranial nerves intact.  No focal deficit.   Skin: Skin warm and dry, normal skin turgor.   Psychiatric: Appropriate mood and behavior    I reviewed telemetry, sinus rhythm     Assessment/Plan   Cards: [facility] 10/2024     Robert Luna is a 59 y.o. male with a history of aortic valve replacement on Coumadin 1997 , hypertension, iron deficiency anemia who presented to [facility] on 5/27/2005 with complaints of dyspnea on exertion.  While here at [facility] GI was consulted EGD/colonoscopy showed no new active bleeding.  He received IV Venofer 300 mg x 3 and 1 unit of packed red blood cells.  He is also receiving antibiotics for possible aspiration pneumonia/pneumonitis.  Cardiology is now asked to see him for \"Presented with worsening dyspnea on exertion. Suspect HF exacerbation, BNP is up. Has mechanical aortic valve last ECHO I " "can see from 1 year ago mean gradient 32 mmHg \"        #Acute on chronic respiratory failure  - post EGD> concern for aspiration.  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  Appears euvolemic on exam    #Cor pulmonale-likely secondary to aspiration and decompensated heart failure    #Frailty and severe protein calorie malnutrition    #Valvular heart disease-  -Status of his valves and concern for aspiration was reviewed with patient yesterday.   In retrospect he says that he feels like he knew this was coming and there was an issue leading up to this.  He is not a candidate for redo aortic valve replacement +/- mitral valve repair/replacement due to his body habitus, frailty.  He understands this.    # Hypokalemia.  Lasix IV discontinued  -Can transition to Lasix 20 mg p.o. daily when hypokalemia resolved      RECS:   -Continue supportive care and treatment of aspiration pneumonitis  - Patient appears euvolemic on exam, can transition to Lasix 20 mg p.o. daily when hypokalemia resolved  -Continuous monitoring of renal function and electrolytes, keep K>4.0 and mag>2.0, supp prn  -c/w remaining CV medications as ordered  - Patient and family undergoing hospice meeting this evening  No further orders from a cardiac perspective  Cardiology will sign off  Please call with questions    Code Status:  DNR and No Intubation    I spent 35 minutes in the professional and overall care of this patient.        Cassia Estevez, APRN-CNP       [1]   Scheduled medications   Medication Dose Route Frequency    ampicillin-sulbactam  1.5 g intravenous q6h    docusate sodium  100 mg oral BID    ferrous sulfate  65 mg of elemental iron oral Daily    fluconazole  200 mg oral Daily    fluticasone  2 spray Each Nostril Daily    [START ON 6/7/2025] furosemide  20 mg oral Daily    " ipratropium-albuteroL  3 mL nebulization TID    metoprolol succinate XL  25 mg oral Daily    polyethylene glycol  17 g oral Nightly    potassium chloride  40 mEq nasogastric tube BID    sennosides  1 tablet oral Nightly    [Held by provider] warfarin  1 mg oral Once per day on Monday Wednesday    [Held by provider] warfarin  1.5 mg oral Once per day on Sunday Thursday Friday   [2]   PRN medications   Medication    acetaminophen    Or    acetaminophen    Or    acetaminophen    guaiFENesin    HYDROmorphone    ipratropium-albuteroL    lubricating eye drops    ondansetron    Or    ondansetron    oxygen    phenoL   [3]   Continuous Medications   Medication Dose Last Rate

## 2025-06-06 NOTE — PROGRESS NOTES
[facility] Hospitalist Progress Note      Between 7AM-7PM please message me via Epic Secure Chat.  After 7PM please page Nocturnist on call.        Assessment/Plan     Acute Problems    Acute hypoxic respiratory failure   Aspiration PNA/pneumonitis  Severe stenosis of his mechanical aortic valve as well as severe mitral regurgitation  Elevated RV pressures  Bilateral pleural effusions  Contraction alkalosis - improved  Hypokalemia  Iron def anemia  Possible occult GI blood loss    Chronic Problems    Mechanical aortic valve replacement - INR goal 2-3  Ascending aorta repair  HTN    Plan    - pulm consult -> suspect aspiration PNA/pneumonitis big driving factor. Urine antigens negative. Procal minimally elevated 0.22. Weaned to NC  - ID consult to assist with PNA/abx management appreciated -> IV Unasyn through today  - SLP appreciated -> failed MBS. NPO rec for now with considerations for GOC/alternative means of nutrition. Currently has NG tube receiving TF  - cardiology consulted -> can consider afterload reduction/diuresis, not a surgical candidate. Holding IV diuresis today  - pal care following -> DNR/DNI, hospice meeting today  - GI consulted, EGD/Colon with no old or active bleeding. No abnormality to explain anemia. Outpatient VCE can be considered for complete evaluation of MAHESH. Candida esophagitis started on PO fluconazole  - Coumadin as dosed by pharmacy for hx of AVR, mindful of INR while on fluconazole may have him skip 2 doses per week if DC home  - IV Venofer 300 mg x3 doses ordered; 1 unit pRBC this admit. Trend hgb. On PO iron supplement now    Fluids: None  Electrolytes: Replete as needed  Nutrition: Tube feed  Ortiz: None  Invasive lines: None  Drains: NG tube  O2: NC    DVT Prophylaxis:  Coumadin    Discharge Planning: pending GOC, hospice meeting today    Plan of care was discussed with patient/sister    Total time spent: At least 38 minutes, providing counseling or in coordination of care. Total time  "on this day of visit includes record and documentation review before and after visit including documentation and time not explicitly included on EMR time stamp.      Subjective     Robert Luna is a 59 y.o. male on day 10 of admission presenting with Symptomatic anemia.    NAEON. Weaned to NC. Tolerating TF. Does not endorse any new acute complaints this AM. Plans for hospice meeting later today.    Review of Systems   Constitutional:  Negative for fever.   Respiratory:  Positive for shortness of breath.    Cardiovascular:  Negative for chest pain.   Gastrointestinal:  Negative for abdominal distention, abdominal pain and vomiting.       Objective     Physical Exam  Vitals reviewed.   Constitutional:       General: He is not in acute distress.  Cardiovascular:      Rate and Rhythm: Normal rate and regular rhythm.   Pulmonary:      Effort: Pulmonary effort is normal.      Breath sounds: Rales present.   Abdominal:      General: There is no distension.      Palpations: Abdomen is soft.   Neurological:      Mental Status: He is alert. Mental status is at baseline.         Last Recorded Vitals  Blood pressure 107/62, pulse 77, temperature 36.7 °C (98.1 °F), temperature source Temporal, resp. rate 16, height 1.499 m (4' 11.02\"), weight (!) 39.5 kg (87 lb 1.6 oz), SpO2 100%.    Medications  Scheduled Medications[1]   PRN Medications[2]                Miguel Walker MD  [facility]         [1] ampicillin-sulbactam, 1.5 g, intravenous, q6h  docusate sodium, 100 mg, oral, BID  ferrous sulfate, 65 mg of elemental iron, oral, Daily  fluconazole, 200 mg, oral, Daily  fluticasone, 2 spray, Each Nostril, Daily  [Held by provider] furosemide, 20 mg, oral, Daily  ipratropium-albuteroL, 3 mL, nebulization, TID  metoprolol succinate XL, 25 mg, oral, Daily  polyethylene glycol, 17 g, oral, Nightly  potassium chloride, 40 mEq, nasogastric tube, BID  sennosides, 1 tablet, oral, Nightly  [Held by provider] warfarin, 1 mg, oral, Once " per day on Monday Wednesday  [Held by provider] warfarin, 1.5 mg, oral, Once per day on Sunday Thursday Friday     [2] PRN medications: acetaminophen **OR** acetaminophen **OR** acetaminophen, guaiFENesin, HYDROmorphone, ipratropium-albuteroL, lubricating eye drops, ondansetron **OR** ondansetron, oxygen, phenoL

## 2025-06-06 NOTE — PROGRESS NOTES
PALLIATIVE CARE SOCIAL WORK NOTE     Pt's family will be meeting with [facility] this afternoon 5:00/5:30 pm. This morning, pt's sisters inquiring about rehab potential, wanting to explore that prior to making a decision about hospice. PT/OT to evaluate prior to the hospice meeting. Will continue to follow as appropriate. Thank you.    MARTÍN Kerr

## 2025-06-06 NOTE — PROGRESS NOTES
06/06/25 1051   Discharge Planning   Expected Discharge Disposition Home  (Hospice mtg)   Does the patient need discharge transport arranged? Yes   RoundTrip coordination needed? Yes   Has discharge transport been arranged? No          Patient failed swal eval and has NG for tube feeding. His oxygen demand decreased and he is on HF 7L. He is on IV abx for PNA. His H/H this morning was 31.2/9.3. He is diuresing. His potassium was 2.9 and is replenishing. Patient and family are having hospice meeting at 5-5:30 pm today. Will continue to follow for discharge needs.

## 2025-06-06 NOTE — PROGRESS NOTES
Occupational Therapy                 Therapy Communication Note    Patient Name: Robert Luna  MRN: [id number]  Department: [facility]  Room: 410/410-A  Today's Date: 6/6/2025     Discipline: Occupational Therapy    Missed Visit: OT Missed Visit: Yes     Missed Visit Reason: Missed Visit Reason: Other (Comment) OT order received and chart reviewed. Hospice meeting scheduled for today, OT will defer until outcome of meeting is known.

## 2025-06-06 NOTE — PROGRESS NOTES
Robert Luna is a 59 y.o. male admitted for symptomatic anemia. Pharmacy has been consulted for warfarin dosing and monitoring for Aortic Valve Replacement with goal INR of 2.0-3.0.      Home regimen    MON TUE WED THR FRI SAT SUN   Dose 1  mg 1.5  mg 1  mg 1.5  mg 1.5  mg 1  mg 1.5  mg   Total weekly dose: 9 mg  Source: [facility] Note from 5/22/2025    Labs  INR: 2.0  Hgb/Hct/Plt  Lab Results   Component Value Date    HGB 9.3 (L) 06/06/2025    HGB 8.5 (L) 06/05/2025    HGB 8.6 (L) 06/04/2025    HGB 8.6 (L) 06/03/2025    HGB 8.2 (L) 06/02/2025        Lab Results   Component Value Date    HCT 31.2 (L) 06/06/2025    HCT 27.9 (L) 06/05/2025    HCT 28.5 (L) 06/04/2025    HCT 30.7 (L) 06/03/2025    HCT 26.9 (L) 06/02/2025        Platelets   Date Value Ref Range Status   06/06/2025 200 150 - 450 x10*3/uL Final   06/05/2025 198 150 - 450 x10*3/uL Final   06/04/2025 197 150 - 450 x10*3/uL Final   06/03/2025 242 150 - 450 x10*3/uL Final   06/02/2025 191 150 - 450 x10*3/uL Final      Warfarin Therapy   Current regimen: resuming home reigmen but fluconazole started byGI, and plan to have patient hold warfarin on Tuesdays and Saturdays while completing 14 day duration   Bridging: None needed  Interacting medications: interacting medication(s) of fluconazole and azithromycin increase bleeding risk    Dosing History During Current Admission  Date 5/28 5/29 5/30 5/31 6/1 6/2 6/3 6/4 6/5 6/6   INR 1.9 2.2 2.9 2.1 1.9 2.1 2.2 3.7 3.8 2.0   Dose  (mg) See plan 1.5 mg 1.5 mg 1 mg 1.5 mg 1 mg Hold Hold Hold 1.5 mg        Assessment and Plan  Patient's INR of 2.0 today is Therapeutic. Hemoglobin/hematocrit/platelet stable.   Warfarin inpatient plan: Give 1.5 mg warfarin today  Continue to monitor s/sx of bleeding including epistaxis, hematuria, unusual bruising, hemoptysis, hematochezia as well as s/sx of stroke including impaired speech, unilateral paralysis, blurry vision.  Pharmacy will continue to monitor the patient and adjust  therapy as needed.    Thank you for the consult. Please do not hesitate to contact a pharmacist with any questions.    Vaishali Cloud, PharmD

## 2025-06-06 NOTE — PROGRESS NOTES
Speech-Language Pathology    Adult Inpatient Swallow Treatment    Patient Name: Robert Luna  MRN: [id number]  : 1965  Today's Date: 25   Time Calculation  Start Time: 1002  Stop Time: 1023  Time Calculation (min): 21 min       Impression:   Swallow treatment completed. Patient with NGT in place for nutritional support. Patient scheduled for GOC meeting with hospice later this date. Discussed potential outcome of permitting po for pleasure in the event patient transitions to hospice care. Ice chips presented to determine threshold for tolerance. Bolus formation slowed with spontaneous reswallows completed given each bolus. No overt s/s aspiration detected on 100% of boluses (6 total given). Reviewed compensatory swallowing strategies to maximize swallowing safety. Patient indicating suspected dysphagia pta and that similar strategies already implemented. Will need to await outcome of GOC discussion to determine if further therapy warranted. Patient able to at least initiate ice chips as tolerated 5 per hour to provide hydration of oral mucosa.     Recommendations:  -CONTINUE NPO WITH NGT  -1-2 ice chips/hr allowed (after aggressive oral care) for safe swallow stimulation/pleasure and to reduce build-up of colonized bacteria within the oropharynx.    -SLP TO FOLLOW CONTINGENT ON OUTCOME OF HOSPICE MEETING.    Swallow Strategies:  -Upright at 90 degrees for all po intake  -Slow rate of consumption    Short Term Goal:   Patient will perform oropharyngeal strengthening exercises to improve swallowing function with 90% given min cueing.     Long Term Goal:  Patient will resume/maintain oral intake in order to promote optimal oropharyngeal swallow function and reduce risk for dehydration, malnutrition and weight loss.     Start Date: 25  End Date: 25  Status: Progressing     Plan:  SLP Services Indicated: Yes  Frequency: 3x week  Discussed POC with patient  SLP - OK to Discharge? : Yes    Pain:   Patient  exhibits no s/s of pain or discomfort during session.     Inpatient Education:  Extensive education provided to patient regarding current swallow function, recommendations/results, and POC.      Consultations/Referrals/Coordination of Services:   Palliative Care/Hospice for GOC planned.

## 2025-06-06 NOTE — PROGRESS NOTES
Spiritual Care Visit  Spiritual Care Request    Reason for Visit:  Routine Visit: Introduction  Continue Visiting: Yes   Request Received From:  Referral From: Physician  Focus of Care:  Visited With: Patient   Refer to :  Referral To:    Care Provided:  The pt was sleeping/resting and not disturbed. There was a spiritual care card left in the room.   Sense of Community and or Spiritism Affiliation:  Yarsani   Outcome:  There will be another visit scheduled.    Chaplain Mini Dimas

## 2025-06-06 NOTE — PROGRESS NOTES
"Robert Luna is a 59 y.o. male on day 10 of admission presenting with Symptomatic anemia.    Subjective   No acute events overnight.  Patient sitting up in bed watching TV now weaned to 5 L.  Reports that he is feeling significantly better.  Occasional cough without sputum.  Denies pain, fever and nausea.  Continuing to receive tube feeds via NG tube.  Patient provided with new oral care kit to be able to perform oral suctioning and oral care with suction given his persistent aspiration of liquids/secretions.    Objective   Physical Exam   Constitutional:   Very thin, mild increased WOB without acute distress, cooperative  HENT: Atraumatic, moist mucous membranes, poor dentition with missing teeth (lower tooth appears decayed)  Eyes: Nonicteric  Neck: Supple, no obvious JVD  Cardiovascular: Regular rate and rhythm, HR 80s, + murmur/aortic valve click  Pulmonary: Fair air entry with diminished bases bilaterally, clear upper fields, posterior mid lung mild coarse crackles bilaterally; no conversational dyspnea noted; on 5 L NC  Abdominal: Thin, nondistended, nontender, + BS  Musculoskeletal: Fair active range of motion with fair strength  Extremities:   No BLE edema  Lymphadenopathy: No nuchal LAP  Skin: Very pale, warm core, cooler extremities, dry  Neurological: Alert and oriented with clear and appropriate speech  Psychiatric:    Appropriate mood and behavior     Medications:  Scheduled Medications[1]   PRN Medications[2]     Last Recorded Vitals  Blood pressure 112/64, pulse 79, temperature 36.8 °C (98.2 °F), temperature source Temporal, resp. rate 20, height 1.499 m (4' 11.02\"), weight (!) 39.5 kg (87 lb 1.6 oz), SpO2 100%.  Intake/Output last 3 Shifts:  I/O last 3 completed shifts:  In: 200 (5.1 mL/kg) [NG/GT:50; IV Piggyback:150]  Out: 2250 (57 mL/kg) [Urine:2250 (1.6 mL/kg/hr)]  Weight: 39.5 kg     Relevant Results  Results for orders placed or performed during the hospital encounter of 05/27/25 (from the past " 24 hours)   Blood Gas Arterial   Result Value Ref Range    POCT pH, Arterial 7.62 (HH) 7.38 - 7.42 pH    POCT pCO2, Arterial 50 (H) 38 - 42 mm Hg    POCT pO2, Arterial 48 (L) 85 - 95 mm Hg    POCT SO2, Arterial 79 (L) 94 - 100 %    POCT Oxy Hemoglobin, Arterial 77.7 (L) 94.0 - 98.0 %    POCT Base Excess, Arterial 26.4 (H) -2.0 - 3.0 mmol/L    POCT HCO3 Calculated, Arterial 51.4 (H) 22.0 - 26.0 mmol/L    Patient Temperature 37.0 degrees Celsius    FiO2 45 %   POCT GLUCOSE   Result Value Ref Range    POCT Glucose 145 (H) 74 - 99 mg/dL   Protime-INR   Result Value Ref Range    Protime 22.3 (H) 9.8 - 12.4 seconds    INR 2.0 (H) 0.9 - 1.1   Basic Metabolic Panel   Result Value Ref Range    Glucose 111 (H) 74 - 99 mg/dL    Sodium 140 136 - 145 mmol/L    Potassium 2.9 (LL) 3.5 - 5.3 mmol/L    Chloride 102 98 - 107 mmol/L    Bicarbonate 29 21 - 32 mmol/L    Anion Gap 12 10 - 20 mmol/L    Urea Nitrogen 15 6 - 23 mg/dL    Creatinine 0.51 0.50 - 1.30 mg/dL    eGFR >90 >60 mL/min/1.73m*2    Calcium 8.5 (L) 8.6 - 10.3 mg/dL   CBC   Result Value Ref Range    WBC 7.2 4.4 - 11.3 x10*3/uL    nRBC 0.3 (H) 0.0 - 0.0 /100 WBCs    RBC 3.58 (L) 4.50 - 5.90 x10*6/uL    Hemoglobin 9.3 (L) 13.5 - 17.5 g/dL    Hematocrit 31.2 (L) 41.0 - 52.0 %    MCV 87 80 - 100 fL    MCH 26.0 26.0 - 34.0 pg    MCHC 29.8 (L) 32.0 - 36.0 g/dL    RDW 23.8 (H) 11.5 - 14.5 %    Platelets 200 150 - 450 x10*3/uL   Magnesium   Result Value Ref Range    Magnesium 2.10 1.60 - 2.40 mg/dL   Vancomycin   Result Value Ref Range    Vancomycin 6.3 5.0 - 20.0 ug/mL      Transthoracic Echo (TTE) Complete  Result Date: 6/3/2025  PHYSICIAN INTERPRETATION: Left Ventricle: Left ventricular ejection fraction is normal calculated by Leos's biplane at 71%. There are no regional left ventricular wall motion abnormalities. The left ventricular cavity size is normal. There is normal septal and normal posterior left ventricular wall thickness. There is left ventricular concentric  remodeling. Left ventricular diastolic filling cannot be determined due to mitral valve repair/replacement. Left Atrium: The left atrial size is severely dilated. Right Ventricle: The right ventricle is severely enlarged. There is mildly reduced right ventricular systolic function. Right Atrium: The right atrium is severely dilated. Aortic Valve: There is a prosthetic aortic valve present. The aortic valve area by VTI is 0.50 cmï¿½ with a peak velocity of 4.29 m/s. The peak and mean gradients are 58 mmHg and 34 mmHg, respectively, with a dimensionless index of 0.21. There is evidence of severe aortic valve stenosis. There is a [employer]. Bryce bileaflet mechanical type aortic valve prosthesis with a 21 mm reported size. There is mild aortic valve regurgitation. The mechanical AV leaflet mobility could not be properly assessed due to prosthetic valve artifact. It is recommended to assess leaflet motion and opening/closing angles with cinefluoroscopy. Mitral Valve: Status post mitral valve repair. The doppler estimated peak and mean diastolic pressure gradients are 30.8 mmHg and 9 mmHg, respectively. Barlows syndrome is present. There is mitral valve prolapse of the anterior and posterior leaflets. There is mild calcification of the mitral leaflets and subvalvular apparatus. The mean gradient of the mitral valve is 9 mmHg. There is severe mitral valve regurgitation. The E Vmax is 2.55 m/s. There is probable presence of an underlying MV annular repair with prosthetic ring (no prior history of MVr available). Tricuspid Valve: The tricuspid valve is abnormal. The tricuspid valve annulus appears dilated. There is moderate to severe tricuspid regurgitation. The doppler estimated RVSP is moderately elevated with a right ventricular systolic pressure of 60 mmHg. Pulmonic Valve: The pulmonic valve is structurally normal. There is trace to mild pulmonic valve regurgitation. Pericardium: Trivial pericardial effusion. Pleural: There is  left pleural effusion. Aorta: The aortic root is normal. There is a prosthetic graft seen in the position of the ascending aorta. In comparison to the previous echocardiogram(s): Compared with study dated 2/6/2024, the degree of aortic valve stenosis has slightly increased from 65/2 mmHg to 73/34 mmHg, with an AME of 0.5-0.77 cm2 and a DI of 0.21. The degree of MR has increased from moderate to severe and the RVSP has increased from 40 mmHg to 60 mmHg. Underlying infection/endocarditis to explain progression of MR could not be ruled out in this study. JOANNE assessment is recommended if clinically indicated.      CONCLUSIONS:  1. Left ventricular ejection fraction is normal calculated by Leos's biplane at 71%.  2. There is mildly reduced right ventricular systolic function.  3. Severely enlarged right ventricle.  4. The Doppler-estimated right ventricular systolic pressure (RVSP) is moderately elevated at 60 mmHg.  5. The left atrial size is severely dilated.  6. The right atrium is severely dilated.  7. Severe aortic valve stenosis. The peak and mean gradients are 73 mmHg and 34 mmHg respectively.  8. There is a St. Bryce bileaflet mechanical type aortic valve prosthesis with a 21 mm reported size. The peak and mean gradients are 73 mmHg and 34 mmHg respectively.  9. Mild aortic valve regurgitation. 10. There is probable presence of an underlying MV annular repair with prosthetic ring (no prior history of MVr available). 11. Mitral valve Barlows syndrome is present, with prolapse of anterior and posterior leaflets. 12. Severe mitral valve regurgitation. 13. There is mild calcification of the mitral leaflets and subvalvular apparatus. 14. Moderate to severe tricuspid regurgitation visualized. 15. The tricuspid valve annulus appears dilated. 16. Prosthetic graft in the ascending aorta position. 17. Compared with study dated 2/6/2024, the degree of aortic valve stenosis has slightly increased from 65/2 mmHg to 73/34  mmHg, with an AME of 0.5-0.77 cm2 and a DI of 0.21. The degree of MR has increased from moderate to severe and the RVSP has increased from 40 mmHg to 60 mmHg. Underlying infection/endocarditis to explain progression of MR could not be ruled out in this study. JOANNE assessment is recommended if clinically indicated. RECOMMENDATIONS: Based on the results of this study and as mechanical AV leaflet mobility could not be properly assessed due to prosthetic valve artifact, it is recommended to assess leaflet motion and opening/closing angles with cinefluoroscopy.      [id number] Ulysses Amin MD Electronically signed on 6/3/2025 at 4:50:49 PM  ** Final (Updated) **     XR chest 1 view  Result Date: 6/3/2025  Interpreted By:  Maya Christie, STUDY: XR CHEST 1 VIEW;  6/3/2025 12:33 pm   INDICATION: Signs/Symptoms:short of breath.     COMPARISON: 05/30/2025   ACCESSION NUMBER(S): [id number]   ORDERING CLINICIAN: CJ NGUYEN   FINDINGS: Ill-defined patchy bilateral infiltrates, slightly improved on the left and increased on the right, with probable bilateral Kerley B-lines. Bilateral pleural effusions increased since the previous exam. The cardiomediastinal silhouette remains enlarged status post sternotomy.       Waxing and waning bilateral infiltrates/edema with increasing bilateral pleural effusions.   MACRO: None.   Signed by: Maya Christie 6/3/2025 12:49 PM Dictation workstation:   [id number]    CT chest wo IV contrast  Result Date: 6/1/2025  Interpreted By:  Andrea Montes, STUDY: CT CHEST WO IV CONTRAST;  6/1/2025 3:48 pm   INDICATION: Shortness-of-breath   COMPARISON: CXR performed 05/30/2025   ACCESSION NUMBER(S): [id number]   ORDERING CLINICIAN: YO BUENO   TECHNIQUE: Helical data acquisition of the chest was obtained  without IV contrast material.  Images were reformatted in axial, coronal, and sagittal planes.   FINDINGS: LUNGS AND AIRWAYS: There are patchy consolidative/alveolar opacities bilaterally noted,  some of which are solid and others mixed attenuation and present within all lobes, greater left than right. Findings are superimposed on interlobular septal thickening and scattered areas of parenchymal nodularity bilaterally which are somewhat motion blurred. Small-to-moderate bilateral pleural effusions are also present. No pneumothorax is seen on either side. No central airway obstruction.   MEDIASTINUM AND MELLISA, LOWER NECK AND AXILLA: No significantly enlarged intrathoracic lymph nodes are identified. The esophagus is patulous and air-filled. No masses are identified in the lower neck   HEART AND VESSELS: The heart is enlarged. The patient is status post prior sternotomy with aortic root and ascending aortic repair and placement of a prosthetic aortic valve. No evident postoperative complication on this noncontrast exam. Scattered atherosclerotic calcifications. The pulmonary trunk is severely dilated, measures 3.6 cm. No thoracic aortic aneurysm. The heart is enlarged with severe biatrial dilatation. Coarsely calcified mitral valve annulus. There is evidence for anemia with increased attenuation of the myocardium relative to the blood pool.   UPPER ABDOMEN: No acute upper abdominal finding is identified on this noncontrast exam   CHEST WALL AND OSSEOUS STRUCTURES: No significant soft tissue findings. No lytic or blastic osseous lesion is identified. Mild anterior wedge compression deformity of the T10 vertebral body level.       1. Findings most compatible with interstitial and alveolar edema with interstitial thickening and patchy consolidative areas/areas of alveolar opacity, mixed solid and sub-solid in attenuation with areas of nodularity also noted, some with tree-in-bud configuration. The possibility of a superimposed pneumonia/pneumonitis and bronchiolitis is not excluded. Advise short-term radiographic follow-up to reassess. Appearance on the  radiograph from the current CT scan quite similar to  the 05/30/2025 CXR. If the chest radiograph appearance does not entirely normalized, follow-up CT will be warranted. 2. Prior sternotomy and repair of the aortic root and ascending aorta with mechanical aortic valve replacement. Atherosclerotic disease. Severe biatrial dilatation. Severely dilated pulmonary trunk. Advise attention on recent echocardiogram. 3. Evidence for anemia.   MACRO: None   Signed by: Andrea Montes 6/1/2025 5:40 PM Dictation workstation:   [id number]    XR chest 1 view  Result Date: 5/30/2025  Interpreted By:  Iram Baca, STUDY: XR CHEST 1 VIEW; 5/30/2025 9:36 am   INDICATION: Signs/Symptoms:leukocytosis, hypoxia   COMPARISON: Radiographs 05/27/2025   ACCESSION NUMBER(S): [id number]   ORDERING CLINICIAN: DEEP PORTILLO   TECHNIQUE: Single frontal view of the chest performed.   FINDINGS:   LINES AND DEVICES: None.   LUNGS: New bilateral perihilar and left basilar airspace opacities are noted. There is new blunting of the right costophrenic angle suggestive of a trace effusion. No pneumothorax.   CARDIOMEDIASTINAL SILHOUETTE: Stable cardiomegaly. Prior median sternotomy.         New bilateral, left greater than right, airspace opacities compatible with pneumonia, although pulmonary edema may appear similarly.   MACRO None   Signed by: Iram Baca 5/30/2025 12:33 PM Dictation workstation:   [id number]    Assessment & Plan  Symptomatic anemia    Aortic valve replaced    Robert Luna is a 59 y.o. male with past medical history of AVR (s/p mechanical valve on Coumadin), congenital aortic coarctation, HLD, KEV, MAHESH who presented to [facility] ED on 5/27 for shortness of breath that had been persistent for the previous month but had acutely worsened in the 24 hours prior to his presentation.  Labs were without leukocytosis, H/H 8.3/27.8, . He was transferred to [facility] for suspected occult GI blood loss with symptomatic anemia. EGD/colonoscopy done on 5/29 demonstrating no old or active  bleeding.  Patient was started on Venofer & was transfused 1 PRBC on 5/29.  On 5/30 patient had new hypoxic episode requiring 4 L, concerning for post endoscopy aspiration event.  CXR showed bilateral airspace opacities with new leukocytosis patient was started on Unasyn (5/30-6/3).  CT chest completed on 6/1 demonstrating multifocal pneumonia with interstitial and alveolar edema.  Leukocytosis noted on 6/2.  Flu A/B, RSV and COVID were all negative.  Cardiology consulted on 6/2 for suspected heart failure exacerbation.  Echo completed on 6/3 with patient experiencing a hypoxic event requiring escalation to Airvo 50 L / 50%.  MRSA swab was negative.  Patient was transitioned to IV Zosyn and fluconazole (for candida esophagitis).  Pulmonology is now consulted to assist in further evaluation and management of acute hypoxic respiratory failure following suspected aspiration.      Impressions:     #Acute hypoxic respiratory failure: Multifactorial 2/2 causes below, post EGD aspiration, worsening MR/TR, MVP, stenosed mechanical AO valve; baseline is room air, currently requiring Airvo     #Pneumonia, multifocal +/- pneumonitis: CT demonstrating patchy consolidative alveolar opacities in all lobes (L>R); likely aspiration given failed swallow eval on 6/2  Completed Unasyn 5/30 - 6/3  Started on Zosyn 6/3; adding azithromycin IV 5 mg daily x 3 days  Flu, RSV and COVID-negative; MRSA swab negative  Legionella urine antigens negative  Strep urine antigen negative  Respiratory culture 6/2 culture in progress  Procalcitonin 0.22      #Pleural effusions, bilateral: Small to moderate noted on CT chest, parapneumonic versus heart failure; currently on high oxygen requirements without acute distress     #Volume overload/acute on chronic HFpEF: 2024 with pseudo normal pattern of LV diastolic filling; echo with CT noting interlobular septal thickening, admission ; + bilateral pleural effusions     #Pulmonary hypertension: CT  on 6/1 with dilated pulmonary trunk at 3.6 cm; echo 6/3 with RVSP 60 mmHg, severely enlarged RV with mildly reduced RV systolic function ISO moderate to severe TR and severe MR and MV prolapse of anterior and posterior leaflets  previous echo 02/2024 normal RV, normal RV systolic function and RVSP 40 mmHg     #Dysphagia with concern for aspiration: patient with previous history of noting difficulty swallowing and choking; failed swallow evaluation on 6/2, only allowed single ice chips as tolerated (3-5 an hour)     #Poor dentition: one lower tooth looks decayed; concern for aspiration of oral secretions      Recommendations:  - Continue supplemental oxygen, wean for saturation 90-95%; currently on AirVo  - Home O2 eval prior to discharge  - Continue scheduled and as needed DuoNebs  - Continue pulmonary hygiene with I-S and Acapella; may need to add EZ Pap   - Adding Mucinex, Flonase  - Antibiotics per primary; adding azithromycin (3 doses) for atypical coverage  - Diuresis as renal function and hemodynamics allows; currently on 40 mg IV Lasix twice daily; on hold, patient received dose of Diamox with improvement of serum bicarb to 29 today  - Monitor pleural effusions for now; no immediate need for thoras  - MBSS evaluation with silent aspiration with all thickness of liquids; no aspiration or penetration with purée; currently with NG tube  - Follow-up on pending studies as above  - Frequent oral care (use oral kits with /sponges and mouth wash)  - DVT prophylaxis: With SCDs, resuming Coumadin per cardiology  - Patient will need to follow-up with primary care with repeat noncontrasted CT chest in ~8 weeks to ensure resolution of infiltrates; if questions or concerns persist, PCP can refer to pulmonary medicine at that time     Thank you for the consult.  Pulmonology will follow.    I spent 35 minutes in the professional and overall care of this patient.   Michelle Shipley, DAVID-CNS           [1]  ampicillin-sulbactam, 1.5 g, intravenous, q6h  docusate sodium, 100 mg, oral, BID  ferrous sulfate, 65 mg of elemental iron, oral, Daily  fluconazole, 200 mg, oral, Daily  fluticasone, 2 spray, Each Nostril, Daily  [Held by provider] furosemide, 40 mg, intravenous, BID  ipratropium-albuteroL, 3 mL, nebulization, TID  metoprolol succinate XL, 25 mg, oral, Daily  polyethylene glycol, 17 g, oral, Nightly  potassium chloride, 40 mEq, nasogastric tube, BID  sennosides, 1 tablet, oral, Nightly  [Held by provider] warfarin, 1 mg, oral, Once per day on Monday Wednesday  [Held by provider] warfarin, 1.5 mg, oral, Once per day on Sunday Thursday Friday     [2] PRN medications: acetaminophen **OR** acetaminophen **OR** acetaminophen, guaiFENesin, HYDROmorphone, ipratropium-albuteroL, lubricating eye drops, ondansetron **OR** ondansetron, oxygen, phenoL

## 2025-06-06 NOTE — PROGRESS NOTES
Music Therapy Note        Therapy Session  Referral Type: New referral this admission  Visit Type: Follow-up visit  Session Start Time: 1248  Session End Time: 1250  Intervention Delivery: In-person  Conflict of Service: None  Number of family members present: 0  Number of staff members present: 0       Treatment/Interventions  Music Therapy Interventions: Assessment  Interruption: No  Patient Fell Asleep at End of Session: No    Post-assessment  Unable to Assess Reason: Did not provide expressive therapy intervention  Continue Visiting: Yes  Total Session Time (min): 2 minutes    Narrative  Assessment Detail: Upon assessment pt reported no complaints. Pt expressed there was a lot going on today but expressed he has been listening to music to keep him company. Pt shared how he could get in touch with MT if needed. Pt thanked MT.  Follow-up: MT will follow up with pt as able    Education Documentation  No documentation found.

## 2025-06-06 NOTE — CARE PLAN
Problem: Pain - Adult  Goal: Verbalizes/displays adequate comfort level or baseline comfort level  Outcome: Progressing     Problem: Safety - Adult  Goal: Free from fall injury  Outcome: Progressing     Problem: Discharge Planning  Goal: Discharge to home or other facility with appropriate resources  Outcome: Progressing     Problem: Chronic Conditions and Co-morbidities  Goal: Patient's chronic conditions and co-morbidity symptoms are monitored and maintained or improved  Outcome: Progressing     Problem: Nutrition  Goal: Nutrient intake appropriate for maintaining nutritional needs  Outcome: Progressing     Problem: Fall/Injury  Goal: Not fall by end of shift  Outcome: Progressing  Goal: Be free from injury by end of the shift  Outcome: Progressing  Goal: Verbalize understanding of personal risk factors for fall in the hospital  Outcome: Progressing  Goal: Verbalize understanding of risk factor reduction measures to prevent injury from fall in the home  Outcome: Progressing  Goal: Use assistive devices by end of the shift  Outcome: Progressing  Goal: Pace activities to prevent fatigue by end of the shift  Outcome: Progressing   The patient's goals for the shift include      The clinical goals for the shift include Maintain theraputeic 02 level    Over the shift, the patient did not make progress toward the following goals. Barriers to progression include . Recommendations to address these barriers include .

## 2025-06-07 LAB
ALBUMIN SERPL BCP-MCNC: 3.2 G/DL (ref 3.4–5)
ANION GAP SERPL CALC-SCNC: 12 MMOL/L (ref 10–20)
BUN SERPL-MCNC: 20 MG/DL (ref 6–23)
CALCIUM SERPL-MCNC: 9 MG/DL (ref 8.6–10.3)
CHLORIDE SERPL-SCNC: 108 MMOL/L (ref 98–107)
CO2 SERPL-SCNC: 22 MMOL/L (ref 21–32)
CREAT SERPL-MCNC: 0.44 MG/DL (ref 0.5–1.3)
EGFRCR SERPLBLD CKD-EPI 2021: >90 ML/MIN/1.73M*2
ERYTHROCYTE [DISTWIDTH] IN BLOOD BY AUTOMATED COUNT: 23.9 % (ref 11.5–14.5)
GLUCOSE BLD MANUAL STRIP-MCNC: 123 MG/DL (ref 74–99)
GLUCOSE SERPL-MCNC: 103 MG/DL (ref 74–99)
HCT VFR BLD AUTO: 29.3 % (ref 41–52)
HGB BLD-MCNC: 9.3 G/DL (ref 13.5–17.5)
INR PPP: 1.5 (ref 0.9–1.1)
MCH RBC QN AUTO: 26.2 PG (ref 26–34)
MCHC RBC AUTO-ENTMCNC: 31.7 G/DL (ref 32–36)
MCV RBC AUTO: 83 FL (ref 80–100)
NRBC BLD-RTO: 0 /100 WBCS (ref 0–0)
PHOSPHATE SERPL-MCNC: 1.7 MG/DL (ref 2.5–4.9)
PLATELET # BLD AUTO: 202 X10*3/UL (ref 150–450)
POTASSIUM SERPL-SCNC: 4.8 MMOL/L (ref 3.5–5.3)
PROTHROMBIN TIME: 17.1 SECONDS (ref 9.8–12.4)
RBC # BLD AUTO: 3.55 X10*6/UL (ref 4.5–5.9)
SODIUM SERPL-SCNC: 137 MMOL/L (ref 136–145)
WBC # BLD AUTO: 7.4 X10*3/UL (ref 4.4–11.3)

## 2025-06-07 PROCEDURE — 97530 THERAPEUTIC ACTIVITIES: CPT | Mod: GP

## 2025-06-07 PROCEDURE — 2500000001 HC RX 250 WO HCPCS SELF ADMINISTERED DRUGS (ALT 637 FOR MEDICARE OP): Performed by: NURSE PRACTITIONER

## 2025-06-07 PROCEDURE — 99233 SBSQ HOSP IP/OBS HIGH 50: CPT | Performed by: STUDENT IN AN ORGANIZED HEALTH CARE EDUCATION/TRAINING PROGRAM

## 2025-06-07 PROCEDURE — 2500000005 HC RX 250 GENERAL PHARMACY W/O HCPCS: Performed by: STUDENT IN AN ORGANIZED HEALTH CARE EDUCATION/TRAINING PROGRAM

## 2025-06-07 PROCEDURE — 94640 AIRWAY INHALATION TREATMENT: CPT

## 2025-06-07 PROCEDURE — 2500000002 HC RX 250 W HCPCS SELF ADMINISTERED DRUGS (ALT 637 FOR MEDICARE OP, ALT 636 FOR OP/ED)

## 2025-06-07 PROCEDURE — 2500000002 HC RX 250 W HCPCS SELF ADMINISTERED DRUGS (ALT 637 FOR MEDICARE OP, ALT 636 FOR OP/ED): Performed by: NURSE PRACTITIONER

## 2025-06-07 PROCEDURE — 85027 COMPLETE CBC AUTOMATED: CPT | Performed by: NURSE PRACTITIONER

## 2025-06-07 PROCEDURE — 80069 RENAL FUNCTION PANEL: CPT | Performed by: STUDENT IN AN ORGANIZED HEALTH CARE EDUCATION/TRAINING PROGRAM

## 2025-06-07 PROCEDURE — 82947 ASSAY GLUCOSE BLOOD QUANT: CPT

## 2025-06-07 PROCEDURE — 36415 COLL VENOUS BLD VENIPUNCTURE: CPT | Performed by: NURSE PRACTITIONER

## 2025-06-07 PROCEDURE — 2500000004 HC RX 250 GENERAL PHARMACY W/ HCPCS (ALT 636 FOR OP/ED): Performed by: NURSE PRACTITIONER

## 2025-06-07 PROCEDURE — 85610 PROTHROMBIN TIME: CPT | Performed by: NURSE PRACTITIONER

## 2025-06-07 PROCEDURE — 2500000001 HC RX 250 WO HCPCS SELF ADMINISTERED DRUGS (ALT 637 FOR MEDICARE OP): Performed by: STUDENT IN AN ORGANIZED HEALTH CARE EDUCATION/TRAINING PROGRAM

## 2025-06-07 PROCEDURE — 97161 PT EVAL LOW COMPLEX 20 MIN: CPT | Mod: GP

## 2025-06-07 PROCEDURE — 97165 OT EVAL LOW COMPLEX 30 MIN: CPT | Mod: GO

## 2025-06-07 PROCEDURE — 2060000001 HC INTERMEDIATE ICU ROOM DAILY

## 2025-06-07 PROCEDURE — 97535 SELF CARE MNGMENT TRAINING: CPT | Mod: GO

## 2025-06-07 RX ORDER — FUROSEMIDE 20 MG/1
20 TABLET ORAL DAILY
Status: DISCONTINUED | OUTPATIENT
Start: 2025-06-07 | End: 2025-06-12 | Stop reason: HOSPADM

## 2025-06-07 RX ORDER — SODIUM,POTASSIUM PHOSPHATES 280-250MG
1 POWDER IN PACKET (EA) ORAL 4 TIMES DAILY
Status: COMPLETED | OUTPATIENT
Start: 2025-06-07 | End: 2025-06-07

## 2025-06-07 RX ORDER — WARFARIN 1 MG/1
1 TABLET ORAL ONCE
Status: COMPLETED | OUTPATIENT
Start: 2025-06-07 | End: 2025-06-07

## 2025-06-07 RX ADMIN — FUROSEMIDE 20 MG: 20 TABLET ORAL at 12:27

## 2025-06-07 RX ADMIN — METOPROLOL SUCCINATE 25 MG: 25 TABLET, EXTENDED RELEASE ORAL at 09:29

## 2025-06-07 RX ADMIN — WARFARIN SODIUM 1 MG: 1 TABLET ORAL at 17:16

## 2025-06-07 RX ADMIN — FERROUS SULFATE TAB 325 MG (65 MG ELEMENTAL FE) 1 TABLET: 325 (65 FE) TAB at 09:28

## 2025-06-07 RX ADMIN — IPRATROPIUM BROMIDE AND ALBUTEROL SULFATE 3 ML: 2.5; .5 SOLUTION RESPIRATORY (INHALATION) at 20:19

## 2025-06-07 RX ADMIN — IPRATROPIUM BROMIDE AND ALBUTEROL SULFATE 3 ML: 2.5; .5 SOLUTION RESPIRATORY (INHALATION) at 07:18

## 2025-06-07 RX ADMIN — FLUCONAZOLE 200 MG: 100 TABLET ORAL at 09:28

## 2025-06-07 RX ADMIN — POTASSIUM & SODIUM PHOSPHATES POWDER PACK 280-160-250 MG 1 PACKET: 280-160-250 PACK at 12:27

## 2025-06-07 RX ADMIN — POLYETHYLENE GLYCOL 3350 17 G: 17 POWDER, FOR SOLUTION ORAL at 20:57

## 2025-06-07 RX ADMIN — ACETAMINOPHEN 650 MG: 160 SOLUTION ORAL at 20:56

## 2025-06-07 RX ADMIN — IPRATROPIUM BROMIDE AND ALBUTEROL SULFATE 3 ML: 2.5; .5 SOLUTION RESPIRATORY (INHALATION) at 12:31

## 2025-06-07 RX ADMIN — ACETAMINOPHEN 650 MG: 160 SOLUTION ORAL at 09:31

## 2025-06-07 RX ADMIN — Medication 2 L/MIN: at 20:19

## 2025-06-07 RX ADMIN — POTASSIUM & SODIUM PHOSPHATES POWDER PACK 280-160-250 MG 1 PACKET: 280-160-250 PACK at 17:19

## 2025-06-07 ASSESSMENT — COGNITIVE AND FUNCTIONAL STATUS - GENERAL
TURNING FROM BACK TO SIDE WHILE IN FLAT BAD: A LITTLE
PERSONAL GROOMING: A LITTLE
DAILY ACTIVITIY SCORE: 18
WALKING IN HOSPITAL ROOM: A LITTLE
MOVING TO AND FROM BED TO CHAIR: A LITTLE
PERSONAL GROOMING: A LITTLE
MOVING TO AND FROM BED TO CHAIR: A LITTLE
DAILY ACTIVITIY SCORE: 19
HELP NEEDED FOR BATHING: A LITTLE
PERSONAL GROOMING: A LITTLE
STANDING UP FROM CHAIR USING ARMS: A LITTLE
DAILY ACTIVITIY SCORE: 18
HELP NEEDED FOR BATHING: A LITTLE
TOILETING: A LITTLE
MOVING TO AND FROM BED TO CHAIR: A LITTLE
DRESSING REGULAR UPPER BODY CLOTHING: A LITTLE
EATING MEALS: A LITTLE
WALKING IN HOSPITAL ROOM: A LITTLE
HELP NEEDED FOR BATHING: A LITTLE
TOILETING: A LITTLE
DRESSING REGULAR UPPER BODY CLOTHING: A LITTLE
TURNING FROM BACK TO SIDE WHILE IN FLAT BAD: A LITTLE
CLIMB 3 TO 5 STEPS WITH RAILING: A LOT
TOILETING: A LITTLE
WALKING IN HOSPITAL ROOM: A LITTLE
MOBILITY SCORE: 19
STANDING UP FROM CHAIR USING ARMS: A LITTLE
DRESSING REGULAR LOWER BODY CLOTHING: A LITTLE
DRESSING REGULAR UPPER BODY CLOTHING: A LITTLE
MOBILITY SCORE: 19
CLIMB 3 TO 5 STEPS WITH RAILING: A LITTLE
MOBILITY SCORE: 19
CLIMB 3 TO 5 STEPS WITH RAILING: A LITTLE
STANDING UP FROM CHAIR USING ARMS: A LITTLE
EATING MEALS: A LITTLE

## 2025-06-07 ASSESSMENT — ACTIVITIES OF DAILY LIVING (ADL)
HOME_MANAGEMENT_TIME_ENTRY: 13
ADL_ASSISTANCE: INDEPENDENT
BATHING_ASSISTANCE: NOT PERFORMED
ADL_ASSISTANCE: INDEPENDENT

## 2025-06-07 ASSESSMENT — ENCOUNTER SYMPTOMS
ABDOMINAL DISTENTION: 0
FEVER: 0
VOMITING: 0
ABDOMINAL PAIN: 0
SHORTNESS OF BREATH: 1

## 2025-06-07 ASSESSMENT — PAIN - FUNCTIONAL ASSESSMENT
PAIN_FUNCTIONAL_ASSESSMENT: 0-10

## 2025-06-07 ASSESSMENT — PAIN DESCRIPTION - DESCRIPTORS
DESCRIPTORS: ACHING
DESCRIPTORS: ACHING

## 2025-06-07 ASSESSMENT — PAIN SCALES - GENERAL
PAINLEVEL_OUTOF10: 2
PAINLEVEL_OUTOF10: 0 - NO PAIN
PAINLEVEL_OUTOF10: 2

## 2025-06-07 ASSESSMENT — PAIN DESCRIPTION - LOCATION: LOCATION: GENERALIZED

## 2025-06-07 NOTE — PROGRESS NOTES
Robert Luna is a 59 y.o. male admitted for symptomatic anemia. Pharmacy has been consulted for warfarin dosing and monitoring for Aortic Valve Replacement with goal INR of 2.0-3.0.      Home regimen    MON TUE WED THR FRI SAT SUN   Dose 1  mg 1.5  mg 1  mg 1.5  mg 1.5  mg 1  mg 1.5  mg   Total weekly dose: 9 mg  Source: [facility] Note from 5/22/2025    Labs  INR: 1.5  Hgb/Hct/Plt  Lab Results   Component Value Date    HGB 9.3 (L) 06/07/2025    HGB 9.3 (L) 06/06/2025    HGB 8.5 (L) 06/05/2025    HGB 8.6 (L) 06/04/2025    HGB 8.6 (L) 06/03/2025        Lab Results   Component Value Date    HCT 29.3 (L) 06/07/2025    HCT 31.2 (L) 06/06/2025    HCT 27.9 (L) 06/05/2025    HCT 28.5 (L) 06/04/2025    HCT 30.7 (L) 06/03/2025        Platelets   Date Value Ref Range Status   06/07/2025 202 150 - 450 x10*3/uL Final   06/06/2025 200 150 - 450 x10*3/uL Final   06/05/2025 198 150 - 450 x10*3/uL Final   06/04/2025 197 150 - 450 x10*3/uL Final   06/03/2025 242 150 - 450 x10*3/uL Final      Warfarin Therapy   Current regimen: resuming home reigmen but fluconazole started by GI, and plan to have patient hold warfarin on Tuesdays and Saturdays while completing 14 day duration   Bridging: None needed  Interacting medications: interacting medication(s) of fluconazole and azithromycin increase bleeding risk    Dosing History During Current Admission  Date 5/28 5/29 5/30 5/31 6/1 6/2 6/3 6/4 6/5 6/6 6/7   INR 1.9 2.2 2.9 2.1 1.9 2.1 2.2 3.7 3.8 2.0 1.5   Dose  (mg) See plan 1.5 mg 1.5 mg 1 mg 1.5 mg 1 mg Hold Hold Hold 1.5 mg 1 mg        Assessment and Plan  Patient's INR of 1.5 today is subtherapeutic. Hemoglobin/hematocrit/platelet stable.   Warfarin inpatient plan: Give 1 mg warfarin today  Original plan to hold Warfarin on Tue/Sat due to Diflucan treatment for Candida. Per last few days, patient has been therapeutic (INR 2.0) after 5mg of Warfarin. Will give 1mg today and see INR tomorrow to better optimize outpatient plan if necessary.  Patient has a high CHADSVASC score - would like to get INR therapeutic ASAP to avoid bridging.   Continue to monitor s/sx of bleeding including epistaxis, hematuria, unusual bruising, hemoptysis, hematochezia as well as s/sx of stroke including impaired speech, unilateral paralysis, blurry vision.  Pharmacy will continue to monitor the patient and adjust therapy as needed.    Thank you for the consult. Please do not hesitate to contact a pharmacist with any questions.    Natividad Rosas, PharmD

## 2025-06-07 NOTE — PROGRESS NOTES
06/07/25 1200   Discharge Planning   Assistance Needed SNF     Patient /Family would like to attempt therapy first if patient does not do well family will contact hospice,

## 2025-06-07 NOTE — PROGRESS NOTES
[facility] Hospitalist Progress Note      Between 7AM-7PM please message me via Epic Secure Chat.  After 7PM please page Nocturnist on call.        Assessment/Plan     Acute Problems    Acute hypoxic respiratory failure   Aspiration PNA/pneumonitis  Severe stenosis of his mechanical aortic valve as well as severe mitral regurgitation  Elevated RV pressures  Bilateral pleural effusions  Contraction alkalosis - improved  Hypokalemia  Iron def anemia  Possible occult GI blood loss    Chronic Problems    Mechanical aortic valve replacement - INR goal 2-3  Ascending aorta repair  HTN    Plan    - pulm consult -> suspect aspiration PNA/pneumonitis big driving factor. Urine antigens negative. Procal minimally elevated 0.22. Weaned to NC  - ID consult to assist with PNA/abx management appreciated -> completed his course of IV Unasyn  - SLP appreciated -> failed MBS. NPO rec for now with considerations for GOC/alternative means of nutrition. Currently has NG tube receiving TF  - cardiology consulted -> can consider afterload reduction/diuresis, not a surgical candidate. He did receive some IV diuresis with good response. Once potassium improved can start on PO lasix 20mg daily to try and maintain euvolemia  - pal care following -> DNR/DNI, hospice meeting it looks like patient wants a trial of PT/OT and will consider hospice down the road  - GI consulted -> EGD/Colon with no old or active bleeding. No abnormality to explain anemia. Outpatient VCE can be considered for complete evaluation of MAHESH. Candida esophagitis started on PO fluconazole for 2 week course  - Coumadin as dosed by pharmacy for hx of AVR, mindful of INR while on fluconazole may have him skip 2 doses per week if DC home  - IV Venofer 300 mg x3 doses ordered; 1 unit pRBC this admit. Trend hgb. On PO iron supplement now  - PT/OT evaluations    Fluids: None  Electrolytes: Replete as needed  Nutrition: Tube feed  Ortiz: None  Invasive lines: None  Drains: NG tube  O2:  "NC    DVT Prophylaxis:  Coumadin    Discharge Planning: will need PT/OT eval, possible SNF once med ready    Plan of care was discussed with patient/sister    Total time spent: At least 38 minutes, providing counseling or in coordination of care. Total time on this day of visit includes record and documentation review before and after visit including documentation and time not explicitly included on EMR time stamp.      Subjective     Robert Luna is a 59 y.o. male on day 11 of admission presenting with Symptomatic anemia.    NAEON. Weaned to NC. Tolerating TF. He says overall his breathing feels better today compared to where it was at a few days ago    Review of Systems   Constitutional:  Negative for fever.   Respiratory:  Positive for shortness of breath.    Cardiovascular:  Negative for chest pain.   Gastrointestinal:  Negative for abdominal distention, abdominal pain and vomiting.       Objective     Physical Exam  Vitals reviewed.   Constitutional:       General: He is not in acute distress.  Cardiovascular:      Rate and Rhythm: Normal rate and regular rhythm.   Pulmonary:      Effort: Pulmonary effort is normal.      Breath sounds: Rales present.   Abdominal:      General: There is no distension.      Palpations: Abdomen is soft.   Neurological:      Mental Status: He is alert. Mental status is at baseline.         Last Recorded Vitals  Blood pressure 106/66, pulse 77, temperature 36.2 °C (97.1 °F), temperature source Temporal, resp. rate 20, height 1.499 m (4' 11.02\"), weight (!) 39.5 kg (87 lb 1.6 oz), SpO2 100%.    Medications  Scheduled Medications[1]   PRN Medications[2]                Miguel Walker MD  [facility]         [1] docusate sodium, 100 mg, oral, BID  ferrous sulfate, 65 mg of elemental iron, oral, Daily  fluconazole, 200 mg, oral, Daily  fluticasone, 2 spray, Each Nostril, Daily  [Held by provider] furosemide, 20 mg, oral, Daily  ipratropium-albuteroL, 3 mL, nebulization, TID  metoprolol " succinate XL, 25 mg, oral, Daily  polyethylene glycol, 17 g, oral, Nightly  sennosides, 1 tablet, oral, Nightly  warfarin, 1 mg, oral, Once per day on Monday Wednesday  warfarin, 1.5 mg, oral, Once per day on Sunday Thursday Friday     [2] PRN medications: acetaminophen **OR** acetaminophen **OR** acetaminophen, guaiFENesin, HYDROmorphone, ipratropium-albuteroL, lubricating eye drops, ondansetron **OR** ondansetron, oxygen, phenoL

## 2025-06-07 NOTE — PROGRESS NOTES
Physical Therapy    Physical Therapy Evaluation & Treatment    Patient Name: Robert Luna  MRN: [id number]  Department: [facility]  Room: [facility]  Today's Date: 6/7/2025   Time Calculation  Start Time: 1135  Stop Time: 1218  Time Calculation (min): 43 min    Assessment/Plan   PT Assessment  PT Assessment Results: Decreased endurance, Impaired balance, Decreased mobility  Rehab Prognosis: Good  Barriers to Discharge Home: No anticipated barriers  Evaluation/Treatment Tolerance: Patient tolerated treatment well  Medical Staff Made Aware: Yes  Strengths: Ability to acquire knowledge, Premorbid level of function, Support of extended family/friends, Support of social community  Barriers to Participation:  (None identified)  End of Session Communication: Bedside nurse  Assessment Comment: Pt presents today with BLE strength WFL, fair balance, and fair endurance. Pt would benefit from continued PT to progress transfer, gait, and stair training to maximize pt's safety/independence with mobility at D/C. Pt is anticipated to benefit from low intensity therapy at D/C.  End of Session Patient Position: Up in chair, Alarm on   IP OR SWING BED PT PLAN  Inpatient or Swing Bed: Inpatient  PT Plan  Treatment/Interventions: Bed mobility, Transfer training, Gait training, Stair training, Balance training, Endurance training, Therapeutic exercise, Therapeutic activity, Home exercise program, Postural re-education  PT Plan: Ongoing PT  PT Frequency: 3 times per week  PT Discharge Recommendations: Low intensity level of continued care  Equipment Recommended upon Discharge:  (TBD)  PT Recommended Transfer Status: Contact guard  PT - OK to Discharge: Yes (PT POC initiated today)      Subjective     PT Visit Info:  PT Received On: 06/07/25  General Visit Information:  General  Reason for Referral: 59 y.o. M admitted with concerns of symptomatic anemia  Referred By: BRETT CRUZ)  Past Medical History Relevant to Rehab: KEV, HTN, HLD, aortic  calve replacement  Family/Caregiver Present: Yes  Co-Treatment: OT  Co-Treatment Reason: Co-evaluation with OT to maximize pt safety and participation.  Prior to Session Communication: Bedside nurse  Patient Position Received: Bed, 4 rail up, Alarm on  Preferred Learning Style: auditory, kinesthetic, visual  General Comment: Pt supine in bed upon PT arrival. Cleared to participate with RN, and agreeable to co-evaluation with PT/OT.  Home Living:  Home Living  Type of Home: House  Lives With: Alone  Home Adaptive Equipment:  (Rollator)  Home Layout: One level  Home Access: Stairs to enter without rails  Entrance Stairs-Rails: None  Entrance Stairs-Number of Steps: 2  Bathroom Shower/Tub: Tub/shower unit  Bathroom Toilet: Low-rise  Bathroom Equipment: None  Prior Level of Function:  Prior Function Per Pt/Caregiver Report  Level of [place]: Independent with ADLs and functional transfers, Independent with homemaking with ambulation  Receives Help From: Family, Neighbor (Sister)  ADL Assistance: Independent  Homemaking Assistance: Independent  Driving/Transportation:  (Pt does not drive currently. Pt walks to grocery store)  Shopping: Independent  Pet Care: Independent  Ambulatory Assistance: Independent (No AD)  Leisure: Pt reports walking his dog 3x/day.  Prior Function Comments: Pt denies recent falls  Precautions:  Precautions  Medical Precautions: Fall precautions, Oxygen therapy device and L/min (4L O2)  Precautions Comment: NG tube     Date/Time Vitals Session Patient Position Pulse Resp SpO2 BP MAP (mmHg)    06/07/25 1227 --  --  83  18  100 %  101/57  68     06/07/25 1231 --  --  --  --  100 %  --  --                Objective   Pain:  Pain Assessment  Pain Assessment:  (Pt denies pain but reports discomfort at NG tube site)  Cognition:  Cognition  Orientation Level: Oriented X4    General Assessments:  Activity Tolerance  Endurance: Tolerates 30+ min exercise without fatigue    Sensation  Light Touch: No  apparent deficits    Coordination  Movements are Fluid and Coordinated: Yes    Postural Control  Head Control: Mild forward head posture    Static Sitting Balance  Static Sitting-Balance Support: Bilateral upper extremity supported, Feet supported  Static Sitting-Level of Assistance: Distant supervision  Static Sitting-Comment/Number of Minutes: At the EOB    Static Standing Balance  Static Standing-Balance Support: No upper extremity supported  Static Standing-Level of Assistance: Close supervision  Static Standing-Comment/Number of Minutes: +Gait belt  Dynamic Standing Balance  Dynamic Standing-Balance Support: No upper extremity supported  Dynamic Standing-Level of Assistance: Contact guard  Dynamic Standing-Balance: Turning  Dynamic Standing-Comments: +Gait belt  Functional Assessments:  Bed Mobility  Bed Mobility: Yes  Bed Mobility 1  Bed Mobility 1: Supine to sitting  Level of Assistance 1: Modified independent  Bed Mobility Comments 1: Using bed rail. HOB flat    Stairs  Stairs: No  Extremity/Trunk Assessments:  RLE   RLE : Within Functional Limits  LLE   LLE : Within Functional Limits  Treatments:  Ambulation/Gait Training  Ambulation/Gait Training Performed: Yes  Ambulation/Gait Training 1  Surface 1: Level tile  Device 1: No device  Assistance 1: Contact guard, Minimal verbal cues  Quality of Gait 1: Wide base of support, Diminished heel strike, Decreased step length  Comments/Distance (ft) 1: 6 f tx 1, 2 ft x 1, 55 ft x 1. Pt demonstrates decreased fredrick, step length, and reciprocating arm swing with arms at low to mid-guard. Pt demonstrates sudden increased fredrick toward the end of last bout of ambulation. VC for stable fredrick.  Transfers  Transfer: Yes  Transfer 1  Transfer From 1: Bed to  Transfer to 1: Stand  Technique 1: Sit to stand  Transfer Device 1: Gait belt  Transfer Level of Assistance 1: Close supervision, Minimal verbal cues  Trials/Comments 1: VC for BUE push from the bed to stand with  return demonstration. Pt able to  a reasonable amount of time.  Transfers 2  Transfer From 2: Stand to  Transfer to 2: Toilet  Technique 2: Stand to sit  Transfer Device 2: Gait belt  Transfer Level of Assistance 2: Contact guard  Trials/Comments 2: Pt demonstrates good BLE positioning in front of the toilet before initiating sitting. Pt uses grab bar near toilet seat to assist in controlled descent.  Transfers 3  Transfer From 3: Toilet to  Transfer to 3: Stand  Technique 3: Sit to stand  Transfer Device 3: Gait belt  Transfer Level of Assistance 3: Contact guard  Trials/Comments 3: Pt uses grab bar near toilet to assist in rising to standing.  Transfers 4  Transfer From 4: Stand to  Transfer to 4: Chair with arms  Technique 4: Stand to sit  Transfer Device 4: Gait belt  Transfer Level of Assistance 4: Contact guard, Minimal verbal cues  Trials/Comments 4: VC for BLE positioning against the chair and BUE reach for the armrest during descent for control.  Outcome Measures:  [facility] Basic Mobility  Turning from your back to your side while in a flat bed without using bedrails: None  Moving from lying on your back to sitting on the side of a flat bed without using bedrails: None  Moving to and from bed to chair (including a wheelchair): A little  Standing up from a chair using your arms (e.g. wheelchair or bedside chair): A little  To walk in hospital room: A little  Climbing 3-5 steps with railing: A lot  Basic Mobility - Total Score: 19    Encounter Problems       Encounter Problems (Active)       Balance       Goal 1       Start:  06/07/25    Expected End:  06/21/25       Pt performs all sitting and standing balance IND            Mobility       STG - Patient will navigate 2 steps with LRAD and standby assist       Start:  06/07/25    Expected End:  06/21/25            STG - Patient will ambulate       Start:  06/07/25    Expected End:  06/21/25       >150 ft IND using LRAD with proper gait mechanics             PT Transfers       STG - Patient will transfer sit to and from stand       Start:  06/07/25    Expected End:  06/21/25       IND using LRAD with proper body mechanics                Education Documentation  Body Mechanics, taught by Hector Da Silva PT at 6/7/2025  1:39 PM.  Learner: Patient  Readiness: Acceptance  Method: Explanation, Demonstration  Response: Verbalizes Understanding    Mobility Training, taught by Hector Da Silva, PT at 6/7/2025  1:39 PM.  Learner: Patient  Readiness: Acceptance  Method: Explanation, Demonstration  Response: Verbalizes Understanding

## 2025-06-07 NOTE — PROGRESS NOTES
Occupational Therapy  Evaluation/Treatment    Patient Name: Robert Luna  MRN: [id number]  : 1965  Today's Date: 25  Time Calculation  Start Time: 1136  Stop Time: 1218  Time Calculation (min): 42 min       Assessment:  OT Assessment: Pt demonstrates to be functioning close to baseline independence; however, pt does have generalized weakness decreased endurance. OT to address energy conservation training, ADL training, and d/c planning. Recommend d/c to Low Intensity.  Prognosis: Excellent  Barriers to Discharge Home: No anticipated barriers  Evaluation/Treatment Tolerance: Patient tolerated treatment well  Medical Staff Made Aware: Yes  End of Session Communication: Bedside nurse  End of Session Patient Position: Up in chair, Alarm on  OT Assessment Results: Decreased ADL status, Decreased endurance  Prognosis: Excellent  Evaluation/Treatment Tolerance: Patient tolerated treatment well  Medical Staff Made Aware: Yes  Strengths: Ability to acquire knowledge, Attitude of self, Capable of completing ADLs semi/independent  Plan:  Treatment Interventions: ADL retraining, Endurance training, Patient/family training  OT Frequency: 3 times per week  OT Discharge Recommendations: Low intensity level of continued care  OT Recommended Transfer Status: Stand by assist, Assist of 1  OT - OK to Discharge: Yes  Treatment Interventions: ADL retraining, Endurance training, Patient/family training    Subjective   Current Problem:  1. Symptomatic anemia  Colonoscopy Diagnostic    Esophagogastroduodenoscopy (EGD)    Colonoscopy Diagnostic    Esophagogastroduodenoscopy (EGD)    Surgical Pathology Exam    Surgical Pathology Exam    Cytology Consultation (Non-Gynecologic)    Cytology Consultation (Non-Gynecologic)    Transthoracic Echo (TTE) Complete    Transthoracic Echo (TTE) Complete    CANCELED: Surgical Pathology Exam    CANCELED: Surgical Pathology Exam      2. Iron deficiency anemia due to chronic blood loss  Colonoscopy  Diagnostic    Colonoscopy Diagnostic    Surgical Pathology Exam    Surgical Pathology Exam    Cytology Consultation (Non-Gynecologic)    Cytology Consultation (Non-Gynecologic)    Capsule Endoscopy Small Intestine    CANCELED: Surgical Pathology Exam    CANCELED: Surgical Pathology Exam      3. Other specified postprocedural states  Capsule Endoscopy Small Intestine      4. Acute on chronic heart failure with preserved ejection fraction  CANCELED: Transthoracic Echo (TTE) Limited    CANCELED: Transthoracic Echo (TTE) Limited      5. Aortic valve replaced  Transthoracic Echo (TTE) Complete    Transthoracic Echo (TTE) Complete    Case Request Cath Lab: Valve Fluoroscopy    Case Request Cath Lab: Valve Fluoroscopy    Cardiac Catheterization Procedure    Cardiac Catheterization Procedure      6. Presence of xenogenic heart valve  Transthoracic Echo (TTE) Complete      7. Other rheumatic aortic valve diseases  Cardiac Catheterization Procedure        General:   OT Received On: 06/07/25  General  Reason for Referral: 59 y.o. M admitted with concerns of symptomatic anemia  Referred By: BRETT CRUZ)  Past Medical History Relevant to Rehab: KEV, HTN, HLD, aortic calve replacement  Family/Caregiver Present: Yes  Caregiver Feedback: Sister  Co-Treatment: PT  Co-Treatment Reason: Co-evaluation with OT to maximize pt safety and participation.  Prior to Session Communication: Bedside nurse  Patient Position Received: Bed, 4 rail up, Alarm on  General Comment: Cleared and agreeable to participate in OT. (+)NC 4L, NGT to TF, tele  Precautions:  Medical Precautions: Fall precautions     Date/Time Vitals Session Patient Position Pulse Resp SpO2 BP MAP (mmHg)    06/07/25 1227 --  --  83  18  100 %  101/57  68     06/07/25 1231 --  --  --  --  100 %  --  --                 Pain:  Pain Assessment  Pain Assessment:  (Denies pain but reports discomfort from NGT)    Objective   Cognition:  Overall Cognitive Status: Within Functional  Limits             Home Living:  Type of Home: House  Lives With: Alone  Home Adaptive Equipment:  (Rollator)  Home Layout: One level  Home Access: Stairs to enter without rails  Entrance Stairs-Rails: None  Entrance Stairs-Number of Steps: 2  Bathroom Shower/Tub: Tub/shower unit  Bathroom Toilet: Low-rise  Bathroom Equipment: None    Prior Function:  Level of [place]: Independent with ADLs and functional transfers, Independent with homemaking with ambulation  Receives Help From: Family, Neighbor  ADL Assistance: Independent  Homemaking Assistance: Independent  Driving/Transportation:  (Walks mostly)  Leisure: Pt reports walking his dog 3x/day; enjoys music  Prior Function Comments: Pt denies recent falls       ADL:  Eating Assistance: Not performed  Grooming Assistance: Stand by  Grooming Deficit: Supervision/safety, Wash/dry hands  Bathing Assistance: Not performed  UE Dressing Assistance: Stand by  LE Dressing Assistance: Not performed  Toileting Assistance with Device: Stand by        Activities of Daily Living: Grooming  Grooming Level of Assistance: Close supervision  Grooming Where Assessed: Standing sinkside  Grooming Comments: washing hands    UE Dressing  UE Dressing Level of Assistance: Minimum assistance  UE Dressing Where Assessed: Edge of bed  UE Dressing Comments: Line mgmt    Toileting  Toileting Level of Assistance: Close supervision  Where Assessed: Toilet    Activity Tolerance:  Endurance:  (Decreased endurance)    Functional Standing Tolerance:  Time: 4min  Activity: functional mobility, grooming, toileting tasks    Bed Mobility/Transfers: Bed Mobility  Bed Mobility: Yes  Bed Mobility 1  Bed Mobility 1: Supine to sitting  Level of Assistance 1: Modified independent  Bed Mobility Comments 1: Use of bedrail    Transfers  Transfer: Yes  Transfer 1  Technique 1: Sit to stand, Stand to sit  Transfer Device 1: Gait belt  Transfer Level of Assistance 1: Close supervision  Trials/Comments 1: Cues  for hand placement      Functional Mobility:  Functional Mobility  Functional Mobility Performed: Yes  Functional Mobility 1  Surface 1: Level tile  Device 1: No device  Functional Mobility Support Devices: Gait belt  Assistance 1: Close supervision  Sitting Balance:  Static Sitting Balance  Static Sitting-Balance Support: Feet unsupported, No upper extremity supported  Static Sitting-Level of Assistance: Independent  Dynamic Sitting Balance  Dynamic Sitting-Balance Support: Feet unsupported, No upper extremity supported  Dynamic Sitting-Level of Assistance: Independent  Standing Balance:  Static Standing Balance  Static Standing-Balance Support: No upper extremity supported  Static Standing-Level of Assistance: Close supervision, Distant supervision  Dynamic Standing Balance  Dynamic Standing-Balance Support: No upper extremity supported  Dynamic Standing-Level of Assistance: Close supervision     Vision:Vision - Basic Assessment  Current Vision:  (Glasses intermittently)    Sensation:  Sensation Comment: No apparent deficits       Coordination:  Movements are Fluid and Coordinated: Yes      Extremities: RUE   RUE : Within Functional Limits and LUE   LUE: Within Functional Limits    Outcome Measures: [facility] Daily Activity  Putting on and taking off regular lower body clothing: A little  Bathing (including washing, rinsing, drying): A little  Putting on and taking off regular upper body clothing: A little  Toileting, which includes using toilet, bedpan or urinal: A little  Taking care of personal grooming such as brushing teeth: A little  Eating Meals: None  Daily Activity - Total Score: 19        Education Documentation  Handouts, taught by Kylie Todd OT at 6/7/2025  1:57 PM.  Learner: Patient  Readiness: Acceptance  Method: Explanation  Response: Verbalizes Understanding  Comment: Educated on purpose of OT, benefits and importance of oral hygiene, safe transfers.    Body Mechanics, taught by Kylie  KEITH Todd at 6/7/2025  1:57 PM.  Learner: Patient  Readiness: Acceptance  Method: Explanation  Response: Verbalizes Understanding  Comment: Educated on purpose of OT, benefits and importance of oral hygiene, safe transfers.    ADL Training, taught by Kylie Todd OT at 6/7/2025  1:57 PM.  Learner: Patient  Readiness: Acceptance  Method: Explanation  Response: Verbalizes Understanding  Comment: Educated on purpose of OT, benefits and importance of oral hygiene, safe transfers.    Education Comments  No comments found.          Goals:  Encounter Problems       Encounter Problems (Active)       Bathing       LTG - Patient will utilize adaptive techniques to bathe body Independent       Start:  06/07/25    Expected End:  06/21/25               Dressings Extremities       LTG - Patient will dress UB and LB Independent       Start:  06/07/25    Expected End:  06/21/25               Grooming       LTG - Patient will complete daily grooming tasks Independent       Start:  06/07/25    Expected End:  06/21/25               Instrumental Activities of Daily Living       LTG - Patient will complete basic household management activities Independent to allow independent functioning at home       Start:  06/07/25    Expected End:  06/21/25            STG - Patient will verbalize/demonstrate use of 2 energy conservation techniques to promote safety and independence with functional tasks       Start:  06/07/25    Expected End:  06/21/25               Toileting       LTG - Patient will complete daily toileting tasks Independent       Start:  06/07/25    Expected End:  06/21/25

## 2025-06-07 NOTE — CARE PLAN
Pt remained safe and stable throughout shift. No reports of pain or discomfort. No reports or evidence of respiratory distress. No acute events overnight.      Problem: Pain - Adult  Goal: Verbalizes/displays adequate comfort level or baseline comfort level  Outcome: Progressing     Problem: Safety - Adult  Goal: Free from fall injury  Outcome: Progressing     Problem: Discharge Planning  Goal: Discharge to home or other facility with appropriate resources  Outcome: Progressing     Problem: Chronic Conditions and Co-morbidities  Goal: Patient's chronic conditions and co-morbidity symptoms are monitored and maintained or improved  Outcome: Progressing     Problem: Nutrition  Goal: Nutrient intake appropriate for maintaining nutritional needs  Outcome: Progressing     Problem: Fall/Injury  Goal: Not fall by end of shift  Outcome: Progressing  Goal: Be free from injury by end of the shift  Outcome: Progressing  Goal: Verbalize understanding of personal risk factors for fall in the hospital  Outcome: Progressing  Goal: Verbalize understanding of risk factor reduction measures to prevent injury from fall in the home  Outcome: Progressing  Goal: Use assistive devices by end of the shift  Outcome: Progressing  Goal: Pace activities to prevent fatigue by end of the shift  Outcome: Progressing

## 2025-06-08 VITALS
SYSTOLIC BLOOD PRESSURE: 96 MMHG | BODY MASS INDEX: 17.1 KG/M2 | OXYGEN SATURATION: 96 % | RESPIRATION RATE: 18 BRPM | WEIGHT: 87.1 LBS | HEART RATE: 84 BPM | DIASTOLIC BLOOD PRESSURE: 59 MMHG | HEIGHT: 60 IN | TEMPERATURE: 97.1 F

## 2025-06-08 LAB
ALBUMIN SERPL BCP-MCNC: 3.1 G/DL (ref 3.4–5)
ANION GAP SERPL CALC-SCNC: 11 MMOL/L (ref 10–20)
BUN SERPL-MCNC: 23 MG/DL (ref 6–23)
CALCIUM SERPL-MCNC: 8.6 MG/DL (ref 8.6–10.3)
CHLORIDE SERPL-SCNC: 106 MMOL/L (ref 98–107)
CO2 SERPL-SCNC: 23 MMOL/L (ref 21–32)
CREAT SERPL-MCNC: 0.48 MG/DL (ref 0.5–1.3)
EGFRCR SERPLBLD CKD-EPI 2021: >90 ML/MIN/1.73M*2
ERYTHROCYTE [DISTWIDTH] IN BLOOD BY AUTOMATED COUNT: 25.5 % (ref 11.5–14.5)
GLUCOSE SERPL-MCNC: 125 MG/DL (ref 74–99)
HCT VFR BLD AUTO: 29.7 % (ref 41–52)
HGB BLD-MCNC: 9 G/DL (ref 13.5–17.5)
INR PPP: 1.6 (ref 0.9–1.1)
MCH RBC QN AUTO: 26.3 PG (ref 26–34)
MCHC RBC AUTO-ENTMCNC: 30.3 G/DL (ref 32–36)
MCV RBC AUTO: 87 FL (ref 80–100)
NRBC BLD-RTO: 0 /100 WBCS (ref 0–0)
PHOSPHATE SERPL-MCNC: 2.6 MG/DL (ref 2.5–4.9)
PLATELET # BLD AUTO: 193 X10*3/UL (ref 150–450)
POTASSIUM SERPL-SCNC: 4.3 MMOL/L (ref 3.5–5.3)
PROTHROMBIN TIME: 17.3 SECONDS (ref 9.8–12.4)
RBC # BLD AUTO: 3.42 X10*6/UL (ref 4.5–5.9)
SODIUM SERPL-SCNC: 136 MMOL/L (ref 136–145)
WBC # BLD AUTO: 6.9 X10*3/UL (ref 4.4–11.3)

## 2025-06-08 PROCEDURE — 99232 SBSQ HOSP IP/OBS MODERATE 35: CPT | Performed by: CLINICAL NURSE SPECIALIST

## 2025-06-08 PROCEDURE — 80069 RENAL FUNCTION PANEL: CPT | Performed by: STUDENT IN AN ORGANIZED HEALTH CARE EDUCATION/TRAINING PROGRAM

## 2025-06-08 PROCEDURE — 94640 AIRWAY INHALATION TREATMENT: CPT

## 2025-06-08 PROCEDURE — 99232 SBSQ HOSP IP/OBS MODERATE 35: CPT | Performed by: STUDENT IN AN ORGANIZED HEALTH CARE EDUCATION/TRAINING PROGRAM

## 2025-06-08 PROCEDURE — 2500000001 HC RX 250 WO HCPCS SELF ADMINISTERED DRUGS (ALT 637 FOR MEDICARE OP): Performed by: NURSE PRACTITIONER

## 2025-06-08 PROCEDURE — 2500000004 HC RX 250 GENERAL PHARMACY W/ HCPCS (ALT 636 FOR OP/ED): Performed by: HOSPITALIST

## 2025-06-08 PROCEDURE — 2500000002 HC RX 250 W HCPCS SELF ADMINISTERED DRUGS (ALT 637 FOR MEDICARE OP, ALT 636 FOR OP/ED): Performed by: NURSE PRACTITIONER

## 2025-06-08 PROCEDURE — 36415 COLL VENOUS BLD VENIPUNCTURE: CPT | Performed by: NURSE PRACTITIONER

## 2025-06-08 PROCEDURE — 2500000002 HC RX 250 W HCPCS SELF ADMINISTERED DRUGS (ALT 637 FOR MEDICARE OP, ALT 636 FOR OP/ED)

## 2025-06-08 PROCEDURE — 85027 COMPLETE CBC AUTOMATED: CPT | Performed by: NURSE PRACTITIONER

## 2025-06-08 PROCEDURE — 1200000002 HC GENERAL ROOM WITH TELEMETRY DAILY

## 2025-06-08 PROCEDURE — 85610 PROTHROMBIN TIME: CPT | Performed by: NURSE PRACTITIONER

## 2025-06-08 PROCEDURE — 2500000001 HC RX 250 WO HCPCS SELF ADMINISTERED DRUGS (ALT 637 FOR MEDICARE OP): Performed by: STUDENT IN AN ORGANIZED HEALTH CARE EDUCATION/TRAINING PROGRAM

## 2025-06-08 RX ORDER — WARFARIN 1 MG/1
1 TABLET ORAL ONCE
Status: COMPLETED | OUTPATIENT
Start: 2025-06-08 | End: 2025-06-08

## 2025-06-08 RX ADMIN — IPRATROPIUM BROMIDE AND ALBUTEROL SULFATE 3 ML: 2.5; .5 SOLUTION RESPIRATORY (INHALATION) at 12:21

## 2025-06-08 RX ADMIN — IPRATROPIUM BROMIDE AND ALBUTEROL SULFATE 3 ML: 2.5; .5 SOLUTION RESPIRATORY (INHALATION) at 07:28

## 2025-06-08 RX ADMIN — SODIUM CHLORIDE 500 ML: 0.9 INJECTION, SOLUTION INTRAVENOUS at 02:05

## 2025-06-08 RX ADMIN — FERROUS SULFATE TAB 325 MG (65 MG ELEMENTAL FE) 1 TABLET: 325 (65 FE) TAB at 09:05

## 2025-06-08 RX ADMIN — FLUCONAZOLE 200 MG: 100 TABLET ORAL at 09:05

## 2025-06-08 RX ADMIN — ACETAMINOPHEN 650 MG: 160 SOLUTION ORAL at 09:10

## 2025-06-08 RX ADMIN — IPRATROPIUM BROMIDE AND ALBUTEROL SULFATE 3 ML: 2.5; .5 SOLUTION RESPIRATORY (INHALATION) at 19:25

## 2025-06-08 RX ADMIN — METOPROLOL SUCCINATE 25 MG: 25 TABLET, EXTENDED RELEASE ORAL at 09:05

## 2025-06-08 RX ADMIN — WARFARIN SODIUM 1 MG: 1 TABLET ORAL at 17:56

## 2025-06-08 RX ADMIN — FUROSEMIDE 20 MG: 20 TABLET ORAL at 09:05

## 2025-06-08 ASSESSMENT — COGNITIVE AND FUNCTIONAL STATUS - GENERAL
TURNING FROM BACK TO SIDE WHILE IN FLAT BAD: A LITTLE
CLIMB 3 TO 5 STEPS WITH RAILING: A LITTLE
DAILY ACTIVITIY SCORE: 18
PERSONAL GROOMING: A LITTLE
HELP NEEDED FOR BATHING: A LITTLE
MOBILITY SCORE: 19
MOVING TO AND FROM BED TO CHAIR: A LITTLE
DRESSING REGULAR UPPER BODY CLOTHING: A LITTLE
EATING MEALS: A LITTLE
DRESSING REGULAR LOWER BODY CLOTHING: A LITTLE
STANDING UP FROM CHAIR USING ARMS: A LITTLE
TOILETING: A LITTLE
WALKING IN HOSPITAL ROOM: A LITTLE

## 2025-06-08 ASSESSMENT — PAIN SCALES - GENERAL
PAINLEVEL_OUTOF10: 0 - NO PAIN
PAINLEVEL_OUTOF10: 6
PAINLEVEL_OUTOF10: 0 - NO PAIN

## 2025-06-08 ASSESSMENT — PAIN - FUNCTIONAL ASSESSMENT
PAIN_FUNCTIONAL_ASSESSMENT: 0-10

## 2025-06-08 ASSESSMENT — ENCOUNTER SYMPTOMS
ABDOMINAL PAIN: 0
VOMITING: 0
SHORTNESS OF BREATH: 1
FEVER: 0
ABDOMINAL DISTENTION: 0

## 2025-06-08 ASSESSMENT — PAIN DESCRIPTION - DESCRIPTORS: DESCRIPTORS: ACHING

## 2025-06-08 ASSESSMENT — PAIN DESCRIPTION - ORIENTATION: ORIENTATION: RIGHT

## 2025-06-08 NOTE — PROGRESS NOTES
"Robert Luna is a 59 y.o. male on day 12 of admission presenting with Symptomatic anemia.    Subjective   No acute events overnight.  Patient sitting up in bed watching TV with family at bedside.  Patient reports he is feeling better today and is currently stable on room air.  Patient denies pain, fever, chills and nausea.  Reports an occasional cough without sputum.  Patient is able to perform his own oral care with the oral care system and we again discussed the importance of clearing secretions using the oral suction catheter to avoid the risk of aspiration.     Objective   Physical Exam   Constitutional:   Very thin, NAD, cooperative  HENT: Atraumatic, moist mucous membranes, poor dentition with missing teeth (lower tooth appears decayed)  Eyes: Nonicteric  Neck: Supple, no obvious JVD  Cardiovascular: Regular rate and rhythm, HR 80s, + murmur/aortic valve click  Pulmonary: Fair air entry with slightly diminished bases bilaterally, clear upper fields, posterior bibasilar coarse crackles; no conversational dyspnea noted; on RA  Abdominal: Thin, nondistended, nontender, + BS; + NGT  Musculoskeletal: Fair active range of motion with fair strength  Extremities:   No BLE edema  Lymphadenopathy: No nuchal LAP  Skin: Very pale, warm core, cooler extremities, dry  Neurological: Alert and oriented with clear and appropriate speech  Psychiatric:    Appropriate mood and behavior     Medications:  Scheduled Medications[1]   PRN Medications[2]     Last Recorded Vitals  Blood pressure 105/63, pulse 84, temperature 37.1 °C (98.8 °F), temperature source Temporal, resp. rate 19, height 1.499 m (4' 11.02\"), weight (!) 39.5 kg (87 lb 1.6 oz), SpO2 100%.  Intake/Output last 3 Shifts:  I/O last 3 completed shifts:  In: 625 (15.8 mL/kg) [IV Piggyback:625]  Out: 250 (6.3 mL/kg) [Urine:250 (0.2 mL/kg/hr)]  Weight: 39.5 kg     Relevant Results  Results for orders placed or performed during the hospital encounter of 05/27/25 (from the past " 24 hours)   POCT GLUCOSE   Result Value Ref Range    POCT Glucose 123 (H) 74 - 99 mg/dL   Protime-INR   Result Value Ref Range    Protime 17.3 (H) 9.8 - 12.4 seconds    INR 1.6 (H) 0.9 - 1.1   CBC   Result Value Ref Range    WBC 6.9 4.4 - 11.3 x10*3/uL    nRBC 0.0 0.0 - 0.0 /100 WBCs    RBC 3.42 (L) 4.50 - 5.90 x10*6/uL    Hemoglobin 9.0 (L) 13.5 - 17.5 g/dL    Hematocrit 29.7 (L) 41.0 - 52.0 %    MCV 87 80 - 100 fL    MCH 26.3 26.0 - 34.0 pg    MCHC 30.3 (L) 32.0 - 36.0 g/dL    RDW 25.5 (H) 11.5 - 14.5 %    Platelets 193 150 - 450 x10*3/uL   Renal Function Panel   Result Value Ref Range    Glucose 125 (H) 74 - 99 mg/dL    Sodium 136 136 - 145 mmol/L    Potassium 4.3 3.5 - 5.3 mmol/L    Chloride 106 98 - 107 mmol/L    Bicarbonate 23 21 - 32 mmol/L    Anion Gap 11 10 - 20 mmol/L    Urea Nitrogen 23 6 - 23 mg/dL    Creatinine 0.48 (L) 0.50 - 1.30 mg/dL    eGFR >90 >60 mL/min/1.73m*2    Calcium 8.6 8.6 - 10.3 mg/dL    Phosphorus 2.6 2.5 - 4.9 mg/dL    Albumin 3.1 (L) 3.4 - 5.0 g/dL      Transthoracic Echo (TTE) Complete  Result Date: 6/3/2025  PHYSICIAN INTERPRETATION: Left Ventricle: Left ventricular ejection fraction is normal calculated by Leos's biplane at 71%. There are no regional left ventricular wall motion abnormalities. The left ventricular cavity size is normal. There is normal septal and normal posterior left ventricular wall thickness. There is left ventricular concentric remodeling. Left ventricular diastolic filling cannot be determined due to mitral valve repair/replacement. Left Atrium: The left atrial size is severely dilated. Right Ventricle: The right ventricle is severely enlarged. There is mildly reduced right ventricular systolic function. Right Atrium: The right atrium is severely dilated. Aortic Valve: There is a prosthetic aortic valve present. The aortic valve area by VTI is 0.50 cmï¿½ with a peak velocity of 4.29 m/s. The peak and mean gradients are 58 mmHg and 34 mmHg, respectively, with  a dimensionless index of 0.21. There is evidence of severe aortic valve stenosis. There is a [employer]. Bryce bileaflet mechanical type aortic valve prosthesis with a 21 mm reported size. There is mild aortic valve regurgitation. The mechanical AV leaflet mobility could not be properly assessed due to prosthetic valve artifact. It is recommended to assess leaflet motion and opening/closing angles with cinefluoroscopy. Mitral Valve: Status post mitral valve repair. The doppler estimated peak and mean diastolic pressure gradients are 30.8 mmHg and 9 mmHg, respectively. Barlows syndrome is present. There is mitral valve prolapse of the anterior and posterior leaflets. There is mild calcification of the mitral leaflets and subvalvular apparatus. The mean gradient of the mitral valve is 9 mmHg. There is severe mitral valve regurgitation. The E Vmax is 2.55 m/s. There is probable presence of an underlying MV annular repair with prosthetic ring (no prior history of MVr available). Tricuspid Valve: The tricuspid valve is abnormal. The tricuspid valve annulus appears dilated. There is moderate to severe tricuspid regurgitation. The doppler estimated RVSP is moderately elevated with a right ventricular systolic pressure of 60 mmHg. Pulmonic Valve: The pulmonic valve is structurally normal. There is trace to mild pulmonic valve regurgitation. Pericardium: Trivial pericardial effusion. Pleural: There is left pleural effusion. Aorta: The aortic root is normal. There is a prosthetic graft seen in the position of the ascending aorta. In comparison to the previous echocardiogram(s): Compared with study dated 2/6/2024, the degree of aortic valve stenosis has slightly increased from 65/2 mmHg to 73/34 mmHg, with an AME of 0.5-0.77 cm2 and a DI of 0.21. The degree of MR has increased from moderate to severe and the RVSP has increased from 40 mmHg to 60 mmHg. Underlying infection/endocarditis to explain progression of MR could not be ruled out  in this study. JOANNE assessment is recommended if clinically indicated.      CONCLUSIONS:  1. Left ventricular ejection fraction is normal calculated by Leos's biplane at 71%.  2. There is mildly reduced right ventricular systolic function.  3. Severely enlarged right ventricle.  4. The Doppler-estimated right ventricular systolic pressure (RVSP) is moderately elevated at 60 mmHg.  5. The left atrial size is severely dilated.  6. The right atrium is severely dilated.  7. Severe aortic valve stenosis. The peak and mean gradients are 73 mmHg and 34 mmHg respectively.  8. There is a St. Bryce bileaflet mechanical type aortic valve prosthesis with a 21 mm reported size. The peak and mean gradients are 73 mmHg and 34 mmHg respectively.  9. Mild aortic valve regurgitation. 10. There is probable presence of an underlying MV annular repair with prosthetic ring (no prior history of MVr available). 11. Mitral valve Barlows syndrome is present, with prolapse of anterior and posterior leaflets. 12. Severe mitral valve regurgitation. 13. There is mild calcification of the mitral leaflets and subvalvular apparatus. 14. Moderate to severe tricuspid regurgitation visualized. 15. The tricuspid valve annulus appears dilated. 16. Prosthetic graft in the ascending aorta position. 17. Compared with study dated 2/6/2024, the degree of aortic valve stenosis has slightly increased from 65/2 mmHg to 73/34 mmHg, with an AME of 0.5-0.77 cm2 and a DI of 0.21. The degree of MR has increased from moderate to severe and the RVSP has increased from 40 mmHg to 60 mmHg. Underlying infection/endocarditis to explain progression of MR could not be ruled out in this study. JOANNE assessment is recommended if clinically indicated. RECOMMENDATIONS: Based on the results of this study and as mechanical AV leaflet mobility could not be properly assessed due to prosthetic valve artifact, it is recommended to assess leaflet motion and opening/closing angles with  cinefluoroscopy.      [id number] Ulysses Amin MD Electronically signed on 6/3/2025 at 4:50:49 PM  ** Final (Updated) **     XR chest 1 view  Result Date: 6/3/2025  Interpreted By:  Maya Christie, STUDY: XR CHEST 1 VIEW;  6/3/2025 12:33 pm   INDICATION: Signs/Symptoms:short of breath.     COMPARISON: 05/30/2025   ACCESSION NUMBER(S): [id number]   ORDERING CLINICIAN: CJ NGUYEN   FINDINGS: Ill-defined patchy bilateral infiltrates, slightly improved on the left and increased on the right, with probable bilateral Kerley B-lines. Bilateral pleural effusions increased since the previous exam. The cardiomediastinal silhouette remains enlarged status post sternotomy.       Waxing and waning bilateral infiltrates/edema with increasing bilateral pleural effusions.   MACRO: None.   Signed by: Maya Christie 6/3/2025 12:49 PM Dictation workstation:   [id number]    CT chest wo IV contrast  Result Date: 6/1/2025  Interpreted By:  Andrea Montes, STUDY: CT CHEST WO IV CONTRAST;  6/1/2025 3:48 pm   INDICATION: Shortness-of-breath   COMPARISON: CXR performed 05/30/2025   ACCESSION NUMBER(S): [id number]   ORDERING CLINICIAN: YO BUENO   TECHNIQUE: Helical data acquisition of the chest was obtained  without IV contrast material.  Images were reformatted in axial, coronal, and sagittal planes.   FINDINGS: LUNGS AND AIRWAYS: There are patchy consolidative/alveolar opacities bilaterally noted, some of which are solid and others mixed attenuation and present within all lobes, greater left than right. Findings are superimposed on interlobular septal thickening and scattered areas of parenchymal nodularity bilaterally which are somewhat motion blurred. Small-to-moderate bilateral pleural effusions are also present. No pneumothorax is seen on either side. No central airway obstruction.   MEDIASTINUM AND MELLISA, LOWER NECK AND AXILLA: No significantly enlarged intrathoracic lymph nodes are identified. The esophagus is patulous and  air-filled. No masses are identified in the lower neck   HEART AND VESSELS: The heart is enlarged. The patient is status post prior sternotomy with aortic root and ascending aortic repair and placement of a prosthetic aortic valve. No evident postoperative complication on this noncontrast exam. Scattered atherosclerotic calcifications. The pulmonary trunk is severely dilated, measures 3.6 cm. No thoracic aortic aneurysm. The heart is enlarged with severe biatrial dilatation. Coarsely calcified mitral valve annulus. There is evidence for anemia with increased attenuation of the myocardium relative to the blood pool.   UPPER ABDOMEN: No acute upper abdominal finding is identified on this noncontrast exam   CHEST WALL AND OSSEOUS STRUCTURES: No significant soft tissue findings. No lytic or blastic osseous lesion is identified. Mild anterior wedge compression deformity of the T10 vertebral body level.       1. Findings most compatible with interstitial and alveolar edema with interstitial thickening and patchy consolidative areas/areas of alveolar opacity, mixed solid and sub-solid in attenuation with areas of nodularity also noted, some with tree-in-bud configuration. The possibility of a superimposed pneumonia/pneumonitis and bronchiolitis is not excluded. Advise short-term radiographic follow-up to reassess. Appearance on the  radiograph from the current CT scan quite similar to the 05/30/2025 CXR. If the chest radiograph appearance does not entirely normalized, follow-up CT will be warranted. 2. Prior sternotomy and repair of the aortic root and ascending aorta with mechanical aortic valve replacement. Atherosclerotic disease. Severe biatrial dilatation. Severely dilated pulmonary trunk. Advise attention on recent echocardiogram. 3. Evidence for anemia.   MACRO: None   Signed by: Andrea Montes 6/1/2025 5:40 PM Dictation workstation:   [id number]    XR chest 1 view  Result Date: 5/30/2025  Interpreted By:  Keven  Iram, STUDY: XR CHEST 1 VIEW; 5/30/2025 9:36 am   INDICATION: Signs/Symptoms:leukocytosis, hypoxia   COMPARISON: Radiographs 05/27/2025   ACCESSION NUMBER(S): [id number]   ORDERING CLINICIAN: DEEP PORTILLO   TECHNIQUE: Single frontal view of the chest performed.   FINDINGS:   LINES AND DEVICES: None.   LUNGS: New bilateral perihilar and left basilar airspace opacities are noted. There is new blunting of the right costophrenic angle suggestive of a trace effusion. No pneumothorax.   CARDIOMEDIASTINAL SILHOUETTE: Stable cardiomegaly. Prior median sternotomy.         New bilateral, left greater than right, airspace opacities compatible with pneumonia, although pulmonary edema may appear similarly.   MACRO None   Signed by: Iram Baca 5/30/2025 12:33 PM Dictation workstation:   [id number]    Assessment & Plan  Symptomatic anemia    Aortic valve replaced    Robert Luna is a 59 y.o. male with past medical history of AVR (s/p mechanical valve on Coumadin), congenital aortic coarctation, HLD, KEV, MAHESH who presented to [facility] ED on 5/27 for shortness of breath that had been persistent for the previous month but had acutely worsened in the 24 hours prior to his presentation.  Labs were without leukocytosis, H/H 8.3/27.8, . He was transferred to [facility] for suspected occult GI blood loss with symptomatic anemia. EGD/colonoscopy done on 5/29 demonstrating no old or active bleeding.  Patient was started on Venofer & was transfused 1 PRBC on 5/29.  On 5/30 patient had new hypoxic episode requiring 4 L, concerning for post endoscopy aspiration event.  CXR showed bilateral airspace opacities with new leukocytosis patient was started on Unasyn (5/30-6/3).  CT chest completed on 6/1 demonstrating multifocal pneumonia with interstitial and alveolar edema.  Leukocytosis noted on 6/2.  Flu A/B, RSV and COVID were all negative.  Cardiology consulted on 6/2 for suspected heart failure exacerbation.  Echo completed on 6/3  with patient experiencing a hypoxic event requiring escalation to Airvo 50 L / 50%.  MRSA swab was negative.  Patient was transitioned to IV Zosyn and fluconazole (for candida esophagitis).  Pulmonology is now consulted to assist in further evaluation and management of acute hypoxic respiratory failure following suspected aspiration.      Impressions:     #Acute hypoxic respiratory failure: Multifactorial 2/2 causes below, post EGD aspiration, worsening MR/TR, MVP, stenosed mechanical AO valve; baseline is room air, currently requiring Airvo; now resolved and back to room air     #Pneumonia, multifocal +/- pneumonitis: CT demonstrating patchy consolidative alveolar opacities in all lobes (L>R); likely aspiration given failed swallow eval on 6/2  Completed Unasyn 5/30 - 6/3  Started on Zosyn 6/3; adding azithromycin IV 5 mg daily x 3 days  Flu, RSV and COVID-negative; MRSA swab negative  Legionella urine antigens negative  Strep urine antigen negative  Respiratory culture 6/2 culture in progress  Procalcitonin 0.22      #Pleural effusions, bilateral: Small to moderate noted on CT chest, parapneumonic versus heart failure; currently on high oxygen requirements without acute distress     #Volume overload/acute on chronic HFpEF: 2024 with pseudo normal pattern of LV diastolic filling; echo with CT noting interlobular septal thickening, admission ; + bilateral pleural effusions     #Pulmonary hypertension: CT on 6/1 with dilated pulmonary trunk at 3.6 cm; echo 6/3 with RVSP 60 mmHg, severely enlarged RV with mildly reduced RV systolic function ISO moderate to severe TR and severe MR and MV prolapse of anterior and posterior leaflets  previous echo 02/2024 normal RV, normal RV systolic function and RVSP 40 mmHg     #Dysphagia with concern for aspiration: patient with previous history of noting difficulty swallowing and choking; failed swallow evaluation on 6/2, only allowed single ice chips as tolerated (3-5 an  hour)     #Poor dentition: one lower tooth looks decayed; concern for aspiration of oral secretions      Recommendations:  - Continue supplemental oxygen, wean for saturation 90-95%  - Home O2 eval prior to discharge  - Continue scheduled and as needed DuoNebs  - Continue pulmonary hygiene with I-S and Acapella   - Adding Mucinex, Flonase  - Antibiotics per primary; adding azithromycin (3 doses) for atypical coverage  - Diuresis as renal function and hemodynamics allows; currently on 40 mg IV Lasix twice daily; on hold, patient received dose of Diamox for metabolic alkalosis, serum Bicarb remains normalized  - Monitor pleural effusions for now; no immediate need for thoras  - MBSS evaluation with silent aspiration with all thickness of liquids; no aspiration or penetration with purée; currently with NG tube  - Follow-up on pending studies as above  - Frequent oral care (use oral kits with /sponges and mouth wash)  - DVT prophylaxis: With SCDs, resuming Coumadin per cardiology  - Patient will need to follow-up with primary care with repeat noncontrasted CT chest in ~8 weeks to ensure resolution of infiltrates; if questions or concerns persist, PCP can refer to pulmonary medicine at that time     Thank you for the consult. Discussed with Dr. Walker. Pulmonary will sign off.     I spent 35 minutes in the professional and overall care of this patient.   DAVID Motta-CNS             [1] docusate sodium, 100 mg, oral, BID  ferrous sulfate, 65 mg of elemental iron, oral, Daily  fluconazole, 200 mg, oral, Daily  fluticasone, 2 spray, Each Nostril, Daily  furosemide, 20 mg, nasogastric tube, Daily  ipratropium-albuteroL, 3 mL, nebulization, TID  metoprolol succinate XL, 25 mg, oral, Daily  polyethylene glycol, 17 g, oral, Nightly  sennosides, 1 tablet, oral, Nightly  [Held by provider] warfarin, 1 mg, oral, Once per day on Monday Wednesday  warfarin, 1 mg, oral, Once  [Held by provider] warfarin, 1.5 mg, oral,  Once per day on Sunday Thursday Friday     [2] PRN medications: acetaminophen **OR** acetaminophen **OR** acetaminophen, guaiFENesin, HYDROmorphone, ipratropium-albuteroL, lubricating eye drops, ondansetron **OR** ondansetron, oxygen, phenoL

## 2025-06-08 NOTE — PROGRESS NOTES
[facility] Hospitalist Progress Note      Between 7AM-7PM please message me via Epic Secure Chat.  After 7PM please page Nocturnist on call.        Assessment/Plan     Acute Problems    Acute hypoxic respiratory failure   Aspiration PNA/pneumonitis  Severe stenosis of his mechanical aortic valve as well as severe mitral regurgitation  Elevated RV pressures  Bilateral pleural effusions  Contraction alkalosis - improved  Hypokalemia  Iron def anemia  Possible occult GI blood loss    Chronic Problems    Mechanical aortic valve replacement - INR goal 2-3  Ascending aorta repair  HTN    Plan    - pulm consult -> suspect aspiration PNA/pneumonitis big driving factor. Urine antigens negative. Procal minimally elevated 0.22. Weaned to room air at this point  - ID consult to assist with PNA/abx management appreciated -> completed his course of IV Unasyn  - SLP appreciated -> failed MBS. NPO rec for now with considerations for GOC/alternative means of nutrition. Currently has NG tube receiving TF  - cardiology consulted -> can consider afterload reduction/diuresis, not a surgical candidate. He did receive some IV diuresis with good response. Started on PO lasix 20mg daily to try and maintain euvolemia  - pal care following -> DNR/DNI, hospice meeting it looks like patient wants a trial of PT/OT and will consider hospice down the road  - GI consulted -> EGD/Colon with no old or active bleeding. No abnormality to explain anemia. Outpatient VCE can be considered for complete evaluation of MAHESH. Candida esophagitis started on PO fluconazole for 2 week course  - Coumadin as dosed by pharmacy for hx of AVR, mindful of INR while on fluconazole may have him skip 2 doses per week if DC home  - IV Venofer 300 mg x3 doses ordered; 1 unit pRBC this admit. Trend hgb. On PO iron supplement now  - PT/OT evaluations    Fluids: None  Electrolytes: Replete as needed  Nutrition: Tube feed  Ortiz: None  Invasive lines: None  Drains: NG tube  O2:  "NC    DVT Prophylaxis:  Coumadin    Discharge Planning: will need PT/OT eval, possible SNF once med ready    Plan of care was discussed with patient/RN    Total time spent: At least 38 minutes, providing counseling or in coordination of care. Total time on this day of visit includes record and documentation review before and after visit including documentation and time not explicitly included on EMR time stamp.      Subjective     Robert Luna is a 59 y.o. male on day 12 of admission presenting with Symptomatic anemia.    NAEON. Weaned to room air. Resting in bed. Discussed care with RN    Review of Systems   Constitutional:  Negative for fever.   Respiratory:  Positive for shortness of breath.    Cardiovascular:  Negative for chest pain.   Gastrointestinal:  Negative for abdominal distention, abdominal pain and vomiting.       Objective     Physical Exam  Vitals reviewed.   Constitutional:       General: He is not in acute distress.  Cardiovascular:      Rate and Rhythm: Normal rate and regular rhythm.   Pulmonary:      Effort: Pulmonary effort is normal.      Breath sounds: Rales present.   Abdominal:      General: There is no distension.      Palpations: Abdomen is soft.   Neurological:      Mental Status: He is alert. Mental status is at baseline.         Last Recorded Vitals  Blood pressure 104/51, pulse 79, temperature 36.3 °C (97.4 °F), temperature source Temporal, resp. rate 18, height 1.499 m (4' 11.02\"), weight (!) 39.5 kg (87 lb 1.6 oz), SpO2 98%.    Medications  Scheduled Medications[1]   PRN Medications[2]                Miguel Walker MD  [facility]         [1] docusate sodium, 100 mg, oral, BID  ferrous sulfate, 65 mg of elemental iron, oral, Daily  fluconazole, 200 mg, oral, Daily  fluticasone, 2 spray, Each Nostril, Daily  furosemide, 20 mg, nasogastric tube, Daily  ipratropium-albuteroL, 3 mL, nebulization, TID  metoprolol succinate XL, 25 mg, oral, Daily  polyethylene glycol, 17 g, oral, " Nightly  sennosides, 1 tablet, oral, Nightly  [Held by provider] warfarin, 1 mg, oral, Once per day on Monday Wednesday  warfarin, 1 mg, oral, Once  [Held by provider] warfarin, 1.5 mg, oral, Once per day on Sunday Thursday Friday     [2] PRN medications: acetaminophen **OR** acetaminophen **OR** acetaminophen, guaiFENesin, HYDROmorphone, ipratropium-albuteroL, lubricating eye drops, ondansetron **OR** ondansetron, oxygen, phenoL

## 2025-06-08 NOTE — PROGRESS NOTES
Robert Luna is a 59 y.o. male admitted for symptomatic anemia. Pharmacy has been consulted for warfarin dosing and monitoring for Aortic Valve Replacement with goal INR of 2.0-3.0.      Home regimen    MON TUE WED THR FRI SAT SUN   Dose 1  mg 1.5  mg 1  mg 1.5  mg 1.5  mg 1  mg 1.5  mg   Total weekly dose: 9 mg  Source: [facility] from 5/22/2025    Labs  INR: 1.6  Hgb/Hct/Plt  Lab Results   Component Value Date    HGB 9.0 (L) 06/08/2025    HGB 9.3 (L) 06/07/2025    HGB 9.3 (L) 06/06/2025    HGB 8.5 (L) 06/05/2025    HGB 8.6 (L) 06/04/2025        Lab Results   Component Value Date    HCT 29.7 (L) 06/08/2025    HCT 29.3 (L) 06/07/2025    HCT 31.2 (L) 06/06/2025    HCT 27.9 (L) 06/05/2025    HCT 28.5 (L) 06/04/2025        Platelets   Date Value Ref Range Status   06/08/2025 193 150 - 450 x10*3/uL Final   06/07/2025 202 150 - 450 x10*3/uL Final   06/06/2025 200 150 - 450 x10*3/uL Final   06/05/2025 198 150 - 450 x10*3/uL Final   06/04/2025 197 150 - 450 x10*3/uL Final      Warfarin Therapy   Current regimen: resuming home reigmen but fluconazole started by GI, and plan to have patient hold warfarin on Tuesdays and Saturdays while completing 14 day duration   Bridging: None needed  Interacting medications: interacting medication(s) of fluconazole and azithromycin increase bleeding risk    Dosing History During Current Admission  Date 5/28 5/29 5/30 5/31 6/1 6/2 6/3 6/4 6/5 6/6 6/7 6/8   INR 1.9 2.2 2.9 2.1 1.9 2.1 2.2 3.7 3.8 2.0 1.5 1.6   Dose  (mg) See plan 1.5 mg 1.5 mg 1 mg 1.5 mg 1 mg Hold Hold Hold 1.5 mg 1 mg 1 mg        Assessment and Plan  Patient's INR of 1.6 today is subtherapeutic. Hemoglobin/hematocrit/platelet stable.   Warfarin inpatient plan: Give 1 mg warfarin today  Original plan to hold Warfarin on Tue/Sat due to Diflucan treatment for Candida. Per last few days, patient has been therapeutic (INR 2.0) after 5mg of Warfarin. Will give 1mg today to hopefully allow for patient to go to original plan of  skipping Tue/Sat while on Diflucan.   Continue to monitor s/sx of bleeding including epistaxis, hematuria, unusual bruising, hemoptysis, hematochezia as well as s/sx of stroke including impaired speech, unilateral paralysis, blurry vision.  Pharmacy will continue to monitor the patient and adjust therapy as needed.    Thank you for the consult. Please do not hesitate to contact a pharmacist with any questions.    Natividad Rosas, LigiaD

## 2025-06-08 NOTE — CARE PLAN
Problem: Pain - Adult  Goal: Verbalizes/displays adequate comfort level or baseline comfort level  Outcome: Progressing     Problem: Safety - Adult  Goal: Free from fall injury  Outcome: Progressing     Problem: Discharge Planning  Goal: Discharge to home or other facility with appropriate resources  Outcome: Progressing     Problem: Chronic Conditions and Co-morbidities  Goal: Patient's chronic conditions and co-morbidity symptoms are monitored and maintained or improved  Outcome: Progressing     Problem: Nutrition  Goal: Nutrient intake appropriate for maintaining nutritional needs  Outcome: Progressing     Problem: Fall/Injury  Goal: Not fall by end of shift  Outcome: Progressing  Goal: Be free from injury by end of the shift  Outcome: Progressing  Goal: Verbalize understanding of personal risk factors for fall in the hospital  Outcome: Progressing  Goal: Verbalize understanding of risk factor reduction measures to prevent injury from fall in the home  Outcome: Progressing  Goal: Use assistive devices by end of the shift  Outcome: Progressing  Goal: Pace activities to prevent fatigue by end of the shift  Outcome: Progressing   The patient's goals for the shift include      The clinical goals for the shift include maintain safety    Over the shift, the patient did make progress toward the following goals. Barriers to progression include using call light appropriately. Recommendations to address these barriers include continued use of the call light for assistance.

## 2025-06-09 LAB
ALBUMIN SERPL BCP-MCNC: 3.2 G/DL (ref 3.4–5)
ANION GAP SERPL CALC-SCNC: 13 MMOL/L (ref 10–20)
BUN SERPL-MCNC: 24 MG/DL (ref 6–23)
CALCIUM SERPL-MCNC: 8.9 MG/DL (ref 8.6–10.3)
CHLORIDE SERPL-SCNC: 103 MMOL/L (ref 98–107)
CO2 SERPL-SCNC: 25 MMOL/L (ref 21–32)
CREAT SERPL-MCNC: 0.5 MG/DL (ref 0.5–1.3)
EGFRCR SERPLBLD CKD-EPI 2021: >90 ML/MIN/1.73M*2
ERYTHROCYTE [DISTWIDTH] IN BLOOD BY AUTOMATED COUNT: 26.8 % (ref 11.5–14.5)
GLUCOSE SERPL-MCNC: 107 MG/DL (ref 74–99)
HCT VFR BLD AUTO: 32.3 % (ref 41–52)
HGB BLD-MCNC: 9.7 G/DL (ref 13.5–17.5)
INR PPP: 1.5 (ref 0.9–1.1)
MCH RBC QN AUTO: 26.6 PG (ref 26–34)
MCHC RBC AUTO-ENTMCNC: 30 G/DL (ref 32–36)
MCV RBC AUTO: 89 FL (ref 80–100)
NRBC BLD-RTO: 0 /100 WBCS (ref 0–0)
PHOSPHATE SERPL-MCNC: 2.9 MG/DL (ref 2.5–4.9)
PLATELET # BLD AUTO: 217 X10*3/UL (ref 150–450)
POTASSIUM SERPL-SCNC: 4.5 MMOL/L (ref 3.5–5.3)
PROTHROMBIN TIME: 17 SECONDS (ref 9.8–12.4)
RBC # BLD AUTO: 3.65 X10*6/UL (ref 4.5–5.9)
SODIUM SERPL-SCNC: 136 MMOL/L (ref 136–145)
WBC # BLD AUTO: 6.4 X10*3/UL (ref 4.4–11.3)

## 2025-06-09 PROCEDURE — 94640 AIRWAY INHALATION TREATMENT: CPT

## 2025-06-09 PROCEDURE — 85027 COMPLETE CBC AUTOMATED: CPT | Performed by: NURSE PRACTITIONER

## 2025-06-09 PROCEDURE — 2500000002 HC RX 250 W HCPCS SELF ADMINISTERED DRUGS (ALT 637 FOR MEDICARE OP, ALT 636 FOR OP/ED)

## 2025-06-09 PROCEDURE — 92526 ORAL FUNCTION THERAPY: CPT | Mod: GN

## 2025-06-09 PROCEDURE — 2500000001 HC RX 250 WO HCPCS SELF ADMINISTERED DRUGS (ALT 637 FOR MEDICARE OP): Performed by: NURSE PRACTITIONER

## 2025-06-09 PROCEDURE — 99231 SBSQ HOSP IP/OBS SF/LOW 25: CPT | Performed by: NURSE PRACTITIONER

## 2025-06-09 PROCEDURE — 36415 COLL VENOUS BLD VENIPUNCTURE: CPT | Performed by: NURSE PRACTITIONER

## 2025-06-09 PROCEDURE — 2500000002 HC RX 250 W HCPCS SELF ADMINISTERED DRUGS (ALT 637 FOR MEDICARE OP, ALT 636 FOR OP/ED): Performed by: NURSE PRACTITIONER

## 2025-06-09 PROCEDURE — 85610 PROTHROMBIN TIME: CPT | Performed by: NURSE PRACTITIONER

## 2025-06-09 PROCEDURE — 99232 SBSQ HOSP IP/OBS MODERATE 35: CPT | Performed by: STUDENT IN AN ORGANIZED HEALTH CARE EDUCATION/TRAINING PROGRAM

## 2025-06-09 PROCEDURE — 2500000001 HC RX 250 WO HCPCS SELF ADMINISTERED DRUGS (ALT 637 FOR MEDICARE OP): Performed by: STUDENT IN AN ORGANIZED HEALTH CARE EDUCATION/TRAINING PROGRAM

## 2025-06-09 PROCEDURE — 80069 RENAL FUNCTION PANEL: CPT | Performed by: STUDENT IN AN ORGANIZED HEALTH CARE EDUCATION/TRAINING PROGRAM

## 2025-06-09 PROCEDURE — 97116 GAIT TRAINING THERAPY: CPT | Mod: GP | Performed by: PHYSICAL THERAPIST

## 2025-06-09 PROCEDURE — 94664 DEMO&/EVAL PT USE INHALER: CPT

## 2025-06-09 PROCEDURE — 1100000001 HC PRIVATE ROOM DAILY

## 2025-06-09 RX ORDER — WARFARIN 3 MG/1
1.5 TABLET ORAL ONCE
Status: COMPLETED | OUTPATIENT
Start: 2025-06-09 | End: 2025-06-09

## 2025-06-09 RX ADMIN — METOPROLOL SUCCINATE 25 MG: 25 TABLET, EXTENDED RELEASE ORAL at 09:55

## 2025-06-09 RX ADMIN — FLUCONAZOLE 200 MG: 100 TABLET ORAL at 09:55

## 2025-06-09 RX ADMIN — IPRATROPIUM BROMIDE AND ALBUTEROL SULFATE 3 ML: 2.5; .5 SOLUTION RESPIRATORY (INHALATION) at 12:00

## 2025-06-09 RX ADMIN — IPRATROPIUM BROMIDE AND ALBUTEROL SULFATE 3 ML: 2.5; .5 SOLUTION RESPIRATORY (INHALATION) at 19:49

## 2025-06-09 RX ADMIN — WARFARIN SODIUM 1.5 MG: 3 TABLET ORAL at 18:33

## 2025-06-09 RX ADMIN — FLUTICASONE PROPIONATE 2 SPRAY: 50 SPRAY, METERED NASAL at 10:17

## 2025-06-09 RX ADMIN — FUROSEMIDE 20 MG: 20 TABLET ORAL at 09:55

## 2025-06-09 RX ADMIN — IPRATROPIUM BROMIDE AND ALBUTEROL SULFATE 3 ML: 2.5; .5 SOLUTION RESPIRATORY (INHALATION) at 07:00

## 2025-06-09 RX ADMIN — FERROUS SULFATE TAB 325 MG (65 MG ELEMENTAL FE) 1 TABLET: 325 (65 FE) TAB at 09:55

## 2025-06-09 ASSESSMENT — ENCOUNTER SYMPTOMS
FEVER: 0
ABDOMINAL PAIN: 0
ABDOMINAL DISTENTION: 0
VOMITING: 0
SHORTNESS OF BREATH: 1

## 2025-06-09 ASSESSMENT — COGNITIVE AND FUNCTIONAL STATUS - GENERAL
CLIMB 3 TO 5 STEPS WITH RAILING: A LITTLE
DAILY ACTIVITIY SCORE: 22
MOBILITY SCORE: 22
MOBILITY SCORE: 20
DRESSING REGULAR LOWER BODY CLOTHING: A LITTLE
DAILY ACTIVITIY SCORE: 22
DRESSING REGULAR LOWER BODY CLOTHING: A LITTLE
TOILETING: A LITTLE
CLIMB 3 TO 5 STEPS WITH RAILING: A LITTLE
CLIMB 3 TO 5 STEPS WITH RAILING: A LITTLE
STANDING UP FROM CHAIR USING ARMS: A LITTLE
TOILETING: A LITTLE
WALKING IN HOSPITAL ROOM: A LITTLE
MOVING TO AND FROM BED TO CHAIR: A LITTLE
MOBILITY SCORE: 22
WALKING IN HOSPITAL ROOM: A LITTLE
WALKING IN HOSPITAL ROOM: A LITTLE

## 2025-06-09 ASSESSMENT — PAIN SCALES - GENERAL
PAINLEVEL_OUTOF10: 0 - NO PAIN

## 2025-06-09 NOTE — PROGRESS NOTES
Robert Luna is a 59 y.o. male admitted for symptomatic anemia. Pharmacy has been consulted for warfarin dosing and monitoring for Aortic Valve Replacement with goal INR of 2.0-3.0.      Home regimen    MON TUE WED THR FRI SAT SUN   Dose 1  mg 1.5  mg 1  mg 1.5  mg 1.5  mg 1  mg 1.5  mg   Total weekly dose: 9 mg  Source: [facility] from 5/22/2025    Labs  INR: 1.5  Hgb/Hct/Plt  Lab Results   Component Value Date    HGB 9.7 (L) 06/09/2025    HGB 9.0 (L) 06/08/2025    HGB 9.3 (L) 06/07/2025    HGB 9.3 (L) 06/06/2025    HGB 8.5 (L) 06/05/2025        Lab Results   Component Value Date    HCT 32.3 (L) 06/09/2025    HCT 29.7 (L) 06/08/2025    HCT 29.3 (L) 06/07/2025    HCT 31.2 (L) 06/06/2025    HCT 27.9 (L) 06/05/2025        Platelets   Date Value Ref Range Status   06/09/2025 217 150 - 450 x10*3/uL Final   06/08/2025 193 150 - 450 x10*3/uL Final   06/07/2025 202 150 - 450 x10*3/uL Final   06/06/2025 200 150 - 450 x10*3/uL Final   06/05/2025 198 150 - 450 x10*3/uL Final      Warfarin Therapy   Current regimen: resuming home reigmen but fluconazole started by GI, and plan to have patient hold warfarin on Tuesdays and Saturdays while completing 14 day duration   Bridging: None needed  Interacting medications: interacting medication(s) of fluconazole  increase bleeding risk    Dosing History During Current Admission  Date 5/28 5/29 5/30 5/31 6/1 6/2 6/3 6/4 6/5 6/6 6/7 6/8 6/9   INR 1.9 2.2 2.9 2.1 1.9 2.1 2.2 3.7 3.8 2.0 1.5 1.6 1.5   Dose  (mg) See plan 1.5 mg 1.5 mg 1 mg 1.5 mg 1 mg Hold Hold Hold 1.5 mg 1 mg 1 mg 1.5        Assessment and Plan  Patient's INR of 1.5 today is subtherapeutic. Hemoglobin/hematocrit/platelet stable.   Warfarin inpatient plan: Give 1.5 mg of warfarin today  Original plan to hold Warfarin on Tue/Sat due to Diflucan treatment for Candida. INR has been subtherapeutic after holding 2 doses. Has only received 3 doses so far after holding-- I am expecting INR to increase over the next few days,  and if not will increase dosing plan   Continue to monitor s/sx of bleeding including epistaxis, hematuria, unusual bruising, hemoptysis, hematochezia as well as s/sx of stroke including impaired speech, unilateral paralysis, blurry vision.  Pharmacy will continue to monitor the patient and adjust therapy as needed.    Thank you for the consult. Please do not hesitate to contact a pharmacist with any questions.    Vaishali Cloud, PharmD

## 2025-06-09 NOTE — PROGRESS NOTES
"Robert Luna is a 59 y.o. male on day 13 of admission presenting with Symptomatic anemia.      Interval Hx and Subjective:  Had rapid response over weekend for hypotension that resolved with fluid bolus.  Met with hospice on Friday, was hopeful to try PT/OT before accessing hospice care.  [facility] scores too high for SNF       Objective  Blood pressure 100/62, pulse 84, temperature 36.9 °C (98.5 °F), temperature source Temporal, resp. rate 18, height 1.499 m (4' 11.02\"), weight (!) 39.5 kg (87 lb 1.6 oz), SpO2 96%.    General:  appears comfortable, NAD  Neuro: alert, oriented to situation.  No focal findings  Psych:  normal affect, engaged, cooperative  HEENT:  oral mucosa moist without exudate, tongue midline.  PERRLA, no discharge.   NG in place  Resps:  resps unlabored, lungs CTA, no cough noted.  Now on room air  Card:  RRR, +  murmur   GI:  normal bowel sounds, soft and non tender  :  deferred  MS:  No injury or deformity noted  Integ:  skin warm and dry overal    Imaging  No results found.    Cardiology, Vascular, and Other Imaging  No other imaging results found for the past 2 days     Results for orders placed or performed during the hospital encounter of 05/27/25 (from the past 24 hours)   Protime-INR   Result Value Ref Range    Protime 17.0 (H) 9.8 - 12.4 seconds    INR 1.5 (H) 0.9 - 1.1   CBC   Result Value Ref Range    WBC 6.4 4.4 - 11.3 x10*3/uL    nRBC 0.0 0.0 - 0.0 /100 WBCs    RBC 3.65 (L) 4.50 - 5.90 x10*6/uL    Hemoglobin 9.7 (L) 13.5 - 17.5 g/dL    Hematocrit 32.3 (L) 41.0 - 52.0 %    MCV 89 80 - 100 fL    MCH 26.6 26.0 - 34.0 pg    MCHC 30.0 (L) 32.0 - 36.0 g/dL    RDW 26.8 (H) 11.5 - 14.5 %    Platelets 217 150 - 450 x10*3/uL   Renal Function Panel   Result Value Ref Range    Glucose 107 (H) 74 - 99 mg/dL    Sodium 136 136 - 145 mmol/L    Potassium 4.5 3.5 - 5.3 mmol/L    Chloride 103 98 - 107 mmol/L    Bicarbonate 25 21 - 32 mmol/L    Anion Gap 13 10 - 20 mmol/L    Urea Nitrogen 24 (H) 6 - 23 " mg/dL    Creatinine 0.50 0.50 - 1.30 mg/dL    eGFR >90 >60 mL/min/1.73m*2    Calcium 8.9 8.6 - 10.3 mg/dL    Phosphorus 2.9 2.5 - 4.9 mg/dL    Albumin 3.2 (L) 3.4 - 5.0 g/dL      Scheduled medications  Scheduled Medications[1]  Continuous medications  Continuous Medications[2]  PRN medications  PRN Medications[3]     Dietary Orders (From admission, onward)       Start     Ordered    06/05/25 1716  Enteral feeding with NPO NG (nasogastric tube); 10; 60; Water; Tap water; Every 4 hours  Diet effective now        Comments: Advance by 10mL every 8 hours to goal rate of 40 mL/hr   Question Answer Comment   Tube feeding formula: Vital 1.5    Feeding route: NG (nasogastric tube)    Tube feeding continuous rate (mL/hr): 10    Tube feeding flush (mL): 60    Flush type: Water    Water type: Tap water    Flush frequency: Every 4 hours        06/05/25 1715    05/27/25 2232  May Participate in Room Service  ( ROOM SERVICE MAY PARTICIPATE)  Once        Question:  .  Answer:  Yes    05/27/25 2231                     Assessment/Plan     Robert Luna is a 59 y.o. male with past medical history of KEV, HTN, HLD, aortic valve replacement  with aortic graft 1997 on coumadin who presented to outside hospital 5/27/25 with  persistent SOB that had worsened over the prior 24 hours.  He was anemic with Hgb 8.3 and mild elevation of BNP, significant elevation of BUN so there was c/f GI bleed and patient transferred to [facility] for GI consult.  Stool + for occult blood.  On 5/29 EGD and colonoscopy NEG for acute bleeding.  Suspect anemia related to iron deficiency along with occult losses.  He started to develop hypoxia after procedure and it was suspected he might have aspirated but sx persisted and cardiology consulted for possible acute diastolic heart failure and pulm also consulted,  His 02 needs increased and he was transferred to SDU for Airvo therapy.  Echo completed 6/4/25 shows normal EF, reduced right ventricular systolic function,  "severely enlarged right ventricle, both atria are severely dilated, severe aortic stenosis, mild aortic valve regurg, Mitral valve with severe regurgitation, moderate to severe tricuspid regurgitation.  Patient now with significant structural cardiac changes with heart failure with cor pulmonale, aortic stenosis, bilateral pleural effusions, pulm HTN, and with acute hypoxic respiratory failure.  Patient not a candidate for a valve replacements.  He also is significantly deconditioned and malnourished with BMI of 18.08.  Additionally patient failed swallow evaluation and SLP recommending patient be NPO.  Patient does not have ACP documents.  Palliative medicine consulted to assist with goals of care.       Goals of care: Patient oriented, able to make his needs known.  Sister Luis is at bedside and has a blank HCPOA form in hand and they plan to fill out.  They report patient's sister Sasha would be first and Luis would be second.  We reviewed his cardiopulmonary status.  Patient reports thinking one day recently that his dog was probably going to out live him and he felt very peaceful about that.  He has been aware of his decline and had been told at one point he would not live past his 50s so he feels he has been gifted with extra years.  He still wants to \"fight for a bit\" if possible.  Code status discussion:  reviewed procedure of CPR, typical interventions needed if someone survives attempt, and effects of cardiopulmonary arrest on the body, especially if prolonged.  Reviewed that patient likely to have poor chance at functional recovery in light of their other health conditions.   Patient does not want CPR, but would be OK with intubation short term if needed.  He reports feeling very hungry and OK with NG feeding if it helps him if gets him a bit of strength.       6/5/25 spoke with sister Luis.  She reports many family members are coming in this weekend from out of state to see patient, wonders if this is a " good idea.  I informed her that if they are hoping for a nice family get together, now is the time as I would expect patient's condition to continue to decline and that the chance of meaningful visits may not present itself if we look into the next few months.    Patient also clarified he would not want intubation and OK with changing status to DNR/DNI  6/9/25  Family met with hospice on Friday.  They are hoping patient can benefit from SNF although is scores were too high.  MBSS may help determine next steps  -continue to honor DNR/DNI  -will place [place] Portable form when closer to discharge  -hospice      Dysphagia:  patient tolerating NG.  I asked SLP to re-evaluate, patient did OK and plan for MBSS tomorrow  -chloro septic spray ordered for potential irritation   -await MBSS results     Dyspnea related to heart failure, valve disease, and aspiration PNA.  Patient feels like breathing is OK at moment on high flow 02  -resp tx/ATB/cardiac tx per respective teams  -continue hydromorphone 0.2 mg IV Q 3 hours PRN dyspnea, not yet utilized     Constipation:  + bm overnight 6/7  -will monitor       Radha Bloom APRN CNP          [1] docusate sodium, 100 mg, oral, BID  ferrous sulfate, 65 mg of elemental iron, oral, Daily  fluconazole, 200 mg, oral, Daily  fluticasone, 2 spray, Each Nostril, Daily  furosemide, 20 mg, nasogastric tube, Daily  ipratropium-albuteroL, 3 mL, nebulization, TID  metoprolol succinate XL, 25 mg, oral, Daily  polyethylene glycol, 17 g, oral, Nightly  sennosides, 1 tablet, oral, Nightly  [Held by provider] warfarin, 1 mg, oral, Once per day on Monday Wednesday  [Held by provider] warfarin, 1.5 mg, oral, Once per day on Sunday Thursday Friday  warfarin, 1.5 mg, oral, Once     [2]    [3] PRN medications: acetaminophen **OR** acetaminophen **OR** acetaminophen, guaiFENesin, HYDROmorphone, ipratropium-albuteroL, lubricating eye drops, ondansetron **OR** ondansetron, oxygen, phenoL

## 2025-06-09 NOTE — PROGRESS NOTES
[facility] Hospitalist Progress Note      Between 7AM-7PM please message me via Epic Secure Chat.  After 7PM please page Nocturnist on call.        Assessment/Plan     Acute Problems    Acute hypoxic respiratory failure - resolved  Aspiration PNA/pneumonitis - resolved  Severe stenosis of his mechanical aortic valve as well as severe mitral regurgitation  Elevated RV pressures  Bilateral pleural effusions  Contraction alkalosis - resolved  Hypokalemia - resolved  Iron def anemia  Possible occult GI blood loss    Chronic Problems    Mechanical aortic valve replacement - INR goal 2-3  Ascending aorta repair  HTN    Plan    - SLP appreciated -> failed MBS. NPO rec for now with considerations for GOC/alternative means of nutrition. Currently has NG tube receiving TF. He is scheduled for repeat MBS 6/10  - pal care following -> DNR/DNI, hospice meeting it looks like patient wants a trial of PT/OT and will consider hospice down the road. However PT/OT rec low, looking unlikely he will be able to go to acute rehab or SNF. TCC to re-engage hospice navigator to talk about discharge planning  - pulm signed off -> treated for aspiration PNA/pneumonitis. Weaned to room air at this point  - ID signed off -> completed his course of IV Unasyn  - cardiology signed off -> can consider afterload reduction/diuresis, not a surgical candidate. He did receive some IV diuresis with good response. Started on PO lasix 20mg daily to try and maintain euvolemia  - GI signed off -> EGD/Colon with no old or active bleeding. No abnormality to explain anemia. Outpatient VCE can be considered for complete evaluation of MAHESH. Candida esophagitis started on PO fluconazole for 2 week course.    - Coumadin as dosed by pharmacy for hx of AVR, mindful of INR while on fluconazole  - IV Venofer 300 mg x3 doses completed; 1 unit pRBC this admit. Trend hgb. On PO iron supplement now  - PT/OT evaluations -> rec low    Fluids: None  Electrolytes: Replete as  "needed  Nutrition: Tube feed  Ortiz: None  Invasive lines: None  Drains: NG tube  O2: NC    DVT Prophylaxis:  Coumadin    Discharge Planning: pending MBS 6/10 and nutrition plan, TCC to discuss with hospice navigator    Plan of care was discussed with patient/RN    Total time spent: At least 38 minutes, providing counseling or in coordination of care. Total time on this day of visit includes record and documentation review before and after visit including documentation and time not explicitly included on EMR time stamp.      Subjective     Robert Luna is a 59 y.o. male on day 13 of admission presenting with Symptomatic anemia.    NAEON. Weaned to room air. Resting in bed. No new complaints.     Review of Systems   Constitutional:  Negative for fever.   Respiratory:  Positive for shortness of breath.    Cardiovascular:  Negative for chest pain.   Gastrointestinal:  Negative for abdominal distention, abdominal pain and vomiting.       Objective     Physical Exam  Vitals reviewed.   Constitutional:       General: He is not in acute distress.  Cardiovascular:      Rate and Rhythm: Normal rate and regular rhythm.   Pulmonary:      Effort: Pulmonary effort is normal.      Breath sounds: Rales present.   Abdominal:      General: There is no distension.      Palpations: Abdomen is soft.   Neurological:      Mental Status: He is alert. Mental status is at baseline.         Last Recorded Vitals  Blood pressure 100/62, pulse 84, temperature 36.9 °C (98.5 °F), temperature source Temporal, resp. rate 18, height 1.499 m (4' 11.02\"), weight (!) 39.5 kg (87 lb 1.6 oz), SpO2 96%.    Medications  Scheduled Medications[1]   PRN Medications[2]                Miguel Walker MD  [facility] Medicine         [1] docusate sodium, 100 mg, oral, BID  ferrous sulfate, 65 mg of elemental iron, oral, Daily  fluconazole, 200 mg, oral, Daily  fluticasone, 2 spray, Each Nostril, Daily  furosemide, 20 mg, nasogastric tube, Daily  ipratropium-albuteroL, 3 " mL, nebulization, TID  metoprolol succinate XL, 25 mg, oral, Daily  polyethylene glycol, 17 g, oral, Nightly  sennosides, 1 tablet, oral, Nightly  [Held by provider] warfarin, 1 mg, oral, Once per day on Monday Wednesday  [Held by provider] warfarin, 1.5 mg, oral, Once per day on Sunday Thursday Friday  warfarin, 1.5 mg, oral, Once     [2] PRN medications: acetaminophen **OR** acetaminophen **OR** acetaminophen, guaiFENesin, HYDROmorphone, ipratropium-albuteroL, lubricating eye drops, ondansetron **OR** ondansetron, oxygen, phenoL

## 2025-06-09 NOTE — PROGRESS NOTES
"Music Therapy Note    Therapy Session  Referral Type: New referral this admission  Visit Type: Follow-up visit  Session Start Time: 1415  Session End Time: 1440  Intervention Delivery: In-person  Conflict of Service: None     Pre-assessment  Pain Score: 0 - No pain  Stress Level (0-10): 0  Anxiety Level (0-10): 0  Mood/Affect: Calm, Cooperative  Verbalized Emotional State: Acceptance         Treatment/Interventions  Areas of Focus: Self-expression, Normalization  Music Therapy Interventions: Live music listening    Post-assessment  0-10 (Numeric) Pain Score: 0 - No pain  Stress Level (0-10): 0  Anxiety Level (0-10): 0  Mood/Affect: Calm  Verbalized Emotional State: Enjoyment, Gratitude  Total Session Time (min): 25 minutes    Narrative  Assessment Detail: Pt was sitting up in chair, pleasant and welcoming to the MT. Upon assessment pt reported no complaints and discussed being able to walk in the hallway for the first time. MT provided listening presence and pt was agreeable to session at this time. 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  Plan: MT engaged pt in live music listening to promote self-expression and to normalize the environment.  Intervention: Pt was invited to refocus their attention onto the music while the MT facilitated preferred and familiar music at bedside via live guitar and voice.  MT adapted musical elements such as volume, tempo, and lyrical content to sustain focal engagement and validate pt expressions.  Evaluation: Pt responded to the music by mouthing along with familiar lyrics and nodding his head. Pt discussed identification with song \"Won't Back Down\" and requested additional song material. Afterwards pt expressed gratitude for the session.  Follow-up: Will f/u as able.    Education Documentation  No documentation found.          "

## 2025-06-09 NOTE — NURSING NOTE
Report called to Princess Chance EVERETT,informed that meds were not passed on this patient at time of report.

## 2025-06-09 NOTE — CARE PLAN
Problem: Pain - Adult  Goal: Verbalizes/displays adequate comfort level or baseline comfort level  Outcome: Progressing     Problem: Safety - Adult  Goal: Free from fall injury  Outcome: Progressing     Problem: Discharge Planning  Goal: Discharge to home or other facility with appropriate resources  Outcome: Progressing     Problem: Chronic Conditions and Co-morbidities  Goal: Patient's chronic conditions and co-morbidity symptoms are monitored and maintained or improved  Outcome: Progressing     Problem: Nutrition  Goal: Nutrient intake appropriate for maintaining nutritional needs  Outcome: Progressing     Problem: Fall/Injury  Goal: Not fall by end of shift  Outcome: Progressing  Goal: Be free from injury by end of the shift  Outcome: Progressing  Goal: Verbalize understanding of personal risk factors for fall in the hospital  Outcome: Progressing  Goal: Verbalize understanding of risk factor reduction measures to prevent injury from fall in the home  Outcome: Progressing  Goal: Use assistive devices by end of the shift  Outcome: Progressing  Goal: Pace activities to prevent fatigue by end of the shift  Outcome: Progressing

## 2025-06-09 NOTE — PROGRESS NOTES
Speech-Language Pathology    Adult Inpatient Swallow Treatment    Patient Name: Robert Luna  MRN: [id number]  : 1965  Today's Date: 25   Time Calculation  Start Time: 0830  Stop Time: 900  Time Calculation (min): 30 min       Impression:   Swallow treatment completed. Patient with NGT in place with no further supplemental O2 support. Patient now on room air. Wet vocal quality noted at baseline with weak cough elicited to achieve partial clearance. Patient decided not to proceed with hospice care at this time. Patient expressed desire to trial PT/OT for rehabilitation. Patient given ice chips and water at time of session. Bolus formation slowed with spontaneous reswallows completed. Vocal quality appeared slightly improved post swallows. Cough delayed following multiple bolus presentations. Discussed POC. Patient endorsing dysphagia pta appearing intermittent in nature. Patient agreeable to repeating MBSS to determine current level of functioning. Given improvement in medical condition from initial assessment with no tachypnea or supplemental O2 support, feel repeat study warranted to determine if po diet can be safely initiated. Continuation of NPO status advised until swallowing safety can be determine via instrumentation.     Recommendations:  -NPO WITH CONTINUATION OF NGT FEEDINGS  -MODIFIED BARIUM SWALLOW STUDY    Short Term Goal:   Patient will perform oropharyngeal strengthening exercises to improve swallowing function with 90% given min cueing.    Long Term Goal:  Patient will resume/maintain oral intake in order to promote optimal oropharyngeal swallow function and reduce risk for dehydration, malnutrition and weight loss.     Start Date: 25  End Date: 25  Status: Progressing     Plan:  SLP Services Indicated: Yes  Frequency: 3x week  Discussed POC with patient  SLP - OK to Discharge? : No    Pain:   Patient exhibits no s/s of pain or discomfort during session.     Inpatient  Education:  Extensive education provided to patient regarding current swallow function, recommendations/results, and POC.      Consultations/Referrals/Coordination of Services:   N/A

## 2025-06-09 NOTE — PROGRESS NOTES
06/09/25 0728   Discharge Planning   Expected Discharge Disposition [facility]  (Hospice consult placed)     Hospice consult placed, per notes patient has NG placed needs SLP and MBS on TF Vital 1.5, wean o2, PT/OT discharge plan TBD, I will continue to monitor for discharge planing and needs.   None

## 2025-06-09 NOTE — PROGRESS NOTES
"Spiritual Care Visit  Spiritual Care Request    Reason for Visit:  Routine Visit: Introduction  Continue Visiting: Yes   Request Received From:  Referral From: Nurse  Focus of Care:  Visited With: Patient   Refer to :  Referral To:    Spiritual Care Assessment    Spiritual Assessment:  Patient Spiritual Care Encounters  Suffering Severity: Mild  Fear Level: None  Coping: Consistently demonstrated  Dignified Life Closure: Consistently demonstrated  Care Provided:  Intended Effects: Build relationship of care and support  Methods: Encourage sharing of feelings  Interventions: Active listening  Sense of Community and or Yazidism Affiliation:  Tenriism    Addressed Needs/Concerns and/or Mehran Through:  \"I am as ready to die as I can be.\" The pt stated that 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 He also stated that because he has been sick most of his life combined with his priya that \"this\" is easy to accept. \"If they operate on me I will die.\"  Outcome:  Outcome of Spiritual Care Visit: Acceptance, Trust in self/others/God   Advance Directives:  Not Received  Spiritual Care Annotation    Annotation:  The pt stated that he would appreciate another visit. Playing the musical group's song by the [employer] to \"Amazing Michelle with the music therapist was a highlight of his day. \"This has been a great day.\" The pt sang the song as a duet and a solo. Are you coming back tomorrow?  There will be another visit scheduled.    Chaplain Mini Dimas          "

## 2025-06-09 NOTE — PROGRESS NOTES
Physical Therapy    Physical Therapy Treatment    Patient Name: Robert Luna  MRN: [id number]  Department: [facility] [id number]  Room: [address] [place] [address]  Today's Date: 6/9/2025  Time Calculation  Start Time: 1605  Stop Time: 1625  Time Calculation (min): 20 min         Assessment/Plan   PT Assessment  PT Assessment Results: Decreased endurance, Impaired balance, Decreased mobility  Rehab Prognosis: Good  Barriers to Discharge Home: No anticipated barriers  Evaluation/Treatment Tolerance: Patient tolerated treatment well  Medical Staff Made Aware: Yes  Strengths: Ability to acquire knowledge, Capable of completing ADLs semi/independent, Attitude of self, Support of extended family/friends, Premorbid level of function, Housing layout  Barriers to Participation: Comorbidities  End of Session Communication: Bedside nurse  Assessment Comment: Pt is progressing well in all goal areas. Stairs training next visit, if possible.   End of Session Patient Position: Bed, 3 rail up, Alarm on (needs in reach)  PT Plan  Inpatient/Swing Bed or Outpatient: Inpatient  PT Plan  Treatment/Interventions: Transfer training, Gait training, Therapeutic activity, Therapeutic exercise, Neuromuscular re-education, Balance training, Stair training, Bed mobility, Strengthening, Endurance training  PT Plan: Ongoing PT  PT Frequency: 3 times per week  PT Discharge Recommendations: Low intensity level of continued care  Equipment Recommended upon Discharge: Wheeled walker  PT Recommended Transfer Status: Stand by assist, Assistive device (walker)  PT - OK to Discharge: Yes (per PT POC)    PT Visit Info:  PT Received On: 06/09/25  Response to Previous Treatment: Patient with no complaints from previous session.     General Visit Information:   General  Reason for Referral: 59 y.o. M admitted with concerns of symptomatic anemia; had been on Airvo but has been weaned to RA; failed MBS, and is currently NPO, but will have repeat MBS tomorrow.  Referred By: BRETT CRUZ)  Past  Medical History Relevant to Rehab: KEV, HTN, HLD, aortic calve replacement  Family/Caregiver Present: No  Prior to Session Communication: Bedside nurse  Patient Position Received: Up in chair, Alarm on  Preferred Learning Style: auditory, kinesthetic, visual  General Comment: Pt agreeable to PT tx.    Subjective   Precautions:  Precautions  Medical Precautions: Fall precautions  Precautions Comment: NG tube     Date/Time Vitals Session Patient Position Pulse Resp SpO2 BP MAP (mmHg)    06/09/25 1511 --  --  83  --  98 %  91/46  61                 Objective   Pain:  Pain Assessment  0-10 (Numeric) Pain Score: 0 - No pain  Cognition:  Cognition  Overall Cognitive Status: Within Functional Limits  Orientation Level: Oriented X4  Attention: Within Functional Limits  Memory: Within Funtional Limits  Safety/Judgement: Within Functional Limits  Insight: Within function limits  Impulsive: Within functional limits  Processing Speed: Within funtional limits  Coordination:  Movements are Fluid and Coordinated: Yes  Postural Control:  Postural Control  Postural Control: Within Functional Limits  Static Sitting Balance  Static Sitting-Balance Support: No upper extremity supported, Feet supported  Static Sitting-Level of Assistance: Independent  Static Sitting-Comment/Number of Minutes: on EOB  Static Standing Balance  Static Standing-Balance Support: No upper extremity supported  Static Standing-Level of Assistance: Close supervision  Static Standing-Comment/Number of Minutes: with giat belt  Dynamic Standing Balance  Dynamic Standing-Balance Support: Bilateral upper extremity supported  Dynamic Standing-Level of Assistance: Distant supervision  Dynamic Standing-Balance: Turning  Dynamic Standing-Comments: with RW and gait belt    Activity Tolerance:  Activity Tolerance  Endurance: Endurance does not limit participation in activity  Treatments:  Bed Mobility  Bed Mobility: Yes  Bed Mobility 1  Bed Mobility 1: Sitting to  supine  Level of Assistance 1: Modified independent  Bed Mobility Comments 1: HOB elevated to grossly 30 deg    Ambulation/Gait Training  Ambulation/Gait Training Performed: Yes  Ambulation/Gait Training 1  Surface 1: Level tile  Device 1: Rolling walker  Gait Support Devices: Gait belt  Assistance 1: Close supervision, Minimal verbal cues  Quality of Gait 1: Wide base of support, Diminished heel strike, Decreased step length  Comments/Distance (ft) 1: 115'x2  Pt provided gait training with RW to enable him to safely ambulate household distances; required occasional verbal cues for walker management, posture, and safety.     Transfers  Transfer: Yes  Transfer 1  Transfer From 1: Chair with arms to  Transfer to 1: Stand  Technique 1: Sit to stand  Transfer Device 1: Walker, Gait belt  Transfer Level of Assistance 1: Close supervision, Minimal verbal cues  Transfers 2  Transfer From 2: Stand to  Transfer to 2: Commode-standard  Technique 2: Stand to sit, Sit to stand  Transfer Device 2: Walker, Gait belt  Transfer Level of Assistance 2: Close supervision  Transfers 3  Transfer From 3: Stand to  Transfer to 3: Bed  Technique 3: Stand to sit  Transfer Device 3: Walker, Gait belt  Transfer Level of Assistance 3: Close supervision  Pt provided instruction in safe sit<->stand technique to enable him to move in/out of bed/chair safely; pt required occasional verbal cues for proper hand placements and to scoot to edge of sitting surface to facilitate ease of sit->stand, and to line up to and reach back for sitting surface before sitting.    Other Activity  Other Activity Performed: Yes  Pt preformed toileting on BSC with SBA for balance; able to pull down/up brief, manage clothing, and perform ángela care with hands off assist.     Outcome Measures:  [facility] Basic Mobility  Turning from your back to your side while in a flat bed without using bedrails: None  Moving from lying on your back to sitting on the side of a flat bed  without using bedrails: None  Moving to and from bed to chair (including a wheelchair): A little  Standing up from a chair using your arms (e.g. wheelchair or bedside chair): A little  To walk in hospital room: A little  Climbing 3-5 steps with railing: A little  Basic Mobility - Total Score: 20    Education Documentation  Body Mechanics, taught by Mercedez Carvalho, PT at 6/9/2025  4:40 PM.  Learner: Patient  Readiness: Acceptance  Method: Explanation  Response: Verbalizes Understanding, Demonstrated Understanding  Comment: See gait and transfer comments    Mobility Training, taught by Mercedez Carvaloh, PT at 6/9/2025  4:40 PM.  Learner: Patient  Readiness: Acceptance  Method: Explanation  Response: Verbalizes Understanding, Demonstrated Understanding  Comment: See gait and transfer comments    Education Comments  No comments found.        OP EDUCATION:       Encounter Problems       Encounter Problems (Active)       Balance       Goal 1 (Progressing)       Start:  06/07/25    Expected End:  06/21/25       Pt performs all sitting and standing balance IND            Mobility       STG - Patient will navigate 2 steps with LRAD and standby assist       Start:  06/07/25    Expected End:  06/21/25            STG - Patient will ambulate (Progressing)       Start:  06/07/25    Expected End:  06/21/25       >150 ft IND using LRAD with proper gait mechanics            PT Transfers       STG - Patient will transfer sit to and from stand (Progressing)       Start:  06/07/25    Expected End:  06/21/25       IND using LRAD with proper body mechanics            Pain - Adult

## 2025-06-10 ENCOUNTER — APPOINTMENT (OUTPATIENT)
Dept: RADIOLOGY | Facility: HOSPITAL | Age: 60
End: 2025-06-10
Payer: MEDICARE

## 2025-06-10 LAB
ALBUMIN SERPL BCP-MCNC: 3.3 G/DL (ref 3.4–5)
ANION GAP SERPL CALC-SCNC: 14 MMOL/L (ref 10–20)
BUN SERPL-MCNC: 23 MG/DL (ref 6–23)
CALCIUM SERPL-MCNC: 9.1 MG/DL (ref 8.6–10.3)
CHLORIDE SERPL-SCNC: 103 MMOL/L (ref 98–107)
CO2 SERPL-SCNC: 24 MMOL/L (ref 21–32)
CREAT SERPL-MCNC: 0.53 MG/DL (ref 0.5–1.3)
EGFRCR SERPLBLD CKD-EPI 2021: >90 ML/MIN/1.73M*2
ERYTHROCYTE [DISTWIDTH] IN BLOOD BY AUTOMATED COUNT: 26.5 % (ref 11.5–14.5)
GLUCOSE SERPL-MCNC: 106 MG/DL (ref 74–99)
HCT VFR BLD AUTO: 32.4 % (ref 41–52)
HGB BLD-MCNC: 10.1 G/DL (ref 13.5–17.5)
INR PPP: 1.4 (ref 0.9–1.1)
MCH RBC QN AUTO: 26.1 PG (ref 26–34)
MCHC RBC AUTO-ENTMCNC: 31.2 G/DL (ref 32–36)
MCV RBC AUTO: 84 FL (ref 80–100)
NRBC BLD-RTO: 0 /100 WBCS (ref 0–0)
PHOSPHATE SERPL-MCNC: 4.2 MG/DL (ref 2.5–4.9)
PLATELET # BLD AUTO: 236 X10*3/UL (ref 150–450)
POTASSIUM SERPL-SCNC: 4.8 MMOL/L (ref 3.5–5.3)
PROTHROMBIN TIME: 15.3 SECONDS (ref 9.8–12.4)
RBC # BLD AUTO: 3.87 X10*6/UL (ref 4.5–5.9)
SODIUM SERPL-SCNC: 136 MMOL/L (ref 136–145)
WBC # BLD AUTO: 7.1 X10*3/UL (ref 4.4–11.3)

## 2025-06-10 PROCEDURE — 99232 SBSQ HOSP IP/OBS MODERATE 35: CPT | Performed by: STUDENT IN AN ORGANIZED HEALTH CARE EDUCATION/TRAINING PROGRAM

## 2025-06-10 PROCEDURE — 74230 X-RAY XM SWLNG FUNCJ C+: CPT | Performed by: RADIOLOGY

## 2025-06-10 PROCEDURE — 36415 COLL VENOUS BLD VENIPUNCTURE: CPT | Performed by: NURSE PRACTITIONER

## 2025-06-10 PROCEDURE — 2500000005 HC RX 250 GENERAL PHARMACY W/O HCPCS: Performed by: STUDENT IN AN ORGANIZED HEALTH CARE EDUCATION/TRAINING PROGRAM

## 2025-06-10 PROCEDURE — 97535 SELF CARE MNGMENT TRAINING: CPT | Mod: GO

## 2025-06-10 PROCEDURE — 2500000002 HC RX 250 W HCPCS SELF ADMINISTERED DRUGS (ALT 637 FOR MEDICARE OP, ALT 636 FOR OP/ED): Performed by: NURSE PRACTITIONER

## 2025-06-10 PROCEDURE — 85027 COMPLETE CBC AUTOMATED: CPT | Performed by: NURSE PRACTITIONER

## 2025-06-10 PROCEDURE — 92611 MOTION FLUOROSCOPY/SWALLOW: CPT | Mod: GN

## 2025-06-10 PROCEDURE — 74230 X-RAY XM SWLNG FUNCJ C+: CPT

## 2025-06-10 PROCEDURE — 84100 ASSAY OF PHOSPHORUS: CPT | Performed by: STUDENT IN AN ORGANIZED HEALTH CARE EDUCATION/TRAINING PROGRAM

## 2025-06-10 PROCEDURE — 2500000001 HC RX 250 WO HCPCS SELF ADMINISTERED DRUGS (ALT 637 FOR MEDICARE OP): Performed by: NURSE PRACTITIONER

## 2025-06-10 PROCEDURE — 1100000001 HC PRIVATE ROOM DAILY

## 2025-06-10 PROCEDURE — 2500000001 HC RX 250 WO HCPCS SELF ADMINISTERED DRUGS (ALT 637 FOR MEDICARE OP): Performed by: STUDENT IN AN ORGANIZED HEALTH CARE EDUCATION/TRAINING PROGRAM

## 2025-06-10 PROCEDURE — 2500000002 HC RX 250 W HCPCS SELF ADMINISTERED DRUGS (ALT 637 FOR MEDICARE OP, ALT 636 FOR OP/ED)

## 2025-06-10 PROCEDURE — 85610 PROTHROMBIN TIME: CPT | Performed by: NURSE PRACTITIONER

## 2025-06-10 PROCEDURE — 94664 DEMO&/EVAL PT USE INHALER: CPT

## 2025-06-10 PROCEDURE — 94640 AIRWAY INHALATION TREATMENT: CPT

## 2025-06-10 RX ORDER — WARFARIN 1 MG/1
1 TABLET ORAL
Status: DISCONTINUED | OUTPATIENT
Start: 2025-06-11 | End: 2025-06-12 | Stop reason: HOSPADM

## 2025-06-10 RX ORDER — WARFARIN 3 MG/1
3 TABLET ORAL ONCE
Status: COMPLETED | OUTPATIENT
Start: 2025-06-10 | End: 2025-06-10

## 2025-06-10 RX ORDER — WARFARIN 3 MG/1
1.5 TABLET ORAL
Status: DISCONTINUED | OUTPATIENT
Start: 2025-06-12 | End: 2025-06-12 | Stop reason: HOSPADM

## 2025-06-10 RX ADMIN — WARFARIN SODIUM 3 MG: 3 TABLET ORAL at 17:52

## 2025-06-10 RX ADMIN — BARIUM SULFATE 40 ML: 400 SUSPENSION ORAL at 10:41

## 2025-06-10 RX ADMIN — FERROUS SULFATE TAB 325 MG (65 MG ELEMENTAL FE) 1 TABLET: 325 (65 FE) TAB at 09:27

## 2025-06-10 RX ADMIN — IPRATROPIUM BROMIDE AND ALBUTEROL SULFATE 3 ML: 2.5; .5 SOLUTION RESPIRATORY (INHALATION) at 07:09

## 2025-06-10 RX ADMIN — IPRATROPIUM BROMIDE AND ALBUTEROL SULFATE 3 ML: 2.5; .5 SOLUTION RESPIRATORY (INHALATION) at 21:04

## 2025-06-10 RX ADMIN — FUROSEMIDE 20 MG: 20 TABLET ORAL at 09:28

## 2025-06-10 RX ADMIN — FLUCONAZOLE 200 MG: 100 TABLET ORAL at 09:28

## 2025-06-10 RX ADMIN — BARIUM SULFATE 60 ML: 0.81 POWDER, FOR SUSPENSION ORAL at 10:42

## 2025-06-10 RX ADMIN — BARIUM SULFATE 40 ML: 400 SUSPENSION ORAL at 10:42

## 2025-06-10 RX ADMIN — BARIUM SULFATE 30 ML: 400 PASTE ORAL at 10:40

## 2025-06-10 RX ADMIN — IPRATROPIUM BROMIDE AND ALBUTEROL SULFATE 3 ML: 2.5; .5 SOLUTION RESPIRATORY (INHALATION) at 12:01

## 2025-06-10 RX ADMIN — FLUTICASONE PROPIONATE 2 SPRAY: 50 SPRAY, METERED NASAL at 09:26

## 2025-06-10 ASSESSMENT — COGNITIVE AND FUNCTIONAL STATUS - GENERAL
TOILETING: A LITTLE
HELP NEEDED FOR BATHING: A LITTLE
CLIMB 3 TO 5 STEPS WITH RAILING: A LITTLE
WALKING IN HOSPITAL ROOM: A LITTLE
PERSONAL GROOMING: A LITTLE
MOBILITY SCORE: 22
DAILY ACTIVITIY SCORE: 24
DRESSING REGULAR LOWER BODY CLOTHING: A LITTLE
DAILY ACTIVITIY SCORE: 20

## 2025-06-10 ASSESSMENT — PAIN - FUNCTIONAL ASSESSMENT
PAIN_FUNCTIONAL_ASSESSMENT: 0-10
PAIN_FUNCTIONAL_ASSESSMENT: 0-10

## 2025-06-10 ASSESSMENT — PAIN SCALES - GENERAL
PAINLEVEL_OUTOF10: 0 - NO PAIN
PAINLEVEL_OUTOF10: 0 - NO PAIN

## 2025-06-10 ASSESSMENT — ACTIVITIES OF DAILY LIVING (ADL): HOME_MANAGEMENT_TIME_ENTRY: 33

## 2025-06-10 NOTE — PROGRESS NOTES
Occupational Therapy                 Therapy Communication Note    Patient Name: Robert Luna  MRN: [id number]  Department: [facility]  Room: [address] [place] [address]  Today's Date: 6/10/2025     Discipline: Occupational Therapy    Missed Visit: OT Missed Visit: Yes     Missed Visit Reason: Missed Visit Reason:  (Pt eating PM meal. Will f/u as schedule permits)    Missed Time: Attempt    Comment:

## 2025-06-10 NOTE — PROGRESS NOTES
Nutrition Follow-up Note  Nutrition Assessment      Robert Luna is a 59 y.o. year old male patient with Symptomatic anemia [D64.9]  On day #15 of hospital admission    Chart reviewed and pt visited.  Medical History[1]    Per chart review:  -Rapid over weekend for hypotension  -Pt to consider hospice at a later time  -Repeat MBSS today; per SLP, diet advanced to soft and bites with mod thickened liquids (notified host about diet change in order to get pt a tray).    Rec, keep NG placed until pt can tolerate po diet- at least 50% of meals.    Scheduled medications  Scheduled Medications[2]  Continuous medications  Continuous Medications[3]  PRN medications  PRN Medications[4]    Nutrition Significant Labs:  BMP Trend:   Results from last 7 days   Lab Units 06/10/25  0613 06/09/25  0508 06/08/25  0628 06/07/25  0740   GLUCOSE mg/dL 106* 107* 125* 103*   CALCIUM mg/dL 9.1 8.9 8.6 9.0   SODIUM mmol/L 136 136 136 137   POTASSIUM mmol/L 4.8 4.5 4.3 4.8   CO2 mmol/L 24 25 23 22   CHLORIDE mmol/L 103 103 106 108*   BUN mg/dL 23 24* 23 20   CREATININE mg/dL 0.53 0.50 0.48* 0.44*    , BG POCT trend:   Results from last 7 days   Lab Units 06/07/25  1221 06/05/25  1506   POCT GLUCOSE mg/dL 123* 145*    , Liver Function Trend:    , Renal Lab Trend:   Results from last 7 days   Lab Units 06/10/25  0613 06/09/25  0508 06/08/25  0628 06/07/25  0740 06/06/25  0550 06/06/25  0550   POTASSIUM mmol/L 4.8 4.5 4.3 4.8  --  2.9*   PHOSPHORUS mg/dL 4.2 2.9 2.6 1.7*   < >  --    SODIUM mmol/L 136 136 136 137  --  140   MAGNESIUM mg/dL  --   --   --   --   --  2.10   EGFR mL/min/1.73m*2 >90 >90 >90 >90  --  >90   BUN mg/dL 23 24* 23 20  --  15   CREATININE mg/dL 0.53 0.50 0.48* 0.44*  --  0.51    < > = values in this interval not displayed.    , TPN/PPN Labs:   Results from last 7 days   Lab Units 06/10/25  0613 06/09/25  0508 06/08/25  0628 06/07/25  0740 06/06/25  0550 06/06/25  0550   GLUCOSE mg/dL 106* 107* 125* 103*  --  111*   POTASSIUM  "mmol/L 4.8 4.5 4.3 4.8  --  2.9*   PHOSPHORUS mg/dL 4.2 2.9 2.6 1.7*   < >  --    MAGNESIUM mg/dL  --   --   --   --   --  2.10   SODIUM mmol/L 136 136 136 137  --  140   CHLORIDE mmol/L 103 103 106 108*  --  102    < > = values in this interval not displayed.    , Lipid Panel:   Lab Results   Component Value Date    CHOL 147 09/02/2022    HDL 39 (L) 09/02/2022    CHHDL 3.8 09/02/2022    TRIG 99 09/02/2022    , Vit D: No results found for: \"VITD25\" , Vit B12: No results found for: \"KNOKNOXI07\" , Iron Panel:   Lab Results   Component Value Date    IRON 17 (L) 05/28/2025    TIBC 390 05/28/2025    FERRITIN 13 (L) 05/28/2025    , Folate: No results found for: \"FOLATE\"     Dietary Orders (From admission, onward)       Start     Ordered    06/10/25 1340  Oral nutritional supplements  Until discontinued        Question Answer Comment   Deliver with Lunch    Deliver with Dinner    Select supplement: Magic Cup        06/10/25 1339    06/10/25 1133  Adult diet Regular; Soft and bite sized 6; Moderately thick 3  Diet effective now        Comments: No mixed consistencies.   Question Answer Comment   Diet type Regular    Texture Soft and bite sized 6    Fluid consistency Moderately thick 3        06/10/25 1134    05/27/25 2232  May Participate in Room Service  ( ROOM SERVICE MAY PARTICIPATE)  Once        Question:  .  Answer:  Yes    05/27/25 2231                     History:  Energy Intake: Good > 75 %  Food and Nutrient History: Pt on TF for EEN. Diet advanced today to PO diet    Anthropometrics:  Height: 149.9 cm (4' 11.02\")  Weight: (!) 39.5 kg (87 lb 1.6 oz)  BMI (Calculated): 17.58    Weight Change: -1.36    Wt Readings from Last 6 Encounters:   06/05/25 (!) 39.5 kg (87 lb 1.6 oz)   05/27/25 (!) 40.6 kg (89 lb 8.1 oz)   12/19/24 (!) 40.4 kg (89 lb)   10/07/24 (!) 39.9 kg (88 lb)   03/06/24 (!) 43.5 kg (96 lb)   09/18/23 (!) 44 kg (97 lb)     Weight History / % Weight Change: 12/19/24: 40.4kg, 10/17/24: 39.9kg, 3/6/24: " "43.5kg.  Reports UBW of #.  Significant Weight Loss: No       IBW/kg (Dietitian Calculated): 47.3 kg  Percent of IBW: 84 %     Energy Needs:  Height: 149.9 cm (4' 11.02\")  Temp: 37.3 °C (99.1 °F)    Method for Estimating Needs: 1400- 1460 @ 34-36 kcal/kg    Method for Estimating 24 Hour Protein Needs: 54-63 @ 1.2-1.4 gr/kg IBW    Total Fluid Estimated Needs in 24 Hours (mL): 1350 mL  Total Fluid Estimated Needs in 24 hours (mL/kg): 30 mL/kg       Nutrition Focused Physical Findings:  Orbital Fat Pads: Severe (dark circles, hollowing and loose skin)  Buccal Fat Pads: Severe (hollow, sunken and narrow face)    Temporalis: Mild-Moderate (slight depression)  Pectoralis (Clavicular Region): Severe (protruding prominent clavicle)  Deltoid/Trapezius: Severe (squared shoulders, acromion process prominent)    Edema: none       Skin: Negative  Mouth Findings: Dysphagia       Nutrition Diagnosis   Malnutrition Diagnosis  Patient has Malnutrition Diagnosis: Yes  Diagnosis Status: Active  Malnutrition Diagnosis: Severe malnutrition related to chronic disease or condition  Related to: dysphagia  As Evidenced by: reported intake <75% estimated needs for > 2 months, moderate-severe muscle & subcutaneous fat depletion    Patient has Nutrition Diagnosis: Yes  Nutrition Diagnosis 1: Inadequate protein energy intake  Diagnosis Status (1): Active  Related to (1): dysphagia  As Evidenced by (1): need of TF/modified textured diet       Nutrition Interventions/Recommendations   Nutrition Prescription: Nutrition prescription for oral nutrition  Individualized Nutrition Prescription Provided for : Magic cups BID    Food and/or Nutrient Delivery Interventions  Meals and Snacks: General healthful diet, Texture-modified diet  Goal: Tolerate diet; >75% consumed     Rec, keep NG placed until pt can tolerate po diet- at least 50% of meals.      Medical Food Supplement: Commercial food medical food supplement therapy  Goal: Magic cups provide " 290kcals and 9g of protein per serving    Collaboration and Referral of Nutrition Care: Collaboration by nutrition professional with other providers  Coordination of Care with Providers: SLP, Nursing, Provider    Education Documentation  N/A      Nutrition Monitoring and Evaluation   Food and Nutrient Related History  Estimated Energy Intake: Energy intake greater or equal to 75% of estimated energy needs, Energy intake 50 -75% of estimated energy needs    Fluid Intake: Estimated fluid intake    Intake / Amount of food: Meets > 75% estimated energy needs, Consumes at least 75% or more of meals/snacks/supplements, Consumes at least 50% or more of meals/snacks/supplements    Anthropometrics: Body Composition/Growth/Weight History  Body Weight: Body weight - Maintain stable weight    Body Weight Change: Body weight gain - Gradual weight gain    Biochemical Data, Medical Tests and Procedures  Electrolyte and Renal Panel: Other (Comment), BUN, Electrolytes within normal limits  Criteria: As clinically indicated    Gastrointestinal Profile: Other (Comment)  Criteria: As clinically indicated    Glucose/Endocrine Profile: Glucose within normal limits ( mg/dL)  Criteria: As clinically indicated    Nutritional Anemia Profile: Other (Comment)  Criteria: As clinically indicated    Vitamin Profile: Other (Comment)  Criteria: As clinically indicated    Nutrition Focused Physical Findings  Adipose Finding: Loss of subcutaneous fat       Digestive System Finding: Vomiting, Nausea, Abdominal pain, Diarrhea, Constipation, Loose stool, Fatty stool    Muscle Finding: Muscle atrophy    Skin Finding: Promote intact skin - Promote skin integrity       Mouth Finding: Dysphagia    Time Spent (min): 40 minutes  Last Date of Nutrition Visit: 06/10/25  Nutrition Follow-Up Needed?: Dietitian to reassess per policy  Follow up Comment: f/LYLA farnsworth            [1]   Past Medical History:  Diagnosis Date    Heart valve disease    [2]  docusate sodium, 100 mg, oral, BID  ferrous sulfate, 65 mg of elemental iron, oral, Daily  fluconazole, 200 mg, oral, Daily  fluticasone, 2 spray, Each Nostril, Daily  furosemide, 20 mg, nasogastric tube, Daily  ipratropium-albuteroL, 3 mL, nebulization, TID  metoprolol succinate XL, 25 mg, oral, Daily  polyethylene glycol, 17 g, oral, Nightly  sennosides, 1 tablet, oral, Nightly  [START ON 6/11/2025] warfarin, 1 mg, oral, Once per day on Monday Wednesday Saturday  [START ON 6/12/2025] warfarin, 1.5 mg, oral, Once per day on Sunday Tuesday Thursday Friday  warfarin, 3 mg, oral, Once     [3]    [4] PRN medications: acetaminophen **OR** acetaminophen **OR** acetaminophen, guaiFENesin, HYDROmorphone, ipratropium-albuteroL, lubricating eye drops, ondansetron **OR** ondansetron, oxygen, phenoL

## 2025-06-10 NOTE — PROGRESS NOTES
Robert Luna is a 59 y.o. male admitted for symptomatic anemia. Pharmacy has been consulted for warfarin dosing and monitoring for Aortic Valve Replacement with goal INR of 2.0-3.0.      Home regimen    MON TUE WED THR FRI SAT SUN   Dose 1  mg 1.5  mg 1  mg 1.5  mg 1.5  mg 1  mg 1.5  mg   Total weekly dose: 9 mg  Source: [facility] Note from 5/22/2025    Labs  INR: 1.4  Hgb/Hct/Plt  Lab Results   Component Value Date    HGB 10.1 (L) 06/10/2025    HGB 9.7 (L) 06/09/2025    HGB 9.0 (L) 06/08/2025    HGB 9.3 (L) 06/07/2025    HGB 9.3 (L) 06/06/2025        Lab Results   Component Value Date    HCT 32.4 (L) 06/10/2025    HCT 32.3 (L) 06/09/2025    HCT 29.7 (L) 06/08/2025    HCT 29.3 (L) 06/07/2025    HCT 31.2 (L) 06/06/2025        Platelets   Date Value Ref Range Status   06/10/2025 236 150 - 450 x10*3/uL Final   06/09/2025 217 150 - 450 x10*3/uL Final   06/08/2025 193 150 - 450 x10*3/uL Final   06/07/2025 202 150 - 450 x10*3/uL Final   06/06/2025 200 150 - 450 x10*3/uL Final      Warfarin Therapy   Current regimen: resuming home reigmen   Bridging: None needed  Interacting medications: interacting medication(s) of fluconazole  increase bleeding risk    Dosing History During Current Admission  Date 5/28 5/29 5/30 5/31 6/1 6/2 6/3 6/4 6/5 6/6 6/7 6/8 6/9 6/10   INR 1.9 2.2 2.9 2.1 1.9 2.1 2.2 3.7 3.8 2.0 1.5 1.6 1.5 1.4   Dose  (mg) See plan 1.5 mg 1.5 mg 1 mg 1.5 mg 1 mg Hold Hold Hold 1.5 mg 1 mg 1 mg 1.5 3 mg        Assessment and Plan  Patient's INR of 1.4 today is subtherapeutic. Hemoglobin/hematocrit/platelet stable.   Warfarin inpatient plan: Give 3 mg boost dose today. Patient has received 6 mg of warfarin over the past week so 3 mg will put the patient back at his weekly dose of 9 mg  Original plan to hold Warfarin on Tue/Sat due to Diflucan treatment for Candida. INR has been subtherapeutic after 5 days of new plan so recommend resuming previous regimen of 1 mg on M/W/Sat and 1.5 mg all other days.   Continue to  monitor s/sx of bleeding including epistaxis, hematuria, unusual bruising, hemoptysis, hematochezia as well as s/sx of stroke including impaired speech, unilateral paralysis, blurry vision.  Pharmacy will continue to monitor the patient and adjust therapy as needed.  Discharge Plan: resume home regimen of 1 mg on M/W/Sat and 1.5 mg all other days. Follow-up INR check ASAP     Thank you for the consult. Please do not hesitate to contact a pharmacist with any questions.    Vaishali Cloud, PharmD

## 2025-06-10 NOTE — PROCEDURES
"Speech-Language Pathology     Inpatient Modified Barium Swallow Study    Patient Name: Robert Luna  MRN: [id number]  : 1965  Today's Date: 06/10/25  Time Calculation  Start Time: 1014  Stop Time: 1033  Time Calculation (min): 19 min        Modified Barium Swallow Study completed. Informed verbal consent obtained prior to completion of exam. Trials of thin liquids, mildly/moderately thick liquids, purees, and solids were administered for exam this date. AP view not obtained due to positioning/equipment limitations and patient safety/mobility concerns. Barium tablet not administered due to safety concerns.    SLP: Kaia Shafer SLP   Contact info: [employer] secure chat    Reason for Referral: C/f aspiration/oropharyngeal dysphagia    Patient Hx:  \"Robert Luna is a 59 y.o. male with a history of aortic valve replacement on warfarin who presented to [facility] ER complaining of dyspnea on exertion that has become progressively worse over the past 24 hours. He was transferred from [facility] ER to [facility] for suspected occult GI blood loss with symptomatic anemia \" Diagnosed with pneumonia and acute hypoxic respiratory failure with need for Airvo 50L at 50%, which was initiated on 6/3/25.  Patient endorsing dysphagia with difficulty swallowing and choking reported.    Respiratory Status: room air  Current diet: NPO w/ alternate means of nutrition/hydration    Pain:  Patient exhibits no s/s of pain or discomfort during exam.      Clinical Presentation:  Patient alert and verbal with NGT placed upon arrival to radiology suite. Patient seated fully upright for exam. Cough detected at baseline appearing weak. Mental status adequate with patient able to follow commands during exam.     RECOMMENDATIONS:   -SOFT & BITE SIZED diet (IDDSI Level 6)  -MODERATELY/HONEY thickened liquids (IDDSI Level 3)> NO MIXED CONSISTENCIES  *SLP TO FOLLOW TO DETERMINE PATIENT CANDIDACY FOR CORBETT WATER " PROTOCOL    STRATEGIES:  -Upright at 90 degrees for all po intake  -Slow rate of consumption  -Small bolus size  -Medication  whole/crushed in pudding or apple sauce  -Reswallow with each bite/sip    SLP PLAN:  Skilled SLP Services: Skilled SLP intervention for dysphagia is warranted.  SLP Frequency: 3x per week  Duration: 1 month  Treatment/Interventions:   - Oropharyngeal exercises  - Bolus trials  - Compensatory strategy training  - Diet tolerance/advancement  - Patient/caregiver education    Discussed POC: patient  Discussed Risks/Benefits: Yes  Patient/Caregiver Agreeable: Yes    Short term goals established 06/10/25:   Patient will tolerate current diet without overt s/s aspiration on 100% of therapeutic trials.      Long term goals established 06/10/25:   Patient will tolerate the least restrictive diet without overt s/s aspiration or further pulmonary compromise by time of discharge.      Education Provided:  Results and recommendations of MBSS reviewed with patient upon completion of exam. POC discussed at this time with need for modification of diet and compensatory swallowing strategies to maximize swallowing safety and efficiency. Verbal understanding and agreement given on all accounts.     Treatment Provided Today: N/A    Additional Medical Consults Suggested:   N/A    Repeat Study: Tentatively in 4-6 weeks contingent on display of overall improvement in condition, as pt appropriate, and radiology schedule permits.    Mechanics of the Swallow Summary:  ORAL PHASE:  Lip Closure - Intact  Tongue Control During Bolus Hold - Intact  Bolus prep/mastication - Impaired  Bolus transport/lingual motion - Impaired  Oral residue - Absent    PHARYNGEAL PHASE:  Initiation of pharyngeal swallow - Intact  Soft palate elevation - Intact  Laryngeal elevation - Intact  Anterior hyoid excursion - Impaired  Epiglottic movement - Intact (reduced during reswallows)  Laryngeal vestibule closure - Impaired  Pharyngeal  stripping wave - Impaired  Pharyngeal contraction (A/P view) - Not tested  Pharyngoesophageal segment opening - Intact  Tongue base retraction - Impaired  Pharyngeal residue - Present    ESOPHAGEAL PHASE:  Esophageal clearance - Impaired    SLP Impressions with Severity Rating:   Pt presents with mild-moderate oral and moderate pharyngeal dysphagia upon completion of modified barium swallow study this date. Swallowing physiology is characterized by the above noted deficits. Impairments that most negatively impact swallowing safety and efficiency include reduced TB retraction, hypomobility of PPW, reduced laryngeal vestibule closure, and inconsistent epiglottic deflection during reswallow attempts. Premature pharyngeal entry noted with all boluses. Solids extend to piriforms prior to swallow onset. Vallecular and piriform sinus residue noted with thin liquids and mildly/moderately thick liquids. Mild piriform sinus residue noted with purees and solids, which did not appear to accumulate and partially cleared with subsequent swallows. Laryngeal penetration and trace silent aspiration viewed with thin and mildly thick liquids mostly from overflow of piriform sinus residue into upper laryngeal vestibule. Aspirate with thin liquids remained in trachea and likely descended out of view as exam progressed. Unable to visually confirm if aspiration with mildly thick liquids effectively cleared from airway. Initially laryngeal penetration thought to have occurred with moderately thick liquids, however when subsequent boluses consumed following purees and solids, no airway entry could be identified with moderately thick liquid consistencies. No further laryngeal penetration was observed for any other consistency during the study. Retention of contrast in distal esophagus noted by end of exam. Question of partially due to adhesion of contrast along NGT present at time of exam. Modification of diet required to maximize swallowing  safety. If diet modifications and compensatory swallowing strategies not adhered to, patient will likely experience aspiration with recommended diet consistencies from residual overflow into airway. Patient appears able to recall strategies and exhibits good safety awareness for use of slow rate and small bolus size with implementation of reswallows. Chin tuck not trialed due to concern for anterior emptying of piriform sinus residue into upper laryngeal vestibule.    *Of note: The A-P/oblique bolus follow-through is not intended to be utilized as a diagnostic assessment of the esophagus, but rather as a tool to observe the biomechanical aspects of the swallow continuum to inform the need for further evaluation by medical specialists, as applicable.     OUTCOME MEASURES:  Functional Oral Intake Scale  Functional Oral Intake Scale: Level 5        total oral diet with multiple consistencies, but requires special preparations and compensations    Rosenbek's Penetration Aspiration Scale    Thin Liquids: 8. SILENT ASPIRATION - contrast passes glottis, visible residue, NO pt response]     Nectar Thick Liquids: 8. SILENT ASPIRATION - contrast passes glottis, visible residue, NO pt response]     Honey Thick Liquids: 1. NO ASPIRATION & NO PENETRATION - no aspiration, contrast does not enter airway    Puree: 1. NO ASPIRATION & NO PENETRATION - no aspiration, contrast does not enter airway    Solids: 1. NO ASPIRATION & NO PENETRATION - no aspiration, contrast does not enter airway

## 2025-06-10 NOTE — CARE PLAN
The clinical goals for the shift include Maintain safety    Over the shift, the patient did make progress toward the following goals. Barriers to progression include .       Problem: Pain - Adult  Goal: Verbalizes/displays adequate comfort level or baseline comfort level  Outcome: Progressing  Flowsheets (Taken 6/10/2025 1253)  Verbalizes/displays adequate comfort level or baseline comfort level:   Encourage patient to monitor pain and request assistance   Assess pain using appropriate pain scale   Administer analgesics based on type and severity of pain and evaluate response   Implement non-pharmacological measures as appropriate and evaluate response   Consider cultural and social influences on pain and pain management   Notify Licensed Independent Practitioner if interventions unsuccessful or patient reports new pain     Problem: Safety - Adult  Goal: Free from fall injury  Outcome: Progressing  Flowsheets (Taken 6/10/2025 1253)  Free from fall injury: Instruct family/caregiver on patient safety     Problem: Discharge Planning  Goal: Discharge to home or other facility with appropriate resources  Outcome: Progressing  Flowsheets (Taken 6/10/2025 1253)  Discharge to home or other facility with appropriate resources:   Identify barriers to discharge with patient and caregiver   Arrange for needed discharge resources and transportation as appropriate   Identify discharge learning needs (meds, wound care, etc)   Arrange for interpreters to assist at discharge as needed   Refer to discharge planning if patient needs post-hospital services based on physician order or complex needs related to functional status, cognitive ability or social support system     Problem: Chronic Conditions and Co-morbidities  Goal: Patient's chronic conditions and co-morbidity symptoms are monitored and maintained or improved  Outcome: Progressing  Flowsheets (Taken 6/10/2025 1253)  Care Plan - Patient's Chronic Conditions and Co-Morbidity  Symptoms are Monitored and Maintained or Improved:   Monitor and assess patient's chronic conditions and comorbid symptoms for stability, deterioration, or improvement   Collaborate with multidisciplinary team to address chronic and comorbid conditions and prevent exacerbation or deterioration   Update acute care plan with appropriate goals if chronic or comorbid symptoms are exacerbated and prevent overall improvement and discharge     Problem: Nutrition  Goal: Nutrient intake appropriate for maintaining nutritional needs  Outcome: Progressing     Problem: Fall/Injury  Goal: Not fall by end of shift  Outcome: Progressing  Goal: Be free from injury by end of the shift  Outcome: Progressing  Goal: Verbalize understanding of personal risk factors for fall in the hospital  Outcome: Progressing  Goal: Verbalize understanding of risk factor reduction measures to prevent injury from fall in the home  Outcome: Progressing  Goal: Use assistive devices by end of the shift  Outcome: Progressing  Goal: Pace activities to prevent fatigue by end of the shift  Outcome: Progressing     Problem: Skin  Goal: Decreased wound size/increased tissue granulation at next dressing change  Outcome: Progressing  Flowsheets (Taken 6/10/2025 1253)  Decreased wound size/increased tissue granulation at next dressing change: Protective dressings over bony prominences  Goal: Participates in plan/prevention/treatment measures  Outcome: Progressing  Flowsheets (Taken 6/10/2025 1253)  Participates in plan/prevention/treatment measures:   Elevate heels   Increase activity/out of bed for meals  Goal: Prevent/manage excess moisture  Outcome: Progressing  Flowsheets (Taken 6/10/2025 1253)  Prevent/manage excess moisture: Cleanse incontinence/protect with barrier cream  Goal: Prevent/minimize sheer/friction injuries  Outcome: Progressing  Flowsheets (Taken 6/10/2025 1253)  Prevent/minimize sheer/friction injuries: Increase activity/out of bed for  meals  Goal: Promote/optimize nutrition  Outcome: Progressing  Flowsheets (Taken 6/10/2025 1253)  Promote/optimize nutrition: Monitor/record intake including meals  Goal: Promote skin healing  Outcome: Progressing  Flowsheets (Taken 6/10/2025 1253)  Promote skin healing: Protective dressings over bony prominences

## 2025-06-10 NOTE — SIGNIFICANT EVENT
Rounded briefly on patient.  He reports that RD wants him to eat at least 75% of the next 3 meals before NG would be removed.  We did discuss dispo briefly, he would like to go home with PT/OT and then consider hospice.  Will follow up again in AM

## 2025-06-10 NOTE — PROGRESS NOTES
Music Therapy Note    Therapy Session  Referral Type: New referral this admission  Visit Type: Follow-up visit  Session Start Time: 1259  Session End Time: 1302  Intervention Delivery: In-person  Conflict of Service: None     Pre-assessment  Mood/Affect: Calm, Cooperative  Verbalized Emotional State: Acceptance         Treatment/Interventions  Music Therapy Interventions: Assessment    Post-assessment  Total Session Time (min): 3 minutes    Narrative  Assessment Detail: Pt was sitting up in bed with lunch tray, alert and pleased about being able to eat following this morning's MBS. Pt greeted the MT and  but requested they return at a later time for session.  Follow-up: Upon reattemt (13:35) pt was still eating lunch, requested f/u - MT staff will reattempt as able.    Education Documentation  No documentation found.

## 2025-06-10 NOTE — PROGRESS NOTES
Occupational Therapy Treatment    Name: Robert Luna  MRN: [id number]  Department: [facility]  Room: [address] [place] [address]  Date: 06/10/25  Time Calculation  Start Time: 1400  Stop Time: 1433  Time Calculation (min): 33 min    Assessment:  OT Assessment: Pt demonstrates progression in skilled OT services. Continue OT to optimize and promote independence with functional tasks. Recommend d/c to Low Intensity  Prognosis: Excellent  Barriers to Discharge Home: No anticipated barriers  Evaluation/Treatment Tolerance: Patient tolerated treatment well  Medical Staff Made Aware: Yes  End of Session Communication: Bedside nurse  End of Session Patient Position: Up in chair, Alarm on  Plan:  Treatment Interventions: ADL retraining, Endurance training, Patient/family training  OT Frequency: 3 times per week  OT Discharge Recommendations: Low intensity level of continued care  OT Recommended Transfer Status: Stand by assist, Assist of 1  OT - OK to Discharge: Yes    Subjective   Previous Visit Info:  OT Last Visit  OT Received On: 06/10/25  OT Missed Visit: Yes  Missed Visit Reason:  (Pt eating PM meal. Will f/u as schedule permits)    General:  General  Reason for Referral: 59 y.o. M admitted with concerns of symptomatic anemia; had been on Airvo but has been weaned to RA; failed MBS, and is currently NPO, but will have repeat MBS tomorrow.  Referred By: BRETT CRUZ)  Past Medical History Relevant to Rehab: KEV, HTN, HLD, aortic calve replacement  Prior to Session Communication: Bedside nurse  Patient Position Received: Bed, 2 rail up, Alarm off, not on at start of session  General Comment: Cleared and agreeable to participate in OT. (+)NGT to TF    Precautions:  Medical Precautions: Fall precautions       Pain Assessment:  Pain Assessment  Pain Assessment: 0-10  0-10 (Numeric) Pain Score: 0 - No pain     Objective     Cognition:  Overall Cognitive Status: Within Functional Limits    Activities of Daily Living: Grooming  Grooming Level of  Assistance: Close supervision, Minimum assistance  Grooming Where Assessed: Standing sinkside  Grooming Comments: Assist with thoroughness during shaving task with electric razor. Pt washing face and hands Close SUP    Toileting  Toileting Level of Assistance: Distant supervision  Where Assessed: Toilet    Functional Standing Tolerance:  Functional Standing Tolerance  Time: 6min  Activity: grooming skinside, functional mobility    Bed Mobility/Transfers: Bed Mobility  Bed Mobility: Yes  Bed Mobility 1  Bed Mobility 1: Supine to sitting  Level of Assistance 1: Modified independent    Transfers  Transfer: Yes  Transfer 1  Technique 1: Sit to stand, Stand to sit  Transfer Device 1: Walker  Transfer Level of Assistance 1: Close supervision, Distant supervision      Functional Mobility:  Functional Mobility  Functional Mobility Performed: Yes  Functional Mobility 1  Device 1: Rolling walker  Assistance 1: Close supervision  Sitting Balance:  Static Sitting Balance  Static Sitting-Level of Assistance: Independent  Dynamic Sitting Balance  Dynamic Sitting-Level of Assistance: Independent  Standing Balance:  Static Standing Balance  Static Standing-Balance Support:  (Unilateral UE support)  Static Standing-Level of Assistance: Distant supervision  Dynamic Standing Balance  Dynamic Standing-Balance Support: Bilateral upper extremity supported  Dynamic Standing-Level of Assistance: Close supervision       Outcome Measures:  [facility] Daily Activity  Putting on and taking off regular lower body clothing: A little  Bathing (including washing, rinsing, drying): A little  Putting on and taking off regular upper body clothing: None  Toileting, which includes using toilet, bedpan or urinal: A little  Taking care of personal grooming such as brushing teeth: A little  Eating Meals: None  Daily Activity - Total Score: 20        Education Documentation  Handouts, taught by Kylie Todd OT at 6/10/2025  3:48 PM.  Learner:  Patient  Readiness: Acceptance  Method: Explanation  Response: Verbalizes Understanding  Comment: Pt educated on safety with AD mgmt, hand placement, and purpose and goals of OT    Body Mechanics, taught by Kylie Todd OT at 6/10/2025  3:48 PM.  Learner: Patient  Readiness: Acceptance  Method: Explanation  Response: Verbalizes Understanding  Comment: Pt educated on safety with AD mgmt, hand placement, and purpose and goals of OT    ADL Training, taught by Kylie Todd OT at 6/10/2025  3:48 PM.  Learner: Patient  Readiness: Acceptance  Method: Explanation  Response: Verbalizes Understanding  Comment: Pt educated on safety with AD mgmt, hand placement, and purpose and goals of OT    Education Comments  No comments found.      Goals:  Encounter Problems       Encounter Problems (Active)       Bathing       LTG - Patient will utilize adaptive techniques to bathe body Independent       Start:  06/07/25    Expected End:  06/21/25               Dressings Extremities       LTG - Patient will dress UB and LB Independent       Start:  06/07/25    Expected End:  06/21/25               Grooming       LTG - Patient will complete daily grooming tasks Independent (Progressing)       Start:  06/07/25    Expected End:  06/21/25               Instrumental Activities of Daily Living       LTG - Patient will complete basic household management activities Independent to allow independent functioning at home       Start:  06/07/25    Expected End:  06/21/25            STG - Patient will verbalize/demonstrate use of 2 energy conservation techniques to promote safety and independence with functional tasks       Start:  06/07/25    Expected End:  06/21/25               Toileting       LTG - Patient will complete daily toileting tasks Independent (Progressing)       Start:  06/07/25    Expected End:  06/21/25

## 2025-06-10 NOTE — CARE PLAN
The patient's goals for the shift include      The clinical goals for the shift include maintain safety    Care plan progressing. Will continue to monitor.

## 2025-06-10 NOTE — PROGRESS NOTES
Spiritual Care Visit  Spiritual Care Request    Reason for Visit:  Routine Visit: Follow-up  Continue Visiting: Yes   Request Received From:  Referral From: Patient  Focus of Care:  Visited With: Patient   Refer to :  From Patient  Sense of Community and or Zoroastrianism Affiliation:  Denominational   Outcome:  1st attempt:  The was congratulated for having passed his swallow test. He was eating. It was stated that he would prefer to eat rather than have a consultation.  2nd attempt: 1:30 pm  The pt had not finished eating and was left enjoying his lunch.     There will be another visit scheduled if time permits.     Chaplain Mini Dimas

## 2025-06-10 NOTE — PROGRESS NOTES
"Robert Luna is a 59 y.o. male on day 14 of admission presenting with Symptomatic anemia.      Subjective   No acute events overnight. Patient states that his throat is uncomfortable however otherwise does not have any pain or shortness of breath.        Objective     Last Recorded Vitals  /63 (BP Location: Left arm, Patient Position: Lying)   Pulse 100   Temp 37.3 °C (99.1 °F) (Temporal)   Resp 18   Wt (!) 39.5 kg (87 lb 1.6 oz)   SpO2 95%   Intake/Output last 3 Shifts:    Intake/Output Summary (Last 24 hours) at 6/10/2025 1423  Last data filed at 6/9/2025 2151  Gross per 24 hour   Intake --   Output 275 ml   Net -275 ml       Admission Weight  Weight: (!) 40.6 kg (89 lb 8.1 oz) (05/28/25 1913)    Daily Weight  06/05/25 : (!) 39.5 kg (87 lb 1.6 oz)    Image Results  FL modified barium swallow study  Narrative: Interpreted By:  Maya Christie and Lovelace Elizabeth   STUDY:  FL MODIFIED BARIUM SWALLOW STUDY;; 6/10/2025 10:38 am      INDICATION:  Signs/Symptoms:rule out aspiration.          COMPARISON:  None.      ACCESSION NUMBER(S):  [id number]      ORDERING CLINICIAN:  CJ NGUYEN      TECHNIQUE:  Modified Barium Swallow Study completed. Informed verbal consent  obtained prior to completion of exam. Trials of thin liquids,  mildly/moderately thick liquids, purees, and solids were administered  for exam this date. AP view not obtained due to positioning/equipment  limitations and patient safety/mobility concerns. Barium tablet not  administered due to safety concerns.      SLP: ERIK Jeffries  Contact info: [employer]      SPEECH FINDINGS:      Reason for Referral: C/f aspiration/oropharyngeal dysphagia      Patient Hx:  \"Robert Luna is a 59 y.o. male with a history of aortic  valve replacement on warfarin who presented to [facility] ER  complaining of dyspnea on exertion that has become progressively  worse over the past 24 hours. He was transferred from [facility] to  [facility]" "Center for suspected occult GI blood loss with  symptomatic anemia \" Diagnosed with pneumonia and acute hypoxic  respiratory failure with need for Airvo 50L at 50%, which was  initiated on 6/3/25. Patient endorsing dysphagia with difficulty  swallowing and choking reported.      Respiratory Status:  room air  Current diet: NPO w/ alternate means of nutrition/hydration      Pain:  Patient exhibits no s/s of pain or discomfort during exam.      Clinical Presentation:  Patient alert and verbal with NGT placed upon  arrival to radiology suite. Patient seated fully upright for exam.  Cough detected at baseline appearing weak. Mental status adequate  with patient able to follow commands during exam.      RECOMMENDATIONS:  -SOFT & BITE SIZED diet (IDDSI Level 6)  -MODERATELY/HONEY thickened liquids (IDDSI Level 3)> NO MIXED  CONSISTENCIES *SLP TO FOLLOW TO DETERMINE PATIENT CANDIDACY FOR  CORBETT WATER PROTOCOL      STRATEGIES:  -Upright at 90 degrees for all po intake  -Slow rate of consumption  -Small bolus size  -Medication  whole/crushed in pudding or apple sauce  -Reswallow with each bite/sip      SLP PLAN:  Skilled SLP Services: Skilled SLP intervention for dysphagia is  warranted. SLP Frequency: 3x per week  Duration: 1 month  Treatment/Interventions:  - Oropharyngeal exercises  - Bolus trials  - Compensatory strategy training  - Diet tolerance/advancement  - Patient/caregiver education      Discussed POC: patient  Discussed Risks/Benefits: Yes  Patient/Caregiver Agreeable: Yes      Short term goals established 06/10/25:  Patient will tolerate current diet without overt s/s aspiration on  100% of therapeutic trials.          Long term goals established 06/10/25:  Patient will tolerate the least restrictive diet without overt s/s  aspiration or further pulmonary compromise by time of discharge.          Education Provided:  Results and recommendations of MBSS reviewed  with patient upon completion of exam. POC " discussed at this time with  need for modification of diet and compensatory swallowing strategies  to maximize swallowing safety and efficiency. Verbal understanding  and agreement given on all accounts.      Treatment Provided Today: N/A      Additional Medical Consults Suggested:  N/A      Repeat Study: Tentatively in 4-6 weeks contingent on display of  overall improvement in condition, as pt appropriate, and radiology  schedule permits.      Mechanics of the Swallow Summary:  ORAL PHASE:  Lip Closure - Intact  Tongue Control During Bolus Hold - Intact  Bolus prep/mastication - Impaired  Bolus transport/lingual motion - Impaired  Oral residue - Absent      PHARYNGEAL PHASE:  Initiation of pharyngeal swallow - Intact  Soft palate elevation - Intact  Laryngeal elevation - Intact  Anterior hyoid excursion - Impaired  Epiglottic movement - Intact (reduced during reswallows)  Laryngeal vestibule closure - Impaired  Pharyngeal stripping wave - Impaired  Pharyngeal contraction (A/P view) - Not tested  Pharyngoesophageal segment opening - Intact  Tongue base retraction - Impaired  Pharyngeal residue - Present      ESOPHAGEAL PHASE:  Esophageal clearance - Impaired      SLP Impressions with Severity Rating:  Pt presents with mild-moderate oral and moderate pharyngeal dysphagia  upon completion of modified barium swallow study this date.  Swallowing physiology is characterized by the above noted deficits.  Impairments that most negatively impact swallowing safety and  efficiency include reduced TB retraction, hypomobility of PPW,  reduced laryngeal vestibule closure, and inconsistent epiglottic  deflection during reswallow attempts. Premature pharyngeal entry  noted with all boluses. Solids extend to piriforms prior to swallow  onset. Vallecular and piriform sinus residue noted with thin liquids  and mildly/moderately thick liquids. Mild piriform sinus residue  noted with purees and solids, which did not appear to  accumulate and  partially cleared with subsequent swallows. Laryngeal penetration and  trace silent aspiration viewed with thin and mildly thick liquids  mostly from overflow of piriform sinus residue into upper laryngeal  vestibule. Aspirate with thin liquids remained in trachea and likely  descended out of view as exam progressed. Unable to visually confirm  if aspiration with mildly thick liquids effectively cleared from  airway. Initially laryngeal penetration thought to have occurred with  moderately thick liquids, however when subsequent boluses consumed  following purees and solids, no airway entry could be identified with  moderately thick liquid consistencies. No further laryngeal  penetration was observed for any other consistency during the study.  Retention of contrast in distal esophagus noted by end of exam.  Question of partially due to adhesion of contrast along NGT present  at time of exam. Modification of diet required to maximize swallowing  safety. If diet modifications and compensatory swallowing strategies  not adhered to, patient will likely experience aspiration with  recommended diet consistencies from residual overflow into airway.  Patient appears able to recall strategies and exhibits good safety  awareness for use of slow rate and small bolus size with  implementation of reswallows. Chin tuck not trialed due to concern  for anterior emptying of piriform sinus residue into upper laryngeal  vestibule.      *Of note: The A-P/oblique bolus follow-through is not intended to be  utilized as a diagnostic assessment of the esophagus, but rather as a  tool to observe the biomechanical aspects of the swallow continuum to  inform the need for further evaluation by medical specialists, as  applicable.      OUTCOME MEASURES:  Functional Oral Intake Scale  Functional Oral Intake Scale: Level 5        total oral diet with  multiple consistencies, but requires special preparations and  compensations       Rosenbek's Penetration Aspiration Scale      Thin Liquids: 8. SILENT ASPIRATION - contrast passes glottis, visible  residue, NO pt response      Nectar Thick Liquids: 8. SILENT ASPIRATION - contrast passes glottis,  visible residue, NO pt response      Honey Thick Liquids: 1. NO ASPIRATION & NO PENETRATION - no  aspiration, contrast does not enter airway      Puree: 1. NO ASPIRATION & NO PENETRATION - no aspiration, contrast  does not enter airway      Solids: 1. NO ASPIRATION & NO PENETRATION - no aspiration, contrast  does not enter airway          Speech Therapy section of this report signed by MIRIAM Ndiaye/SLP on 6/10/2025 at 11:16 am.      RADIOLOGY FINDINGS:  Patient is edentulous. Question increased density of the mandible.  Nasoenteric tube in place. Cervical spine spondylosis. No significant  contrast retention on screening evaluation of the esophagus.      Radiology section of this report signed by BRIE Christie MD.      Impression: Per speech therapy:      Pt presents with mild-moderate oral and moderate pharyngeal dysphagia  upon completion of modified barium swallow study this date.  Swallowing physiology is characterized by the above noted deficits.  Impairments that most negatively impact swallowing safety and  efficiency include reduced TB retraction, hypomobility of PPW,  reduced laryngeal vestibule closure, and inconsistent epiglottic  deflection during reswallow attempts. Premature pharyngeal entry  noted with all boluses. Solids extend to piriforms prior to swallow  onset. Vallecular and piriform sinus residue noted with thin liquids  and mildly/moderately thick liquids. Mild piriform sinus residue  noted with purees and solids, which did not appear to accumulate and  partially cleared with subsequent swallows. Laryngeal penetration and  trace silent aspiration viewed with thin and mildly thick liquids  mostly from overflow of piriform sinus residue into upper laryngeal  vestibule.  Aspirate with thin liquids remained in trachea and likely  descended out of view as exam progressed. Unable to visually confirm  if aspiration with mildly thick liquids effectively cleared from  airway. Initially laryngeal penetration thought to have occurred with  moderately thick liquids, however when subsequent boluses consumed  following purees and solids, no airway entry could be identified with  moderately thick liquid consistencies. No further laryngeal  penetration was observed for any other consistency during the study.  Retention of contrast in distal esophagus noted by end of exam.  Question of partially due to adhesion of contrast along NGT present  at time of exam. Modification of diet required to maximize swallowing  safety. If diet modifications and compensatory swallowing strategies  not adhered to, patient will likely experience aspiration with  recommended diet consistencies from residual overflow into airway.  Patient appears able to recall strategies and exhibits good safety  awareness for use of slow rate and small bolus size with  implementation of reswallows. Chin tuck not trialed due to concern  for anterior emptying of piriform sinus residue into upper laryngeal  vestibule.      MACRO:  None      Signed by: Maya Christie 6/10/2025 1:58 PM  Dictation workstation:   [id number]      Physical Exam  Vitals reviewed.   Constitutional:       General: He is not in acute distress.  Cardiovascular:      Rate and Rhythm: Normal rate and regular rhythm.   Pulmonary:      Effort: Pulmonary effort is normal.      Breath sounds: Rales present.   Abdominal:      General: There is no distension.      Palpations: Abdomen is soft.   Neurological:      Mental Status: He is alert. Mental status is at baseline.   Relevant Results                              Assessment & Plan  Symptomatic anemia    Aortic valve replaced          Malnutrition Diagnosis Status: Active  Malnutrition Diagnosis: Severe malnutrition  related to chronic disease or condition  Related to: dysphagia  As Evidenced by: reported intake <75% estimated needs for > 2 months, moderate-severe muscle & subcutaneous fat depletion  I agree with the dietitian's malnutrition diagnosis.    Patient is awaiting MBSS today. Discharged and further treatment planning pends on the results of this test     Acute Problems     Acute hypoxic respiratory failure - resolved  Aspiration PNA/pneumonitis - resolved  Severe stenosis of his mechanical aortic valve as well as severe mitral regurgitation  Elevated RV pressures  Bilateral pleural effusions  Contraction alkalosis - resolved  Hypokalemia - resolved  Iron def anemia  Possible occult GI blood loss     Chronic Problems     Mechanical aortic valve replacement - INR goal 2-3  Ascending aorta repair  HTN     Plan     - SLP appreciated -> failed MBS. NPO rec for now with considerations for GOC/alternative means of nutrition. Currently has NG tube receiving TF. He is scheduled for repeat MBS 6/10  - pal care following -> DNR/DNI, hospice meeting it looks like patient wants a trial of PT/OT and will consider hospice down the road. However PT/OT rec low, looking unlikely he will be able to go to acute rehab or SNF. TCC to re-engage hospice navigator to talk about discharge planning  - pulm signed off -> treated for aspiration PNA/pneumonitis. Weaned to room air at this point  - ID signed off -> completed his course of IV Unasyn  - cardiology signed off -> can consider afterload reduction/diuresis, not a surgical candidate. He did receive some IV diuresis with good response. Started on PO lasix 20mg daily to try and maintain euvolemia  - GI signed off -> EGD/Colon with no old or active bleeding. No abnormality to explain anemia. Outpatient VCE can be considered for complete evaluation of MAHESH. Candida esophagitis started on PO fluconazole for 2 week course.    - Coumadin as dosed by pharmacy for hx of AVR, mindful of INR while on  fluconazole  - IV Venofer 300 mg x3 doses completed; 1 unit pRBC this admit. Trend hgb. On PO iron supplement now  - PT/OT evaluations -> rec low     Fluids: None  Electrolytes: Replete as needed  Nutrition: Tube feed  Ortiz: None  Invasive lines: None  Drains: NG tube  O2: NC     DVT Prophylaxis:  Coumadin     Renee Negrete MD

## 2025-06-11 LAB
ALBUMIN SERPL BCP-MCNC: 3.4 G/DL (ref 3.4–5)
ANION GAP SERPL CALC-SCNC: 13 MMOL/L (ref 10–20)
BUN SERPL-MCNC: 26 MG/DL (ref 6–23)
CALCIUM SERPL-MCNC: 9.2 MG/DL (ref 8.6–10.3)
CHLORIDE SERPL-SCNC: 103 MMOL/L (ref 98–107)
CO2 SERPL-SCNC: 24 MMOL/L (ref 21–32)
CREAT SERPL-MCNC: 0.46 MG/DL (ref 0.5–1.3)
EGFRCR SERPLBLD CKD-EPI 2021: >90 ML/MIN/1.73M*2
ERYTHROCYTE [DISTWIDTH] IN BLOOD BY AUTOMATED COUNT: 27.1 % (ref 11.5–14.5)
GLUCOSE SERPL-MCNC: 105 MG/DL (ref 74–99)
HCT VFR BLD AUTO: 30.6 % (ref 41–52)
HGB BLD-MCNC: 9.7 G/DL (ref 13.5–17.5)
INR PPP: 1.5 (ref 0.9–1.1)
LABORATORY COMMENT REPORT: NORMAL
MCH RBC QN AUTO: 26.9 PG (ref 26–34)
MCHC RBC AUTO-ENTMCNC: 31.7 G/DL (ref 32–36)
MCV RBC AUTO: 85 FL (ref 80–100)
NRBC BLD-RTO: 0 /100 WBCS (ref 0–0)
PATH REPORT.FINAL DX SPEC: NORMAL
PATH REPORT.GROSS SPEC: NORMAL
PATH REPORT.TOTAL CANCER: NORMAL
PHOSPHATE SERPL-MCNC: 4.5 MG/DL (ref 2.5–4.9)
PLATELET # BLD AUTO: 252 X10*3/UL (ref 150–450)
POTASSIUM SERPL-SCNC: 4.6 MMOL/L (ref 3.5–5.3)
PROTHROMBIN TIME: 16.9 SECONDS (ref 9.8–12.4)
RBC # BLD AUTO: 3.6 X10*6/UL (ref 4.5–5.9)
SODIUM SERPL-SCNC: 135 MMOL/L (ref 136–145)
WBC # BLD AUTO: 8.1 X10*3/UL (ref 4.4–11.3)

## 2025-06-11 PROCEDURE — 1100000001 HC PRIVATE ROOM DAILY

## 2025-06-11 PROCEDURE — 99232 SBSQ HOSP IP/OBS MODERATE 35: CPT | Performed by: STUDENT IN AN ORGANIZED HEALTH CARE EDUCATION/TRAINING PROGRAM

## 2025-06-11 PROCEDURE — 2500000001 HC RX 250 WO HCPCS SELF ADMINISTERED DRUGS (ALT 637 FOR MEDICARE OP): Performed by: NURSE PRACTITIONER

## 2025-06-11 PROCEDURE — 97116 GAIT TRAINING THERAPY: CPT | Mod: GP,CQ

## 2025-06-11 PROCEDURE — 94640 AIRWAY INHALATION TREATMENT: CPT

## 2025-06-11 PROCEDURE — 2500000001 HC RX 250 WO HCPCS SELF ADMINISTERED DRUGS (ALT 637 FOR MEDICARE OP): Performed by: STUDENT IN AN ORGANIZED HEALTH CARE EDUCATION/TRAINING PROGRAM

## 2025-06-11 PROCEDURE — 36415 COLL VENOUS BLD VENIPUNCTURE: CPT | Performed by: STUDENT IN AN ORGANIZED HEALTH CARE EDUCATION/TRAINING PROGRAM

## 2025-06-11 PROCEDURE — 85027 COMPLETE CBC AUTOMATED: CPT | Performed by: NURSE PRACTITIONER

## 2025-06-11 PROCEDURE — 80069 RENAL FUNCTION PANEL: CPT | Performed by: STUDENT IN AN ORGANIZED HEALTH CARE EDUCATION/TRAINING PROGRAM

## 2025-06-11 PROCEDURE — 2500000002 HC RX 250 W HCPCS SELF ADMINISTERED DRUGS (ALT 637 FOR MEDICARE OP, ALT 636 FOR OP/ED): Performed by: NURSE PRACTITIONER

## 2025-06-11 PROCEDURE — 85610 PROTHROMBIN TIME: CPT | Performed by: NURSE PRACTITIONER

## 2025-06-11 PROCEDURE — 2500000002 HC RX 250 W HCPCS SELF ADMINISTERED DRUGS (ALT 637 FOR MEDICARE OP, ALT 636 FOR OP/ED)

## 2025-06-11 PROCEDURE — 92526 ORAL FUNCTION THERAPY: CPT | Mod: GN

## 2025-06-11 RX ADMIN — IPRATROPIUM BROMIDE AND ALBUTEROL SULFATE 3 ML: 2.5; .5 SOLUTION RESPIRATORY (INHALATION) at 07:13

## 2025-06-11 RX ADMIN — WARFARIN SODIUM 1 MG: 1 TABLET ORAL at 17:07

## 2025-06-11 RX ADMIN — FUROSEMIDE 20 MG: 20 TABLET ORAL at 08:56

## 2025-06-11 RX ADMIN — FLUCONAZOLE 200 MG: 100 TABLET ORAL at 08:57

## 2025-06-11 RX ADMIN — IPRATROPIUM BROMIDE AND ALBUTEROL SULFATE 3 ML: 2.5; .5 SOLUTION RESPIRATORY (INHALATION) at 19:08

## 2025-06-11 RX ADMIN — FERROUS SULFATE TAB 325 MG (65 MG ELEMENTAL FE) 1 TABLET: 325 (65 FE) TAB at 08:56

## 2025-06-11 RX ADMIN — FLUTICASONE PROPIONATE 2 SPRAY: 50 SPRAY, METERED NASAL at 08:59

## 2025-06-11 RX ADMIN — METOPROLOL SUCCINATE 25 MG: 25 TABLET, EXTENDED RELEASE ORAL at 08:56

## 2025-06-11 ASSESSMENT — COGNITIVE AND FUNCTIONAL STATUS - GENERAL
CLIMB 3 TO 5 STEPS WITH RAILING: A LITTLE
DAILY ACTIVITIY SCORE: 24
MOVING TO AND FROM BED TO CHAIR: A LITTLE
STANDING UP FROM CHAIR USING ARMS: A LITTLE
CLIMB 3 TO 5 STEPS WITH RAILING: A LITTLE
WALKING IN HOSPITAL ROOM: A LITTLE
MOBILITY SCORE: 22
WALKING IN HOSPITAL ROOM: A LITTLE
MOBILITY SCORE: 20

## 2025-06-11 ASSESSMENT — PAIN - FUNCTIONAL ASSESSMENT
PAIN_FUNCTIONAL_ASSESSMENT: 0-10
PAIN_FUNCTIONAL_ASSESSMENT: 0-10

## 2025-06-11 ASSESSMENT — PAIN SCALES - GENERAL
PAINLEVEL_OUTOF10: 0 - NO PAIN
PAINLEVEL_OUTOF10: 3
PAINLEVEL_OUTOF10: 0 - NO PAIN

## 2025-06-11 NOTE — PROGRESS NOTES
Physical Therapy    Physical Therapy Treatment    Patient Name: Robert Luna  MRN: [id number]  Department: [place] [address]  Room: [address] [place] [address]  Today's Date: 6/11/2025  Time Calculation  Start Time: 1414  Stop Time: 1440  Time Calculation (min): 26 min         Assessment/Plan   PT Assessment  Rehab Prognosis: Good  Barriers to Discharge Home: No anticipated barriers  End of Session Communication: Bedside nurse  Assessment Comment: Pt demonstrates fair activity tolerance. No overt LOB noted while performing mobility. One extended seated rest break required.  End of Session Patient Position: Bed, 3 rail up, Alarm on  PT Plan  Inpatient/Swing Bed or Outpatient: Inpatient  PT Plan  Treatment/Interventions: Bed mobility, Transfer training, Gait training  PT Plan: Ongoing PT  PT Frequency: 3 times per week  PT Discharge Recommendations: Low intensity level of continued care  Equipment Recommended upon Discharge: Wheeled walker  PT Recommended Transfer Status: Stand by assist  PT - OK to Discharge: Yes (per POC)    PT Visit Info:  PT Received On: 06/11/25     General Visit Information:   General  Reason for Referral: 59 y.o. M admitted with concerns of symptomatic anemia; had been on Airvo but has been weaned to RA; failed MBS, and is currently NPO, but will have repeat MBS tomorrow.  Referred By: BRETT CRUZ)  Past Medical History Relevant to Rehab: KEV, HTN, HLD, aortic calve replacement  Prior to Session Communication: Bedside nurse  Patient Position Received: Up in chair, Alarm on    Subjective   Precautions:  Precautions  Medical Precautions: Fall precautions  Precautions Comment: NG tube     Date/Time Vitals Session Patient Position Pulse Resp SpO2 BP MAP (mmHg)    06/11/25 1514 --  --  100  18  98 %  104/63  77                 Objective   Pain:  Pain Assessment  Pain Assessment: 0-10  0-10 (Numeric) Pain Score: 3  Pain Type: Acute pain  Pain Location: Back  Cognition:  Cognition  Orientation Level: Oriented X4       Postural  Control:  Static Sitting Balance  Static Sitting-Balance Support: Feet supported, No upper extremity supported  Static Sitting-Level of Assistance: Independent  Static Standing Balance  Static Standing-Balance Support: Bilateral upper extremity supported  Static Standing-Level of Assistance: Distant supervision       Treatments:     Bed Mobility  Bed Mobility: Yes  Bed Mobility 1  Bed Mobility 1: Sitting to supine  Level of Assistance 1: Distant supervision  Bed Mobility Comments 1: Pt required no use of bed rail, good eccentric control noted.    Ambulation/Gait Training  Ambulation/Gait Training Performed: Yes  Ambulation/Gait Training 1  Surface 1: Level tile  Device 1: Rolling walker  Gait Support Devices: Gait belt  Assistance 1: Close supervision  Quality of Gait 1: Wide base of support, Diminished heel strike, Decreased step length  Comments/Distance (ft) 1: 175, 225 (Pt requires VC to remain within CORI of WW. No overt LOB noted. One extended seated rest break required due to fatigue.)  Transfers  Transfer: Yes  Transfer 1  Transfer From 1: Chair with arms to, Chair without arms to  Transfer to 1: Stand  Technique 1: Sit to stand, Stand to sit  Transfer Device 1: Walker, Gait belt  Transfer Level of Assistance 1: Distant supervision  Trials/Comments 1: No physical assist required to perform. Good hand placement and safety.    Outcome Measures:  [facility] Basic Mobility  Turning from your back to your side while in a flat bed without using bedrails: None  Moving from lying on your back to sitting on the side of a flat bed without using bedrails: None  Moving to and from bed to chair (including a wheelchair): A little  Standing up from a chair using your arms (e.g. wheelchair or bedside chair): A little  To walk in hospital room: A little  Climbing 3-5 steps with railing: A little  Basic Mobility - Total Score: 20    Education Documentation  Body Mechanics, taught by Elda Eden PTA at 6/11/2025  3:36  PM.  Learner: Patient  Readiness: Acceptance  Method: Explanation  Response: Verbalizes Understanding    Mobility Training, taught by Elda Eden PTA at 6/11/2025  3:36 PM.  Learner: Patient  Readiness: Acceptance  Method: Explanation  Response: Verbalizes Understanding    Education Comments  No comments found.           Encounter Problems       Encounter Problems (Active)       Balance       Goal 1 (Progressing)       Start:  06/07/25    Expected End:  06/21/25       Pt performs all sitting and standing balance IND            Mobility       STG - Patient will navigate 2 steps with LRAD and standby assist       Start:  06/07/25    Expected End:  06/21/25            STG - Patient will ambulate (Progressing)       Start:  06/07/25    Expected End:  06/21/25       >150 ft IND using LRAD with proper gait mechanics            PT Transfers       STG - Patient will transfer sit to and from stand (Progressing)       Start:  06/07/25    Expected End:  06/21/25       IND using LRAD with proper body mechanics            Pain - Adult

## 2025-06-11 NOTE — SIGNIFICANT EVENT
Palliative care sign off note.  Palliative care consulted to address goals  of care.  These have been established and current plan is to utilize home PT/OT and then access hospice services afterwards.  [place] Portable DNRCCA form is at bedside for transport.  Palliative care remains available for re-consult if patient, family, or team's needs change.  Thank you for inviting us to participate in care.

## 2025-06-11 NOTE — PROGRESS NOTES
Robert Luna is a 59 y.o. male admitted for symptomatic anemia. Pharmacy has been consulted for warfarin dosing and monitoring for Aortic Valve Replacement with goal INR of 2.0-3.0.      Home regimen    MON TUE WED THR FRI SAT SUN   Dose 1  mg 1.5  mg 1  mg 1.5  mg 1.5  mg 1  mg 1.5  mg   Total weekly dose: 9 mg  Source: [facility] Note from 5/22/2025    Labs  INR: 1.5  Hgb/Hct/Plt  Lab Results   Component Value Date    HGB 9.7 (L) 06/11/2025    HGB 10.1 (L) 06/10/2025    HGB 9.7 (L) 06/09/2025    HGB 9.0 (L) 06/08/2025    HGB 9.3 (L) 06/07/2025        Lab Results   Component Value Date    HCT 30.6 (L) 06/11/2025    HCT 32.4 (L) 06/10/2025    HCT 32.3 (L) 06/09/2025    HCT 29.7 (L) 06/08/2025    HCT 29.3 (L) 06/07/2025        Platelets   Date Value Ref Range Status   06/11/2025 252 150 - 450 x10*3/uL Final   06/10/2025 236 150 - 450 x10*3/uL Final   06/09/2025 217 150 - 450 x10*3/uL Final   06/08/2025 193 150 - 450 x10*3/uL Final   06/07/2025 202 150 - 450 x10*3/uL Final      Warfarin Therapy   Current regimen: resuming home reigmen   Bridging: None needed  Interacting medications: interacting medication(s) of fluconazole  increase bleeding risk    Dosing History During Current Admission  Date 5/28 5/29 5/30 5/31 6/1 6/2 6/3 6/4 6/5 6/6 6/7 6/8 6/9 6/10 6/11   INR 1.9 2.2 2.9 2.1 1.9 2.1 2.2 3.7 3.8 2.0 1.5 1.6 1.5 1.4 1.5   Dose  (mg) See plan 1.5 mg 1.5 mg 1 mg 1.5 mg 1 mg Hold Hold Hold 1.5 mg 1 mg 1 mg 1.5 3 mg 1 mg        Assessment and Plan  Patient's INR of 1.5 today is subtherapeutic. Hemoglobin/hematocrit/platelet stable.   Warfarin inpatient plan: Give home regimen 1 mg today- hopefully INR will increase tomorrow or he will need another boost dose  Original plan to hold Warfarin on Tue/Sat due to Diflucan treatment for Candida. INR has been subtherapeutic after 5 days of new plan so recommend resuming previous regimen of 1 mg on M/W/Sat and 1.5 mg all other days.   Continue to monitor s/sx of bleeding  including epistaxis, hematuria, unusual bruising, hemoptysis, hematochezia as well as s/sx of stroke including impaired speech, unilateral paralysis, blurry vision.  Pharmacy will continue to monitor the patient and adjust therapy as needed.  Discharge Plan: resume home regimen of 1 mg on M/W/Sat and 1.5 mg all other days. Follow-up INR check ASAP     Thank you for the consult. Please do not hesitate to contact a pharmacist with any questions.    Vaishali Cloud, PharmD

## 2025-06-11 NOTE — CARE PLAN
The clinical goals for the shift include pt remains hds    Over the shift, the patient did not make progress toward the following goals. Barriers to progression include       Problem: Pain - Adult  Goal: Verbalizes/displays adequate comfort level or baseline comfort level  Outcome: Progressing  Flowsheets (Taken 6/11/2025 1215)  Verbalizes/displays adequate comfort level or baseline comfort level:   Encourage patient to monitor pain and request assistance   Assess pain using appropriate pain scale   Administer analgesics based on type and severity of pain and evaluate response   Implement non-pharmacological measures as appropriate and evaluate response   Consider cultural and social influences on pain and pain management   Notify Licensed Independent Practitioner if interventions unsuccessful or patient reports new pain     Problem: Safety - Adult  Goal: Free from fall injury  Outcome: Progressing  Flowsheets (Taken 6/11/2025 1215)  Free from fall injury: Instruct family/caregiver on patient safety     Problem: Discharge Planning  Goal: Discharge to home or other facility with appropriate resources  Outcome: Progressing  Flowsheets (Taken 6/11/2025 1215)  Discharge to home or other facility with appropriate resources:   Identify barriers to discharge with patient and caregiver   Arrange for needed discharge resources and transportation as appropriate   Identify discharge learning needs (meds, wound care, etc)   Arrange for interpreters to assist at discharge as needed   Refer to discharge planning if patient needs post-hospital services based on physician order or complex needs related to functional status, cognitive ability or social support system     Problem: Chronic Conditions and Co-morbidities  Goal: Patient's chronic conditions and co-morbidity symptoms are monitored and maintained or improved  Outcome: Progressing  Flowsheets (Taken 6/11/2025 1215)  Care Plan - Patient's Chronic Conditions and Co-Morbidity  Symptoms are Monitored and Maintained or Improved:   Monitor and assess patient's chronic conditions and comorbid symptoms for stability, deterioration, or improvement   Collaborate with multidisciplinary team to address chronic and comorbid conditions and prevent exacerbation or deterioration   Update acute care plan with appropriate goals if chronic or comorbid symptoms are exacerbated and prevent overall improvement and discharge     Problem: Nutrition  Goal: Nutrient intake appropriate for maintaining nutritional needs  Outcome: Progressing     Problem: Fall/Injury  Goal: Not fall by end of shift  Outcome: Progressing  Goal: Be free from injury by end of the shift  Outcome: Progressing  Goal: Verbalize understanding of personal risk factors for fall in the hospital  Outcome: Progressing  Goal: Verbalize understanding of risk factor reduction measures to prevent injury from fall in the home  Outcome: Progressing  Goal: Use assistive devices by end of the shift  Outcome: Progressing  Goal: Pace activities to prevent fatigue by end of the shift  Outcome: Progressing     Problem: Skin  Goal: Decreased wound size/increased tissue granulation at next dressing change  Outcome: Progressing  Flowsheets (Taken 6/11/2025 1215)  Decreased wound size/increased tissue granulation at next dressing change: Protective dressings over bony prominences  Goal: Participates in plan/prevention/treatment measures  Outcome: Progressing  Flowsheets (Taken 6/11/2025 1215)  Participates in plan/prevention/treatment measures:   Elevate heels   Increase activity/out of bed for meals  Goal: Prevent/manage excess moisture  Outcome: Progressing  Flowsheets (Taken 6/11/2025 1215)  Prevent/manage excess moisture:   Monitor for/manage infection if present   Follow provider orders for dressing changes  Goal: Prevent/minimize sheer/friction injuries  Outcome: Progressing  Flowsheets (Taken 6/11/2025 1215)  Prevent/minimize sheer/friction injuries:  Increase activity/out of bed for meals  Goal: Promote/optimize nutrition  Outcome: Progressing  Flowsheets (Taken 6/11/2025 1215)  Promote/optimize nutrition: Monitor/record intake including meals  Goal: Promote skin healing  Outcome: Progressing  Flowsheets (Taken 6/11/2025 1215)  Promote skin healing: Protective dressings over bony prominences

## 2025-06-11 NOTE — PROGRESS NOTES
"Nutrition Progress Note  Nutrition Assessment      History:  Energy Intake: Fair 50-75 %  Food and Nutrient History: SLP advanced diet to soft & bite sized with moderately thick liquids.  EN order was discontinued.  Recieved EN until noon, now off.  Eating 50-75% of his last 3 meals.  Reports his appetite is good and he likes Magic cup.  Discussed with care team & plan is to d/c NG if eats >50% of meals today.  Food Intolerance: Milk/lactose    Anthropometrics:  Height: 149.9 cm (4' 11.02\")  Weight: (!) 39.5 kg (87 lb 1.6 oz)  BMI (Calculated): 17.58    Weight Change: -1.36    Weight History / % Weight Change: 12/19/24: 40.4kg, 10/17/24: 39.9kg, 3/6/24: 43.5kg.  Reports UBW of #.  Significant Weight Loss: No  IBW/kg (Dietitian Calculated): 47.3 kg  Percent of IBW: 84 %   Amputation Calculations:  BMI Amputation Adjustment: No    Energy Needs:  Method for Estimating Needs: 1400- 1460 @ 34-36 kcal/kg    Method for Estimating 24 Hour Protein Needs: 54-63 @ 1.2-1.4 gr/kg IBW    Total Fluid Estimated Needs in 24 Hours (mL): 1350 mL  Total Fluid Estimated Needs in 24 hours (mL/kg): 30 mL/kg     Dietary Orders  Adult diet Regular; Soft and bite sized 6; Moderately thick 3   Oral nutritional supplements    Select supplement: Magic Cup BID     Comments: No mixed consistencies.      Nutrition Diagnosis   Malnutrition Diagnosis  Patient has Malnutrition Diagnosis: Yes  Diagnosis Status: Active  Malnutrition Diagnosis: Severe malnutrition related to chronic disease or condition  Related to: dysphagia  As Evidenced by: reported intake <75% estimated needs for > 2 months, moderate-severe muscle & subcutaneous fat depletion    Patient has Nutrition Diagnosis: Yes  Nutrition Diagnosis 1: Inadequate protein energy intake  Diagnosis Status (1): Active  Related to (1): dysphagia  As Evidenced by (1): need of TF/modified textured diet     Nutrition Interventions/Recommendations   Nutrition Prescription: Nutrition prescription for " oral nutrition  Individualized Nutrition Prescription Provided for : continue diet texture per SLP recommendations.  Discussed importance of adequate fluid intake while on thickened liquids and patient understands.  Continue Magic cup BID.    Food and/or Nutrient Delivery Interventions  Meals and Snacks: General healthful diet, Texture-modified diet  Goal: intake meets >75% estimated nutrient needs.     Medical Food Supplement: Commercial food medical food supplement therapy  Goal: Magic cups provide 290kcals and 9g of protein per serving    Collaboration and Referral of Nutrition Care: Collaboration by nutrition professional with other providers  Coordination of Care with Providers: Nursing, Provider    Nutrition Monitoring and Evaluation   Food and Nutrient Related History   Intake / Amount of food: Consumes at least 75% or more of meals/snacks/supplements    Anthropometrics: Body Composition/Growth/Weight History  Body Weight: Body weight - Promote weight restoration    Body Weight Change: Body weight gain - Gradual weight gain    Biochemical Data, Medical Tests and Procedures  Electrolyte and Renal Panel: BUN, Calcium, serum, Chloride, Creatinine, Magnesium, Phosphorus, Potassium, Sodium  Criteria: as indicated    Glucose/Endocrine Profile: Glucose within normal limits ( mg/dL)  Criteria: as indicated    Nutritional Anemia Profile: Folate, serum, Hematocrit, Hemoglobin, Iron, serum  Criteria: as indicated    Vitamin Profile: Vitamin D, 25 hydroxy, Other (Comment)  Criteria: as indicated    Nutrition Focused Physical Findings   Digestive System Finding: Anorexia, Constipation, Diarrhea, Early satiety, Nausea, Vomiting  Criteria: daily    Mouth Finding: Dysphagia  Criteria: daily    Last Date of Nutrition Visit: 06/11/25  Nutrition Follow-Up Needed?: Dietitian to reassess per policy  Follow up Comment: [facility]        no

## 2025-06-11 NOTE — PROGRESS NOTES
06/11/25 0721   Discharge Planning   Assistance Needed Patient passed repeat MBS yesterday- on soft/bite sized food and honey thick liquids. Per RD patient to tolerate 50% of meals before removing NGT. Current dc plan home with [facility]. TCC to continue to follow.   Expected Discharge Disposition Home H     I met with patient at bedside to discuss dc plan. He confirmed plan is to dc home with [facility]. Will need referral placed.

## 2025-06-11 NOTE — PROGRESS NOTES
Physical Therapy                 Therapy Communication Note    Patient Name: Robert Luna  MRN: [id number]  Department: [place] [address]  Room: [address] [place] [address]  Today's Date: 6/11/2025     Discipline: Physical Therapy    Missed Visit: PT Missed Visit: Yes     Missed Visit Reason: Missed Visit Reason: Patient refused (Attempted PT tx, pt in bathroom, requesting PT come back at a later time. RN notified.)    Missed Time: Attempt

## 2025-06-11 NOTE — PROGRESS NOTES
Speech-Language Pathology    Adult Inpatient Swallow Treatment    Patient Name: Robert Luna  MRN: [id number]  : 1965  Today's Date: 25   Time Calculation  Start Time: 840  Stop Time: 909  Time Calculation (min): 29 min       Impression:   Swallow treatment completed. Patient seen to assess tolerance of recommended diet. Patient with wet quality to voice upon awakening as SLP entered room. Encouraged patient to expectorate and suction secretions, which yielded a clear vocal quality. HOB elevated prior to initiation of po intake. Patient able to independently set up tray and feed self. Reinforced use of slow rate and small bolus size. Patient with tendency to talk with oral cavity filled triggering a cough response. Advised patient to refrain from talking with min cues given during meal to avoid this behavior. Rationale provided to enhance adherence. No further cough evident with subsequent solid boluses (90% tolerance with solids without overt s/s aspiration). Moderately thick liquids and purees consumed without overt s/s aspiration on 100% of boluses. Voice and respirations clear. Mastication of solids prolonged with mild oral slowness with purees and thickened liquids. Spontaneous reswallows completed with boluses consumed. Patient demonstrating ability to self regulate and self correct by recalling and implementing compensatory swallowing strategies to maximize swallowing efficiency and safety. Effective strategy to assist patient to refrain from talking with oral cavity filled was to turn on television. Risk for aspiration exists if compensatory swallowing strategies not adhered to. Patient remains a good candidate for further therapy for dysphagia management. Actively receiving TF with questionable need for nocturnal TF vs continuous in order to stimulate an appetite. Reached out to medical team including dieticians to clarify diet orders. SLP to continue to follow and determine candidacy for [place]  Water Protocol.     Recommendations:  -SOFT & BITE SIZED diet (IDDSI Level 6)  -MODERATELY/HONEY thickened liquids (IDDSI Level 3)    *AVOID MIXED CONSISTENCIES    -SLP FOR DYSPHAGIA MANAGEMENT  *SLP TO FOLLOW TO DETERMINE PATIENT CANDIDACY FOR CORBETT WATER PROTOCOL     Swallow Strategies:  -Upright at 90 degrees for all po intake  -Slow rate of consumption  -Small bolus size  -Medication whole in pudding or apple sauce  -Reswallow with each bite/sip    Short Term Goal:   Patient will tolerate current diet without overt s/s aspiration on 100% of therapeutic trials.    Long Term Goal:  Patient will tolerate the least restrictive diet without overt s/s aspiration or further pulmonary compromise by time of discharge.    Start Date: 6/10/25  End Date: 7/10/25  Status: Progressing     Plan:  SLP Services Indicated: Yes  Frequency: 3x week  Discussed POC with patient  SLP - OK to Discharge? : Yes    Pain:   Patient exhibits no s/s of pain or discomfort during session.     Inpatient Education:  Extensive education provided to patient regarding current swallow function, recommendations/results, and POC.      Consultations/Referrals/Coordination of Services:   N/A

## 2025-06-12 ENCOUNTER — PHARMACY VISIT (OUTPATIENT)
Dept: PHARMACY | Facility: CLINIC | Age: 60
End: 2025-06-12
Payer: COMMERCIAL

## 2025-06-12 ENCOUNTER — DOCUMENTATION (OUTPATIENT)
Dept: HOME HEALTH SERVICES | Facility: HOME HEALTH | Age: 60
End: 2025-06-12
Payer: MEDICARE

## 2025-06-12 ENCOUNTER — HOME HEALTH ADMISSION (OUTPATIENT)
Dept: HOME HEALTH SERVICES | Facility: HOME HEALTH | Age: 60
End: 2025-06-12
Payer: MEDICARE

## 2025-06-12 VITALS
RESPIRATION RATE: 17 BRPM | HEART RATE: 98 BPM | TEMPERATURE: 98.1 F | BODY MASS INDEX: 17.1 KG/M2 | OXYGEN SATURATION: 97 % | SYSTOLIC BLOOD PRESSURE: 101 MMHG | HEIGHT: 60 IN | DIASTOLIC BLOOD PRESSURE: 63 MMHG | WEIGHT: 87.1 LBS

## 2025-06-12 LAB
ALBUMIN SERPL BCP-MCNC: 3.3 G/DL (ref 3.4–5)
ANION GAP SERPL CALC-SCNC: 12 MMOL/L (ref 10–20)
BUN SERPL-MCNC: 29 MG/DL (ref 6–23)
CALCIUM SERPL-MCNC: 8.4 MG/DL (ref 8.6–10.3)
CHLORIDE SERPL-SCNC: 103 MMOL/L (ref 98–107)
CO2 SERPL-SCNC: 23 MMOL/L (ref 21–32)
CREAT SERPL-MCNC: 0.54 MG/DL (ref 0.5–1.3)
EGFRCR SERPLBLD CKD-EPI 2021: >90 ML/MIN/1.73M*2
ERYTHROCYTE [DISTWIDTH] IN BLOOD BY AUTOMATED COUNT: 27.4 % (ref 11.5–14.5)
GLUCOSE SERPL-MCNC: 80 MG/DL (ref 74–99)
HCT VFR BLD AUTO: 32.5 % (ref 41–52)
HGB BLD-MCNC: 9.8 G/DL (ref 13.5–17.5)
INR PPP: 1.8 (ref 0.9–1.1)
MCH RBC QN AUTO: 26.1 PG (ref 26–34)
MCHC RBC AUTO-ENTMCNC: 30.2 G/DL (ref 32–36)
MCV RBC AUTO: 87 FL (ref 80–100)
NRBC BLD-RTO: 0 /100 WBCS (ref 0–0)
PHOSPHATE SERPL-MCNC: 3.1 MG/DL (ref 2.5–4.9)
PLATELET # BLD AUTO: 288 X10*3/UL (ref 150–450)
POTASSIUM SERPL-SCNC: 4.6 MMOL/L (ref 3.5–5.3)
PROTHROMBIN TIME: 20.1 SECONDS (ref 9.8–12.4)
RBC # BLD AUTO: 3.75 X10*6/UL (ref 4.5–5.9)
SODIUM SERPL-SCNC: 133 MMOL/L (ref 136–145)
WBC # BLD AUTO: 7.8 X10*3/UL (ref 4.4–11.3)

## 2025-06-12 PROCEDURE — 85027 COMPLETE CBC AUTOMATED: CPT | Performed by: NURSE PRACTITIONER

## 2025-06-12 PROCEDURE — 94640 AIRWAY INHALATION TREATMENT: CPT

## 2025-06-12 PROCEDURE — 2500000002 HC RX 250 W HCPCS SELF ADMINISTERED DRUGS (ALT 637 FOR MEDICARE OP, ALT 636 FOR OP/ED): Performed by: NURSE PRACTITIONER

## 2025-06-12 PROCEDURE — 2500000001 HC RX 250 WO HCPCS SELF ADMINISTERED DRUGS (ALT 637 FOR MEDICARE OP): Performed by: NURSE PRACTITIONER

## 2025-06-12 PROCEDURE — 85610 PROTHROMBIN TIME: CPT | Performed by: NURSE PRACTITIONER

## 2025-06-12 PROCEDURE — RXMED WILLOW AMBULATORY MEDICATION CHARGE

## 2025-06-12 PROCEDURE — 36415 COLL VENOUS BLD VENIPUNCTURE: CPT | Performed by: NURSE PRACTITIONER

## 2025-06-12 PROCEDURE — 92526 ORAL FUNCTION THERAPY: CPT | Mod: GN

## 2025-06-12 PROCEDURE — 99239 HOSP IP/OBS DSCHRG MGMT >30: CPT | Performed by: STUDENT IN AN ORGANIZED HEALTH CARE EDUCATION/TRAINING PROGRAM

## 2025-06-12 PROCEDURE — 80069 RENAL FUNCTION PANEL: CPT | Performed by: STUDENT IN AN ORGANIZED HEALTH CARE EDUCATION/TRAINING PROGRAM

## 2025-06-12 RX ORDER — GUAIFENESIN 600 MG/1
600 TABLET, EXTENDED RELEASE ORAL 2 TIMES DAILY PRN
Qty: 30 TABLET | Refills: 0 | Status: SHIPPED | OUTPATIENT
Start: 2025-06-12

## 2025-06-12 RX ORDER — ACETAMINOPHEN 325 MG/1
650 TABLET ORAL EVERY 6 HOURS PRN
Status: DISCONTINUED | OUTPATIENT
Start: 2025-06-12 | End: 2025-06-12 | Stop reason: HOSPADM

## 2025-06-12 RX ORDER — FERROUS SULFATE 325(65) MG
65 TABLET ORAL DAILY
Qty: 30 TABLET | Refills: 0 | Status: SHIPPED | OUTPATIENT
Start: 2025-06-12

## 2025-06-12 RX ORDER — FLUCONAZOLE 200 MG/1
200 TABLET ORAL DAILY
Qty: 4 TABLET | Refills: 0 | Status: SHIPPED | OUTPATIENT
Start: 2025-06-13 | End: 2025-06-17

## 2025-06-12 RX ORDER — FUROSEMIDE 20 MG/1
20 TABLET ORAL DAILY
Qty: 30 TABLET | Refills: 0 | Status: SHIPPED | OUTPATIENT
Start: 2025-06-13

## 2025-06-12 RX ORDER — FLUTICASONE PROPIONATE 50 MCG
2 SPRAY, SUSPENSION (ML) NASAL DAILY
Qty: 16 G | Refills: 12 | Status: SHIPPED | OUTPATIENT
Start: 2025-06-13

## 2025-06-12 RX ADMIN — FLUTICASONE PROPIONATE 2 SPRAY: 50 SPRAY, METERED NASAL at 09:17

## 2025-06-12 RX ADMIN — IPRATROPIUM BROMIDE AND ALBUTEROL SULFATE 3 ML: 2.5; .5 SOLUTION RESPIRATORY (INHALATION) at 12:30

## 2025-06-12 RX ADMIN — IPRATROPIUM BROMIDE AND ALBUTEROL SULFATE 3 ML: 2.5; .5 SOLUTION RESPIRATORY (INHALATION) at 08:05

## 2025-06-12 RX ADMIN — FLUCONAZOLE 200 MG: 100 TABLET ORAL at 09:18

## 2025-06-12 RX ADMIN — ACETAMINOPHEN 650 MG: 325 TABLET ORAL at 09:25

## 2025-06-12 RX ADMIN — FERROUS SULFATE TAB 325 MG (65 MG ELEMENTAL FE) 1 TABLET: 325 (65 FE) TAB at 09:18

## 2025-06-12 ASSESSMENT — COGNITIVE AND FUNCTIONAL STATUS - GENERAL
CLIMB 3 TO 5 STEPS WITH RAILING: A LITTLE
DAILY ACTIVITIY SCORE: 24
WALKING IN HOSPITAL ROOM: A LITTLE
DAILY ACTIVITIY SCORE: 24
WALKING IN HOSPITAL ROOM: A LITTLE
MOBILITY SCORE: 22
CLIMB 3 TO 5 STEPS WITH RAILING: A LITTLE
MOBILITY SCORE: 22

## 2025-06-12 ASSESSMENT — PAIN SCALES - GENERAL
PAINLEVEL_OUTOF10: 1
PAINLEVEL_OUTOF10: 3
PAINLEVEL_OUTOF10: 0 - NO PAIN

## 2025-06-12 ASSESSMENT — PAIN - FUNCTIONAL ASSESSMENT
PAIN_FUNCTIONAL_ASSESSMENT: 0-10
PAIN_FUNCTIONAL_ASSESSMENT: 0-10

## 2025-06-12 ASSESSMENT — PAIN DESCRIPTION - LOCATION: LOCATION: THROAT

## 2025-06-12 NOTE — PROGRESS NOTES
Robert Luna is a 59 y.o. male admitted for symptomatic anemia. Pharmacy has been consulted for warfarin dosing and monitoring for Aortic Valve Replacement with goal INR of 2.0-3.0.      Home regimen    MON TUE WED THR FRI SAT SUN   Dose 1  mg 1.5  mg 1  mg 1.5  mg 1.5  mg 1  mg 1.5  mg   Total weekly dose: 9 mg  Source: [facility] Note from 5/22/2025    Labs  INR: 1.8  Hgb/Hct/Plt  Lab Results   Component Value Date    HGB 9.8 (L) 06/12/2025    HGB 9.7 (L) 06/11/2025    HGB 10.1 (L) 06/10/2025    HGB 9.7 (L) 06/09/2025    HGB 9.0 (L) 06/08/2025        Lab Results   Component Value Date    HCT 32.5 (L) 06/12/2025    HCT 30.6 (L) 06/11/2025    HCT 32.4 (L) 06/10/2025    HCT 32.3 (L) 06/09/2025    HCT 29.7 (L) 06/08/2025        Platelets   Date Value Ref Range Status   06/12/2025 288 150 - 450 x10*3/uL Final   06/11/2025 252 150 - 450 x10*3/uL Final   06/10/2025 236 150 - 450 x10*3/uL Final   06/09/2025 217 150 - 450 x10*3/uL Final   06/08/2025 193 150 - 450 x10*3/uL Final      Warfarin Therapy   Current regimen: resuming home reigmen   Bridging: None needed  Interacting medications: interacting medication(s) of fluconazole  increase bleeding risk    Dosing History During Current Admission  Date 5/28 5/29 5/30 5/31 6/1 6/2 6/3 6/4 6/5 6/6 6/7 6/8 6/9 6/10 6/11 6/12   INR 1.9 2.2 2.9 2.1 1.9 2.1 2.2 3.7 3.8 2.0 1.5 1.6 1.5 1.4 1.5 1.8   Dose  (mg) See plan 1.5 mg 1.5 mg 1 mg 1.5 mg 1 mg Hold Hold Hold 1.5 mg 1 mg 1 mg 1.5 3 mg 1 mg 1.5 mg        Assessment and Plan  Patient's INR of 1.8 today is subtherapeutic. Hemoglobin/hematocrit/platelet stable.   Warfarin inpatient plan: Give 1.5 mg dose today. Happy with increase in INR today, if not >2 tomorrow will give another boost dose.   Original plan to hold Warfarin on Tue/Sat due to Diflucan treatment for Candida. INR has been subtherapeutic after 5 days of new plan so recommend resuming previous regimen of 1 mg on M/W/Sat and 1.5 mg all other days.   Continue to monitor  s/sx of bleeding including epistaxis, hematuria, unusual bruising, hemoptysis, hematochezia as well as s/sx of stroke including impaired speech, unilateral paralysis, blurry vision.  Pharmacy will continue to monitor the patient and adjust therapy as needed.  Discharge Plan: resume home regimen of 1 mg on M/W/Sat and 1.5 mg all other days. Follow-up INR check ASAP     Thank you for the consult. Please do not hesitate to contact a pharmacist with any questions.    Vaishali Cloud, PharmD

## 2025-06-12 NOTE — HH CARE COORDINATION
Home Care received a Referral for Nursing, Physical Therapy, Occupational Therapy, and Home Health Aide. We have processed the referral for a Start of Care on 06/14-06/15.     If you have any questions or concerns, please feel free to contact us at [phone]. Follow the prompts, enter your five digit zip code, and you will be directed to your care team on E[facility] 1.

## 2025-06-12 NOTE — CARE PLAN
The clinical goals for the shift include pt remains hds & free from falls    Over the shift, the patient is making progress toward the following goals.       Problem: Pain - Adult  Goal: Verbalizes/displays adequate comfort level or baseline comfort level  Outcome: Progressing     Problem: Safety - Adult  Goal: Free from fall injury  Outcome: Progressing     Problem: Discharge Planning  Goal: Discharge to home or other facility with appropriate resources  Outcome: Progressing     Problem: Chronic Conditions and Co-morbidities  Goal: Patient's chronic conditions and co-morbidity symptoms are monitored and maintained or improved  Outcome: Progressing     Problem: Nutrition  Goal: Nutrient intake appropriate for maintaining nutritional needs  Outcome: Progressing     Problem: Fall/Injury  Goal: Not fall by end of shift  Outcome: Progressing

## 2025-06-12 NOTE — PROGRESS NOTES
Speech-Language Pathology    Adult Inpatient Swallow Treatment    Patient Name: Robert Luna  MRN: [id number]  : 1965  Today's Date: 25   Time Calculation  Start Time: 0850  Stop Time: 917  Time Calculation (min): 27 min       Impression:   Swallow treatment completed. Patient seated upright in chair consuming breakfast. NGT present with TF discontinued. Patient with intermittent use of increased bolus size. SLP provided cues to reduce bolus size. Mastication of solids prolonged with oral residue remaining. Reswallow and/or use of liquid wash used to facilitate oral clearance. Patient exhibited cough x3 during consumption of solids. No cough evident on 100% of puree and moderately thick liquid boluses. Reinforced small bolus sizes and slow rate. Patient with tendency to talk with oral cavity filled, yet recognizes this and immediately discontinues behavior. Vocal quality clear post swallow. Discussed need to further modify diet to minced moist as a precaution. Patient in agreement and endorsing increased difficulty masticating harder bite sized consistencies. Patient will benefit from ongoing therapy following discharge.     Recommendations:  -MINCED & MOIST diet (IDDSI Level 5)  -MODERATELY/HONEY thickened liquids (IDDSI Level 3)    *NO MIXED CONSISTENCIES    -SLP FOR DYSPHAGIA MANAGEMENT    *SLP TO FOLLOW TO DETERMINE PATIENT CANDIDACY FOR CORBETT WATER PROTOCOL     Swallow Strategies:  -Upright at 90 degrees for all po intake  -Slow rate of consumption  -Small bolus size  -Medication whole in pudding or apple sauce  -Reswallow with each bite/sip  -Alternate solids with liquids    Short Term Goal:   Patient will tolerate current diet without overt s/s aspiration on 100% of therapeutic trials.    Long Term Goal:  Patient will tolerate the least restrictive diet without overt s/s aspiration or further pulmonary compromise by time of discharge.    Start Date: 6/10/25  End Date: 7/10/25  Status: Progressing      Plan:  SLP Services Indicated: Yes  Frequency: 3x week  Discussed POC with patient  SLP - OK to Discharge? : Yes    Pain:   Patient exhibits no s/s of pain or discomfort during session.     Inpatient Education:  Extensive education provided to patient regarding current swallow function, recommendations/results, and POC.      Consultations/Referrals/Coordination of Services:   N/A

## 2025-06-13 ENCOUNTER — PATIENT OUTREACH (OUTPATIENT)
Dept: PRIMARY CARE | Facility: CLINIC | Age: 60
End: 2025-06-13
Payer: MEDICARE

## 2025-06-13 NOTE — DISCHARGE SUMMARY
Discharge Diagnosis  Symptomatic anemia   Dysphagia  Malnutrition   Issues Requiring Follow-Up  PCP follow up     Discharge Meds     Medication List      START taking these medications     FeroSuL 325 mg (65 mg iron) tablet; Generic drug: ferrous sulfate; Take   1 tablet by mouth once daily.   fluconazole 200 mg tablet; Commonly known as: Diflucan; Take 1 tablet   (200 mg) by mouth once daily for 4 doses.   fluticasone 50 mcg/actuation nasal spray; Commonly known as: Flonase;   Administer 2 sprays into each nostril once daily. Shake gently. Before   first use, prime pump. After use, clean tip and replace cap.   furosemide 20 mg tablet; Commonly known as: Lasix; Take 1 tablet (20 mg)   by mouth once daily.   guaiFENesin 600 mg 12 hr tablet; Commonly known as: Mucinex; Take 1   tablet (600 mg) by mouth 2 times a day as needed for cough. Do not crush,   chew, or split.     CONTINUE taking these medications     metoprolol succinate XL 25 mg 24 hr tablet; Commonly known as:   Toprol-XL; TAKE 1 TABLET(25 MG) BY MOUTH EVERY 24 HOURS   warfarin 1 mg tablet; Commonly known as: Coumadin; Take as directed. If   you are unsure how to take this medication, talk to your nurse or doctor.;   Original instructions: Take 1 tablet (1 mg) by mouth see administration   instructions.       Test Results Pending At Discharge  Pending Labs       No current pending labs.            Hospital Course   Patient was discharge home without NG tube. Patient passed MBS and was able to eat >50 % of his meals. Patient discharged home with information for hospice and plan for hospice if he continues to  decline   Acute Problems     Acute hypoxic respiratory failure - resolved  Aspiration PNA/pneumonitis - resolved  Severe stenosis of his mechanical aortic valve as well as severe mitral regurgitation  Elevated RV pressures  Bilateral pleural effusions  Contraction alkalosis - resolved  Hypokalemia - resolved  Iron def anemia  Possible occult GI blood  loss     Chronic Problems     Mechanical aortic valve replacement - INR goal 2-3  Ascending aorta repair  HTN     Plan     - SLP appreciated -> failed MBS. NPO rec for now with considerations for GOC/alternative means of nutrition. Currently has NG tube receiving TF. He is scheduled for repeat MBS 6/10  - pal care following -> DNR/DNI, hospice meeting it looks like patient wants a trial of PT/OT and will consider hospice down the road. However PT/OT rec low, looking unlikely he will be able to go to acute rehab or SNF. TCC to re-engage hospice navigator to talk about discharge planning  - pulm signed off -> treated for aspiration PNA/pneumonitis. Weaned to room air at this point  - ID signed off -> completed his course of IV Unasyn  - cardiology signed off -> can consider afterload reduction/diuresis, not a surgical candidate. He did receive some IV diuresis with good response. Started on PO lasix 20mg daily to try and maintain euvolemia  - GI signed off -> EGD/Colon with no old or active bleeding. No abnormality to explain anemia. Outpatient VCE can be considered for complete evaluation of MAHESH. Candida esophagitis started on PO fluconazole for 2 week course.    - Coumadin as dosed by pharmacy for hx of AVR, mindful of INR while on fluconazole  - IV Venofer 300 mg x3 doses completed; 1 unit pRBC this admit. Trend hgb. On PO iron supplement now  - PT/OT evaluations -> rec low     Fluids: None  Electrolytes: Replete as needed  Nutrition: Tube feed  Ortiz: None  Invasive lines: None  Drains: NG tube  O2: NC    Pertinent Physical Exam At Time of Discharge  Physical Exam  Vitals reviewed.   Constitutional:       General: He is not in acute distress.  Cardiovascular:      Rate and Rhythm: Normal rate and regular rhythm.   Pulmonary:      Effort: Pulmonary effort is normal.      Breath sounds:CTAB   Abdominal:      General: There is no distension.      Palpations: Abdomen is soft.   Neurological:      Mental Status: He is  alert. Mental status is at baseline.   Relevant Results        Outpatient Follow-Up  Future Appointments   Date Time Provider Department Center   6/14/2025 To Be Determined Heather Barnett, RN [facility]   6/16/2025 To Be Determined Amanda Pendleton, PT [facility]   6/18/2025 To Be Determined Carolina Cadena OT [facility]   6/23/2025 10:30 AM Ronni Jang DO [id number] [facility]         Renee Negrete MD

## 2025-06-13 NOTE — PROGRESS NOTES
Discharge Facility: [facility]     Discharge Diagnosis:      Symptomatic anemia    Iron deficiency anemia due to chronic blood loss    Other specified postprocedural states    Acute on chronic heart failure with preserved ejection fraction    Aortic valve replaced    Presence of xenogenic heart valve    Other rheumatic aortic valve diseases    Long term (current) use of anticoagulants      Admission Date:   5/27/2025   Discharge Date:    6/12/2025     PCP Appointment Date: 6/23/2025     Specialist Appointment Date:     [facility] in process     Hospital Encounter and Summary Linked: Yes    Admission (Discharged) with Renee Negrete MD (05/27/2025)     See discharge assessment below for further details     Wrap Up  Wrap Up Additional Comments: Patient states  he has family in home for support, [facility] will be in home tomorrow. Patient states he has all new meds aware of med changes. Patient awrae PCP F/U 6/23/2025. (6/13/2025  2:14 PM)    Engagement  Call Start Time: 1414 (6/13/2025  2:14 PM)    Medications  Medications reviewed with patient/caregiver?: Yes (6/13/2025  2:14 PM)  Is the patient having any side effects they believe may be caused by any medication additions or changes?: No (6/13/2025  2:14 PM)  Does the patient have all medications ordered at discharge?: Yes (6/13/2025  2:14 PM)  Care Management Interventions: No intervention needed (6/13/2025  2:14 PM)  Prescription Comments: ferrous sulfate 325 mg (65 mg elemental)  tablet  Take 1 tablet by mouth once daily.  Last time this was given: June 12, 2025  9:18 AM 1 tablet  fluconazole 200 mg tablet  Commonly known as: Diflucan  Start taking on: June 13, 2025  Take 1 tablet (200 mg) by mouth once daily for 4  doses.  Last time this was given: June 12, 2025  9:18 AM  Wait until  Juan 13  1 tablet  fluticasone 50 mcg/actuation nasal spray  Commonly known as: Flonase  Start taking on: June 13, 2025  Administer 2 sprays into each nostril once daily.   Shake gently. Before first use, prime pump. After  use, clean tip and replace cap.  Last time this was given: June 12, 2025  9:17 AM  Wait until  Jun 13  2 sprays  furosemide 20 mg tablet  Commonly known as: Lasix  Start taking on: June 13, 2025  Take 1 tablet (20 mg) by mouth once daily.  Last time this was given: June 11, 2025  8:56 AM  Wait until  Jun 13  1 tablet  guaiFENesin 600 mg 12 hr tablet  Commonly known as: Mucinex  Take 1 tablet (600 mg) by mouth 2 times a day as  needed for cough. Do not crush, chew, or split. (6/13/2025  2:14 PM)  Is the patient taking all medications as directed (includes completed medication regime)?: -- (Pt states has meds) (6/13/2025  2:14 PM)  Care Management Interventions: Provided patient education (6/13/2025  2:14 PM)  Medication Comments: No questions or concerns (6/13/2025  2:14 PM)    Appointments  Does the patient have a primary care provider?: Yes (6/13/2025  2:14 PM)  Care Management Interventions: Verified appointment date/time/provider (6/13/2025  2:14 PM)    Self Management  What is the home health agency?: [facility] in process (6/13/2025  2:14 PM)  What Durable Medical Equipment (DME) was ordered?: NA (6/13/2025  2:14 PM)    Patient Teaching  Does the patient have access to their discharge instructions?: Yes (6/13/2025  2:14 PM)  Care Management Interventions: Reviewed instructions with patient (6/13/2025  2:14 PM)  What is the patient's perception of their health status since discharge?: Improving (6/13/2025  2:14 PM)  Is the patient/caregiver able to teach back the hierarchy of who to call/visit for symptoms/problems? PCP, Specialist, Home Health nurse, Urgent Care, ED, 911: Yes (6/13/2025  2:14 PM)

## 2025-06-14 ENCOUNTER — HOME CARE VISIT (OUTPATIENT)
Dept: HOME HEALTH SERVICES | Facility: HOME HEALTH | Age: 60
End: 2025-06-14
Payer: MEDICARE

## 2025-06-14 VITALS
HEART RATE: 92 BPM | OXYGEN SATURATION: 99 % | TEMPERATURE: 98.4 F | DIASTOLIC BLOOD PRESSURE: 57 MMHG | RESPIRATION RATE: 18 BRPM | SYSTOLIC BLOOD PRESSURE: 103 MMHG

## 2025-06-14 PROCEDURE — 169592 NO-PAY CLAIM PROCEDURE

## 2025-06-14 PROCEDURE — G0299 HHS/HOSPICE OF RN EA 15 MIN: HCPCS | Mod: HHH

## 2025-06-14 ASSESSMENT — ACTIVITIES OF DAILY LIVING (ADL)
AMBULATION ASSISTANCE: 1
ENTERING_EXITING_HOME: MINIMUM ASSIST
OASIS_M1830: 03
AMBULATION ASSISTANCE: ONE PERSON

## 2025-06-14 ASSESSMENT — ENCOUNTER SYMPTOMS
SHORTNESS OF BREATH: 1
TROUBLE SWALLOWING: 1
APPETITE LEVEL: GOOD
LAST BOWEL MOVEMENT: 67370
MUSCLE WEAKNESS: 1
BOWEL PATTERN NORMAL: 1
LOWER EXTREMITY EDEMA: 1
FATIGUE: 1
PERSON REPORTING PAIN: PATIENT
COUGH: 1
STOOL FREQUENCY: DAILY
CHANGE IN APPETITE: UNCHANGED
OCCASIONAL FEELINGS OF UNSTEADINESS: 0
DYSPNEA ACTIVITY LEVEL: AFTER AMBULATING MORE THAN 20 FT
COUGH CHARACTERISTICS: NON-PRODUCTIVE
DENIES PAIN: 1
LOSS OF SENSATION IN FEET: 0
DEPRESSION: 0

## 2025-06-14 ASSESSMENT — LIFESTYLE VARIABLES: PERSONAL HISTORY ALCOHOL: 1

## 2025-06-15 ENCOUNTER — HOME CARE VISIT (OUTPATIENT)
Dept: HOME HEALTH SERVICES | Facility: HOME HEALTH | Age: 60
End: 2025-06-15
Payer: MEDICARE

## 2025-06-17 ENCOUNTER — HOME CARE VISIT (OUTPATIENT)
Dept: HOME HEALTH SERVICES | Facility: HOME HEALTH | Age: 60
End: 2025-06-17
Payer: MEDICARE

## 2025-06-17 VITALS
TEMPERATURE: 97.9 F | DIASTOLIC BLOOD PRESSURE: 58 MMHG | HEART RATE: 94 BPM | WEIGHT: 81.44 LBS | BODY MASS INDEX: 16.44 KG/M2 | RESPIRATION RATE: 16 BRPM | SYSTOLIC BLOOD PRESSURE: 110 MMHG | OXYGEN SATURATION: 99 %

## 2025-06-17 PROCEDURE — G0300 HHS/HOSPICE OF LPN EA 15 MIN: HCPCS | Mod: HHH

## 2025-06-17 ASSESSMENT — ENCOUNTER SYMPTOMS
DENIES PAIN: 1
PERSON REPORTING PAIN: PATIENT

## 2025-06-18 ENCOUNTER — HOME CARE VISIT (OUTPATIENT)
Dept: HOME HEALTH SERVICES | Facility: HOME HEALTH | Age: 60
End: 2025-06-18
Payer: MEDICARE

## 2025-06-18 VITALS
TEMPERATURE: 98.1 F | RESPIRATION RATE: 18 BRPM | HEART RATE: 98 BPM | SYSTOLIC BLOOD PRESSURE: 116 MMHG | DIASTOLIC BLOOD PRESSURE: 62 MMHG | OXYGEN SATURATION: 99 %

## 2025-06-18 PROCEDURE — G0152 HHCP-SERV OF OT,EA 15 MIN: HCPCS | Mod: HHH

## 2025-06-18 ASSESSMENT — ACTIVITIES OF DAILY LIVING (ADL)
BATHING ASSESSED: 1
TOILETING: 1
DRESSING_LB_CURRENT_FUNCTION: INDEPENDENT
FEEDING_WITHIN_DEFINED_LIMITS: 1
BATHING_WITHIN_DEFINED_LIMITS: 1
TOILETING: INDEPENDENT
BATHING_CURRENT_FUNCTION: INDEPENDENT
GROOMING_WITHIN_DEFINED_LIMITS: 1

## 2025-06-18 ASSESSMENT — PAIN SCALES - PAIN ASSESSMENT IN ADVANCED DEMENTIA (PAINAD)
CONSOLABILITY: 0
FACIALEXPRESSION: 0 - SMILING OR INEXPRESSIVE.
BREATHING: 0
FACIALEXPRESSION: 0
BODYLANGUAGE: 0
NEGVOCALIZATION: 0 - NONE.
CONSOLABILITY: 0 - NO NEED TO CONSOLE.
BODYLANGUAGE: 0 - RELAXED.
NEGVOCALIZATION: 0
TOTALSCORE: 0

## 2025-06-18 ASSESSMENT — ENCOUNTER SYMPTOMS
APPETITE LEVEL: GOOD
HYPERTENSION: 1
CHANGE IN APPETITE: UNCHANGED
PERSON REPORTING PAIN: PATIENT
DENIES PAIN: 1
LAST BOWEL MOVEMENT: 67373

## 2025-06-18 NOTE — RESULT ENCOUNTER NOTE
Gastric and Duodenal Bx normal, no H.pylori or Celiac.  Recommend VCE for complete eval of acute MAHESH which is ordered

## 2025-06-19 ENCOUNTER — TELEPHONE (OUTPATIENT)
Dept: PRIMARY CARE | Facility: CLINIC | Age: 60
End: 2025-06-19
Payer: MEDICARE

## 2025-06-19 ENCOUNTER — ANTICOAGULATION - WARFARIN VISIT (OUTPATIENT)
Dept: CARDIOLOGY | Facility: CLINIC | Age: 60
End: 2025-06-19
Payer: MEDICARE

## 2025-06-19 DIAGNOSIS — Z79.01 LONG TERM (CURRENT) USE OF ANTICOAGULANTS: ICD-10-CM

## 2025-06-19 DIAGNOSIS — Z95.2 AORTIC VALVE REPLACED: Primary | ICD-10-CM

## 2025-06-19 LAB
INR IN PPP BY COAGULATION ASSAY EXTERNAL: 7.3
PROTHROMBIN TIME (PT) IN PPP BY COAGULATION ASSAY EXTERNAL: NORMAL

## 2025-06-19 NOTE — PROGRESS NOTES
Patient identification verified with 2 identifiers.    Location: [facility] Patient Self-Testing Program [phone]    Referring Physician: DR. LACIE RIVERA  Enrollment/ Re-enrollment date: 2025   INR Goal: 2.0-3.0  INR monitoring is per [facility] protocol.  Anticoagulation Medication: warfarin  Indication: Aortic Valve Replacement    Subjective   Bleeding signs/symptoms: No    Bruising: No   Major bleeding event: No  Thrombosis signs/symptoms: No  Thromboembolic event: No  Missed doses: No  Extra doses: No  Medication changes: No  Dietary changes: No  Change in health: No  Change in activity: No  Alcohol: No  Other concerns: No    Upcoming Procedures:  Does the Patient Have any upcoming procedures that require interruption in anticoagulation therapy? no  Does the patient require bridging? no      Anticoagulation Summary  As of 2025      INR goal:  2.0-3.0   TTR:  59.6% (1.6 y)   INR used for dosin.30 (2025)   Weekly warfarin total:  9 mg               Assessment/Plan   Supratherapeutic     1. New dose: No identifiable reason for elevated INR.  Spoke to Dr. Dobson covering for Dr. Rivera.  Patient is to hold 3 days then resume his normal dosing, and check INR on 25.  Patient verbalized understanding.  2. Next INR: 4 days      Education provided to patient during the visit:  Patient instructed to call in interim with questions, concerns and changes.

## 2025-06-20 ENCOUNTER — HOME CARE VISIT (OUTPATIENT)
Dept: HOME HEALTH SERVICES | Facility: HOME HEALTH | Age: 60
End: 2025-06-20
Payer: MEDICARE

## 2025-06-20 VITALS
SYSTOLIC BLOOD PRESSURE: 103 MMHG | OXYGEN SATURATION: 99 % | TEMPERATURE: 97.9 F | WEIGHT: 85 LBS | RESPIRATION RATE: 18 BRPM | BODY MASS INDEX: 17.16 KG/M2 | HEART RATE: 87 BPM | DIASTOLIC BLOOD PRESSURE: 58 MMHG

## 2025-06-20 PROCEDURE — G0299 HHS/HOSPICE OF RN EA 15 MIN: HCPCS | Mod: HHH

## 2025-06-20 SDOH — ECONOMIC STABILITY: GENERAL

## 2025-06-20 ASSESSMENT — ENCOUNTER SYMPTOMS
DYSPNEA ACTIVITY LEVEL: AFTER AMBULATING 10 - 20 FT
APPETITE LEVEL: GOOD
DYSPNEA ON EXERTION: 1
PERSON REPORTING PAIN: PATIENT
DENIES PAIN: 1
LOWER EXTREMITY EDEMA: 1
SHORTNESS OF BREATH: 1
FATIGUES EASILY: 1
TROUBLE SWALLOWING: 1
LAST BOWEL MOVEMENT: 67376

## 2025-06-20 ASSESSMENT — ACTIVITIES OF DAILY LIVING (ADL)
AMBULATION ASSISTANCE: 1
AMBULATION ASSISTANCE: INDEPENDENT
MONEY MANAGEMENT (EXPENSES/BILLS): INDEPENDENT

## 2025-06-23 ENCOUNTER — ANTICOAGULATION - WARFARIN VISIT (OUTPATIENT)
Dept: CARDIOLOGY | Facility: CLINIC | Age: 60
End: 2025-06-23
Payer: MEDICARE

## 2025-06-23 ENCOUNTER — OFFICE VISIT (OUTPATIENT)
Dept: PRIMARY CARE | Facility: CLINIC | Age: 60
End: 2025-06-23
Payer: MEDICARE

## 2025-06-23 VITALS
WEIGHT: 86 LBS | RESPIRATION RATE: 16 BRPM | HEIGHT: 60 IN | DIASTOLIC BLOOD PRESSURE: 56 MMHG | SYSTOLIC BLOOD PRESSURE: 110 MMHG | TEMPERATURE: 98 F | HEART RATE: 81 BPM | OXYGEN SATURATION: 99 % | BODY MASS INDEX: 16.88 KG/M2

## 2025-06-23 DIAGNOSIS — Z98.890 OTHER SPECIFIED POSTPROCEDURAL STATES: ICD-10-CM

## 2025-06-23 DIAGNOSIS — D50.9 IRON DEFICIENCY ANEMIA, UNSPECIFIED IRON DEFICIENCY ANEMIA TYPE: ICD-10-CM

## 2025-06-23 DIAGNOSIS — E46 PROTEIN-CALORIE MALNUTRITION, UNSPECIFIED SEVERITY (MULTI): Primary | ICD-10-CM

## 2025-06-23 DIAGNOSIS — Z95.2 AORTIC VALVE REPLACED: Primary | ICD-10-CM

## 2025-06-23 DIAGNOSIS — I50.33 ACUTE ON CHRONIC HEART FAILURE WITH PRESERVED EJECTION FRACTION: ICD-10-CM

## 2025-06-23 DIAGNOSIS — Z79.01 LONG TERM (CURRENT) USE OF ANTICOAGULANTS: ICD-10-CM

## 2025-06-23 DIAGNOSIS — D50.0 IRON DEFICIENCY ANEMIA DUE TO CHRONIC BLOOD LOSS: ICD-10-CM

## 2025-06-23 LAB
INR IN PPP BY COAGULATION ASSAY EXTERNAL: 4.1 (ref 2–3)
PROTHROMBIN TIME (PT) IN PPP BY COAGULATION ASSAY EXTERNAL: ABNORMAL

## 2025-06-23 PROCEDURE — 3008F BODY MASS INDEX DOCD: CPT | Performed by: FAMILY MEDICINE

## 2025-06-23 PROCEDURE — 99495 TRANSJ CARE MGMT MOD F2F 14D: CPT | Performed by: FAMILY MEDICINE

## 2025-06-23 RX ORDER — GUAIFENESIN 600 MG/1
600 TABLET, EXTENDED RELEASE ORAL 2 TIMES DAILY PRN
Qty: 180 TABLET | Refills: 1 | Status: SHIPPED | OUTPATIENT
Start: 2025-06-23

## 2025-06-23 RX ORDER — FUROSEMIDE 20 MG/1
20 TABLET ORAL DAILY
Qty: 90 TABLET | Refills: 1 | Status: SHIPPED | OUTPATIENT
Start: 2025-06-23

## 2025-06-23 RX ORDER — FERROUS SULFATE 325(65) MG
65 TABLET ORAL DAILY
Qty: 90 TABLET | Refills: 1 | Status: SHIPPED | OUTPATIENT
Start: 2025-06-23

## 2025-06-23 ASSESSMENT — PAIN SCALES - GENERAL: PAINLEVEL_OUTOF10: 0-NO PAIN

## 2025-06-23 NOTE — PROGRESS NOTES
Patient identification verified with 2 identifiers.    Location: [facility] Patient Self-Testing Program [phone]    Referring Physician: DR. LACIE RIVERA  Enrollment/ Re-enrollment date: 2025   INR Goal: 2.0-3.0  INR monitoring is per [facility] protocol.  Anticoagulation Medication: warfarin  Indication: Aortic Valve Replacement    Subjective   Bleeding signs/symptoms: No    Bruising: No   Major bleeding event: No  Thrombosis signs/symptoms: No  Thromboembolic event: No  Missed doses: No  Extra doses: No  Medication changes: No  Dietary changes: No  Change in health: No  Change in activity: No  Alcohol: No  Other concerns: No    Upcoming Procedures:  Does the Patient Have any upcoming procedures that require interruption in anticoagulation therapy? no  Does the patient require bridging? no      Anticoagulation Summary  As of 2025      INR goal:  2.0-3.0   TTR:  59.2% (1.7 y)   INR used for dosin.10 (2025)   Weekly warfarin total:  9 mg             Assessment/Plan   Supratherapeutic     1. New dose: Hold today  Spoke with pt and confirmed faxed INR result of 4.1.  Will hold 1 additional day and test again tomorrow.  Pt states understanding.  2. Next INR: 1 day      Education provided to patient during the visit:  Patient instructed to call in interim with questions, concerns and changes.   Patient educated on compliance with dosing, follow up appointments, and prescribed plan of care.

## 2025-06-23 NOTE — PROGRESS NOTES
"Subjective   Patient ID: Robert Luna is a 59 y.o. male who presents for Hospital Follow-up (Pt is here for a [facility] hospital follow up. Pt was admitted for 16 days.).    HPI   Pt admitted from 5.27-6/12 for anemia/malnutrition/dysphagia    He has been eating much better now with NG out and swallowing better.    He had anemia and had EGD and colonoscopy with no active bleeding noted.  Home health care coming in for a little bit longer.   Review of Systems see HPI    Objective   /56   Pulse 81   Temp 36.7 °C (98 °F) (Temporal)   Resp 16   Ht 1.499 m (4' 11\")   Wt (!) 39 kg (86 lb)   SpO2 99%   BMI 17.37 kg/m²     Physical Exam  Constitutional:       General: He is not in acute distress.     Appearance: Normal appearance.   Cardiovascular:      Rate and Rhythm: Normal rate and regular rhythm.      Heart sounds: No murmur heard.  Pulmonary:      Breath sounds: Normal breath sounds. No wheezing.   Neurological:      Mental Status: He is alert.         Assessment/Plan   Problem List Items Addressed This Visit           ICD-10-CM    Protein-calorie malnutrition, unspecified severity (Multi) - Primary E46    Relevant Orders    CBC and Auto Differential    Comprehensive Metabolic Panel     Other Visit Diagnoses         Codes      Iron deficiency anemia, unspecified iron deficiency anemia type     D50.9    Relevant Orders    CBC and Auto Differential    Comprehensive Metabolic Panel    Iron and TIBC      Other specified postprocedural states     Z98.890    Relevant Medications    guaiFENesin (Mucinex) 600 mg 12 hr tablet      Acute on chronic heart failure with preserved ejection fraction     I50.33    Relevant Medications    furosemide (Lasix) 20 mg tablet      Iron deficiency anemia due to chronic blood loss     D50.0    Relevant Medications    ferrous sulfate 325 mg (65 mg elemental) tablet               "

## 2025-06-24 ENCOUNTER — ANTICOAGULATION - WARFARIN VISIT (OUTPATIENT)
Dept: CARDIOLOGY | Facility: CLINIC | Age: 60
End: 2025-06-24
Payer: MEDICARE

## 2025-06-24 ENCOUNTER — PATIENT OUTREACH (OUTPATIENT)
Dept: PRIMARY CARE | Facility: CLINIC | Age: 60
End: 2025-06-24

## 2025-06-24 ENCOUNTER — HOME CARE VISIT (OUTPATIENT)
Dept: HOME HEALTH SERVICES | Facility: HOME HEALTH | Age: 60
End: 2025-06-24
Payer: MEDICARE

## 2025-06-24 DIAGNOSIS — Z95.2 AORTIC VALVE REPLACED: Primary | ICD-10-CM

## 2025-06-24 DIAGNOSIS — Z79.01 LONG TERM (CURRENT) USE OF ANTICOAGULANTS: ICD-10-CM

## 2025-06-24 LAB
POC INR: 3 (ref 0.9–1.1)
POC PROTHROMBIN TIME: ABNORMAL (ref 9.3–12.5)

## 2025-06-24 NOTE — PROGRESS NOTES
Patient identification verified with 2 identifiers.    Location: [facility] Patient Self-Testing Program [phone]    Referring Physician: Dr. Ronni Jang  Enrollment/ Re-enrollment date: 6/5/25.  Renewal sent to Dr. Jang 6/24/25  INR Goal: 2.0-3.0  INR monitoring is per [facility] protocol.  Anticoagulation Medication: warfarin  Indication: Aortic Valve Replacement    Subjective   Bleeding signs/symptoms: No    Bruising: No   Major bleeding event: No  Thrombosis signs/symptoms: No  Thromboembolic event: No  Missed doses: Yes  patient had warfarin hold for several day due to supra therapeutic INR.  Extra doses: No  Medication changes: No  Dietary changes: No  Change in health: No  Change in activity: No  Alcohol: No  Other concerns: No    Upcoming Procedures:  Does the Patient Have any upcoming procedures that require interruption in anticoagulation therapy? no  Does the patient require bridging? no      Anticoagulation Summary  As of 6/24/2025      INR goal:  2.0-3.0   TTR:  59.1% (1.7 y)   INR used for dosing:  3.00 (6/24/2025)   Weekly warfarin total:  8 mg               Assessment/Plan   Therapeutic     1. New dose: will resume dosing at decreased weekly dose per protocol.     2. Next INR: 3 days.       Education provided to patient during the visit:  Patient instructed to call in interim with questions, concerns and changes.   Patient educated on interactions between medications and warfarin.   Patient educated on dietary consistency in vitamin k consumption.   Patient educated on affects of alcohol consumption while taking warfarin.   Patient educated on signs of bleeding/clotting.   Patient educated on compliance with dosing, follow up appointments, and prescribed plan of care.

## 2025-06-24 NOTE — PROGRESS NOTES
Unable to reach patient for follow up call, PCP F/U complete 6/23/2025     Left voicemail with call back number for patient to call if needed   If no voicemail available call attempts x 2 were made to contact the patient to assist with any questions or concerns patient may have.     
134

## 2025-06-26 ENCOUNTER — HOME CARE VISIT (OUTPATIENT)
Dept: HOME HEALTH SERVICES | Facility: HOME HEALTH | Age: 60
End: 2025-06-26
Payer: MEDICARE

## 2025-06-26 VITALS
WEIGHT: 98 LBS | TEMPERATURE: 97.9 F | RESPIRATION RATE: 16 BRPM | SYSTOLIC BLOOD PRESSURE: 107 MMHG | HEART RATE: 86 BPM | OXYGEN SATURATION: 99 % | DIASTOLIC BLOOD PRESSURE: 62 MMHG | BODY MASS INDEX: 19.79 KG/M2

## 2025-06-26 PROCEDURE — G0300 HHS/HOSPICE OF LPN EA 15 MIN: HCPCS | Mod: HHH

## 2025-06-26 ASSESSMENT — ENCOUNTER SYMPTOMS
CHANGE IN APPETITE: UNCHANGED
LAST BOWEL MOVEMENT: 67381
APPETITE LEVEL: GOOD
DENIES PAIN: 1

## 2025-06-27 ENCOUNTER — ANTICOAGULATION - WARFARIN VISIT (OUTPATIENT)
Dept: CARDIOLOGY | Facility: CLINIC | Age: 60
End: 2025-06-27
Payer: MEDICARE

## 2025-06-27 DIAGNOSIS — Z95.2 AORTIC VALVE REPLACED: Primary | ICD-10-CM

## 2025-06-27 DIAGNOSIS — Z79.01 LONG TERM (CURRENT) USE OF ANTICOAGULANTS: ICD-10-CM

## 2025-06-27 NOTE — PROGRESS NOTES
Patient identification verified with 2 identifiers.    Location: [facility] Patient Self-Testing Program [phone]    Referring Physician: Dr. Ronni Jang  Enrollment/ Re-enrollment date: 2026  Goal: 2.0-3.0  [facility]  Anticoagulation Medication: warfarin  Indication: Aortic Valve Replacement    Subjective   Bleeding signs/symptoms: No    Bruising: No   Major bleeding event: No  Thrombosis signs/symptoms: No  Thromboembolic event: No  Missed doses: No  Extra doses: No  Medication changes: No  Dietary changes: No  Change in health: No  Change in activity: No  Alcohol: No  Other concerns: No    Upcoming Procedures:  Does the Patient Have any upcoming procedures that require interruption in anticoagulation therapy? no  Does the patient require bridging? no      Anticoagulation Summary  As of 2025      INR goal:  2.0-3.0   TTR:  59.2% (1.7 y)   INR used for dosin.10 (2025)   Weekly warfarin total:  8 mg               Assessment/Plan   Therapeutic     1. New dose: Patient INR went from 3.0 to 2.1 in 3 days.  Will maintain dose and patient will retest in 3 days    2. Next INR: 3 days      Education provided to patient during the visit:  Patient instructed to call in interim with questions, concerns and changes.

## 2025-06-29 ENCOUNTER — HOME CARE VISIT (OUTPATIENT)
Dept: HOME HEALTH SERVICES | Facility: HOME HEALTH | Age: 60
End: 2025-06-29
Payer: MEDICARE

## 2025-06-29 VITALS
SYSTOLIC BLOOD PRESSURE: 108 MMHG | DIASTOLIC BLOOD PRESSURE: 64 MMHG | OXYGEN SATURATION: 100 % | WEIGHT: 87.13 LBS | TEMPERATURE: 98.9 F | HEART RATE: 62 BPM | RESPIRATION RATE: 18 BRPM | BODY MASS INDEX: 17.6 KG/M2

## 2025-06-29 PROCEDURE — G0299 HHS/HOSPICE OF RN EA 15 MIN: HCPCS | Mod: HHH

## 2025-06-29 ASSESSMENT — ACTIVITIES OF DAILY LIVING (ADL): AMBULATION ASSISTANCE: 1

## 2025-06-29 ASSESSMENT — ENCOUNTER SYMPTOMS
APPETITE LEVEL: GOOD
CHANGE IN APPETITE: UNCHANGED
DENIES PAIN: 1
PERSON REPORTING PAIN: PATIENT

## 2025-06-30 ENCOUNTER — ANTICOAGULATION - WARFARIN VISIT (OUTPATIENT)
Dept: CARDIOLOGY | Facility: CLINIC | Age: 60
End: 2025-06-30
Payer: MEDICARE

## 2025-06-30 DIAGNOSIS — Z95.2 AORTIC VALVE REPLACED: Primary | ICD-10-CM

## 2025-06-30 DIAGNOSIS — Z79.01 LONG TERM (CURRENT) USE OF ANTICOAGULANTS: ICD-10-CM

## 2025-06-30 LAB
INR IN PPP BY COAGULATION ASSAY EXTERNAL: 1.7
PROTHROMBIN TIME (PT) IN PPP BY COAGULATION ASSAY EXTERNAL: NORMAL

## 2025-06-30 NOTE — PROGRESS NOTES
Patient identification verified with 2 identifiers.    Location: [facility] Patient Self-Testing Program [phone]    Referring Physician: DR. LACIE RIVERA  Enrollment/ Re-enrollment date: 2026   INR Goal: 2.0-3.0  INR monitoring is per [facility] protocol.  Anticoagulation Medication: warfarin  Indication: Aortic Valve Replacement    Subjective   Bleeding signs/symptoms: No    Bruising: No   Major bleeding event: No  Thrombosis signs/symptoms: No  Thromboembolic event: No  Missed doses: No  Extra doses: No  Medication changes: No  Dietary changes: No  Change in health: No  Change in activity: No  Alcohol: No  Other concerns: No    Upcoming Procedures:  Does the Patient Have any upcoming procedures that require interruption in anticoagulation therapy? no  Does the patient require bridging? no      Anticoagulation Summary  As of 2025      INR goal:  2.0-3.0   TTR:  59.1% (1.7 y)   INR used for dosin.70 (2025)   Weekly warfarin total:  8.5 mg               Assessment/Plan   Subtherapeutic     1. New dose: SPOKE TO PT. WILL INCREASE TWD. PT VERBALIZED NEW INSTRUCTIONS CORRECTLY    2. Next INR: 7/3/2025      Education provided to patient during the visit:  Patient instructed to call in interim with questions, concerns and changes.   Patient educated on compliance with dosing, follow up appointments, and prescribed plan of care.

## 2025-07-03 ENCOUNTER — ANTICOAGULATION - WARFARIN VISIT (OUTPATIENT)
Dept: CARDIOLOGY | Facility: CLINIC | Age: 60
End: 2025-07-03
Payer: MEDICARE

## 2025-07-03 DIAGNOSIS — Z95.2 AORTIC VALVE REPLACED: ICD-10-CM

## 2025-07-03 DIAGNOSIS — Z79.01 LONG TERM (CURRENT) USE OF ANTICOAGULANTS: ICD-10-CM

## 2025-07-03 NOTE — PROGRESS NOTES
Patient identification verified with 2 identifiers.    Location: [facility] Patient Self-Testing Program [phone]    Referring Physician: DR. LACIE RIVERA  Enrollment/ Re-enrollment date: 2026   INR Goal: 2.0-3.0  INR monitoring is per [facility] protocol.  Anticoagulation Medication: warfarin  Indication: Aortic Valve Replacement    Subjective   Bleeding signs/symptoms: No    Bruising: No   Major bleeding event: No  Thrombosis signs/symptoms: No  Thromboembolic event: No  Missed doses: No  Extra doses: No  Medication changes: No  Dietary changes: No  Change in health: No  Change in activity: No  Alcohol: No  Other concerns: No    Upcoming Procedures:  Does the Patient Have any upcoming procedures that require interruption in anticoagulation therapy? no  Does the patient require bridging? no      Anticoagulation Summary  As of 7/3/2025      INR goal:  2.0-3.0   TTR:  58.8% (1.7 y)   INR used for dosin.00 (7/3/2025)   Weekly warfarin total:  8.5 mg               Assessment/Plan   Therapeutic     1. New dose: no change    2. Next INR: 1 week      Education provided to patient during the visit:  Patient instructed to call in interim with questions, concerns and changes.   Patient educated on compliance with dosing, follow up appointments, and prescribed plan of care.

## 2025-07-07 ENCOUNTER — HOME CARE VISIT (OUTPATIENT)
Dept: HOME HEALTH SERVICES | Facility: HOME HEALTH | Age: 60
End: 2025-07-07
Payer: MEDICARE

## 2025-07-07 VITALS
RESPIRATION RATE: 18 BRPM | DIASTOLIC BLOOD PRESSURE: 60 MMHG | SYSTOLIC BLOOD PRESSURE: 100 MMHG | TEMPERATURE: 97.2 F | OXYGEN SATURATION: 99 % | HEART RATE: 73 BPM

## 2025-07-07 PROCEDURE — G0299 HHS/HOSPICE OF RN EA 15 MIN: HCPCS | Mod: HHH

## 2025-07-07 ASSESSMENT — ENCOUNTER SYMPTOMS: DENIES PAIN: 1

## 2025-07-07 ASSESSMENT — ACTIVITIES OF DAILY LIVING (ADL)
OASIS_M1830: 00
HOME_HEALTH_OASIS: 00

## 2025-07-10 ENCOUNTER — ANTICOAGULATION - WARFARIN VISIT (OUTPATIENT)
Dept: CARDIOLOGY | Facility: CLINIC | Age: 60
End: 2025-07-10
Payer: MEDICARE

## 2025-07-10 DIAGNOSIS — Z79.01 LONG TERM (CURRENT) USE OF ANTICOAGULANTS: ICD-10-CM

## 2025-07-10 DIAGNOSIS — Z95.2 AORTIC VALVE REPLACED: Primary | ICD-10-CM

## 2025-07-10 NOTE — PROGRESS NOTES
Patient identification verified with 2 identifiers.    Location: [facility] Patient Self-Testing Program [phone]    Referring Physician: DR. LACIE RIVERA  Enrollment/ Re-enrollment date: 2026   INR Goal: 2.0-3.0  INR monitoring is per [facility] protocol.  Anticoagulation Medication: warfarin  Indication: Aortic Valve Replacement    Subjective   Bleeding signs/symptoms: No    Bruising: No   Major bleeding event: No  Thrombosis signs/symptoms: No  Thromboembolic event: No  Missed doses: Yes  Denies  Extra doses: No  Medication changes: Yes  Denies  Dietary changes: No  Change in health: Yes  Denies  Change in activity: No  Alcohol: No  Other concerns: No    Upcoming Procedures:  Does the Patient Have any upcoming procedures that require interruption in anticoagulation therapy? no  Does the patient require bridging? no      Anticoagulation Summary  As of 7/10/2025      INR goal:  2.0-3.0   TTR:  58.1% (1.7 y)   INR used for dosin.70 (7/10/2025)   Weekly warfarin total:  9 mg               Assessment/Plan   Subtherapeutic    1. New dose: Will increase dose and patient will retest in one week.    2. Next INR: 1 week      Education provided to patient during the visit:  Patient instructed to call in interim with questions, concerns and changes.

## 2025-07-17 ENCOUNTER — ANTICOAGULATION - WARFARIN VISIT (OUTPATIENT)
Dept: CARDIOLOGY | Facility: CLINIC | Age: 60
End: 2025-07-17
Payer: MEDICARE

## 2025-07-17 DIAGNOSIS — Z79.01 LONG TERM (CURRENT) USE OF ANTICOAGULANTS: ICD-10-CM

## 2025-07-17 DIAGNOSIS — Z95.2 AORTIC VALVE REPLACED: Primary | ICD-10-CM

## 2025-07-17 DIAGNOSIS — Z95.2 AORTIC VALVE REPLACED: ICD-10-CM

## 2025-07-17 DIAGNOSIS — I05.9 MITRAL VALVE DISORDER: Primary | ICD-10-CM

## 2025-07-17 NOTE — PROGRESS NOTES
Patient identification verified with 2 identifiers.    Location:  [facility] Patient Self-Testing Program [phone]    Referring Physician: Dr. Ronni Jang (RENEWAL SENT ON 7/17/25)  Enrollment/ Re-enrollment date: 6/25/2026   INR Goal: 2.0-3.0  INR monitoring is per [facility] protocol.  Anticoagulation Medication: warfarin  Indication: Aortic Valve Replacement    Subjective   Bleeding signs/symptoms: No    Bruising: No   Major bleeding event: No  Thrombosis signs/symptoms: No  Thromboembolic event: No  Missed doses: No  Denies  Extra doses: No  Medication changes: No  Denies  Dietary changes: No  Denies  Change in health: No  Change in activity: No  Alcohol: No  Other concerns: No    Upcoming Procedures:  Does the Patient Have any upcoming procedures that require interruption in anticoagulation therapy? no  Does the patient require bridging? no      Anticoagulation Summary  As of 7/17/2025      INR goal:  2.0-3.0   TTR:  58.1% (1.7 y)   INR used for dosing:  --   Weekly warfarin total:  9.5 mg               Assessment/Plan   Subtherapeutic     1. New dose: Will increase dose and patient will retest in one week.    2. Next INR: 1 week      Education provided to patient during the visit:  Patient instructed to call in interim with questions, concerns and changes.

## 2025-07-24 ENCOUNTER — ANTICOAGULATION - WARFARIN VISIT (OUTPATIENT)
Dept: CARDIOLOGY | Facility: CLINIC | Age: 60
End: 2025-07-24
Payer: MEDICARE

## 2025-07-24 DIAGNOSIS — Z95.2 AORTIC VALVE REPLACED: Primary | ICD-10-CM

## 2025-07-24 DIAGNOSIS — Z79.01 LONG TERM (CURRENT) USE OF ANTICOAGULANTS: ICD-10-CM

## 2025-07-24 NOTE — PROGRESS NOTES
Patient identification verified with 2 identifiers.    Location: [facility] Patient Self-Testing Program [phone]    Referring Physician: Dr. Ronni Jang   Enrollment/ Re-enrollment date: 2026  INR Goal: 2.0-3.0  INR monitoring is per [facility] protocol.  Anticoagulation Medication: warfarin  Indication: Aortic Valve Replacemen    Subjective   Bleeding signs/symptoms: No    Bruising: No   Major bleeding event: No  Thrombosis signs/symptoms: No  Thromboembolic event: No  Missed doses: No  Extra doses: No  Medication changes: No  Dietary changes: No  Change in health: No  Change in activity: No  Alcohol: No  Other concerns: No    Upcoming Procedures:  Does the Patient Have any upcoming procedures that require interruption in anticoagulation therapy? no  Does the patient require bridging? no      Anticoagulation Summary  As of 2025      INR goal:  2.0-3.0   TTR:  56.8% (1.7 y)   INR used for dosin.60 (2025)   Weekly warfarin total:  10.5 mg               Assessment/Plan   Subtherapeutic     1. New dose: Will increase dose and patient will retest in one week.    2. Next INR: 1 week      Education provided to patient during the visit:  Patient instructed to call in interim with questions, concerns and changes.

## 2025-07-26 NOTE — ASSESSMENT & PLAN NOTE
Admitted to [facility] in June with shortness of breath and anemia.  He underwent echocardiogram study revealing significant valvular heart disease.  Mean gradient of 23 mmHg and peak gradient of 73 mmHg across mechanical aortic valve prosthesis.  Also severe degree of mitral valve regurgitation.  Cardiology service was consulted and they did not feel the patient was a candidate for redo sternotomy aortic valve replacement procedure given frailty and comorbidities.  With his complex valvular heart disease I am going to refer him to our valvular and structural heart program for just an opinion whether we should consider any alternative therapies such as a transcatheter [id number] repair procedure on the mitral valve.

## 2025-07-26 NOTE — ASSESSMENT & PLAN NOTE
Severe mitral valve regurgitation on echocardiogram done at [facility] in June.  Cardiology service felt the patient was not a candidate for redo sternotomy aortic valve replacement and mitral valve repair procedure given frailty and comorbidities.  I am going to refer the patient to our valve and structural heart clinic to see whether he would be a candidate for transcatheter [id number] repair procedure on the mitral valve.  At this time we will continue furosemide therapy to reduce vascular congestion but he appears to be well compensated at this time despite his valvular heart disease.

## 2025-07-30 ENCOUNTER — OFFICE VISIT (OUTPATIENT)
Facility: CLINIC | Age: 60
End: 2025-07-30
Payer: MEDICARE

## 2025-07-30 VITALS
WEIGHT: 86.5 LBS | HEIGHT: 60 IN | SYSTOLIC BLOOD PRESSURE: 106 MMHG | DIASTOLIC BLOOD PRESSURE: 60 MMHG | BODY MASS INDEX: 16.98 KG/M2 | OXYGEN SATURATION: 99 % | HEART RATE: 72 BPM

## 2025-07-30 DIAGNOSIS — I05.9 MITRAL VALVE DISORDER: ICD-10-CM

## 2025-07-30 DIAGNOSIS — Z95.2 AORTIC VALVE REPLACED: Primary | ICD-10-CM

## 2025-07-30 PROCEDURE — 99214 OFFICE O/P EST MOD 30 MIN: CPT | Performed by: INTERNAL MEDICINE

## 2025-07-30 PROCEDURE — 99212 OFFICE O/P EST SF 10 MIN: CPT

## 2025-07-30 PROCEDURE — 3008F BODY MASS INDEX DOCD: CPT | Performed by: INTERNAL MEDICINE

## 2025-07-30 PROCEDURE — 1036F TOBACCO NON-USER: CPT | Performed by: INTERNAL MEDICINE

## 2025-07-30 ASSESSMENT — LIFESTYLE VARIABLES
SKIP TO QUESTIONS 9-10: 1
HOW OFTEN DO YOU HAVE SIX OR MORE DRINKS ON ONE OCCASION: NEVER
HOW OFTEN DO YOU HAVE A DRINK CONTAINING ALCOHOL: NEVER
HOW MANY STANDARD DRINKS CONTAINING ALCOHOL DO YOU HAVE ON A TYPICAL DAY: PATIENT DOES NOT DRINK
AUDIT-C TOTAL SCORE: 0

## 2025-07-30 ASSESSMENT — ENCOUNTER SYMPTOMS
SHORTNESS OF BREATH: 0
PARESTHESIAS: 0
LOSS OF SENSATION IN FEET: 0
COUGH: 0
ABDOMINAL PAIN: 0
OCCASIONAL FEELINGS OF UNSTEADINESS: 0
NUMBNESS: 0
HEMATURIA: 0
DYSPNEA ON EXERTION: 0
BLURRED VISION: 0
DYSURIA: 0
PALPITATIONS: 0
DEPRESSION: 0

## 2025-07-30 ASSESSMENT — PATIENT HEALTH QUESTIONNAIRE - PHQ9
1. LITTLE INTEREST OR PLEASURE IN DOING THINGS: NOT AT ALL
2. FEELING DOWN, DEPRESSED OR HOPELESS: NOT AT ALL
SUM OF ALL RESPONSES TO PHQ9 QUESTIONS 1 AND 2: 0

## 2025-07-30 ASSESSMENT — PAIN SCALES - GENERAL: PAINLEVEL_OUTOF10: 0-NO PAIN

## 2025-07-30 NOTE — PROGRESS NOTES
Subjective   Robert Luna is a 60 y.o. male.    Chief Complaint:  Hospital Follow-up (SOB, CHF)    HPI  60-year-old male with a history of valvular heart disease reports for cardiology and hospital follow-up visit.  The patient underwent aortic valve replacement with a [employer] mechanical valve and repair of his aorta back in 1997.  He had a recent hospitalization at [facility] with some heart failure type symptoms.  He was diuresed symptoms improved.  Echocardiogram did reveal a severe degree of mitral valve regurgitation with a mean gradient of 23 mmHg and peak gradient of 73 mmHg across the mechanical aortic valve prosthesis.  Cardiology service felt that risks of redo sternotomy and valvular surgery were too high risk and recommend only conservative therapy.  Since his discharge the patient states he has been doing well.  He is walking his dog and staying physically active without any significant shortness of breath symptoms.  He has lost weight over the past few months, approximately 10 to 15 pounds.  But overall he states he feels well at this time.    Review of Systems   Constitutional: Negative for malaise/fatigue.   HENT:  Negative for congestion.    Eyes:  Negative for blurred vision.   Cardiovascular:  Negative for chest pain, dyspnea on exertion and palpitations.   Respiratory:  Negative for cough and shortness of breath.    Musculoskeletal:  Negative for joint pain.   Gastrointestinal:  Negative for abdominal pain.   Genitourinary:  Negative for dysuria and hematuria.   Neurological:  Negative for numbness and paresthesias.       Objective   Constitutional:       Appearance: Not in distress.   Eyes:      Conjunctiva/sclera: Conjunctivae normal.   Neck:      Vascular: JVD normal.   Pulmonary:      Breath sounds: Normal breath sounds. No wheezing. No rhonchi. No rales.   Cardiovascular:      Normal rate. Regular rhythm.      Murmurs: There is a grade 3/6 high frequency early systolic murmur.       No gallop.  No click. No rub.   Abdominal:      Palpations: Abdomen is soft.   Neurological:      General: No focal deficit present.      Mental Status: Alert.       Lab Review:       Assessment/Plan   The primary encounter diagnosis was Aortic valve replaced. A diagnosis of Mitral valve disorder was also pertinent to this visit.    Aortic valve replaced  Admitted to [facility] in June with shortness of breath and anemia.  He underwent echocardiogram study revealing significant valvular heart disease.  Mean gradient of 23 mmHg and peak gradient of 73 mmHg across mechanical aortic valve prosthesis.  Also severe degree of mitral valve regurgitation.  Cardiology service was consulted and they did not feel the patient was a candidate for redo sternotomy aortic valve replacement procedure given frailty and comorbidities.  With his complex valvular heart disease I am going to refer him to our valvular and structural heart program for just an opinion whether we should consider any alternative therapies such as a transcatheter nwpj-xk-stve repair procedure on the mitral valve.    Mitral valve disorder  Severe mitral valve regurgitation on echocardiogram done at [facility] in June.  Cardiology service felt the patient was not a candidate for redo sternotomy aortic valve replacement and mitral valve repair procedure given frailty and comorbidities.  I am going to refer the patient to our valve and structural heart clinic to see whether he would be a candidate for transcatheter [id number] repair procedure on the mitral valve.  At this time we will continue furosemide therapy to reduce vascular congestion but he appears to be well compensated at this time despite his valvular heart disease.

## 2025-07-31 ENCOUNTER — ANTICOAGULATION - WARFARIN VISIT (OUTPATIENT)
Dept: CARDIOLOGY | Facility: CLINIC | Age: 60
End: 2025-07-31
Payer: MEDICARE

## 2025-07-31 DIAGNOSIS — Z79.01 LONG TERM (CURRENT) USE OF ANTICOAGULANTS: ICD-10-CM

## 2025-07-31 DIAGNOSIS — Z95.2 AORTIC VALVE REPLACED: Primary | ICD-10-CM

## 2025-07-31 NOTE — PROGRESS NOTES
Patient identification verified with 2 identifiers.    Location: [facility] Patient Self-Testing Program [phone]    Referring Physician: DR. LACIE RIVERA  Enrollment/ Re-enrollment date: 2026   INR Goal: 2.0-3.0  INR monitoring is per [facility] protocol.  Anticoagulation Medication: warfarin  Indication: Aortic Valve Replacement    Subjective   Bleeding signs/symptoms: No    Bruising: No   Major bleeding event: No  Thrombosis signs/symptoms: No  Thromboembolic event: No  Missed doses: No  Extra doses: No  Medication changes: No  Dietary changes: No  Change in health: No  Change in activity: No  Alcohol: No  Other concerns: No    Upcoming Procedures:  Does the Patient Have any upcoming procedures that require interruption in anticoagulation therapy? no  Does the patient require bridging? no      Anticoagulation Summary  As of 2025      INR goal:  2.0-3.0   TTR:  56.2% (1.8 y)   INR used for dosin.00 (2025)   Weekly warfarin total:  10.5 mg               Assessment/Plan   Therapeutic     1. New dose: no change  SPOKE TO PT. CONFIRMED CURRENT DOSING INSTRUCTIONS  2. Next INR: 1 week      Education provided to patient during the visit:  Patient instructed to call in interim with questions, concerns and changes.   Patient educated on compliance with dosing, follow up appointments, and prescribed plan of care.

## 2025-08-07 ENCOUNTER — ANTICOAGULATION - WARFARIN VISIT (OUTPATIENT)
Dept: CARDIOLOGY | Facility: CLINIC | Age: 60
End: 2025-08-07
Payer: MEDICARE

## 2025-08-07 DIAGNOSIS — Z79.01 LONG TERM (CURRENT) USE OF ANTICOAGULANTS: ICD-10-CM

## 2025-08-07 DIAGNOSIS — Z95.2 AORTIC VALVE REPLACED: Primary | ICD-10-CM

## 2025-08-07 LAB
INR IN PPP BY COAGULATION ASSAY EXTERNAL: 2.2
PROTHROMBIN TIME (PT) IN PPP BY COAGULATION ASSAY EXTERNAL: NORMAL

## 2025-08-07 NOTE — PROGRESS NOTES
Patient identification verified with 2 identifiers.    Location: [facility] Patient Self-Testing Program [phone]    Referring Physician: DR. LACIE RIVERA  Enrollment/ Re-enrollment date: 2026   INR Goal: 2.0-3.0  INR monitoring is per [facility] protocol.  Anticoagulation Medication: warfarin  Indication: Aortic Valve Replacement    Subjective   Bleeding signs/symptoms: No    Bruising: No   Major bleeding event: No  Thrombosis signs/symptoms: No  Thromboembolic event: No  Missed doses: No  Extra doses: No  Medication changes: No  Dietary changes: No  Change in health: No  Change in activity: No  Alcohol: No  Other concerns: No    Upcoming Procedures:  Does the Patient Have any upcoming procedures that require interruption in anticoagulation therapy? no  Does the patient require bridging? no      Anticoagulation Summary  As of 2025      INR goal:  2.0-3.0   TTR:  56.7% (1.8 y)   INR used for dosin.20 (2025)   Weekly warfarin total:  10.5 mg               Assessment/Plan   Therapeutic     1. New dose: no change    2. Next INR: 1 week      Education provided to patient during the visit:  Patient instructed to call in interim with questions, concerns and changes.   Patient educated on interactions between medications and warfarin.   Patient educated on dietary consistency in vitamin k consumption.   Patient educated on affects of alcohol consumption while taking warfarin.   Patient educated on signs of bleeding/clotting.   Patient educated on compliance with dosing, follow up appointments, and prescribed plan of care.

## 2025-08-08 DIAGNOSIS — I05.9 MITRAL VALVE DISORDER: ICD-10-CM

## 2025-08-08 DIAGNOSIS — I34.0 NONRHEUMATIC MITRAL VALVE REGURGITATION: Primary | ICD-10-CM

## 2025-08-08 DIAGNOSIS — Z95.2 AORTIC VALVE REPLACED: ICD-10-CM

## 2025-08-11 ENCOUNTER — OFFICE VISIT (OUTPATIENT)
Dept: CARDIAC SURGERY | Facility: CLINIC | Age: 60
End: 2025-08-11
Payer: MEDICARE

## 2025-08-11 ENCOUNTER — OFFICE VISIT (OUTPATIENT)
Dept: CARDIOLOGY | Facility: CLINIC | Age: 60
End: 2025-08-11
Payer: MEDICARE

## 2025-08-11 VITALS
BODY MASS INDEX: 16.69 KG/M2 | HEART RATE: 81 BPM | DIASTOLIC BLOOD PRESSURE: 73 MMHG | SYSTOLIC BLOOD PRESSURE: 125 MMHG | HEIGHT: 60 IN | WEIGHT: 85 LBS

## 2025-08-11 DIAGNOSIS — I34.0 MITRAL VALVE INSUFFICIENCY, UNSPECIFIED ETIOLOGY: Primary | ICD-10-CM

## 2025-08-11 DIAGNOSIS — I05.9 MITRAL VALVE DISORDER: Primary | ICD-10-CM

## 2025-08-11 DIAGNOSIS — Z95.2 AORTIC VALVE REPLACED: ICD-10-CM

## 2025-08-11 PROCEDURE — 1036F TOBACCO NON-USER: CPT | Performed by: THORACIC SURGERY (CARDIOTHORACIC VASCULAR SURGERY)

## 2025-08-11 PROCEDURE — 99205 OFFICE O/P NEW HI 60 MIN: CPT | Performed by: THORACIC SURGERY (CARDIOTHORACIC VASCULAR SURGERY)

## 2025-08-11 PROCEDURE — 99215 OFFICE O/P EST HI 40 MIN: CPT | Performed by: INTERNAL MEDICINE

## 2025-08-11 PROCEDURE — 99213 OFFICE O/P EST LOW 20 MIN: CPT | Performed by: THORACIC SURGERY (CARDIOTHORACIC VASCULAR SURGERY)

## 2025-08-11 PROCEDURE — G2211 COMPLEX E/M VISIT ADD ON: HCPCS | Performed by: INTERNAL MEDICINE

## 2025-08-11 PROCEDURE — 3008F BODY MASS INDEX DOCD: CPT | Performed by: THORACIC SURGERY (CARDIOTHORACIC VASCULAR SURGERY)

## 2025-08-11 ASSESSMENT — PATIENT HEALTH QUESTIONNAIRE - PHQ9
SUM OF ALL RESPONSES TO PHQ9 QUESTIONS 1 AND 2: 0
2. FEELING DOWN, DEPRESSED OR HOPELESS: NOT AT ALL
1. LITTLE INTEREST OR PLEASURE IN DOING THINGS: NOT AT ALL

## 2025-08-11 ASSESSMENT — ENCOUNTER SYMPTOMS
PSYCHIATRIC NEGATIVE: 1
DEPRESSION: 0
OCCASIONAL FEELINGS OF UNSTEADINESS: 0
LOSS OF SENSATION IN FEET: 0
CARDIOVASCULAR NEGATIVE: 1
NEUROLOGICAL NEGATIVE: 1
SHORTNESS OF BREATH: 1

## 2025-08-11 ASSESSMENT — PAIN SCALES - GENERAL: PAINLEVEL_OUTOF10: 0-NO PAIN

## 2025-08-12 LAB
ALBUMIN SERPL-MCNC: 4.6 G/DL (ref 3.6–5.1)
ALP SERPL-CCNC: 85 U/L (ref 35–144)
ALT SERPL-CCNC: 23 U/L (ref 9–46)
ANION GAP SERPL CALCULATED.4IONS-SCNC: 12 MMOL/L (CALC) (ref 7–17)
AST SERPL-CCNC: 29 U/L (ref 10–35)
BILIRUB SERPL-MCNC: 0.8 MG/DL (ref 0.2–1.2)
BUN SERPL-MCNC: 20 MG/DL (ref 7–25)
CALCIUM SERPL-MCNC: 9.2 MG/DL (ref 8.6–10.3)
CHLORIDE SERPL-SCNC: 104 MMOL/L (ref 98–110)
CO2 SERPL-SCNC: 23 MMOL/L (ref 20–32)
CREAT SERPL-MCNC: 0.64 MG/DL (ref 0.7–1.35)
EGFRCR SERPLBLD CKD-EPI 2021: 107 ML/MIN/1.73M2
ERYTHROCYTE [DISTWIDTH] IN BLOOD BY AUTOMATED COUNT: 15.4 % (ref 11–15)
GLUCOSE SERPL-MCNC: 90 MG/DL (ref 65–99)
HCT VFR BLD AUTO: 38.5 % (ref 38.5–50)
HGB BLD-MCNC: 11.8 G/DL (ref 13.2–17.1)
MCH RBC QN AUTO: 29.5 PG (ref 27–33)
MCHC RBC AUTO-ENTMCNC: 30.6 G/DL (ref 32–36)
MCV RBC AUTO: 96.3 FL (ref 80–100)
PLATELET # BLD AUTO: 200 THOUSAND/UL (ref 140–400)
PMV BLD REES-ECKER: 11 FL (ref 7.5–12.5)
POTASSIUM SERPL-SCNC: 4.6 MMOL/L (ref 3.5–5.3)
PROT SERPL-MCNC: 7.3 G/DL (ref 6.1–8.1)
RBC # BLD AUTO: 4 MILLION/UL (ref 4.2–5.8)
SODIUM SERPL-SCNC: 139 MMOL/L (ref 135–146)
WBC # BLD AUTO: 4.2 THOUSAND/UL (ref 3.8–10.8)

## 2025-08-14 ENCOUNTER — ANTICOAGULATION - WARFARIN VISIT (OUTPATIENT)
Dept: CARDIOLOGY | Facility: CLINIC | Age: 60
End: 2025-08-14
Payer: MEDICARE

## 2025-08-14 DIAGNOSIS — Z79.01 LONG TERM (CURRENT) USE OF ANTICOAGULANTS: ICD-10-CM

## 2025-08-14 DIAGNOSIS — Z95.2 AORTIC VALVE REPLACED: Primary | ICD-10-CM

## 2025-08-15 DIAGNOSIS — I05.9 MITRAL VALVE DISORDER: ICD-10-CM

## 2025-08-15 DIAGNOSIS — I34.0 NONRHEUMATIC MITRAL VALVE REGURGITATION: Primary | ICD-10-CM

## 2025-08-21 ENCOUNTER — ANTICOAGULATION - WARFARIN VISIT (OUTPATIENT)
Dept: CARDIOLOGY | Facility: CLINIC | Age: 60
End: 2025-08-21
Payer: MEDICARE

## 2025-08-21 DIAGNOSIS — Z95.2 AORTIC VALVE REPLACED: Primary | ICD-10-CM

## 2025-08-21 DIAGNOSIS — Z79.01 LONG TERM (CURRENT) USE OF ANTICOAGULANTS: ICD-10-CM

## 2025-08-21 LAB
INR IN PPP BY COAGULATION ASSAY EXTERNAL: 2.2 (ref 2–3)
PROTHROMBIN TIME (PT) IN PPP BY COAGULATION ASSAY EXTERNAL: NORMAL

## 2025-08-27 ENCOUNTER — PATIENT OUTREACH (OUTPATIENT)
Dept: PRIMARY CARE | Facility: CLINIC | Age: 60
End: 2025-08-27
Payer: MEDICARE

## 2025-08-28 ENCOUNTER — ANTICOAGULATION - WARFARIN VISIT (OUTPATIENT)
Dept: CARDIOLOGY | Facility: CLINIC | Age: 60
End: 2025-08-28
Payer: MEDICARE

## 2025-08-28 DIAGNOSIS — Z79.01 LONG TERM (CURRENT) USE OF ANTICOAGULANTS: ICD-10-CM

## 2025-08-28 DIAGNOSIS — Z95.2 AORTIC VALVE REPLACED: Primary | ICD-10-CM

## 2025-09-02 ENCOUNTER — HOSPITAL ENCOUNTER (OUTPATIENT)
Dept: CARDIOLOGY | Facility: HOSPITAL | Age: 60
Setting detail: OUTPATIENT SURGERY
Discharge: HOME | End: 2025-09-02
Payer: MEDICARE

## 2025-09-02 ENCOUNTER — HOSPITAL ENCOUNTER (OUTPATIENT)
Facility: HOSPITAL | Age: 60
Setting detail: OUTPATIENT SURGERY
Discharge: HOME | End: 2025-09-02
Attending: INTERNAL MEDICINE | Admitting: INTERNAL MEDICINE
Payer: MEDICARE

## 2025-09-02 ENCOUNTER — ANESTHESIA EVENT (OUTPATIENT)
Dept: CARDIOLOGY | Facility: HOSPITAL | Age: 60
End: 2025-09-02
Payer: MEDICARE

## 2025-09-02 ENCOUNTER — APPOINTMENT (OUTPATIENT)
Dept: CARDIOLOGY | Facility: HOSPITAL | Age: 60
End: 2025-09-02
Payer: MEDICARE

## 2025-09-02 ENCOUNTER — ANESTHESIA (OUTPATIENT)
Dept: CARDIOLOGY | Facility: HOSPITAL | Age: 60
End: 2025-09-02
Payer: MEDICARE

## 2025-09-02 VITALS
RESPIRATION RATE: 20 BRPM | HEART RATE: 84 BPM | OXYGEN SATURATION: 97 % | DIASTOLIC BLOOD PRESSURE: 62 MMHG | SYSTOLIC BLOOD PRESSURE: 111 MMHG

## 2025-09-02 DIAGNOSIS — I34.0 NONRHEUMATIC MITRAL VALVE REGURGITATION: ICD-10-CM

## 2025-09-02 DIAGNOSIS — I05.9 MITRAL VALVE DISORDER: ICD-10-CM

## 2025-09-02 DIAGNOSIS — R01.1 CARDIAC MURMUR, UNSPECIFIED: ICD-10-CM

## 2025-09-02 DIAGNOSIS — Z95.2 AORTIC VALVE REPLACED: ICD-10-CM

## 2025-09-02 LAB
ANION GAP BLDA CALCULATED.4IONS-SCNC: 10 MMO/L (ref 10–25)
ANION GAP BLDV CALCULATED.4IONS-SCNC: 10 MMOL/L (ref 10–25)
ANION GAP BLDV CALCULATED.4IONS-SCNC: 13 MMOL/L (ref 10–25)
ANION GAP BLDV CALCULATED.4IONS-SCNC: 9 MMOL/L (ref 10–25)
BASE EXCESS BLDA CALC-SCNC: -1 MMOL/L (ref -2–3)
BASE EXCESS BLDV CALC-SCNC: -1.5 MMOL/L (ref -2–3)
BASE EXCESS BLDV CALC-SCNC: -1.8 MMOL/L (ref -2–3)
BASE EXCESS BLDV CALC-SCNC: -2.4 MMOL/L (ref -2–3)
BODY TEMPERATURE: 37 DEGREES CELSIUS
CA-I BLDA-SCNC: 1.15 MMOL/L (ref 1.1–1.33)
CA-I BLDV-SCNC: 1.08 MMOL/L (ref 1.1–1.33)
CA-I BLDV-SCNC: 1.1 MMOL/L (ref 1.1–1.33)
CA-I BLDV-SCNC: 1.11 MMOL/L (ref 1.1–1.33)
CHLORIDE BLDA-SCNC: 104 MMOL/L (ref 98–107)
CHLORIDE BLDV-SCNC: 103 MMOL/L (ref 98–107)
CHLORIDE BLDV-SCNC: 104 MMOL/L (ref 98–107)
CHLORIDE BLDV-SCNC: 109 MMOL/L (ref 98–107)
GLUCOSE BLDA-MCNC: 97 MG/DL (ref 74–99)
GLUCOSE BLDV-MCNC: 92 MG/DL (ref 74–99)
GLUCOSE BLDV-MCNC: 95 MG/DL (ref 74–99)
GLUCOSE BLDV-MCNC: 95 MG/DL (ref 74–99)
HCO3 BLDA-SCNC: 24.8 MMOL/L (ref 22–26)
HCO3 BLDV-SCNC: 19.7 MMOL/L (ref 22–26)
HCO3 BLDV-SCNC: 23.7 MMOL/L (ref 22–26)
HCO3 BLDV-SCNC: 24.3 MMOL/L (ref 22–26)
HCT VFR BLD EST: 29 % (ref 41–52)
HCT VFR BLD EST: 31 % (ref 41–52)
HGB BLDA-MCNC: 10.2 G/DL (ref 13.5–17.5)
HGB BLDV-MCNC: 9.6 G/DL (ref 13.5–17.5)
HGB BLDV-MCNC: 9.7 G/DL (ref 13.5–17.5)
HGB BLDV-MCNC: 9.8 G/DL (ref 13.5–17.5)
LACTATE BLDA-SCNC: 0.8 MMOL/L (ref 0.4–2)
LACTATE BLDV-SCNC: 0.8 MMOL/L (ref 0.4–2)
LACTATE BLDV-SCNC: 0.9 MMOL/L (ref 0.4–2)
LACTATE BLDV-SCNC: 1 MMOL/L (ref 0.4–2)
OXYHGB MFR BLDA: 95.3 % (ref 94–98)
OXYHGB MFR BLDV: 60.5 % (ref 45–75)
OXYHGB MFR BLDV: 67.3 % (ref 45–75)
OXYHGB MFR BLDV: 98.1 % (ref 45–75)
PCO2 BLDA: 45 MM HG (ref 38–42)
PCO2 BLDV: 23 MM HG (ref 41–51)
PCO2 BLDV: 45 MM HG (ref 41–51)
PCO2 BLDV: 46 MM HG (ref 41–51)
PH BLDA: 7.35 PH (ref 7.38–7.42)
PH BLDV: 7.32 PH (ref 7.33–7.43)
PH BLDV: 7.34 PH (ref 7.33–7.43)
PH BLDV: 7.54 PH (ref 7.33–7.43)
PO2 BLDA: 76 MM HG (ref 85–95)
PO2 BLDV: 107 MM HG (ref 35–45)
PO2 BLDV: 36 MM HG (ref 35–45)
PO2 BLDV: 40 MM HG (ref 35–45)
POCT INTERNATIONAL NORMALIZATION RATIO: 1.2
POCT PROTHROMBIN TIME: 14.2 SECONDS
POTASSIUM BLDA-SCNC: 3.6 MMOL/L (ref 3.5–5.3)
POTASSIUM BLDV-SCNC: 3.5 MMOL/L (ref 3.5–5.3)
POTASSIUM BLDV-SCNC: 3.8 MMOL/L (ref 3.5–5.3)
POTASSIUM BLDV-SCNC: 3.9 MMOL/L (ref 3.5–5.3)
SAO2 % BLDA: 97 % (ref 94–100)
SAO2 % BLDV: 61 % (ref 45–75)
SAO2 % BLDV: 68 % (ref 45–75)
SAO2 % BLDV: 99 % (ref 45–75)
SODIUM BLDA-SCNC: 135 MMOL/L (ref 136–145)
SODIUM BLDV-SCNC: 133 MMOL/L (ref 136–145)
SODIUM BLDV-SCNC: 135 MMOL/L (ref 136–145)
SODIUM BLDV-SCNC: 136 MMOL/L (ref 136–145)

## 2025-09-02 PROCEDURE — C1751 CATH, INF, PER/CENT/MIDLINE: HCPCS | Performed by: INTERNAL MEDICINE

## 2025-09-02 PROCEDURE — 2500000005 HC RX 250 GENERAL PHARMACY W/O HCPCS: Performed by: NURSE PRACTITIONER

## 2025-09-02 PROCEDURE — 2720000007 HC OR 272 NO HCPCS: Performed by: INTERNAL MEDICINE

## 2025-09-02 PROCEDURE — C1769 GUIDE WIRE: HCPCS | Performed by: INTERNAL MEDICINE

## 2025-09-02 PROCEDURE — 93320 DOPPLER ECHO COMPLETE: CPT

## 2025-09-02 PROCEDURE — 2500000004 HC RX 250 GENERAL PHARMACY W/ HCPCS (ALT 636 FOR OP/ED): Performed by: INTERNAL MEDICINE

## 2025-09-02 PROCEDURE — 2780000003 HC OR 278 NO HCPCS: Performed by: INTERNAL MEDICINE

## 2025-09-02 PROCEDURE — 93005 ELECTROCARDIOGRAM TRACING: CPT

## 2025-09-02 PROCEDURE — C1894 INTRO/SHEATH, NON-LASER: HCPCS | Performed by: INTERNAL MEDICINE

## 2025-09-02 PROCEDURE — C1887 CATHETER, GUIDING: HCPCS | Performed by: INTERNAL MEDICINE

## 2025-09-02 PROCEDURE — 2550000001 HC RX 255 CONTRASTS: Performed by: INTERNAL MEDICINE

## 2025-09-02 PROCEDURE — 7100000009 HC PHASE TWO TIME - INITIAL BASE CHARGE: Performed by: INTERNAL MEDICINE

## 2025-09-02 PROCEDURE — 93312 ECHO TRANSESOPHAGEAL: CPT | Performed by: INTERNAL MEDICINE

## 2025-09-02 PROCEDURE — 93320 DOPPLER ECHO COMPLETE: CPT | Performed by: INTERNAL MEDICINE

## 2025-09-02 PROCEDURE — 99153 MOD SED SAME PHYS/QHP EA: CPT | Performed by: INTERNAL MEDICINE

## 2025-09-02 PROCEDURE — 93460 R&L HRT ART/VENTRICLE ANGIO: CPT | Performed by: INTERNAL MEDICINE

## 2025-09-02 PROCEDURE — 93318 ECHO TRANSESOPHAGEAL INTRAOP: CPT

## 2025-09-02 PROCEDURE — C1760 CLOSURE DEV, VASC: HCPCS | Performed by: INTERNAL MEDICINE

## 2025-09-02 PROCEDURE — 7100000010 HC PHASE TWO TIME - EACH INCREMENTAL 1 MINUTE: Performed by: INTERNAL MEDICINE

## 2025-09-02 PROCEDURE — 99152 MOD SED SAME PHYS/QHP 5/>YRS: CPT | Performed by: INTERNAL MEDICINE

## 2025-09-02 PROCEDURE — 99152 MOD SED SAME PHYS/QHP 5/>YRS: CPT

## 2025-09-02 PROCEDURE — 84132 ASSAY OF SERUM POTASSIUM: CPT | Performed by: INTERNAL MEDICINE

## 2025-09-02 PROCEDURE — 2500000005 HC RX 250 GENERAL PHARMACY W/O HCPCS: Performed by: INTERNAL MEDICINE

## 2025-09-02 PROCEDURE — 93325 DOPPLER ECHO COLOR FLOW MAPG: CPT | Performed by: INTERNAL MEDICINE

## 2025-09-02 RX ORDER — FENTANYL CITRATE 50 UG/ML
INJECTION, SOLUTION INTRAMUSCULAR; INTRAVENOUS AS NEEDED
Status: DISCONTINUED | OUTPATIENT
Start: 2025-09-02 | End: 2025-09-02 | Stop reason: HOSPADM

## 2025-09-02 RX ORDER — LIDOCAINE HYDROCHLORIDE 10 MG/ML
INJECTION, SOLUTION EPIDURAL; INFILTRATION; INTRACAUDAL; PERINEURAL AS NEEDED
Status: DISCONTINUED | OUTPATIENT
Start: 2025-09-02 | End: 2025-09-02 | Stop reason: HOSPADM

## 2025-09-02 RX ORDER — HEPARIN SODIUM 1000 [USP'U]/ML
INJECTION, SOLUTION INTRAVENOUS; SUBCUTANEOUS AS NEEDED
Status: DISCONTINUED | OUTPATIENT
Start: 2025-09-02 | End: 2025-09-02 | Stop reason: HOSPADM

## 2025-09-02 RX ORDER — MIDAZOLAM HYDROCHLORIDE 1 MG/ML
INJECTION, SOLUTION INTRAMUSCULAR; INTRAVENOUS AS NEEDED
Status: DISCONTINUED | OUTPATIENT
Start: 2025-09-02 | End: 2025-09-02 | Stop reason: HOSPADM

## 2025-09-02 RX ORDER — HEPARIN SODIUM 5000 [USP'U]/ML
5000 INJECTION, SOLUTION INTRAVENOUS; SUBCUTANEOUS EVERY 8 HOURS
Status: DISCONTINUED | OUTPATIENT
Start: 2025-09-02 | End: 2025-09-02 | Stop reason: HOSPADM

## 2025-09-02 RX ADMIN — BENZOCAINE 2 SPRAY: 200 SPRAY DENTAL; ORAL; PERIODONTAL at 08:50

## 2025-09-02 RX ADMIN — FENTANYL CITRATE 50 MCG: 50 INJECTION, SOLUTION INTRAMUSCULAR; INTRAVENOUS at 08:51

## 2025-09-02 RX ADMIN — Medication 3 DOSE: at 08:49

## 2025-09-02 RX ADMIN — MIDAZOLAM 1 MG: 1 INJECTION INTRAMUSCULAR; INTRAVENOUS at 08:51

## 2025-09-02 ASSESSMENT — PAIN SCALES - GENERAL
PAINLEVEL_OUTOF10: 0 - NO PAIN

## 2025-09-02 ASSESSMENT — PAIN - FUNCTIONAL ASSESSMENT
PAIN_FUNCTIONAL_ASSESSMENT: 0-10

## 2025-09-03 LAB — EJECTION FRACTION: 63 %

## 2025-09-04 VITALS
WEIGHT: 85 LBS | RESPIRATION RATE: 18 BRPM | HEIGHT: 60 IN | DIASTOLIC BLOOD PRESSURE: 64 MMHG | SYSTOLIC BLOOD PRESSURE: 106 MMHG | BODY MASS INDEX: 16.69 KG/M2 | TEMPERATURE: 36.3 F | OXYGEN SATURATION: 98 % | HEART RATE: 75 BPM

## 2025-09-05 LAB
ATRIAL RATE: 78 BPM
P AXIS: 39 DEGREES
P OFFSET: 219 MS
P ONSET: 148 MS
PR INTERVAL: 146 MS
Q ONSET: 221 MS
QRS COUNT: 13 BEATS
QRS DURATION: 86 MS
QT INTERVAL: 370 MS
QTC CALCULATION(BAZETT): 421 MS
QTC FREDERICIA: 403 MS
R AXIS: 65 DEGREES
T AXIS: 64 DEGREES
T OFFSET: 406 MS
VENTRICULAR RATE: 78 BPM

## (undated) DEVICE — GLIDESHEATH, SLENDER 6FR, 10CM, NITINOL KIT

## (undated) DEVICE — HAND CONTROL, PREMIUM, AT-X65L

## (undated) DEVICE — Device

## (undated) DEVICE — GLIDESHEATH, SLENDER, 7FR, 10CM, NITINOL KIT

## (undated) DEVICE — TR BAND, RADIAL COMPRESSION, STANDARD, 24CM

## (undated) DEVICE — TUBING, MANIFOLD, LOW PRESSURE

## (undated) DEVICE — PACK, ANGIO P2, CUSTOM, LAKE

## (undated) DEVICE — CATHETER, OPTITORQUE, 5FR, TIG, 1H/100CM

## (undated) DEVICE — CATHETER, THERMODILUTION, SWAN GANZ, 7 FR, 110CM, STANDARD

## (undated) DEVICE — GUIDEWIRE, J TIP, 3 MM, 0.035 IN X 260 CM, PTFE

## (undated) DEVICE — CATHETER, ANGIO, IMPULSE, PIG 155 DEG, 5 FR X 110 CM